# Patient Record
Sex: MALE | Race: BLACK OR AFRICAN AMERICAN | NOT HISPANIC OR LATINO | Employment: UNEMPLOYED | URBAN - METROPOLITAN AREA
[De-identification: names, ages, dates, MRNs, and addresses within clinical notes are randomized per-mention and may not be internally consistent; named-entity substitution may affect disease eponyms.]

---

## 2017-10-19 ENCOUNTER — HOSPITAL ENCOUNTER (EMERGENCY)
Facility: OTHER | Age: 57
Discharge: HOME | End: 2017-10-19
Attending: EMERGENCY MEDICINE
Payer: COMMERCIAL

## 2017-10-19 VITALS
BODY MASS INDEX: 34.1 KG/M2 | OXYGEN SATURATION: 94 % | RESPIRATION RATE: 17 BRPM | TEMPERATURE: 97 F | HEART RATE: 76 BPM | SYSTOLIC BLOOD PRESSURE: 143 MMHG | HEIGHT: 68 IN | WEIGHT: 225 LBS | DIASTOLIC BLOOD PRESSURE: 104 MMHG

## 2017-10-19 DIAGNOSIS — R53.83 FATIGUE: ICD-10-CM

## 2017-10-19 LAB
ANION GAP SERPL CALC-SCNC: 8 MMOL/L
BASOPHILS # BLD AUTO: 0.06 K/UL
BASOPHILS NFR BLD: 0.5 %
BUN SERPL-MCNC: 12 MG/DL
CALCIUM SERPL-MCNC: 9.3 MG/DL
CHLORIDE SERPL-SCNC: 105 MMOL/L
CO2 SERPL-SCNC: 24 MMOL/L
CREAT SERPL-MCNC: 0.9 MG/DL
DIFFERENTIAL METHOD: ABNORMAL
EOSINOPHIL # BLD AUTO: 0.1 K/UL
EOSINOPHIL NFR BLD: 1.2 %
ERYTHROCYTE [DISTWIDTH] IN BLOOD BY AUTOMATED COUNT: 14.2 %
EST. GFR  (AFRICAN AMERICAN): >60 ML/MIN/1.73 M^2
EST. GFR  (NON AFRICAN AMERICAN): >60 ML/MIN/1.73 M^2
GLUCOSE SERPL-MCNC: 142 MG/DL
HCT VFR BLD AUTO: 44.6 %
HGB BLD-MCNC: 15 G/DL
LYMPHOCYTES # BLD AUTO: 2.1 K/UL
LYMPHOCYTES NFR BLD: 18.5 %
MCH RBC QN AUTO: 27.7 PG
MCHC RBC AUTO-ENTMCNC: 33.6 G/DL
MCV RBC AUTO: 82 FL
MONOCYTES # BLD AUTO: 0.5 K/UL
MONOCYTES NFR BLD: 4.2 %
NEUTROPHILS # BLD AUTO: 8.5 K/UL
NEUTROPHILS NFR BLD: 75.3 %
PLATELET # BLD AUTO: 329 K/UL
PMV BLD AUTO: 10 FL
POCT GLUCOSE: 148 MG/DL (ref 70–110)
POTASSIUM SERPL-SCNC: 4 MMOL/L
RBC # BLD AUTO: 5.41 M/UL
SODIUM SERPL-SCNC: 137 MMOL/L
TROPONIN I SERPL DL<=0.01 NG/ML-MCNC: 0.03 NG/ML
WBC # BLD AUTO: 11.24 K/UL

## 2017-10-19 PROCEDURE — 82962 GLUCOSE BLOOD TEST: CPT

## 2017-10-19 PROCEDURE — 93010 ELECTROCARDIOGRAM REPORT: CPT | Mod: ,,, | Performed by: INTERNAL MEDICINE

## 2017-10-19 PROCEDURE — 99284 EMERGENCY DEPT VISIT MOD MDM: CPT | Mod: 25

## 2017-10-19 PROCEDURE — 85025 COMPLETE CBC W/AUTO DIFF WBC: CPT

## 2017-10-19 PROCEDURE — 80048 BASIC METABOLIC PNL TOTAL CA: CPT

## 2017-10-19 PROCEDURE — 84484 ASSAY OF TROPONIN QUANT: CPT

## 2017-10-19 PROCEDURE — 93005 ELECTROCARDIOGRAM TRACING: CPT

## 2017-10-19 RX ORDER — SODIUM CHLORIDE 9 MG/ML
1000 INJECTION, SOLUTION INTRAVENOUS
Status: DISCONTINUED | OUTPATIENT
Start: 2017-10-19 | End: 2017-10-19

## 2017-10-19 NOTE — ED NOTES
"Pt refused orthostatics. Pt wants to leave facility. Pt states, "I was brought here against my own will." Pt encouraged to stay for blood test results. Verbalizes will stay. Daughter and son at bedside.   "

## 2017-10-19 NOTE — ED NOTES
"Pt reports dizziness that started earlier today. "He had a lot of sugar yesterday." Daughter at bedside. Dizziness on standing, sweating "a lot," nausea. Denies chest pain.   "

## 2017-10-19 NOTE — ED NOTES
Pt reports taking 8 medications, but giles snot know the names of them. Has not taken BP meds in a couple days.

## 2017-10-19 NOTE — ED PROVIDER NOTES
Encounter Date: 10/19/2017       History     Chief Complaint   Patient presents with    Fatigue     C/o generalized weakness onset yesterday evening after eating fast food. Pt is diabetic, last metformin this AM. Currently clammy.      Patient is a 58 y/o male with CAD (2 stents), DM and HTN who presents to the ED with generalized fatigue. He reports eating a large amount of sugar yesterday and began feeling dizzy and tired. He receives his care at [facility] but he states his wife is here in the hospital with a knee replacement and his daughter wanted him to get evaluated while here. He reports compliance with Metformin and glipizide. He states he feels flushed and dehydrated. He states his dizziness is worse with lying down and standing up . He denies syncopal episodes. He states this has happened before and he increased his hydration with relief. He states he ran out of his HTN medicine one month ago. He denies CP or SOB. He denies nausea, vomiting, polydipsia, or recent illness.        The history is provided by the patient.     Review of patient's allergies indicates:  No Known Allergies  Past Medical History:   Diagnosis Date    Coronary artery disease     Diabetes mellitus     Hypertension      Past Surgical History:   Procedure Laterality Date    CORONARY ANGIOPLASTY WITH STENT PLACEMENT       History reviewed. No pertinent family history.  Social History   Substance Use Topics    Smoking status: Never Smoker    Smokeless tobacco: Never Used    Alcohol use No     Review of Systems   Constitutional: Positive for diaphoresis and fatigue. Negative for chills and fever.   HENT: Negative for congestion and sore throat.    Eyes: Negative for visual disturbance.   Respiratory: Negative for shortness of breath.    Cardiovascular: Negative for chest pain.   Gastrointestinal: Negative for abdominal pain, diarrhea, nausea and vomiting.   Endocrine: Negative for polydipsia and polyphagia.   Genitourinary:  Negative for decreased urine volume and dysuria.   Musculoskeletal: Negative for back pain.   Skin: Negative for rash.   Neurological: Positive for dizziness. Negative for speech difficulty, numbness and headaches.       Physical Exam     Initial Vitals [10/19/17 1103]   BP Pulse Resp Temp SpO2   (!) 163/95 74 16 97.4 °F (36.3 °C) 100 %      MAP       117.67         Physical Exam    Constitutional: Vital signs are normal. He is cooperative.    male in no acute distress. He is slow to respond which may be secondary to his hearing. He is non-toxic appearing    HENT:   Head: Normocephalic and atraumatic.   Hearing aid to left ear   Eyes: Conjunctivae and EOM are normal. Pupils are equal, round, and reactive to light.   Neck: Normal range of motion. Neck supple.   Cardiovascular: Normal rate and intact distal pulses.   No murmur heard.  Irregular rhythm with PVCs noted on monitor    Pulmonary/Chest: Breath sounds normal. He has no wheezes. He has no rhonchi. He has no rales.   Abdominal: Soft. Bowel sounds are normal. There is no tenderness. There is no rebound and no guarding.   Musculoskeletal: Normal range of motion. He exhibits no edema.   Neurological: GCS eye subscore is 4. GCS verbal subscore is 5. GCS motor subscore is 6.   AAOx4. CN II-XII were intact. Good finger-to-nose task ability. Teddy intact. Strength 5/5 in all extremities. Normal gait   Skin: Skin is warm. Capillary refill takes less than 2 seconds. No rash noted.   Face is mildly clammy    Psychiatric: He has a normal mood and affect. His behavior is normal.         ED Course   Procedures  Labs Reviewed   TROPONIN I - Abnormal; Notable for the following:        Result Value    Troponin I 0.028 (*)     All other components within normal limits    Narrative:     Recoll. [id number] by ODESSA at 10/19/2017 12:14, reason: 3+ hemolysis   notified kyree trinh er @1214   CBC W/ AUTO DIFFERENTIAL - Abnormal; Notable for the following:     Gran # 8.5  (*)     Gran% 75.3 (*)     All other components within normal limits   BASIC METABOLIC PANEL - Abnormal; Notable for the following:     Glucose 142 (*)     All other components within normal limits    Narrative:     Recoll. [id number] by ODESSA at 10/19/2017 12:14, reason: 3+ hemolysis   notified kyree cabrera @1214   POCT GLUCOSE - Abnormal; Notable for the following:     POCT Glucose 148 (*)     All other components within normal limits     EKG Readings: (Independently Interpreted)   Normal sinus rhythm at rate of 78. Normal intervals. Narrow QRS. Inverted T waves in leads III, avF, V5 and V6. Slight ST elevation in lead V2. No previous EKG to compare to          Medical Decision Making:   Initial Assessment:   Urgent evaluation of a 57 y.o. male with a medical history of CAD, HTN and DM presenting to the emergency department complaining of fatigue. Patient is afebrile, nontoxic appearing and hemodynamically stable.  ED Management:  Patient with fatigue and mild diaphoresis. He does not appear ill. EKG reveals possible ischemic changes. We do not have previous EKG to compare to. Will request records from [facility]. Given history, will get basic labs and troponin. POCT glucose is 148. I do not suspect DKA. Blood work does not reveal anemia.   Upon reevaluation, patient states he feels much better. He is adamant on leaving. I have explained to him we are waiting on his remaining lab work and we do not have prior records to compare his EKG to. He wants to leave [place]. I do not suspect ACS. He is stable for discharge and was told we would call with results. BMP reveals hyperglycemia at 142 and troponin is slightly bumped at 0.028.   I have reviewed his old EKGs from [facility] and there are no acute changes. I have called the patient and informed him of the results.                    ED Course      Clinical Impression:     1. Uncontrolled type 2 diabetes mellitus without complication, without long-term current use of insulin     2. Fatigue                             Barney Seals PA-C  10/19/17 1937

## 2019-01-23 DIAGNOSIS — I69.30 LATE EFFECT OF STROKE: Primary | ICD-10-CM

## 2019-01-28 ENCOUNTER — TELEPHONE (OUTPATIENT)
Dept: REHABILITATION | Facility: HOSPITAL | Age: 59
End: 2019-01-28

## 2019-01-28 ENCOUNTER — CLINICAL SUPPORT (OUTPATIENT)
Dept: REHABILITATION | Facility: HOSPITAL | Age: 59
End: 2019-01-28
Payer: MEDICARE

## 2019-01-28 ENCOUNTER — CLINICAL SUPPORT (OUTPATIENT)
Dept: REHABILITATION | Facility: HOSPITAL | Age: 59
End: 2019-01-28
Attending: PSYCHIATRY & NEUROLOGY
Payer: MEDICARE

## 2019-01-28 DIAGNOSIS — R29.898 WEAKNESS OF RIGHT UPPER EXTREMITY: ICD-10-CM

## 2019-01-28 DIAGNOSIS — Z74.09 IMPAIRED MOBILITY AND ACTIVITIES OF DAILY LIVING: ICD-10-CM

## 2019-01-28 DIAGNOSIS — I69.30 LATE EFFECT OF STROKE: ICD-10-CM

## 2019-01-28 DIAGNOSIS — R47.01 GLOBAL APHASIA: ICD-10-CM

## 2019-01-28 DIAGNOSIS — Z78.9 IMPAIRED MOBILITY AND ACTIVITIES OF DAILY LIVING: ICD-10-CM

## 2019-01-28 DIAGNOSIS — Z74.09 IMPAIRED FUNCTIONAL MOBILITY, BALANCE, GAIT, AND ENDURANCE: ICD-10-CM

## 2019-01-28 DIAGNOSIS — R29.898 WEAKNESS OF RIGHT LEG: ICD-10-CM

## 2019-01-28 PROCEDURE — G8987 SELF CARE CURRENT STATUS: HCPCS | Mod: CL,PO | Performed by: OPTOMETRIST

## 2019-01-28 PROCEDURE — G8979 MOBILITY GOAL STATUS: HCPCS | Mod: CL,PO

## 2019-01-28 PROCEDURE — G8988 SELF CARE GOAL STATUS: HCPCS | Mod: CK,PO | Performed by: OPTOMETRIST

## 2019-01-28 PROCEDURE — G8978 MOBILITY CURRENT STATUS: HCPCS | Mod: CM,PO

## 2019-01-28 PROCEDURE — 92523 SPEECH SOUND LANG COMPREHEN: CPT | Mod: PO

## 2019-01-28 PROCEDURE — 97166 OT EVAL MOD COMPLEX 45 MIN: CPT | Mod: PO | Performed by: OPTOMETRIST

## 2019-01-28 PROCEDURE — 97162 PT EVAL MOD COMPLEX 30 MIN: CPT | Mod: PO

## 2019-01-28 PROCEDURE — 97530 THERAPEUTIC ACTIVITIES: CPT | Mod: PO | Performed by: OPTOMETRIST

## 2019-01-28 NOTE — PLAN OF CARE
"Date: 1/28/2019   Start Time:  1350  Stop Time:  1435     Name: Torito Harris   MRN: [id number]             Therapy Diagnosis: No diagnosis found. Physician: Ian Feliciano MD  Physician Orders: Amb Referral to speech therapy   Medical Diagnosis from Referral: I69.30 (ICD-10-CM) - Late effect of stroke     Visit #/Visits authorized: 1/ 10  Date of Evaluation:  1/28/2019   Insurance Authorization Period: 12/31/19   Plan of Care Expiration:   1/28/2019 to 3/25/19     Time In: 1350  Time Out: 1435  Total Billable Time: 45 minutes  Procedure Min.   Speech- Language Evaluation    45   Total Billable Time: 45     Precautions:Standard and Fall  Subjective   Date of Onset: 12/26/17  History of Current Condition: Pt's wife present and acts as a historian for the patient. She reports that patient suffered a stroke in December of 2017. He spent several months at [facility] before discharge home where he had been receiving speech therapy via home health. Pt's wife reports that patient "does not speak well" and has difficulty with his memory.   Past Medical History: Torito Harris  has a past medical history of Coronary artery disease, Diabetes mellitus, and Hypertension.  Torito Harris  has a past surgical history that includes Coronary angioplasty with stent.  Medical Hx and Allergies:  Torito currently has no medications in their medication list. Review of patient's allergies indicates:  No Known Allergies  Imaging: No Imaging    Prior Therapy:  Inpatient rehabilitation and home health that ended "recently"  Social History:  Pt lives with their family (his wife and daughter). Pt has one daughter (age 16) and one son (age 24).  Occupation: Prior to CVA, pt was a .   Prior Level of Function: Independent    Current Level of Function: Requires 24/7 assist at home from family   Pain:   0/10  Pain Location / Description: N/A; no pain reported  Nutrition:  Regular/Thin Liquid   Patient's Therapy Goals:  " ""speak"  Objective   Western Aphasia Battery - Revised (WAB-R) was administered to evaluate the patient's receptive and expressive language function.The purpose stated in the manual for the WAB-R is to determine the presence, severity, and type of aphasia; measure the patient's level of performance to provide a baseline for detecting change over time; provide a comprehensive assessment of the patients language assests and deficits in order to guide treatment and management; infer the location and etiology of the lesion causing the aphasia.  The following results were revealed:      Spontaneous Speech Score:           6 / 20  Auditory Comprehension Score:    6.1 / 10  Repetition Score:                              4.4 /10  Naming and Word Finding Score: 4.1/ 10  Aphasia Quotient (AQ):                    41.2 / 100  Aphasia Classification: Global      CM NOMS (National Outcome Measure System):   Spoken Language Expression  LEVEL 3 The communication partner must assume responsibility for structuring thecommunication exchange, and with consistent and moderate cueing, the individual canproduce words and phrases that are appropriate and meaningful in context.     LEVEL 4: The individual consistently responds accurately to simple yes/no questions andoccasionally follows simple directions without cues. Moderate contextual support isusually needed to understand complex sentences/messages. The individual is able tounderstand limited conversations about routine daily activities with familiarcommunication partners.      .  Treatment   Education: POC, role of SLP and scheduling/ cancellation policy  was discussed with pt. Patient and family members expressed understanding.      Home Program: Not established at this time.  Assessment   Torito is a 58 y.o. male referred to outpatient SpeechTherapy with a medical diagnosis of CVA. Pt presents with global aphasia c/b reduced auditory verbal comprehension, reduced fluency, reduced " repetition, reduced naming & word finding.  Demonstrates impairments including limitations as described in the problem list. Positive prognostic factors include motivation and family support. Negative prognostic factors include time since onset.Barriers to progress include possible transportation from family. Patient will benefit from skilled, outpatient neurological rehabilitation speech therapy.     Rehab Potential: good  Pt's spiritual, cultural and educational needs considered and pt agreeable to plan of care and goals.     Short Term Goals (4 weeks):   1. Pt will complete R/L body part discrimination tasks with 70% Carmen.   2. Pt will follow 3 unit body commands with 80% acc ind.  3. Pt will follow 3 unit commands with visual stimuli with 70% Carmen.  4. Pt will name common objects with 90% Carmen.  5. Pt will generate a response to basic sentence completions with 80% Carmen .  6. Pt will name 5 concrete category items with Carmen.   7. Pt will repeat  3-5 word phrases with 80% Carmen.   8. Assess reading and writing skills.     Long Term Goals (8 weeks):   1. Pt will comprehend communication related to basic medical and social needs and utilize compensatory strategies to maintain safety and participate socially in functional living environment.     2. The patient will develop functional, cognitive-linguistic based skills and utilize compensatory strategies to communicate wants and needs effectively to different conversation partners, maintain safety and participate socially in functional living environment   3. Pt will develop functional reading and writing skills and utilize compensatory strategies to maintain safety during ADL's and read and understand everyday adult material independently.         Plan   Plan of Care Certification: 1/28/2019  to 3/25/19  Recommended Treatment Plan:  Patient will participate in the [facility] neurological rehabilitation program for speech therapy 2 times per week to address his   Communication, Cognition and Motor Speech deficits, to educate patient and their family, and to participate in a home exercise program.     Other Recommendations: None at this time     Therapist's Name:     Farrah Atkinson M.S. CCC-SLP  Speech Language Pathologist     Date: 1/28/2019

## 2019-01-28 NOTE — PROGRESS NOTES
"Outpatient Neurological Rehabilitation  Speech and Language Therapy Evaluation    Date: 1/28/2019   Start Time:  1350  Stop Time:  1435    Name: Torito Harris   MRN: [id number]    Therapy Diagnosis: No diagnosis found. Physician: Ian Feliciano MD  Physician Orders: Amb Referral to speech therapy   Medical Diagnosis from Referral: I69.30 (ICD-10-CM) - Late effect of stroke    Visit #/Visits authorized: ***/ ***  Date of Evaluation:  1/28/2019   Insurance Authorization Period: ***   Plan of Care Expiration:   1/28/2019 to 2/25/19    Time In: 1350  Time Out: 1435  Total Billable Time: 45 minutes  Procedure Min.   Speech- Language Evaluation    45   Total Billable Time: 45    Precautions:Standard and Fall  Subjective   Date of Onset: 12/26/17  History of Current Condition:   Pt's wife present and acts as a historian for the patient. She reports that patient suffered a stroke in December of 2017. He spent several months at [facility] before discharge home where he had been receiving speech therapy via home health. Pt's wife reports that patient "does not speak well".  Past Medical History: Torito Harris  has a past medical history of Coronary artery disease, Diabetes mellitus, and Hypertension.  Torito Harris  has a past surgical history that includes Coronary angioplasty with stent.  Medical Hx and Allergies:  Torito currently has no medications in their medication list. Review of patient's allergies indicates:  No Known Allergies  Imaging: No Imaging ***   Prior Therapy:  Inpatient rehabilitation and home health that ended "recently"  Social History:  Pt lives with their family (his wife and daughter). Pt has one daughter (age 16) and one son (age 24).  Occupation: Prior to CVA, pt was a .   Prior Level of Function: Independent    Current Level of Function: ***  Pain:   0/10  Pain Location / Description: ***  Nutrition:  ***  Patient's Therapy Goals:  ***  Objective   Western Aphasia Battery - Revised " "(WAB-R) was administered to evaluate the patient's receptive and expressive language function.The purpose stated in the manual for the WAB-R is to determine the presence, severity, and type of aphasia; measure the patient's level of performance to provide a baseline for detecting change over time; provide a comprehensive assessment of the patients language assests and deficits in order to guide treatment and management; infer the location and etiology of the lesion causing the aphasia.  The following results were revealed:     Spontaneous Speech Score: 6 / 20  Auditory Comprehension Score: 6.1 / 10  Repetition Score:   4.4 /10  Naming and Word Finding Score: 4.1/ 10  Aphasia Quotient (AQ):   41.2 / 100  Aphasia Classification: Global     CM NOMS (National Outcome Measure System):   Severity Modifier for Medicare G-Code:  ***  Treatment   Treatment Time In: {Blank single:19197::"n/a"}  Treatment Time Out: {Blank single:19197::"n/a"}  Total Treatment Time: {Blank single:19197::"*** minutes","n/a"}  {Blank single:19197::"n/a"}    Education: POC, role of SLP and scheduling/ cancellation policy  was discussed with pt. Patient and family members expressed understanding.     Home Program: Not established at this time.  Assessment   Torito is a 58 y.o. male referred to outpatient SpeechTherapy with a medical diagnosis of ***. Pt presents with ***  Demonstrates impairments including limitations as described in the problem list. Positive prognostic factors include ***. Negative prognostic factors include ***.Barriers to progress include ***. Patient will benefit from skilled, outpatient neurological rehabilitation speech therapy.    Rehab Potential: good  Pt's spiritual, cultural and educational needs considered and pt agreeable to plan of care and goals.    Short Term Goals (4 weeks):   ***  Long Term Goals (8 weeks):   ***    Plan   Plan of Care Certification: 1/28/2019  to ***  Recommended Treatment Plan:  Patient will " participate in the [facility] neurological rehabilitation program for speech therapy {NUMBER 1-5:67066} times per week to address his  {SLP TREATMENT AREAS:[id number]} deficits, to educate patient and their family, and to participate in a home exercise program.    Other Recommendations: ***    Therapist's Name:   Farrah Atkinson CCC-SLP     Date: 1/28/2019

## 2019-01-28 NOTE — PLAN OF CARE
[facility]  Physical Therapy Neurological Rehabilitation Initial Evaluation    Name: Torito Harris  Clinic Number: [id number]    Therapy Diagnosis:   Encounter Diagnoses   Name Primary?    Weakness of right leg     Impaired functional mobility, balance, gait, and endurance      Physician: Ian Feliciano MD    Physician Orders: PT Eval and Treat   Medical Diagnosis from Referral: Late effect of stroke  Evaluation Date: 1/28/2019  Authorization Period Expiration: 01/26/19-12/31/19  Plan of Care Expiration: 01/28/19 - 03/25/19  Visit # / Visits authorized: 01/ 10 (Vist # 1 of 2019)    Time In: 16:00  Time Out: 16:45  Total Billable Time: 45 minutes    Precautions: Standard, Diabetes, Fall and right hemiplegia, hearing aid, safety awareness, cognitive deficits, speech deficits    Subjective   Date of onset: ~1 year ago (December 2017)  History of current condition - Torito's wife present to assist with history. Patient and his wife report that he suffered a CVA ~ 1 year ago. He has been participating in  PT, OT, and SLP since that time, but was recently discharged. He currently requires (A) with toileting, bathing, dressing; (S) for stair negotiation; S to Mod I for functional transfers; S to Mod I for ambulation with abigail walker; S for driving short distances. Pt was active and completely (I) prior to his CVVA. He would like to continue to address remaining mobility deficits to continue to improve his (I) and safety with functional mobility.      Medical History:   Past Medical History:   Diagnosis Date    Coronary artery disease     Diabetes mellitus     Hypertension    No PMH of cancer    Surgical History:   Torito Harris  has a past surgical history that includes Coronary angioplasty with stent.    Medications:   Torito currently has no medications in their medication list.    Allergies:   Review of patient's allergies indicates:  No Known Allergies     Imaging: none  "present on file in [facility]    Prior Therapy:  PT/OT/SLP, just completed  Social History: lives with his wife and 15 y/o daughter; SSH, 7 steps total to enter home (steps outside, RHR; 2 steps to step down once inside the home); pt able to perform steps with abigail walker and SBA  Falls: 0  DME:  hospital bed, abigail walker, w/c, TTB  Home Environment: see above  Exercise Routine / History: none currently   Family Present at time of Eval: wife present and able to assist with history  Occupation: was working as a  prior to CVA; not currently working since CVA  Prior Level of Function: (I) with all functional mobility/ADL, working, driving, running errands  Current Level of Function: (A) ADL,S to Mod I for functional mobility, Mod I to S for ambulation with abigail walker, driving short distances with supervision; mother performs cooking and cleaning and running errands    Pain:  Current 0/10, worst 0/10, best 0/10     Pts goals: "To move better and walk better."    Objective     Mental status: alert, oriented to person, place, and time  Appearance: Casually dressed  Behavior:  cooperative  Attention Span and Concentration:  Grossly intact    Dominant hand:  right     Posture Alignment in sitting:   Head: forward head   Scapulae: shoulders rest in abduction, R scapula with increased winging compared to L side  Trunk: trunk deviated right   Pelvis: posterior pelvic tilt   Legs: right LE rests in ER but able to rest in neutral upon cues to fix posture    Posture Alignment in standing:   Head: forward head   Scapulae: shoulders rest in abduction, R scapula with increased winging compared to L side   Trunk: trunk deviated right, anterior forward lean  Pelvis: R pelvic drop  Legs: decreased weight bearing through RLE,  Increased R knee flexion in stance, limited R hip extension in stance     Sensation: Light Touch: Impaired: throughout RUE and RLE         Tone: gross BLE tone WFL    Visual/Auditory: reports some " blurriness since stroke  Tracking:Impaired: slight impairment with tracking in diagonal planes  Saccades: NT  Acuity:Intact  R/L discrimination: Intact  Visual field: slight L visual field neglect    Coordination:   - fine motor: see OT eval  - UE coordination: see OT eval    - LE coordination:  Not tested this date    ROM:   UPPER EXTREMITY--AROM/PROM  (R) UE: limited as follows: AROM limited with shoulder flexion, shoulder AB, elbow flexion, elbow extension, and wrist extension; PROM limited with shoulder AB and elbow extension; see OT eval for further detail  (L) UE: WNLs           RANGE OF MOTION--LOWER EXTREMITIES  (R) LE Hip: full   Knee: full   Ankle: DF limited to neutral     (L) LE: Hip: normal   Knee: normal   Ankle: normal    Strength: manual muscle test grades below   Upper Extremity Strength - see OT evaluation for more detail    Lower Extremity Strength  Right LE  Left LE    Hip Flexion: 4/5 Hip Flexion: 5/5   Hip Extension:  4-/5 Hip Extension: 5/5   Hip Abduction: 3/5 Hip Abduction: 5/5   Hip Adduction: 3/5 Hip Adduction 5/5   Knee Extension: 4/5 Knee Extension: 5/5   Knee Flexion: 4/5 Knee Flexion: 5/5   Ankle Dorsiflexion: 0/5 Ankle Dorsiflexion: 5/5   Ankle Plantarflexion: 2-/5 Ankle Plantarflexion: 5/5     Abdominal Strength: At least  3/5    Flexibility: Limited R gastroc/soleus; however, R GS tightness allows for functional R ankle clearance during gait     Evaluation   30 second Chair Rise  (adults > 59 y/o) 11 completed with SWETHA abigail walker, CGA     Gait Assessment:   - AD used: abigail walker on L side  - Assistance: SBA  - Distance: at least 200 ft, able to turn 90 deg through doorways and wound obstacles    GAIT DEVIATIONS:  Torito displays the following deviations with ambulation: decreased step length, decreased marcelo, decreased time in stance in RLE,  decreased step length of LLE, R anterior and lateral trunk flexion, increased R knee flexion during weight acceptance, R foot flat at IC,  decreased R hip extension during weight acceptance and during late stance, no reciprocal arm swing    Impairments contributing to deviations: impaired motor control, decreased strength, balance deficits, and muscular endurance deficits.     Endurance Deficit: moderate     Evaluation   Timed Up and Go 34 sec c/ abigail walker c/ SBA   Self Selected Walking Speed 0.33 m/sec (6m/18s)  C/ abigail walker c/ SBA   Fast Walking Speed NT       Functional Mobility (Bed mobility, transfers)  Bed mobility: Mod I  Supine to sit: Mod I  Sit to supine: Mod I  Transfers to bed: S to Mod I  Transfers to toilet: pt uses diapers  Sit to stand: S to Mod I  Stand pivot: S to Mod I  Car transfers: S to Mod I  Wheelchair mobility: dependent    ADL's: see OT evaluation for more detail  Feeding: Mod I  Grooming: (A) required  Hygiene: (A) required  UB Dressing: (A) required  LB Dressing: (A) required  Toileting: (A) required, pt uses diapers  Bathing: (A) required, bed bath    Functional Limitations Reports - G Codes  Category: Mobility   Tool: SSWS  Score: 0.33 m/sec   Current: CM at least 80% < 100% impaired, limited or restricted  Goal: CL at least 60% < 80% impaired, limited or restricted      CMS Impairment/Limitation/Restriction for FOTO for Cerebrovascular Disorders Survey    Therapist reviewed FOTO scores for Torito Harris on 1/28/2019.   FOTO documents entered into [employer] - see Media section.    Limitation Score: 72%         TREATMENT   No treatment provided this date.     Home Exercises and Patient Education Provided    Education provided: POC review, scheduling    Written Home Exercises Provided: to be provided at first follow up session.    Assessment   Torito is a 58 y.o. male referred to outpatient Physical Therapy with a medical diagnosis of late effect of CVA.  Pt presents with R sided hemiparesis, impaired gait, impaired balance, impaired endurance, and impaired functional mobility. RLE strength is notably weaker compared to  LLE strength, with most significant deficits present at R ankle. Pt demonstrates good endurance on chair rise test, but he required 1 UE support and CGA to perform test safely. Pt's current TUG score places him at a high fall risk for household ambulation. Additionally, his SSWS score places him at an increased fall risk for community ambulation. During ambulation in closed hallway, pt required VCs to avoid obstacles on L side due to mild L sided neglect. At this time, pt's mobility appears most limited by his R sided strength deficits, decreased weight acceptance through LLE in stance/ambulation, and  limited muscular endurance. Pt's mobility limitations are compounded by his decreased safety awareness, his visual neglect, and impaired sensation. Pt to benefit from continued PT intervention to address mobility deficits listed above, to improve safety and independence with functional mobility, and to decrease risk of fall.     Pt prognosis is Good.   Pt will benefit from skilled outpatient Physical Therapy to address the deficits stated above and in the chart below, provide pt/family education, and to maximize pt's level of independence.     Plan of care discussed with patient: Yes  Pt's spiritual, cultural and educational needs considered and patient is agreeable to the plan of care and goals as stated below:     Anticipated Barriers for therapy: hearing deficit, speech deficits, cognitive deficits, decreased safety awareness, fall risk, visual neglect, transportation assistance required    Medical Necessity is demonstrated by the following  History  Co-morbidities and personal factors that may impact the plan of care Co-morbidities:   diabetes, transportation assistance required, young age, coronary artery disease, HTN, s/p coronary angioplasty c/ stenting    Personal Factors:   education level     high   Examination  Body Structures and Functions, activity limitations and participation restrictions that may  impact the plan of care Body Regions:   lower extremities  upper extremities  trunk    Body Systems:    gross symmetry  ROM  strength  gross coordinated movement  balance  gait  transfers  transitions  motor control  motor learning    Participation Restrictions:   Limited household and community mobility  No current HEP  Poor safety awareness  Hearing deficits  Visual deficits  Fall risk    Activity limitations:   Learning and applying knowledge  listening    General Tasks and Commands  undertaking multiple tasks    Communication  communicating with/receiving spoken language    Mobility  lifting and carrying objects  fine hand use (grasping/picking up)  walking  driving (bike, car, motorcycle)    Self care  washing oneself (bathing, drying, washing hands)  caring for body parts (brushing teeth, shaving, grooming)  toileting  dressing  looking after one's health    Domestic Life  shopping  cooking  doing house work (cleaning house, washing dishes, laundry)  assisting others    Interactions/Relationships  no deficits    Life Areas  employment    Community and Social Life  community life  recreation and leisure         high   Clinical Presentation evolving clinical presentation with changing clinical characteristics moderate   Decision Making/ Complexity Score: moderate     Goals:  Short Term Goals: 4 weeks   1. Pt to be (I) with established HEP  2. Pt to improve R hip flexion strength MMT score to at least 4+/5 for improved gait mechanics  3. Pt to improve R hip extension strength MMT score to at least 4/5 for improved gait mechanics  4. Pt to improve R hip AB and R hip AD strength MMT scores to at least 3+/5 for improved gait mechanics  5.  Pt to improve R knee flexion and extension strength MMT scores to at least 4+/5 for improved gait mechanics  6. Pt to improve R ankle PF strength MMT score to at least 2/5 for improved gait mechanics  7. Pt to improve chair rise score to at least 5 times with no more than 1 UE  support and S for improved endurance and safety with functional transfers  8. Pt to improve TUG score to at least 30 seconds for improved safety with household mobility  9. Pt to improve SSWS score to at least 0.40 m/sec for improved safety with community mobility.     Long Term Goals: 8 weeks   1. Pt to be (I) with advanced HEP  2. Pt to improve R hip extension strength MMT score to at least 4+/5 for improved gait mechanics  3. Pt to improve R hip AB and R hip AD strength MMT scores to at least 4-/5 for improved gait mechanics  4. Pt to improve R ankle PF strength MMT score to at least 2+/5 for improved gait mechanics  5. Pt to improve chair rise score to at least 5 times with no UE support and S for improved endurance and safety with functional transfers  6. Pt to improve TUG score to at least 26 seconds for improved safety with household mobility  7. Pt to improve SSWS score to at least 0.50 m/sec for improved safety with community mobility.       Plan   Plan of care Certification: 1/28/2019 to 03/25/19.    Outpatient Physical Therapy 2 times weekly for 4 weeks to include the following interventions: Gait Training, Manual Therapy, Neuromuscular Re-ed, Orthotic Management and Training, Patient Education, Self Care, Therapeutic Activites and Therapeutic Exercise.     Hanh Brooke, PT

## 2019-01-28 NOTE — TELEPHONE ENCOUNTER
SLP called pt to reschedule appt 2/2 originally scheduled time was no longer available.   Pt's wife accepted new time.   1/28/19 at 1345 at [address] [place] [address]

## 2019-01-29 NOTE — PLAN OF CARE
[facility] Occupational Therapy  Initial Neurological Evaluation     Date: 1/28/2019  Patient: Torito Harris  Chart Number: [id number]    Therapy Diagnosis:   Encounter Diagnoses   Name Primary?    Impaired mobility and activities of daily living     Weakness of right upper extremity      Physician: Ian Feliciano MD    Physician Orders: Evaluate and treat  Medical Diagnosis: L CVA  Evaluation Date: 1/28/2019  Plan of Care Expiration Period: 3/11/2019  Insurance Authorization period Expiration: 12/31/2019  Date of Return to MD: Unknown at this time  Visit # / Visits Authorized: 1 / 10    Time In:1430  Time Out: 1515  Total Billable (one on one) Time: 45 minutes    Precautions: Standard    Subjective     History of Current Condition: Pt suffered a L CVA 12/6/2017 and was treated at [facility] for Acute care and InPt Rehab. Since discharge home in February of 2018 he has had only home health services.     Involved Side: Right  Dominant Side: Right  Date of Onset: 12/6/2017  Surgical Procedure: n/a  Imaging: unknown  Previous Therapy: PT, OT, Speech for InPt and HH.     Patient's Goals for Therapy: Pt states that he would like to improve use of his R UE for tasks.     Pain:  Pain Related Behaviors Observed: no   Functional Pain Scale Rating 0-10:   0/10 on average  n/a/10 at best  4/10 at worst  Location: R shoulder during PROM  Description: Aching    Occupation:  Pt worked as a body and fender repairman   Working presently: on disability As Pt has not worked since 12/17.       Functional Limitations/Social History:    Prior Level of Function: Prior to CVA in 12/17 he Independent with all ADLs and IADLs and working full time  Current Level of Function: Able to ambulate with Supervision but needs assist with dressing and bathing. Performs grooming and feeding tasks with non-dominant hand     Home/Living environment : lives with their spouse  Home Access: One story home with 5 ROBERT with  railing of L   DME: transfer tub bench, wheelchair and hospital bed and abigail walker     Leisure: Fishing    Driving: Pt currently not driving but has attempted driving short distances using his L foot to operate pedals. He was told not to attempt to drive again until he is tested for driving.     Past Medical History/Physical Systems Review:     Past Medical History:  Torito Harris  has a past medical history of Coronary artery disease, Diabetes mellitus, and Hypertension.    Past Surgical History:  Torito Harris  has a past surgical history that includes Coronary angioplasty with stent.    Current Medications:  Torito currently has no medications in their medication list.    Allergies:  Review of patient's allergies indicates:  No Known Allergies     Objective     Cognitive Exam:  Oriented: Person and Place as Pt was confused with month and year  Behaviors: friendly and cooperative  Follows Commands/attention: Follows one-step commands  Communication: expressive aphasia and has a component of receptive aphasia  Memory: Impaired STM as determined by 3 word recall after 1 minute and 3 minutes  Safety awareness/insight to disability: aware of diagnosis, treatment, and prognosis  Coping skills/emotional control: Appropriate to situation    Visual/Perceptual:  Tracking: intact  Saccades: intact  Acuity: intact  R/L discrimination: intact  Visual field: intact  Motor Planning Praxis: intact with L UE     Physical Exam:  Postural examination/scapula alignment: Rounded shoulder, Affected scapula depressed and Affected scapula downwardly rotated  Joint integrity: Subluxation of 1 finger on R  Skin integrity: Intact  Edema: Mild in R hand    Joint Evaluation  AROM  1/28/2019 AROM  1/28/2019 PROM   1/28/2019    Left Right Right   Shoulder flex 0-180 WNL  throughout 40 140   Shoulder Abd 0-180  25 120   Shoulder ER 0-90  50 90   Shoulder IR 0-90  30 80   Shoulder Extension 0-80  50 80   Elbow flex/ext 0-150  10-90 0-140      Fist: Pt able to make loose fist with increased time      Strength 1/28/2019 1/28/2019   **within available ROM** Left Right   Shoulder flex 4+/5 2-/5   Shoulder abd 4+/5 2-/5   Shoulder ER 4/5 2/5   Shoulder IR 4/5 2/5   Shoulder Extension 4+/5 2+/5   Elbow flex 4+/5 2+/5   Elbow ext 4+/5 2+/5   Wrist flex 4+/5 2+/5   Wrist ext 4+/5 2+/5      Strength: (CLAUDE Dynamometer in lbs.) Average 3 trials, Position II:     1/28/2019 1/28/2019    Left Right   Rung II 86# 15#       Fine Motor Coordination: 9 Hole Peg Test  Left 1/28/2019 Right 1/28/2019      Unable to perform     Gross motor coordination:    - MORRIS (Rapid Alternating Movements): Normal on L. Delayed on R at wrist for supination/pronation   - Finger to Nose (5 times): Unable on R   - Finger Flicks (coordination moving from digit flexion to digit extension): slow movement on R, unable to flick digits     Tone:  Modified Elmer Scale:   0 - No increase in muscle tone    Comments: Pt actually has low tone in R shoulder and upper arm     Sensation:  Torito  reports sensation is intact throughout    Balance:   Static Sit - FAIR+: Able to take MINIMAL challenges from all directions  Dynamic sit- FAIR+: Able to take MINIMAL challenges from all directions  Static Stand - FAIR: Maintains without assist, but unable to take any challenges   Dynamic stand - FAIR-: Maintains without assist but inconsistent     Endurance Deficit: mild                    Functional Status      Functional Mobility:  Bed mobility: Mod I  Roll to left: Mod I  Roll to right: Mod I  Supine to sit: Mod I  Sit to supine: Mod I  Transfers to bed: Mod I  Transfers to toilet: Mod I  Car transfers: Min A  Wheelchair mobility: Min A    ADL's:  Feeding: Mod I as pt uses non dominant L UE for task  Grooming: Mod I (as above)   Hygiene: Mod I ( as above)  UB Dressing: Mod A  LB Dressing: Max A  Toileting: Mod A  Bathing: Min A    IADL's:  Homecare: D  Cooking: D  Laundry: D  Yard work: D  Use of  telephone: Min A  Money management: D  Medication management: D, Pt's wife sets up for him  Handwriting:Mod I (minimal writing with L hand)   Technology Use: Min A using L hand only     Comments: Pt has expressive aphasia which also limits use of telephone      Therapist reviewed FOTO scores for Torito Harris on 1/28/2019.   FOTO documents entered into [facility] - see Media section.    CMS Impairment/Limitation/Restriction for FOTO Cerebrovascular Disorders Survey  Status Limitation G-Code CMS Severity Modifier  Intake 28% 72% Current Status CL - At least 60 percent but less than 80 percent  Predicted 41% 59% Goal Status+ CK - At least 40 percent but less than 60 percent  D/C Status CL **only report if this is a one time visit  +Based on FOTO predicted change score         Treatment     Treatment Time In: 1505  Treatment Time Out: 1515  Total Treatment time separate from Evaluation time:10 min    Torito participated in dynamic functional therapeutic activities to improve functional performance for 10 minutes, including:  - AAROM and PROM to L UE with emphasis on long stretch at end ranges. Pt was encouraged to practice more opening of his R hand and perform less gripping and flexion as to not interfere with his ability to open his hand.     Home Exercises and Patient Education Provided    Education provided:   -role of OT, goals for OT, scheduling/cancellations, insurance limitations with patient.  -Additional Education provided: Instructions to not use his R hand for gripping tasks and to focus more of opening of hand and functional movements of R wrist and elbow.     Assessment     Torito Harris is a 58 y.o. male referred to outpatient occupational therapy and presents with a medical diagnosis of L CVA, resulting in decreased flexibility, decreased range of motion, decreased muscle strength, impaired function and decreased work ability and demonstrates limitations as described in the chart below. Following  medical record review it is determined that pt will benefit from occupational therapy services in order to maximize pain free and/or functional use of right UE.    Pt prognosis is Good due to Pt motivated to participate and good family support from his spouse.   Pt will benefit from skilled outpatient Occupational Therapy to address the deficits stated above and in the chart below, provide pt/family education, and to maximize pt's level of independence.     Plan of care discussed with patient: Yes  Pt's spiritual, cultural and educational needs considered and patient is agreeable to the plan of care and goals as stated below:     Anticipated Barriers for therapy: None noted at this time     Medical Necessity is demonstrated by the following  Profile and History Assessment of Occupational Performance Level of Clinical Decision Making Complexity Score   Occupational Profile:   Torito Harris is a 58 y.o. male who lives with their spouse and is currently disability . Torito Harris has difficulty with  bathing and dressing  driving/transportation management and housework/household chores  affecting his/her daily functional abilities. His/her main goal for therapy is to improve functional use of his R hand and arm.     Comorbidities:   diabetes and history of CVA    Medical and Therapy History Review:   Expanded               Performance Deficits    Physical:  Joint Stability  Muscle Power/Strength  Muscle Endurance  Control of Voluntary Movement   Strength  Gross Motor Coordination  Fine Motor Coordination    Cognitive:  Orientation  Memory    Psychosocial:    Social Interaction     Clinical Decision Making:  moderate    Assessment Process:  Problem-Focused Assessments    Modification/Need for Assistance:  Minimal-Moderate Modifications/Assistance    Intervention Selection:  Several Treatment Options       moderate  Based on PMHX, co morbidities , data from assessments and functional level of assistance  required with task and clinical presentation directly impacting function.       The following goals were discussed with the patient and patient is in agreement with them as to be addressed in the treatment plan.     Goals:  Short Term Goals: 3 weeks   ROM/Strength to perform the ADL's and functional activities listed below,   Pt will improve functional  strength needed for tasks to 20# in R hand.  Pt will improve AROM for shoulder flexion needed for functional tasks to 60* in R UE.   UE dressing will improve to Min A   LB dressing will improve to Mod A   Toileting will improve to Min A incorporating R hand for task   Pt will be Independent with HEP to improve ROM and FM skills.      Long Term Goals: 6 weeks   ROM/Strength to perform the ADL's and functional activities listed below  Pt will improve functional  strength needed for tasks to 30# in R hand.  Pt will improve AROM for shoulder flexion needed for functional tasks to 80* in R UE.   UE dressing will improve to Supervision   LB dressing will improve to Min A   Toileting will improve to Supervision incorporating R hand for task   Pt will complete clinical testing for skills needed for driving  G code self care CK    Plan   Certification Period/Plan of care expiration: 1/28/2019 to 3/11/2019.    Outpatient Occupational Therapy 2 times weekly for 6 weeks to include the following interventions: Patient Education, Self Care, Therapeutic Activites and Therapeutic Exercise.    TIAGO Carlisle       I certify the need for these services furnished under this plan of treatment and while under my care.  ____________________________________ Physician/Referring Practitioner   Date of Signature

## 2019-01-30 PROBLEM — I69.30 LATE EFFECT OF STROKE: Status: ACTIVE | Noted: 2019-01-30

## 2019-01-30 PROBLEM — R47.01 GLOBAL APHASIA: Status: ACTIVE | Noted: 2019-01-30

## 2019-01-30 PROBLEM — I69.30 LATE EFFECT OF STROKE: Status: RESOLVED | Noted: 2019-01-30 | Resolved: 2019-01-30

## 2019-02-04 ENCOUNTER — DOCUMENTATION ONLY (OUTPATIENT)
Dept: REHABILITATION | Facility: HOSPITAL | Age: 59
End: 2019-02-04

## 2019-02-05 ENCOUNTER — CLINICAL SUPPORT (OUTPATIENT)
Dept: REHABILITATION | Facility: HOSPITAL | Age: 59
End: 2019-02-05
Attending: PSYCHIATRY & NEUROLOGY
Payer: MEDICARE

## 2019-02-05 DIAGNOSIS — Z74.09 IMPAIRED MOBILITY AND ACTIVITIES OF DAILY LIVING: ICD-10-CM

## 2019-02-05 DIAGNOSIS — Z74.09 IMPAIRED FUNCTIONAL MOBILITY, BALANCE, GAIT, AND ENDURANCE: ICD-10-CM

## 2019-02-05 DIAGNOSIS — R29.898 WEAKNESS OF RIGHT LEG: ICD-10-CM

## 2019-02-05 DIAGNOSIS — R29.898 WEAKNESS OF RIGHT UPPER EXTREMITY: ICD-10-CM

## 2019-02-05 DIAGNOSIS — R47.01 GLOBAL APHASIA: ICD-10-CM

## 2019-02-05 DIAGNOSIS — Z78.9 IMPAIRED MOBILITY AND ACTIVITIES OF DAILY LIVING: ICD-10-CM

## 2019-02-05 PROCEDURE — 97112 NEUROMUSCULAR REEDUCATION: CPT | Mod: PO,59

## 2019-02-05 PROCEDURE — 97110 THERAPEUTIC EXERCISES: CPT | Mod: PO

## 2019-02-05 PROCEDURE — 92507 TX SP LANG VOICE COMM INDIV: CPT | Mod: PO

## 2019-02-05 NOTE — PROGRESS NOTES
"  Physical Therapy Daily Treatment Note     [id number]    Therapy Diagnosis:   Encounter Diagnoses   Name Primary?    Weakness of right leg     Impaired functional mobility, balance, gait, and endurance      Physician: Ian Feliciano MD    Visit Date: 2/5/2019    Physician Orders: PT Eval and Treat   Medical Diagnosis from Referral: Late effect of stroke  Evaluation Date: 1/28/2019  Authorization Period Expiration: 01/26/19-12/31/19  Plan of Care Expiration: 01/28/19 - 03/25/19  Visit # / Visits authorized: 01/ 10 (Vist # 1 of 2019)     Time In: 15:20  Time Out: 16:00  Total Billable Time: 40 minutes    Precautions: Standard, Diabetes, Fall and right hemiplegia, hearing aid, safety awareness, cognitive deficits, speech deficits    Subjective     Pt reports: Feeling good   HEP Compliance: N/A secondary to first follow up  Response to previous treatment: N/A secondary to first follow up  Functional change: N/A secondary to first follow up      Pain: 0/10, none reported  Location: N/A      Objective   Pt 5 mins late from Speech Therapy, PT unable to accommodate.     Torito received therapeutic exercises to develop strength, endurance, ROM, flexibility and core stabilization for 40 minutes including:     SLR Test of RLE: approximately 10 degrees of quad lag    Supine: RLE   3 x 10 ADD ball squeezes in hooklying   3 x 10 ADD against gravity in hooklying with eccentrically controlled lowering   3 x 10 bridging, PT anchoring feet on mat   3 x 10 PF and DF AAROM with Yellow T-Band   1 X 5 DF, pt had difficulty with this but trace movements were felt     3 x 10 Side lying clamshells (RLE), tactile cues on hip to keep from rolling back    Seated on EOM: RLE   3 x 30" Seated gatrsoc stretch with cord, occasional Carmen for proper alignment   3 x 30" Seated HS stretch   3 x 10 hip flexion   3 x 10 LAQ, Pt unable to acquire full knee extension    Home Exercises Provided and Patient " Education Provided     Education provided: Bridging, side-lying clamshells, hip ADduction squeezes, seated marching, long arc quad, supine dorsiflexion, supine plantarflexion    Written Home Exercises Provided: yes.  Exercises were reviewed and Torito was able to demonstrate them prior to the end of the session.  Torito demonstrated good  understanding of the education provided. Torito's wife present for  HEP instruction as well.     See EMR under Media for exercises provided 2/5/2019.    Assessment   Mr. Ugarte tolerated his first follow-up appointment well this afternoon. Pt performed there-ex well and had trace movements in his RLE DFs. Pt required occasional Carmen and VCs to prevent rolling onto his back while performing sidelying clams. Pt's straight leg raise test revealed approximately a 10 degree quad lag. Hamstring length not formally assessed at this time, but hamstrings appear to be tight or shortened or will be addressed with hamstring stretches. Pt would continue to benefit from skilled PT interventions to address the deficits and problems listed on initial evaluation. Continue current POC.    Torito is progressing well towards his goals.   Pt prognosis is Good.     Pt will continue to benefit from skilled outpatient physical therapy to address the deficits listed in the problem list box on initial evaluation, provide pt/family education and to maximize pt's level of independence in the home and community environment.     Pt's spiritual, cultural and educational needs considered and pt agreeable to plan of care and goals.     Anticipated Barriers for therapy: hearing deficit, speech deficits, cognitive deficits, decreased safety awareness, fall risk, visual neglect, transportation assistance required    Goals:  Short Term Goals: 4 weeks   1. Pt to be (I) with established HEP Ongoing  2. Pt to improve R hip flexion strength MMT score to at least 4+/5 for improved gait mechanics Ongoing  3. Pt to improve R  hip extension strength MMT score to at least 4/5 for improved gait mechanics Ongoing  4. Pt to improve R hip AB and R hip AD strength MMT scores to at least 3+/5 for improved gait mechanics Ongoing  5.  Pt to improve R knee flexion and extension strength MMT scores to at least 4+/5 for improved gait mechanics Ongoing  6. Pt to improve R ankle PF strength MMT score to at least 2/5 for improved gait mechanics Ongoing  7. Pt to improve chair rise score to at least 5 times with no more than 1 UE support and S for improved endurance and safety with functional transfers Ongoing  8. Pt to improve TUG score to at least 30 seconds for improved safety with household mobility Ongoing  9. Pt to improve SSWS score to at least 0.40 m/sec for improved safety with community mobility. Ongoing     Long Term Goals: 8 weeks   1. Pt to be (I) with advanced HEP Ongoing  2. Pt to improve R hip extension strength MMT score to at least 4+/5 for improved gait mechanics Ongoing  3. Pt to improve R hip AB and R hip AD strength MMT scores to at least 4-/5 for improved gait mechanics Ongoing  4. Pt to improve R ankle PF strength MMT score to at least 2+/5 for improved gait mechanics Ongoing  5. Pt to improve chair rise score to at least 5 times with no UE support and S for improved endurance and safety with functional transfers Ongoing  6. Pt to improve TUG score to at least 26 seconds for improved safety with household mobility Ongoing  7. Pt to improve SSWS score to at least 0.50 m/sec for improved safety with community mobility. Ongoing     Plan   Plan for Next Session:   X 10 minutes of SciFit stepper, L1   Manual Hamstring stretch maintaining straight knee    Gastroc stretch with Carmen for maintaining proper alignment    Initiate standing weight shifting and weight bearing through RLE    Continue outpatient physical therapy 2x weekly under current established Plan of Care,1/28/2019 to 03/25/19., with treatment to include: pt education, HEP,  therapeutic exercises, neuromuscular re-education/balance exercises, therapeutic activities, joint mobilizations, and modalities PRN, to work towards established goals. Pt may be seen by PTA to carry out plan of care.     Carolin Ramirez, SPT    I certify that I was present in the room directing the student in service delivery and guiding them using my skilled judgment. As the co-signing therapist I have reviewed the students documentation and am responsible for the treatment, assessment, and plan.       Hanh Brooke, PT

## 2019-02-05 NOTE — PROGRESS NOTES
Occupational Therapy Daily Treatment Note     Name: Torito Harris  [facility] Number: [id number]  Physician: Ian Feliciano MD  Medical Diagnosis: L CVA      Therapy Diagnosis:   Encounter Diagnoses   Name Primary?    Impaired mobility and activities of daily living     Weakness of right upper extremity      Visit Date: 2/5/2019  Physician Orders: Eval and Treat   Surgical Procedure and Date: NA  Evaluation Date: 1/28/19   Insurance Authorization Period Expiration: 12/31/19   Plan of Care Certification Period: 3/11/19   Date of Return to MD: unknown at this time     Visit # / Visits authorized: 2 / 10  Time In:1345  Time Out: 1430   Total Billable Time: 45 minutes    Precautions: Standard, fall risk, hearing impaired     Subjective     Pt reports: denies questions regarding HEP verbally instructed at al   he was compliant with home exercise program given last session - occasionally per report   Response to previous treatment:positive, initiated HEP   Functional change: NA     Pain: expressed during passive ER and abd - not rated   Location: RUE      Objective   Patient seen by OT this date.  Pt ambulated to treatment session with abigail walker and SBA.  Patient's caregiver was present for treatment session.  Patient received individual therapy with activities as follows:       Patient participated in neuromuscular reeducation activities for 15 minutes to maximize ROM, motor strength and neuromuscular and proprioceptive input to the RUE.  The following activities were included:   Sitting EOM:   - restoration of R hand arches and metacarpal stretches   - general R wrist stretches: 1. P-A glide of scaphoid on radius, 2. Radial deviation, 3. Increasing mobility of metacarpals, 4. Carpal roll, 5. Movement of forearm on hand towards wrist extension, 6. Movement of forearm on the hand toward supination/ pronation, 7. wrist ext with distraction   - right upper extremity weight bearing on a hard static surface with body  on arm weight shifts in the frontal plane to promote R wrist extension with CGA to maintain R open hand position on hard surface    - application of GG splint to R hand/wrist       Torito participated in therapeutic exercises to improve ROM and strength for 30 minutes:   Sitting EOM:   - AAROM R scap elevation   - AAROM R scap adduction     Supine: (sit > supine with Min A to lift R heel over mat)   - AAROM R elbow flexion and extension   - AAROM shoulder flexion   - PROM shoulder abd   - PROM shoulder ER     Supine > sit with SBA     Patient ambulated with abigail walker to following ST session.  GG splint remained on R hand.  Pt was instructed to remove following ST session or with onset of pain.       Home Exercises and Education Provided     Education provided: passive RUE stretches  - instruction to pt and caregiver   - Progress towards goals   - POC     Written Home Exercises Provided: Patient instructed to cont prior HEP and continue with supine R shoulder exercises (verbally instructed)   Exercises were reviewed and Torito was able to demonstrate them prior to the end of the session.  Torito demonstrated good  understanding of the HEP provided.       Pt has no cultural, educational or language barriers to learning provided.    Assessment   Patient tolerated treatment session well.  Initiated GG splint this date to promote ability to actively open R hand.  He tolerated splint well and was able to maintain wear through following ST session.  He complained of increased pain with shoulder abd (~100*) and ER (~40*) this date.  Patient and his caregiver were instructed on supine stretches for maintenance of RUE tissue elasticity and joint mobility and verbalized understanding of same.      Pt would continue to benefit from skilled OT. Torito is progressing well towards his goals and there are no updates to goals at this time. Pt prognosis is Good due to Pt motivated to participate and good family support from his  spouse.  Pt will continue to benefit from skilled outpatient occupational therapy to address the deficits listed in the problem list on initial evaluation provide pt/family education and to maximize pt's level of independence in the home and community environment.     Anticipated barriers to occupational therapy: none     Pt's spiritual, cultural and educational needs considered and pt agreeable to plan of care and goals.    Goals:  Short Term Goals: 3 weeks   ROM/Strength to perform the ADL's and functional activities listed below,   Pt will improve functional  strength needed for tasks to 20# in R hand.  Pt will improve AROM for shoulder flexion needed for functional tasks to 60* in R UE.   UE dressing will improve to Min A   LB dressing will improve to Mod A   Toileting will improve to Min A incorporating R hand for task   Pt will be Independent with HEP to improve ROM and FM skills.      Long Term Goals: 6 weeks   ROM/Strength to perform the ADL's and functional activities listed below  Pt will improve functional  strength needed for tasks to 30# in R hand.  Pt will improve AROM for shoulder flexion needed for functional tasks to 80* in R UE.   UE dressing will improve to Supervision   LB dressing will improve to Min A   Toileting will improve to Supervision incorporating R hand for task   Pt will complete clinical testing for skills needed for driving  G code self care CK    Plan   Continue POC in pursuit of OT goals.   Discussed Plan of Care with patient: Yes  Updates/Grading for next session: progress as tolerated

## 2019-02-05 NOTE — PROGRESS NOTES
"Outpatient Neurological Rehabilitation   Speech and Language Therapy Daily Note  Date:  2/5/2019     Name: Torito Harris   MRN: [id number]   Therapy Diagnosis:   Encounter Diagnosis   Name Primary?    Global aphasia    Physician: Ian Feliciano MD  Physician Orders: ST evaluate and treat  Medical Diagnosis: I69.30 (ICD-10-CM) - Late effect of stroke    Visit #/Visits authorized: 2/ 10  Date of Evaluation:  1/28/19  Insurance Authorization Period: 1/26/19 to 12/31/19  Plan of Care Expiration Date:   1/28/2019 to 3/25/19  Extended POC: n/a     Time In:  1437  Time Out:  1520  Total Billable Time: 43     Precautions: Standard and Fall    Subjective:   Pt reports: "waffles" "Gone" (referring to his hearing in the right ear)   He  was not compliant to home exercise program 2/2 HEP not established yet.   Response to previous treatment: n/a   Pain Scale:  0/10 on VAS currently.   Pain Location: n/a  Objective:       UNTIMED  Procedure Min.   Speech- Language- Voice Therapy    43           Total Timed Units: 0  Total Untimed Units: 1  Charges Billed/# of units: 1    Short Term Goals: (4 weeks) Current Progress:   1. Pt will complete R/L body part discrimination tasks with 70% Carmen. Not formally addressed      2. Pt will follow 3 unit body commands with 80% acc ind 40% acc ind'ly, 100% acc given min cues   3. Pt will follow 3 unit commands with visual stimuli with 70% Carmen. Not formally addressed      4. Pt will name common objects with 90% Carmen. 20% acc ind'ly, 100% acc given max phonemic and semantic cues. He was able to name the pictures virgen   5. Pt will generate a response to basic sentence completions with 80% Carmen  Not formally addressed      6. Pt will name 5 concrete category items with Carmen. 0% acc ind'ly, 100% acc given max phonemic cues   7. Pt will repeat  3-5 word phrases with 80% Carmen. Not formally addressed      8. Assess reading and writing skills. Not formally addressed. Pt's dominant hand (right) was " affected and is nonfunctional at this time.        Patient Education/Response:   Pt and wife were educated on progress. Demonstrated and discussed labeling and naming things around the house to increase verbal expression.  Recommended better sleep patterns since he is up all day and sleeps during the day. They verbalized understanding.     Written Home Exercises Provided: no.  Exercises were reviewed and Torito was able to demonstrate them prior to the end of the session.  Torito demonstrated good  understanding of the education provided.     See EMR under n/a for exercises provided n/a.  Assessment:   Torito is progressing well towards his goals. Speech is telegraphic and mostly 1 to 2 word utterances. Paraphasias and perseverations were present.Pt is aware of his errors and was able to correct them inconsistently.  Hearing is reduced especially in the right ear 2/2 to not having a working hearing aid. He wore a hearing aid in the left ear.  Current goals remain appropriate. Goals to be updated as necessary.     Pt prognosis is Good. Pt will continue to benefit from skilled outpatient speech and language therapy to address the deficits listed in the problem list on initial evaluation, provide pt/family education and to maximize pt's level of independence in the home and community environment.     Barriers to Therapy: sleeping pattern, possible transportation  Pt's spiritual, cultural and educational needs considered and pt agreeable to plan of care and goals.    Plan:   Continue POC with focus on auditory comprehension and verbal expression.     Recommendation: 1. Hearing test and hearing aid evaluation. His right hearing aid does not work. Pt was seen previously by an external physician.     CATIE Tran., CCC-SLP, CBIS  Speech-Language Pathologist  2/5/2019

## 2019-02-07 ENCOUNTER — CLINICAL SUPPORT (OUTPATIENT)
Dept: REHABILITATION | Facility: HOSPITAL | Age: 59
End: 2019-02-07
Attending: PSYCHIATRY & NEUROLOGY
Payer: MEDICARE

## 2019-02-07 ENCOUNTER — DOCUMENTATION ONLY (OUTPATIENT)
Dept: REHABILITATION | Facility: HOSPITAL | Age: 59
End: 2019-02-07

## 2019-02-07 DIAGNOSIS — Z74.09 IMPAIRED FUNCTIONAL MOBILITY, BALANCE, GAIT, AND ENDURANCE: ICD-10-CM

## 2019-02-07 DIAGNOSIS — Z74.09 IMPAIRED MOBILITY AND ACTIVITIES OF DAILY LIVING: ICD-10-CM

## 2019-02-07 DIAGNOSIS — R29.898 WEAKNESS OF RIGHT LEG: ICD-10-CM

## 2019-02-07 DIAGNOSIS — Z78.9 IMPAIRED MOBILITY AND ACTIVITIES OF DAILY LIVING: ICD-10-CM

## 2019-02-07 DIAGNOSIS — R47.01 GLOBAL APHASIA: ICD-10-CM

## 2019-02-07 DIAGNOSIS — R47.01 BROCA'S APHASIA: ICD-10-CM

## 2019-02-07 DIAGNOSIS — R29.898 WEAKNESS OF RIGHT UPPER EXTREMITY: ICD-10-CM

## 2019-02-07 PROCEDURE — 97116 GAIT TRAINING THERAPY: CPT | Mod: PO

## 2019-02-07 PROCEDURE — 97112 NEUROMUSCULAR REEDUCATION: CPT | Mod: PO

## 2019-02-07 PROCEDURE — 92507 TX SP LANG VOICE COMM INDIV: CPT | Mod: PO

## 2019-02-07 PROCEDURE — 97110 THERAPEUTIC EXERCISES: CPT | Mod: PO

## 2019-02-07 NOTE — PROGRESS NOTES
Occupational Therapy Daily Treatment Note     Name: Torito Harris  [facility] Number: [id number]  Physician: Ian Feliciano MD  Medical Diagnosis: L CVA      Therapy Diagnosis:   Encounter Diagnoses   Name Primary?    Impaired mobility and activities of daily living     Weakness of right upper extremity      Visit Date: 2/7/2019  Physician Orders: Eval and Treat   Surgical Procedure and Date: NA  Evaluation Date: 1/28/19   Insurance Authorization Period Expiration: 12/31/19   Plan of Care Certification Period: 3/11/19   Date of Return to MD: unknown at this time     Visit # / Visits authorized: 3 / 10  Time In:1430   Time Out: 1515    Total Billable Time: 45 minutes    Precautions: Standard, fall risk, hearing impaired     Subjective     Pt reports: denies questions regarding HEP.    he was compliant with home exercise program given last session - performing daily per pt's spouse report   Response to previous treatment:positive  Functional change: NA     Pain: expressed during passive ER and abd - not rated   Location: RUE      Objective   Patient seen by OT this date.  Pt ambulated to treatment session with abigail walker and SBA.  Patient's caregiver was present for treatment session.  Patient received individual therapy with activities as follows:     Patient participated in neuromuscular reeducation activities for 30 minutes to maximize ROM, motor strength and neuromuscular and proprioceptive input to the RUE.  The following activities were included:   Sitting EOM:   - restoration of R hand arches and metacarpal stretches   - general R wrist stretches: 1. P-A glide of scaphoid on radius, 2. Radial deviation, 3. Increasing mobility of metacarpals, 4. Carpal roll, 5. Movement of forearm on hand towards wrist extension, 6. Movement of forearm on the hand toward supination/ pronation, 7. wrist ext with distraction   - right upper extremity weight bearing on a hard static surface with body on arm weight shifts in  the frontal plane to promote R wrist extension with CGA to maintain R open hand position on hard surface    - application of GG splint to R hand/wrist   - Shoulder I-frame body on arm and arm on body movements with Min A for full R elbow extension   - towel slides body on arm movements to promote R shoulder abduction       Torito participated in therapeutic exercises to improve ROM and strength for 15 minutes:   Sitting EOM:   - AAROM R scap elevation   - AAROM R scap adduction     Supine: (sit > supine with SBA)   - AAROM R elbow flexion and extension   - AAROM shoulder flexion   - PROM shoulder abd   - PROM shoulder ER     Supine > sit with SBA     Patient remainded sitting EOM for directly following PT session.  GG splint removed.        Home Exercises and Education Provided     Education provided re: HEP, resting hand in open flat position.   - Progress towards goals   - POC     Written Home Exercises Provided: Patient instructed to cont prior HEP and continue with supine R shoulder exercises (verbally instructed)   Exercises were reviewed and Torito was able to demonstrate them prior to the end of the session.  Torito demonstrated good  understanding of the HEP provided.       Pt has no cultural, educational or language barriers to learning provided.    Assessment   Patient tolerated treatment session well.  He continues to complain of pain with passive R shoulder abd and ER.  Passive stretches were terminated due to subjective pain and towel slides with body on arm movements were performed to promote shoulder abd within tolerable range of motion.  OT continued with GG splint to promote active extension and to prevent soft tissue shortening.  OT will continue to progress as tolerated.      Pt would continue to benefit from skilled OT. Torito is progressing well towards his goals and there are no updates to goals at this time. Pt prognosis is Good due to Pt motivated to participate and good family support from  his spouse.  Pt will continue to benefit from skilled outpatient occupational therapy to address the deficits listed in the problem list on initial evaluation provide pt/family education and to maximize pt's level of independence in the home and community environment.     Anticipated barriers to occupational therapy: none     Pt's spiritual, cultural and educational needs considered and pt agreeable to plan of care and goals.    Goals:  Short Term Goals: 3 weeks   ROM/Strength to perform the ADL's and functional activities listed below,   Pt will improve functional  strength needed for tasks to 20# in R hand.  Pt will improve AROM for shoulder flexion needed for functional tasks to 60* in R UE.   UE dressing will improve to Min A   LB dressing will improve to Mod A   Toileting will improve to Min A incorporating R hand for task   Pt will be Independent with HEP to improve ROM and FM skills.      Long Term Goals: 6 weeks   ROM/Strength to perform the ADL's and functional activities listed below  Pt will improve functional  strength needed for tasks to 30# in R hand.  Pt will improve AROM for shoulder flexion needed for functional tasks to 80* in R UE.   UE dressing will improve to Supervision   LB dressing will improve to Min A   Toileting will improve to Supervision incorporating R hand for task   Pt will complete clinical testing for skills needed for driving  G code self care CK    Plan   Continue POC in pursuit of OT goals.   Discussed Plan of Care with patient: Yes  Updates/Grading for next session: progress as tolerated

## 2019-02-07 NOTE — PROGRESS NOTES
"  Physical Therapy Daily Treatment Note     Name: Torito Harris  [facility] Number: [id number]    Therapy Diagnosis:   Encounter Diagnoses   Name Primary?    Weakness of right leg     Impaired functional mobility, balance, gait, and endurance      Physician: Ian Feliciano MD    Visit Date: 2/7/2019    Physician Orders: PT Eval and Treat   Medical Diagnosis from Referral: Late effect of stroke  Evaluation Date: 1/28/2019  Authorization Period Expiration: 01/26/19-12/31/19  Plan of Care Expiration: 01/28/19 - 03/25/19  Visit # / Visits authorized: 3/ 10 (Vist # 1 of 2019)     Time In: 15:15  Time Out: 16:00  Total Billable Time: 45 minutes    Precautions: Standard, Diabetes, Fall and right hemiplegia, hearing aid, safety awareness, cognitive deficits, speech deficits    Subjective     Pt reports: "Feeling good "  HEP Compliance: Reports compliance with HEP  Response to previous treatment: no soreness or issues  Functional change: progressing       Pain: 0/10, none reported  Location: N/A      Objective     Torito received therapeutic exercises to develop strength, endurance, ROM, flexibility and core stabilization for 35 minutes including:     X 10 Nu-Step recumbent stepper.  B LE, B UE ( R hand ace wrapped to handle)   Seated EOM:   2 x 30 sec of HS stretches with stool    Sit to supine with Mod I  Supine therex ( head elevated)   X 30 reps of IR/ER AROm with flag on foot   X 30 reps of bridges with hip adduction squeeze, 3 sec hold.   3 x 10 reps of R LE SLR.  VC's and TC's for quad set to be performed and slow and controlled movements.    supine to sit with Mod I    Gait: x 10 mins  Ambulated ~ 20ft x 2 to and from stepper with HW and CGA.  CGA to support R UE  Ambulated from the gym to speech therapy with HW and CGA.  CGA to support R UE.        Home Exercises Provided and Patient Education Provided     Education provided: Bridging, side-lying clamshells, hip ADduction squeezes, seated marching, long arc quad, " supine dorsiflexion, supine plantarflexion    Written Home Exercises Provided: yes.  Exercises were reviewed and Torito was able to demonstrate them prior to the end of the session.  Torito demonstrated good  understanding of the education provided. Torito's wife present for  HEP instruction as well.     See EMR under Media for exercises provided 2/5/2019.    Assessment   Mr. Ugarte tx session well and did not have any problems.  Pt began cardiovascular endurance on the stepper and was able to use B UE and B LE with R arm ace wrapped to the handle.  Pt cont with supine therex and was given a visual cue to perform R LE  IR/ER correctly.  Will began seated HS curls with creeper next tx session and try quad cane.   Pt would continue to benefit from skilled PT interventions to address the deficits and problems listed on initial evaluation. Continue current POC.    Torito is progressing well towards his goals.   Pt prognosis is Good.     Pt will continue to benefit from skilled outpatient physical therapy to address the deficits listed in the problem list box on initial evaluation, provide pt/family education and to maximize pt's level of independence in the home and community environment.     Pt's spiritual, cultural and educational needs considered and pt agreeable to plan of care and goals.     Anticipated Barriers for therapy: hearing deficit, speech deficits, cognitive deficits, decreased safety awareness, fall risk, visual neglect, transportation assistance required    Goals:  Short Term Goals: 4 weeks   1. Pt to be (I) with established HEP Ongoing  2. Pt to improve R hip flexion strength MMT score to at least 4+/5 for improved gait mechanics Ongoing  3. Pt to improve R hip extension strength MMT score to at least 4/5 for improved gait mechanics Ongoing  4. Pt to improve R hip AB and R hip AD strength MMT scores to at least 3+/5 for improved gait mechanics Ongoing  5.  Pt to improve R knee flexion and extension  strength MMT scores to at least 4+/5 for improved gait mechanics Ongoing  6. Pt to improve R ankle PF strength MMT score to at least 2/5 for improved gait mechanics Ongoing  7. Pt to improve chair rise score to at least 5 times with no more than 1 UE support and S for improved endurance and safety with functional transfers Ongoing  8. Pt to improve TUG score to at least 30 seconds for improved safety with household mobility Ongoing  9. Pt to improve SSWS score to at least 0.40 m/sec for improved safety with community mobility. Ongoing     Long Term Goals: 8 weeks   1. Pt to be (I) with advanced HEP Ongoing  2. Pt to improve R hip extension strength MMT score to at least 4+/5 for improved gait mechanics Ongoing  3. Pt to improve R hip AB and R hip AD strength MMT scores to at least 4-/5 for improved gait mechanics Ongoing  4. Pt to improve R ankle PF strength MMT score to at least 2+/5 for improved gait mechanics Ongoing  5. Pt to improve chair rise score to at least 5 times with no UE support and S for improved endurance and safety with functional transfers Ongoing  6. Pt to improve TUG score to at least 26 seconds for improved safety with household mobility Ongoing  7. Pt to improve SSWS score to at least 0.50 m/sec for improved safety with community mobility. Ongoing     Plan   Continue outpatient physical therapy 2x weekly under current established Plan of Care,1/28/2019 to 03/25/19., with treatment to include: pt education, HEP, therapeutic exercises, neuromuscular re-education/balance exercises, therapeutic activities, joint mobilizations, and modalities PRN, to work towards established goals. Pt may be seen by PTA to carry out plan of care.           Lucy Vanegas, SHERICE

## 2019-02-07 NOTE — PROGRESS NOTES
"Outpatient Neurological Rehabilitation   Speech and Language Therapy Daily Note  Date:  2/7/2019     Name: Torito Harris   MRN: [id number]   Therapy Diagnosis:   Encounter Diagnosis   Name Primary?    Global aphasia    Physician: Ian Feliciano MD  Physician Orders: ST evaluate and treat  Medical Diagnosis: I69.30 (ICD-10-CM) - Late effect of stroke    Visit #/Visits authorized: 3/ 10  Date of Evaluation:  1/28/19  Insurance Authorization Period: 1/26/19 to 12/31/19  Plan of Care Expiration Date:   1/28/2019 to 3/25/19  Extended POC: n/a     Time In:  1600   Time Out:  1645   Total Billable Time: 45 minutes     Precautions: Standard and Fall    Subjective:   Pt reports: "hi" pt's wife Nereyda was present entire therapy session.   Response to previous treatment: pt gestured "thumbs up"  Pain Scale:  0/10 on VAS currently.   Pain Location: n/a  Objective:   UNTIMED  Procedure Min.   Speech- Language- Voice Therapy    43   Total Untimed Units: 1  Charges Billed/# of units: 1    Short Term Goals: (4 weeks) Current Progress:   1. Pt will complete R/L body part discrimination tasks with 70% Carmen.  Goal Met 2/7/19/ Discontinue  100% acc indly  MET at Baseline   2. Pt will follow 3 unit body commands with 80% acc sergio  Progressing/ Not Met 2/7/2019   20% acc indly, 40% acc given a repetition and a verbal cue, 90% acc given a model   3. Pt will follow 3 unit commands with visual stimuli with 70% Carmen.  Progressing/ Not Met 2/7/2019   Not formally addressed      4. Pt will name common objects with 90% Carmen.  Progressing/ Not Met 2/7/2019   Not formally addressed     5. Pt will generate a response to basic sentence completions with 80% Carmen   Progressing/ Not Met 2/7/2019   Pt completed sentences with 50% acc indly, 80% acc given moderate verbal cues and a repetition.    6. Pt will name 5 concrete category items with Carmen.  Progressing/ Not Met 2/7/2019   Numbers: 13 given a verbal cue and automatic counting  Weather: 3 " indly, 6 given verbal cues  Food: 3 indly, 7 given verbal cues.    7. Pt will repeat  3-5 word phrases with 80% Carmen.  Progressing/ Not Met 2/7/2019   Not formally addressed      8. Assess reading and writing skills.  Progressing/ Not Met 2/7/2019   Not formally addressed         Patient Education/Response:   Reviewed word finding strategies and facilitating communication with people with aphasia (I.e. Establishing a topic of communication, using open ended sentences to elicit a desired word). Chunking strategy (I.e. Breaking sentences into short, manageable phrases) explained. Pt and his wife verbalized understanding.     Assessment:   Torito is progressing well towards his goals. Pt with increased accuracy with sentence completion today with good response to sentence completion as a cue conversationally. STG1 MET at baseline. Due to improved auditory comprehension, pt's aphasia type is considered Broca's Aphasia rather than Global Aphasia as originally diagnosed. Current goals remain appropriate. Goals to be updated as necessary.     Pt prognosis is Good. Pt will continue to benefit from skilled outpatient speech and language therapy to address the deficits listed in the problem list on initial evaluation, provide pt/family education and to maximize pt's level of independence in the home and community environment.     Barriers to Therapy: sleeping pattern, possible transportation  Pt's spiritual, cultural and educational needs considered and pt agreeable to plan of care and goals.    Plan:   Continue POC with focus on auditory comprehension and assessment of reading comprehension     Recommendation: 1. Hearing test and hearing aid evaluation. His right hearing aid does not work. Pt was seen previously by an external physician.     SERINA Mott, CCC-SLP  Speech Language Pathologist   2/7/2019

## 2019-02-07 NOTE — PROGRESS NOTES
Face to face meeting completed with Hanh Brooke PT regarding current status and progress of   Torito Harris .  Alvarado Escobedo, PTA

## 2019-02-11 ENCOUNTER — DOCUMENTATION ONLY (OUTPATIENT)
Dept: REHABILITATION | Facility: HOSPITAL | Age: 59
End: 2019-02-11

## 2019-02-11 NOTE — PROGRESS NOTES
PT/PTA met face to face to discuss pt's treatment plan and progress towards established goals. Continue with current PT POC. Pt will be seen by physical therapist at least every 6th treatment day or every 30 days, whichever occurs first.      Jeff Solis, PTA  02/11/2019

## 2019-02-12 ENCOUNTER — CLINICAL SUPPORT (OUTPATIENT)
Dept: REHABILITATION | Facility: HOSPITAL | Age: 59
End: 2019-02-12
Attending: PSYCHIATRY & NEUROLOGY
Payer: MEDICARE

## 2019-02-12 DIAGNOSIS — R47.01 BROCA'S APHASIA: ICD-10-CM

## 2019-02-12 DIAGNOSIS — Z78.9 IMPAIRED MOBILITY AND ACTIVITIES OF DAILY LIVING: ICD-10-CM

## 2019-02-12 DIAGNOSIS — R29.898 WEAKNESS OF RIGHT LEG: ICD-10-CM

## 2019-02-12 DIAGNOSIS — Z74.09 IMPAIRED FUNCTIONAL MOBILITY, BALANCE, GAIT, AND ENDURANCE: ICD-10-CM

## 2019-02-12 DIAGNOSIS — Z74.09 IMPAIRED MOBILITY AND ACTIVITIES OF DAILY LIVING: ICD-10-CM

## 2019-02-12 DIAGNOSIS — R29.898 WEAKNESS OF RIGHT UPPER EXTREMITY: ICD-10-CM

## 2019-02-12 PROCEDURE — 97530 THERAPEUTIC ACTIVITIES: CPT | Mod: PO

## 2019-02-12 PROCEDURE — 97110 THERAPEUTIC EXERCISES: CPT | Mod: PO,59

## 2019-02-12 PROCEDURE — 97116 GAIT TRAINING THERAPY: CPT | Mod: PO

## 2019-02-12 PROCEDURE — 97110 THERAPEUTIC EXERCISES: CPT | Mod: PO

## 2019-02-12 PROCEDURE — 92507 TX SP LANG VOICE COMM INDIV: CPT | Mod: PO

## 2019-02-12 PROCEDURE — 97112 NEUROMUSCULAR REEDUCATION: CPT | Mod: PO,59

## 2019-02-12 NOTE — PROGRESS NOTES
"Outpatient Neurological Rehabilitation   Speech and Language Therapy Daily Note  Date:  2/12/2019     Name: Torito Harris   MRN: [id number]   Therapy Diagnosis:   Encounter Diagnosis   Name Primary?    Broca's aphasia    Physician: Ian Feliciano MD  Physician Orders: ST evaluate and treat  Medical Diagnosis: I69.30 (ICD-10-CM) - Late effect of stroke    Visit #/Visits authorized: 4/ 10  Date of Evaluation:  1/28/19  Insurance Authorization Period: 1/26/19 to 12/31/19  Plan of Care Expiration Date:   1/28/2019 to 3/25/19  Extended POC: n/a     Time In:   1515  Time Out:   1600  Total Billable Time: 45 minutes    Precautions: Standard and Fall    Subjective:   Pt reports: When asked patient what he did before therapy, pt responded "I don't know".  Response to previous treatment: "alright"  Pain Scale:  0/10 on VAS currently.   Pain Location: n/a  Objective:   UNTIMED  Procedure Min.   Speech- Language- Voice Therapy    45   Total Untimed Units: 1  Charges Billed/# of units: 1    Short Term Goals: (4 weeks) Current Progress:   1. Pt will complete R/L body part discrimination tasks with 70% Carmen.   Goal Met 2/7/19/ Discontinue    2. Pt will follow 3 unit body commands with 80% acc sergio  Progressing/ Not Met 2/12/2019   22% acc indly, 26% acc given a repetition and a verbal cue, 90% acc given a model    3. Pt will follow 3 unit commands with visual stimuli with 70% Carmen.  Progressing/ Not Met 2/12/2019   22% indly, 44% given a repetition. Modeling did not improve accuracy.    4. Pt will name common objects with 90% Carmen.  Progressing/ Not Met 2/12/2019   27% acc indly, 63% acc given phonemic cues, 81% acc given semantic and phonemic cues.    5. Pt will generate a response to basic sentence completions with 80% Carmen   Progressing/ Not Met 2/12/2019   Pt completed sentences with 33% acc indly, 80% acc given moderate verbal and visual cues and a repetition.    6. Pt will name 5 concrete category items with " "Carmen.  Progressing/ Not Met 2/12/2019   Not formally addressed    7. Pt will repeat  3-5 word phrases with 80% Carmen.  Progressing/ Not Met 2/12/2019   85% indly,95 % with a repetition.     Met x's 1.    8. Assess reading and writing skills.  Progressing/ Not Met 2/12/2019   Not formally addressed         Patient Education/Response:   Clinician recommended "Healing the Broken Brain" book to help understand the stroke recovery process. Pt and his wife verbalized understanding.     Assessment:   Torito is progressing well towards his goals. Pt increased accuracy with repeating 3-5 word phrases. Pt benefited from phonemic and semantic cues to aid in naming common objects.  Current goals remain appropriate. Goals to be updated as necessary.     Pt prognosis is Good. Pt will continue to benefit from skilled outpatient speech and language therapy to address the deficits listed in the problem list on initial evaluation, provide pt/family education and to maximize pt's level of independence in the home and community environment.     Barriers to Therapy: sleeping pattern, possible transportation  Pt's spiritual, cultural and educational needs considered and pt agreeable to plan of care and goals.    Plan:   Continue POC with focus on auditory comprehension and assessment of reading comprehension     JANETH Joseph  Student Speech Language Pathologist       "

## 2019-02-12 NOTE — PROGRESS NOTES
"  Physical Therapy Daily Treatment Note     [id number]    Therapy Diagnosis:   Encounter Diagnoses   Name Primary?    Weakness of right leg     Impaired functional mobility, balance, gait, and endurance      Physician: Ian Feliciano MD    Visit Date: 2/12/2019    Physician Orders: PT Eval and Treat   Medical Diagnosis from Referral: Late effect of stroke  Evaluation Date: 1/28/2019  Authorization Period Expiration: 01/26/19-12/31/19  Plan of Care Expiration: 01/28/19 - 03/25/19  Visit # / Visits authorized: 4/ 10 (Vist #4 of 2019)   Time In:16:00  Time Out: 16:45  Total Billable Time: 45 minutes    Precautions: Standard, Diabetes, Fall and right hemiplegia, hearing aid, safety awareness, cognitive deficits, speech deficits    Subjective     Pt reports: Feeling good  HEP Compliance: Reports compliance with HEP  Response to previous treatment: no issues reported  Functional change: non reported at this time      Pain: 0/10, none reported  Location: N/A      Objective     Torito received therapeutic exercises to develop strength, endurance, ROM, flexibility and core stabilization for 35 minutes including:     X 10 SciFit recumbent stepper. L2.5;  B LE, B UE (R hand ace wrapped to handle)     Supine: RLE              x 20 ADduction against gravity in hooklying with eccentrically controlled lowering   1 x 10 Ankle DF                 Seated on EOM: RLE              3 x 30" Seated gatrsoc stretch with cord, occasional Carmen for proper alignment              3 x 30" Seated HS stretch   2 x 15 seated hip flexion     Patient participated in gait training activities to normalize gait pattern for 10 minutes. The following activities were included:   Gait belt used for safety.     Ballet Bar: LUE holding onto ballet bar; pt in standing, Min A   1 x 10 weight shifting onto RLE with PT at R knee to prevent buckling and hyper extension approximately   1 x 10 weight shifting onto RLE and LLE " "stepping onto target with PT at R knee to prevent buckling and hyper extension approximately   1 x 10 weight shifting onto RLE and LLE stepping onto 4" block twith PT at R knee to prevent buckling and hyper extension approximately  150 ft Ambualting with STEPHANI platform walker; modA x 2 assists. Assist for upright posture and walker management. L trunk lean present throughout ambulation. 1 brief standing rest break at approximately 75 ft when RLE started dragging.    Home Exercises Provided and Patient Education Provided     Education provided: Bridging, side-lying clamshells, hip ADduction squeezes, seated marching, long arc quad, supine dorsiflexion, supine plantarflexion    Written Home Exercises Provided: yes.  Exercises were reviewed and Torito was able to demonstrate them prior to the end of the session.  Torito demonstrated good  understanding of the education provided. Torito's wife present for  HEP instruction as well.     See EMR under Media for exercises provided 2/5/2019.    Assessment   Mr. Ugarte tx session well and did not have any problems. Pt demonstrated improved AROM DF and knee extension. PT progressed therapy to include RLE weight acceptance, weight shifting, and gait training, which pt tolerated well. During standing activities, pt continues to favor LLE for majority of weight bearing; however, platform walker did allow patient to increase step length and marcelo. PT to continue to address weight acceptance onto RLE as well as improving gait pattern. Continue current POC.    Torito is progressing well towards his goals.   Pt prognosis is Good.     Pt will continue to benefit from skilled outpatient physical therapy to address the deficits listed in the problem list box on initial evaluation, provide pt/family education and to maximize pt's level of independence in the home and community environment.     Pt's spiritual, cultural and educational needs considered and pt agreeable to plan of care and " goals.     Anticipated Barriers for therapy: hearing deficit, speech deficits, cognitive deficits, decreased safety awareness, fall risk, visual neglect, transportation assistance required    Goals:  Short Term Goals: 4 weeks   1. Pt to be (I) with established HEP Ongoing  2. Pt to improve R hip flexion strength MMT score to at least 4+/5 for improved gait mechanics Ongoing  3. Pt to improve R hip extension strength MMT score to at least 4/5 for improved gait mechanics Ongoing  4. Pt to improve R hip AB and R hip AD strength MMT scores to at least 3+/5 for improved gait mechanics Ongoing  5.  Pt to improve R knee flexion and extension strength MMT scores to at least 4+/5 for improved gait mechanics Ongoing  6. Pt to improve R ankle PF strength MMT score to at least 2/5 for improved gait mechanics Ongoing  7. Pt to improve chair rise score to at least 5 times with no more than 1 UE support and S for improved endurance and safety with functional transfers Ongoing  8. Pt to improve TUG score to at least 30 seconds for improved safety with household mobility Ongoing  9. Pt to improve SSWS score to at least 0.40 m/sec for improved safety with community mobility. Ongoing     Long Term Goals: 8 weeks   1. Pt to be (I) with advanced HEP Ongoing  2. Pt to improve R hip extension strength MMT score to at least 4+/5 for improved gait mechanics Ongoing  3. Pt to improve R hip AB and R hip AD strength MMT scores to at least 4-/5 for improved gait mechanics Ongoing  4. Pt to improve R ankle PF strength MMT score to at least 2+/5 for improved gait mechanics Ongoing  5. Pt to improve chair rise score to at least 5 times with no UE support and S for improved endurance and safety with functional transfers Ongoing  6. Pt to improve TUG score to at least 26 seconds for improved safety with household mobility Ongoing  7. Pt to improve SSWS score to at least 0.50 m/sec for improved safety with community mobility. Ongoing     Plan   Plan  for next visit: continue standing there-act to address weight acceptance onto RLE, GS stretch making sure patient doesn't invert, AROM DF of R ankle, AROM EV of R ankle.    Continue outpatient physical therapy 2x weekly under current established Plan of Care,1/28/2019 to 03/25/19., with treatment to include: pt education, HEP, therapeutic exercises, neuromuscular re-education/balance exercises, therapeutic activities, joint mobilizations, and modalities PRN, to work towards established goals. Pt may be seen by PTA to carry out plan of care.           Hanh Brooke, PT

## 2019-02-12 NOTE — PROGRESS NOTES
Occupational Therapy Daily Treatment Note     Name: Torito Harris  [facility] Number: [id number]  Physician: Ian Feliciano MD  Medical Diagnosis: L CVA      Therapy Diagnosis:   Encounter Diagnoses   Name Primary?    Impaired mobility and activities of daily living     Weakness of right upper extremity      Visit Date: 2/12/2019  Physician Orders: Eval and Treat   Surgical Procedure and Date: NA  Evaluation Date: 1/28/19   Insurance Authorization Period Expiration: 12/31/19   Plan of Care Certification Period: 3/11/19   Date of Return to MD: unknown at this time     Visit # / Visits authorized: 4 / 10  Time In:1430   Time Out: 1516    Total Billable Time: 46 minutes    Precautions: Standard, fall risk, hearing impaired     Subjective     Pt reports:  He has been stretching his hand and wrist by himself daily.  He reports his wife has not been assisting him with supine shoulder exercises as instructed on first follow up treatment sessions.      Response to previous treatment:positive  Functional change: NA     Pain: none reported this date.   Location: NA     Objective   Patient seen by OT this date.  Pt ambulated to treatment session with abigail walker and SBA.  Patient's caregiver was present for treatment session.  Patient received individual therapy with activities as follows:     Patient participated in neuromuscular reeducation activities for 15 minutes to maximize ROM, motor strength and neuromuscular and proprioceptive input to the RUE.  The following activities were included:   Sitting EOM:   - restoration of R hand arches and metacarpal stretches   - general R wrist stretches: 1. P-A glide of scaphoid on radius, 2. Radial deviation, 3. Increasing mobility of metacarpals, 4. Carpal roll, 5. Movement of forearm on hand towards wrist extension, 6. Movement of forearm on the hand toward supination/ pronation, 7. wrist ext with distraction   - right upper extremity weight bearing on a hard static surface with  body on arm weight shifts in the frontal plane to promote R wrist extension with CGA to maintain R open hand position on hard surface    - application of GG splint to R hand/wrist       Patient participated in therapeutic activities to improve functional performance in the home environment for 16 minutes.  The following activities were included:   - OT provided education on adaptive equipment to improve independence in dressing.  Patient practiced with reacher, long-handled shoe horn, sock aid, long-handled sponge, and dressing stick.  Patient demonstrated good performance with AD and verbalized knowledge and understanding of purpose and use.        Torito participated in therapeutic exercises to improve ROM and strength for 15 minutes:   Supine: (sit > supine with SBA)   - self assisted R elbow flexion and extension x 15 reps   - self assisted overhead  R shoulder flexion/ext x 15 reps   - self assisted R shoulder abd and add x 15 reps       Supine > sit with SBA   Sit > stand with SBA    GG splint remained on R hand and patient ambulated with abigail walker and SBA to following ST session.  Instructed to remove splint post ST.        Home Exercises and Education Provided     Education provided: HEP- self assisted supine exercises; AD to maximize independence   - Progress towards goals   - POC     Written Home Exercises Provided: yes - patient verbally instructed on self AA/PROM exercises and continue HEP as instructed in previous tx sessions   Exercises were reviewed and Torito was able to demonstrate them prior to the end of the session.  Torito demonstrated good  understanding of the HEP provided.       Pt has no cultural, educational or language barriers to learning provided.    Assessment   Patient tolerated treatment session well.  Patient reported decreased independence in dressing and max difficulty donning shoes.  OT educated patient on AD to ease performance and promote increased participation.  He  demonstrated good use of all AD, especially use of long handled shoe horn to don shoes without OT or caregiver assistance.  Post intervention, he verbalized interest in purchasing AD for home use.  OT provided education for self assisted ROM as caregiver reported she is not able to assist him daily due busy schedule.  He demonstrated good performance of the above self assisted exercises without any problems or complaints of pain.      Pt would continue to benefit from skilled OT. Torito is progressing well towards his goals and there are no updates to goals at this time. Pt prognosis is Good due to Pt motivated to participate and good family support from his spouse.  Pt will continue to benefit from skilled outpatient occupational therapy to address the deficits listed in the problem list on initial evaluation provide pt/family education and to maximize pt's level of independence in the home and community environment.     Anticipated barriers to occupational therapy: none     Pt's spiritual, cultural and educational needs considered and pt agreeable to plan of care and goals.    Goals:  Short Term Goals: 3 weeks   ROM/Strength to perform the ADL's and functional activities listed below,   Pt will improve functional  strength needed for tasks to 20# in R hand.  Pt will improve AROM for shoulder flexion needed for functional tasks to 60* in R UE.   UE dressing will improve to Min A   LB dressing will improve to Mod A   Toileting will improve to Min A incorporating R hand for task   Pt will be Independent with HEP to improve ROM and FM skills.      Long Term Goals: 6 weeks   ROM/Strength to perform the ADL's and functional activities listed below  Pt will improve functional  strength needed for tasks to 30# in R hand.  Pt will improve AROM for shoulder flexion needed for functional tasks to 80* in R UE.   UE dressing will improve to Supervision   LB dressing will improve to Min A   Toileting will improve to  Supervision incorporating R hand for task   Pt will complete clinical testing for skills needed for driving  G code self care CK    Plan   Continue POC in pursuit of OT goals.     Discussed Plan of Care with patient: Yes  Updates/Grading for next session: progress as tolerated

## 2019-02-14 ENCOUNTER — CLINICAL SUPPORT (OUTPATIENT)
Dept: REHABILITATION | Facility: HOSPITAL | Age: 59
End: 2019-02-14
Attending: PSYCHIATRY & NEUROLOGY
Payer: MEDICARE

## 2019-02-14 DIAGNOSIS — R47.01 BROCA'S APHASIA: ICD-10-CM

## 2019-02-14 DIAGNOSIS — R29.898 WEAKNESS OF RIGHT LEG: ICD-10-CM

## 2019-02-14 DIAGNOSIS — R29.898 WEAKNESS OF RIGHT UPPER EXTREMITY: ICD-10-CM

## 2019-02-14 DIAGNOSIS — Z74.09 IMPAIRED MOBILITY AND ACTIVITIES OF DAILY LIVING: ICD-10-CM

## 2019-02-14 DIAGNOSIS — Z74.09 IMPAIRED FUNCTIONAL MOBILITY, BALANCE, GAIT, AND ENDURANCE: ICD-10-CM

## 2019-02-14 DIAGNOSIS — Z78.9 IMPAIRED MOBILITY AND ACTIVITIES OF DAILY LIVING: ICD-10-CM

## 2019-02-14 PROCEDURE — 92507 TX SP LANG VOICE COMM INDIV: CPT | Mod: PO

## 2019-02-14 PROCEDURE — 97112 NEUROMUSCULAR REEDUCATION: CPT | Mod: PO,59

## 2019-02-14 PROCEDURE — 97110 THERAPEUTIC EXERCISES: CPT | Mod: PO,59

## 2019-02-14 PROCEDURE — 97116 GAIT TRAINING THERAPY: CPT | Mod: PO

## 2019-02-14 NOTE — PROGRESS NOTES
"Outpatient Neurological Rehabilitation   Speech and Language Therapy Daily Note  Date:  2/14/2019     Name: Torito Harris   MRN: [id number]   Therapy Diagnosis:   Encounter Diagnosis   Name Primary?    Broca's aphasia    Physician: Ian Feliciano MD  Physician Orders: ST evaluate and treat  Medical Diagnosis: I69.30 (ICD-10-CM) - Late effect of stroke    Visit #/Visits authorized: 5/ 10  Date of Evaluation:  1/28/19  Insurance Authorization Period: 1/26/19 to 12/31/19  Plan of Care Expiration Date:   1/28/2019 to 3/25/19  Extended POC: n/a     Time In:   1515   Time Out:   1600   Total Billable Time: 45 minutes    Precautions: Standard and Fall    Subjective:   Pt reports: "ok" when asked how he was doing. Consistently looked to his wife to answer for him. Discussed that Mr. Ugarte needs to attempt communication before receiving aid from his wife to help encourage his language development.   Response to previous treatment: did not indicate  Pain Scale:  0/10 on VAS currently.   Pain Location: n/a  Objective:   UNTIMED  Procedure Min.   Speech- Language- Voice Therapy    45   Total Untimed Units: 1  Charges Billed/# of units: 1    Short Term Goals: (4 weeks) Current Progress:   1. Pt will complete R/L body part discrimination tasks with 70% Carmen.   Goal Met 2/7/19/ Discontinue    2. Pt will follow 3 unit body commands with 80% acc sergio Goal Not Met / Revise to 2 unit    3. Pt will follow 3 unit commands with visual stimuli with 70% Carmen. Goal Not Met / Revise to 2 unit    4. Pt will name common objects with 90% Carmen.  Progressing/ Not Met 2/14/2019   40% acc indly, 60% acc given a semantic cue, 80% acc given a gesture, 90% acc given a semantic cue, gesture, and phonemic cue.    5. Pt will generate a response to basic sentence completions with 80% Carmen   Progressing/ Not Met 2/14/2019   Pt completed sentences with an object as visual stimuli with 50% acc indly, 60% acc given a repetition, 70% acc given a phonemic " cue.     Pt completed automatic sentences with 66% acc indly, 73% acc given a repetition, 100% acc given a repetition and a phonemic cue.    6. Pt will name 5 concrete category items with Carmen.  Progressing/ Not Met 2/14/2019   Pt named 0 items in a concrete category indly.  Pt completed convergent naming task with 100% acc max A and added an item to each category with 100% acc max A.    7. Pt will repeat  3-5 word phrases with 80% Caremn.  Progressing/ Not Met 2/14/2019   Not formally addressed    Previously METx1    8. Assess reading and writing skills.  Progressing/ Not Met 2/14/2019   Not formally addressed     9. Revised Goal: pt will follow 2 unit body part commands with 90% acc indly to improve auditory comprehension.  Progressing/ Not Met 2/14/2019   60% acc indly, 80% acc given mod verbal cues, 100% acc given a model    10. Revised Goal: pt will follow 2 unit commands with visual stimuli with 90% acc indly to improve auditory comprehension.   Progressing/ Not Met 2/14/2019   50% acc indly, 70% acc given a repetition, 80% acc given a repetition and a visual cue   11. New Goal: pt and a conversational partner will use supported communication for persons with aphasia to facilitate a 2 conversational turns with a communication partner.  NOTE: will train pt's wife in supported communication       Patient Education/Response:   Pt's wife asked about book Healing the Broken Brain that is on SLP's bookshelf. Discussed how this book discussed CVA recovery from a variety of perspectives including, therapy, neurology, caregiver, and the patient themselves. Pt's wife verbalized that she would like any education available to help her  make progress in therapy. SLP verbalized that she will gather information re: caregiver facilitation of language.     Assessment:   Torito is progressing well towards his goals.revised comprehension goals to address 2 unit stimuli to reduce frustration and facilitate success to prevent  burn out. This level was still a good working level as pt scored 60% accuracy at most with these tasks.  Current goals remain appropriate. Goals to be updated as necessary.     Pt prognosis is Good. Pt will continue to benefit from skilled outpatient speech and language therapy to address the deficits listed in the problem list on initial evaluation, provide pt/family education and to maximize pt's level of independence in the home and community environment.     Barriers to Therapy: sleeping pattern, possible transportation  Pt's spiritual, cultural and educational needs considered and pt agreeable to plan of care and goals.    Plan:   Continue POC with introduction of supported communication for persons with aphasia.     SERINA Mott, CCC-SLP  Speech Language Pathologist   2/14/2019

## 2019-02-14 NOTE — PROGRESS NOTES
"  Occupational Therapy Daily Treatment Note     Name: Torito Harris  [facility] Number: [id number]  Physician: Ian Feliciano MD  Medical Diagnosis: L CVA      Therapy Diagnosis:   Encounter Diagnoses   Name Primary?    Impaired mobility and activities of daily living     Weakness of right upper extremity      Visit Date: 2/14/2019  Physician Orders: Eval and Treat   Surgical Procedure and Date: NA  Evaluation Date: 1/28/19   Insurance Authorization Period Expiration: 12/31/19   Plan of Care Certification Period: 3/11/19   Date of Return to MD: unknown at this time     Visit # / Visits authorized: 5 / 10  Time In: 1441 - pt arrived late  Time Out: 1516  Total Billable Time: 35 minutes    Precautions: Standard, fall risk, hearing impaired     Subjective     Pt reports: Noncompliance with HEP.  Pt's wife reports, "Sorry were running late today.  Traffic was terrible."     Response to previous treatment:positive, increased knowledge on AD   Functional change: NA     Pain: none reported this date.   Location: NA     Objective   Patient seen by OT this date.  Pt ambulated to treatment session with abigail walker and SBA.  Patient's wife was present for treatment session.  Patient received individual therapy with activities as follows:     Patient participated in neuromuscular reeducation activities for 25 minutes to maximize ROM, motor strength and neuromuscular and proprioceptive input to the RUE.  The following activities were included:   Sitting EOM:   - restoration of R hand arches and metacarpal stretches   - general R wrist stretches: 1. P-A glide of scaphoid on radius, 2. Radial deviation, 3. Increasing mobility of metacarpals, 4. Carpal roll, 5. Movement of forearm on hand towards wrist extension, 6. Movement of forearm on the hand toward supination/ pronation, 7. wrist ext with distraction   - right upper extremity weight bearing on a hard static surface with body on arm weight shifts in the frontal plane to " promote R wrist extension with CGA to maintain R open hand position on hard surface    - application of GG splint to R hand/wrist   - Shoulder I-Frame forward shoulder flexion and scap protraction/retraction       Torito participated in therapeutic exercises to improve ROM and strength for 10 minutes:   Supine:  - R scap stabilization - 15 sec hold shoulder flexion 90* x 10 reps with Mod A   - R triceps extension x 15 reps with Min A     Functional mobility:   - Supine <> sit with SBA   - Sit > stand with SBA  - gait with abigail-walker and close SBA       GG splint remained on R hand and patient ambulated with abigail walker and SBA to following ST session.  Instructed to remove splint post ST.        Home Exercises and Education Provided     Education provided re: HEP  - AD to maximize independence   - Progress towards goals   - POC     Written Home Exercises Provided: Patient instructed to cont prior HEP as verbally instructed in previous tx sessions.  Exercises were reviewed and Torito was able to demonstrate them prior to the end of the session.  Torito demonstrated good  understanding of the HEP provided.       Pt has no cultural, educational or language barriers to learning provided.    Assessment   Patient tolerated treatment session well and without any problems.  OT progressed to increased focus on active assisted TherEx. RUE initiation and movement through partial range of motion is improving.  Patient continues to report noncompliance with HEP and therefore OT reinforced importance of daily compliance with HEP to maximize progress outside of therapy.      Pt would continue to benefit from skilled OT. Torito is progressing well towards his goals and there are no updates to goals at this time. Pt prognosis is Good due to Pt motivated to participate and good family support from his spouse.  Pt will continue to benefit from skilled outpatient occupational therapy to address the deficits listed in the problem list  on initial evaluation provide pt/family education and to maximize pt's level of independence in the home and community environment.     Anticipated barriers to occupational therapy: none     Pt's spiritual, cultural and educational needs considered and pt agreeable to plan of care and goals.    Goals:  Short Term Goals: 3 weeks   ROM/Strength to perform the ADL's and functional activities listed below,   Pt will improve functional  strength needed for tasks to 20# in R hand.  Pt will improve AROM for shoulder flexion needed for functional tasks to 60* in R UE.   UE dressing will improve to Min A   LB dressing will improve to Mod A   Toileting will improve to Min A incorporating R hand for task   Pt will be Independent with HEP to improve ROM and FM skills.      Long Term Goals: 6 weeks   ROM/Strength to perform the ADL's and functional activities listed below  Pt will improve functional  strength needed for tasks to 30# in R hand.  Pt will improve AROM for shoulder flexion needed for functional tasks to 80* in R UE.   UE dressing will improve to Supervision   LB dressing will improve to Min A   Toileting will improve to Supervision incorporating R hand for task   Pt will complete clinical testing for skills needed for driving  G code self care CK    Plan   Continue POC in pursuit of OT goals.     Discussed Plan of Care with patient: Yes  Updates/Grading for next session: progress as tolerated

## 2019-02-14 NOTE — PROGRESS NOTES
"  Physical Therapy Daily Treatment Note     [id number]    Therapy Diagnosis:   Encounter Diagnoses   Name Primary?    Weakness of right leg     Impaired functional mobility, balance, gait, and endurance      Physician: Ian Feliciano MD    Visit Date: 2/14/2019    Physician Orders: PT Eval and Treat   Medical Diagnosis from Referral: Late effect of stroke  Evaluation Date: 1/28/2019  Authorization Period Expiration: 01/26/19-12/31/19  Plan of Care Expiration: 01/28/19 - 03/25/19  Visit # / Visits authorized: 5/ 10 (Vist #5 of 2019)   Time In:16:00  Time Out: 16:45  Total Billable Time: 45 minutes    Precautions: Standard, Diabetes, Fall and right hemiplegia, hearing aid, safety awareness, cognitive deficits, speech deficits    Subjective     Pt reports: " I'm doing good."  HEP Compliance: Reports some compliance with HEP  Response to previous treatment: no issues reported  Functional change: non reported at this time      Pain: 0/10, none reported  Location: N/A      Objective     Torito received therapeutic exercises to develop strength, endurance, ROM, flexibility and core stabilization for 25 minutes including:     X 10 SciFit recumbent stepper. L3.0;  B LE, B UE (R hand ace wrapped to handle)     Supine: RLE   2 x 10 reps of R LE IR/ER with rubber bands                   Seated on EOM: BLE              3 x 30" Seated HS stretch     Standing therex:   2 x 10 reps of B LE heel raises with 2 UE support   1 x 10 reps of B LE single leg step ups onto 4 inch step with 2 UE support   1 x 10 reps of B LE single leg target tap to work on weight shifting and single leg stance     Patient participated in gait training activities to normalize gait pattern for 20 minutes. The following activities were included:   Gait belt used for safety.   // bars:  X 4 laps of side stepping with 1 UE support and therapist holding R UE.  VC's to increase hip and knee flexion  X 2 laps of backward " ambulation.   1 UE support and therapist holding R UE.  Pt with R knee hyperextension and did not cont with activity.    170 ft Ambualting with BRITTNEY platform walker; Min A.  Assist for upright posture and walker management. L trunk lean present throughout ambulation.     Home Exercises Provided and Patient Education Provided     Education provided: Bridging, side-lying clamshells, hip ADduction squeezes, seated marching, long arc quad, supine dorsiflexion, supine plantarflexion    Written Home Exercises Provided: yes.  Exercises were reviewed and Torito was able to demonstrate them prior to the end of the session.  Torito demonstrated good  understanding of the education provided. Torito's wife present for  HEP instruction as well.     See EMR under Media for exercises provided 2/5/2019.    Assessment   Mr. Ugarte tx session well and did not have any problems. Pt cont with R LE strengthening and activities to promote R LE weight bearing and weight shifting.  Pt required less assistance for the Brittney walker today and was able to perform gait with good step length today.  No loss of balance noted.   Continue current POC.    Torito is progressing well towards his goals.   Pt prognosis is Good.     Pt will continue to benefit from skilled outpatient physical therapy to address the deficits listed in the problem list box on initial evaluation, provide pt/family education and to maximize pt's level of independence in the home and community environment.     Pt's spiritual, cultural and educational needs considered and pt agreeable to plan of care and goals.     Anticipated Barriers for therapy: hearing deficit, speech deficits, cognitive deficits, decreased safety awareness, fall risk, visual neglect, transportation assistance required    Goals:  Short Term Goals: 4 weeks   1. Pt to be (I) with established HEP Ongoing  2. Pt to improve R hip flexion strength MMT score to at least 4+/5 for improved gait mechanics Ongoing  3.  Pt to improve R hip extension strength MMT score to at least 4/5 for improved gait mechanics Ongoing  4. Pt to improve R hip AB and R hip AD strength MMT scores to at least 3+/5 for improved gait mechanics Ongoing  5.  Pt to improve R knee flexion and extension strength MMT scores to at least 4+/5 for improved gait mechanics Ongoing  6. Pt to improve R ankle PF strength MMT score to at least 2/5 for improved gait mechanics Ongoing  7. Pt to improve chair rise score to at least 5 times with no more than 1 UE support and S for improved endurance and safety with functional transfers Ongoing  8. Pt to improve TUG score to at least 30 seconds for improved safety with household mobility Ongoing  9. Pt to improve SSWS score to at least 0.40 m/sec for improved safety with community mobility. Ongoing     Long Term Goals: 8 weeks   1. Pt to be (I) with advanced HEP Ongoing  2. Pt to improve R hip extension strength MMT score to at least 4+/5 for improved gait mechanics Ongoing  3. Pt to improve R hip AB and R hip AD strength MMT scores to at least 4-/5 for improved gait mechanics Ongoing  4. Pt to improve R ankle PF strength MMT score to at least 2+/5 for improved gait mechanics Ongoing  5. Pt to improve chair rise score to at least 5 times with no UE support and S for improved endurance and safety with functional transfers Ongoing  6. Pt to improve TUG score to at least 26 seconds for improved safety with household mobility Ongoing  7. Pt to improve SSWS score to at least 0.50 m/sec for improved safety with community mobility. Ongoing     Plan   Plan for next visit: continue standing there-act to address weight acceptance onto RLE, GS stretch making sure patient doesn't invert, AROM DF of R ankle, AROM EV of R ankle.    Continue outpatient physical therapy 2x weekly under current established Plan of Care,1/28/2019 to 03/25/19., with treatment to include: pt education, HEP, therapeutic exercises, neuromuscular  re-education/balance exercises, therapeutic activities, joint mobilizations, and modalities PRN, to work towards established goals. Pt may be seen by PTA to carry out plan of care.           Lucy Vanegas, PTA

## 2019-02-19 ENCOUNTER — CLINICAL SUPPORT (OUTPATIENT)
Dept: REHABILITATION | Facility: HOSPITAL | Age: 59
End: 2019-02-19
Attending: PSYCHIATRY & NEUROLOGY
Payer: MEDICARE

## 2019-02-19 DIAGNOSIS — Z74.09 IMPAIRED FUNCTIONAL MOBILITY, BALANCE, GAIT, AND ENDURANCE: ICD-10-CM

## 2019-02-19 DIAGNOSIS — R29.898 WEAKNESS OF RIGHT LEG: ICD-10-CM

## 2019-02-19 DIAGNOSIS — Z78.9 IMPAIRED MOBILITY AND ACTIVITIES OF DAILY LIVING: ICD-10-CM

## 2019-02-19 DIAGNOSIS — R47.01 BROCA'S APHASIA: ICD-10-CM

## 2019-02-19 DIAGNOSIS — R29.898 WEAKNESS OF RIGHT UPPER EXTREMITY: ICD-10-CM

## 2019-02-19 DIAGNOSIS — Z74.09 IMPAIRED MOBILITY AND ACTIVITIES OF DAILY LIVING: ICD-10-CM

## 2019-02-19 PROCEDURE — 97110 THERAPEUTIC EXERCISES: CPT | Mod: PO,59

## 2019-02-19 PROCEDURE — 97116 GAIT TRAINING THERAPY: CPT | Mod: PO

## 2019-02-19 PROCEDURE — 97112 NEUROMUSCULAR REEDUCATION: CPT | Mod: PO

## 2019-02-19 PROCEDURE — 92507 TX SP LANG VOICE COMM INDIV: CPT | Mod: PO

## 2019-02-19 PROCEDURE — 97110 THERAPEUTIC EXERCISES: CPT | Mod: PO

## 2019-02-19 NOTE — PROGRESS NOTES
"Outpatient Neurological Rehabilitation   Speech and Language Therapy Daily Note  Date:  2/19/2019     Name: Torito Harris   MRN: [id number]   Therapy Diagnosis:   Encounter Diagnosis   Name Primary?    Broca's aphasia    Physician: Ian Feliciano MD  Physician Orders: ST evaluate and treat  Medical Diagnosis: I69.30 (ICD-10-CM) - Late effect of stroke    Visit #/Visits authorized: 6/ 10  Date of Evaluation:  1/28/19  Insurance Authorization Period: 1/26/19 to 12/31/19  Plan of Care Expiration Date:   1/28/2019 to 3/25/19  Extended POC: n/a     Time In: 1515    Time Out:  1600  Total Billable Time: 45 minutes    Precautions: Standard and Fall    Subjective:   Pt reports: Patient communicated with clinician about when he went to school in [place]. When asked where in [place], client responded "way up there".  Response to previous treatment: "Good"  Pain Scale:  0/10 on VAS currently.   Pain Location: n/a  Objective:   UNTIMED  Procedure Min.   Speech- Language- Voice Therapy    45 minutes   Total Untimed Units: 1  Charges Billed/# of units: 1    Short Term Goals: (4 weeks) Current Progress:   1. Pt will complete R/L body part discrimination tasks with 70% Carmen.   Goal Met 2/7/19/ Discontinue    2. Pt will follow 3 unit body commands with 80% acc sergio Goal Not Met / Revise to 2 unit    3. Pt will follow 3 unit commands with visual stimuli with 70% Carmen. Goal Not Met / Revise to 2 unit    4. Pt will name common objects with 90% Carmen.  Progressing/ Not Met 2/19/2019   33% acc indly, 41% acc given a semantic cue, 63% acc given a gesture, 100% acc given a semantic cue, gesture, and phonemic cue.    5. Pt will generate a response to basic sentence completions with 80% Carmen   Progressing/ Not Met 2/19/2019   Pt completed sentences with an object as visual stimuli with 50% acc indly, 60% acc given a repetition, 70% acc given a phonemic cue.     Pt completed automatic sentences with 67% acc indly, 77% acc given a " "repetition, 100% acc given a repetition and a phonemic cue.    6. Pt will name 5 concrete category items with Carmen.  Progressing/ Not Met 2/19/2019   Pt completed convergent naming task with 100% acc max A.    7. Pt will repeat  3-5 word phrases with 80% Carmen.  Progressing/ Not Met 2/19/2019   Not formally addressed    Previously METx1    8. Assess reading and writing skills.  Progressing/ Not Met 2/19/2019   Not formally addressed     9. Revised Goal: pt will follow 2 unit body part commands with 90% acc indly to improve auditory comprehension.  Progressing/ Not Met 2/19/2019   65% acc indly, 83% acc given mod verbal cues, 100% acc given a model    10. Revised Goal: pt will follow 2 unit commands with visual stimuli with 90% acc indly to improve auditory comprehension.   Progressing/ Not Met 2/19/2019    Not formally addressed    11. New Goal: pt and a conversational partner will use supported communication for persons with aphasia to facilitate a 2 conversational turns with a communication partner.  NOTE: will train pt's wife in supported communication       Patient Education/Response:   Pt stated frequently throughout the session "I can't hear." SLP educated patient that although hearing may be an issue, he is having difficulty comprehending auditory information. SLP slowed speaking rate and used gestures to aid in presenting information to client. Pt and wife verbalized understanding of all information presented.     Assessment:   Torito is progressing well towards his goals. Patient improves accuracy during object identification tasks when given semantic and phonemic cues. Patient benefits from repetition, gestures, and slow speech rate to comprehend auditory information. Current goals remain appropriate. Goals to be updated as necessary.     Pt prognosis is Good. Pt will continue to benefit from skilled outpatient speech and language therapy to address the deficits listed in the problem list on initial " evaluation, provide pt/family education and to maximize pt's level of independence in the home and community environment.     Barriers to Therapy: sleeping pattern, possible transportation  Pt's spiritual, cultural and educational needs considered and pt agreeable to plan of care and goals.    Plan:   Continue POC with introduction of supported communication for persons with aphasia.     Oxana Dozier BA  Student Speech Language Pathologist     2/19/2019

## 2019-02-19 NOTE — PROGRESS NOTES
"  Physical Therapy Daily Treatment Note     [id number]    Therapy Diagnosis:   Encounter Diagnoses   Name Primary?    Weakness of right leg     Impaired functional mobility, balance, gait, and endurance      Physician: Ian Feliciano MD    Visit Date: 2/19/2019    Physician Orders: PT Eval and Treat   Medical Diagnosis from Referral: Late effect of stroke  Evaluation Date: 1/28/2019  Authorization Period Expiration: 01/26/19-12/31/19  Plan of Care Expiration: 01/28/19 - 03/25/19  Visit # / Visits authorized: 6/ 10 (Vist #5 of 2019)   Time In:16:00  Time Out: 16:45  Total Billable Time: 45 minutes    Precautions: Standard, Diabetes, Fall and right hemiplegia, hearing aid, safety awareness, cognitive deficits, speech deficits    Subjective     Pt reports: " I'm doing good."  HEP Compliance: Reports some compliance with HEP  Response to previous treatment: no issues reported  Functional change: non reported at this time      Pain: 0/10, none reported  Location: N/A      Objective     Torito received therapeutic exercises to develop strength, endurance, ROM, flexibility and core stabilization for 30 minutes including:     X 10 SciFit recumbent stepper. L3.0;  B LE, B UE (R hand ace wrapped to handle)     Supine: RLE   3 x 10 reps of R LE IR/ER with rubber bands   2 x 10 reps of R LE SLR, VC's for quad set              2 x 10 reps of R LE hip adduction with YTB    Sidelying:   2 x 10 reps of R LE clamshell     Seated EOM:   X 30 reps of R LE DF with YTB     Patient participated in gait training activities to normalize gait pattern for 15 minutes. The following activities were included:   Gait belt used for safety.   Pt  Ambulated ~ 170ft and 100ft with LBQC and CGA.      Home Exercises Provided and Patient Education Provided     Education provided: Bridging, side-lying clamshells, hip ADduction squeezes, seated marching, long arc quad, supine dorsiflexion, supine " plantarflexion    Written Home Exercises Provided: yes.  Exercises were reviewed and Torito was able to demonstrate them prior to the end of the session.  Torito demonstrated good  understanding of the education provided. Torito's wife present for  HEP instruction as well.     See EMR under Media for exercises provided 2/5/2019.    Assessment   Mr. Ugarte tx session well and did not have any problems. Pt was able to progress from abigail walker to large base quad cane today with ease and did not have any loss of balance noted.  Pt was able to improve gait speed with out any balance problems using the large base quad cane.  Continue current POC.    Torito is progressing well towards his goals.   Pt prognosis is Good.     Pt will continue to benefit from skilled outpatient physical therapy to address the deficits listed in the problem list box on initial evaluation, provide pt/family education and to maximize pt's level of independence in the home and community environment.     Pt's spiritual, cultural and educational needs considered and pt agreeable to plan of care and goals.     Anticipated Barriers for therapy: hearing deficit, speech deficits, cognitive deficits, decreased safety awareness, fall risk, visual neglect, transportation assistance required    Goals:  Short Term Goals: 4 weeks   1. Pt to be (I) with established HEP Ongoing  2. Pt to improve R hip flexion strength MMT score to at least 4+/5 for improved gait mechanics Ongoing  3. Pt to improve R hip extension strength MMT score to at least 4/5 for improved gait mechanics Ongoing  4. Pt to improve R hip AB and R hip AD strength MMT scores to at least 3+/5 for improved gait mechanics Ongoing  5.  Pt to improve R knee flexion and extension strength MMT scores to at least 4+/5 for improved gait mechanics Ongoing  6. Pt to improve R ankle PF strength MMT score to at least 2/5 for improved gait mechanics Ongoing  7. Pt to improve chair rise score to at least 5  times with no more than 1 UE support and S for improved endurance and safety with functional transfers Ongoing  8. Pt to improve TUG score to at least 30 seconds for improved safety with household mobility Ongoing  9. Pt to improve SSWS score to at least 0.40 m/sec for improved safety with community mobility. Ongoing     Long Term Goals: 8 weeks   1. Pt to be (I) with advanced HEP Ongoing  2. Pt to improve R hip extension strength MMT score to at least 4+/5 for improved gait mechanics Ongoing  3. Pt to improve R hip AB and R hip AD strength MMT scores to at least 4-/5 for improved gait mechanics Ongoing  4. Pt to improve R ankle PF strength MMT score to at least 2+/5 for improved gait mechanics Ongoing  5. Pt to improve chair rise score to at least 5 times with no UE support and S for improved endurance and safety with functional transfers Ongoing  6. Pt to improve TUG score to at least 26 seconds for improved safety with household mobility Ongoing  7. Pt to improve SSWS score to at least 0.50 m/sec for improved safety with community mobility. Ongoing     Plan   Plan for next visit: continue standing there-act to address weight acceptance onto RLE, GS stretch making sure patient doesn't invert, AROM DF of R ankle, AROM EV of R ankle.    Continue outpatient physical therapy 2x weekly under current established Plan of Care,1/28/2019 to 03/25/19., with treatment to include: pt education, HEP, therapeutic exercises, neuromuscular re-education/balance exercises, therapeutic activities, joint mobilizations, and modalities PRN, to work towards established goals. Pt may be seen by PTA to carry out plan of care.           Lucy Vanegas, SHERICE

## 2019-02-19 NOTE — PROGRESS NOTES
Occupational Therapy Daily Treatment Note     Name: Torito Harris  [facility] Number: [id number]  Physician: Ian Feliciano MD  Medical Diagnosis: L CVA      Therapy Diagnosis:   Encounter Diagnoses   Name Primary?    Impaired mobility and activities of daily living     Weakness of right upper extremity      Visit Date: 2/19/2019  Physician Orders: Eval and Treat   Surgical Procedure and Date: NA  Evaluation Date: 1/28/19   Insurance Authorization Period Expiration: 12/31/19   Plan of Care Certification Period: 3/11/19   Date of Return to MD: unknown at this time     Visit # / Visits authorized: 6 / 10  Time In: 1430  Time Out: 1515  Total Billable Time: 45 minutes    Precautions: Standard, fall risk, hearing impaired     Subjective     Pt reports: he is interested in purchasing GG splint for home use.   Noncompliance with HEP.       Response to previous treatment:positive, increased knowledge on AD, interest in GG splint   Functional change: progressing     Pain: none reported this date.   Location: NA     Objective   Patient seen by OT this date.  Pt ambulated to treatment session with abigail walker and SBA.  Patient's wife was present for treatment session.  Patient received individual therapy with activities as follows:     Patient participated in neuromuscular reeducation activities for 20 minutes to maximize ROM, motor strength and neuromuscular and proprioceptive input to the RUE.  The following activities were included:   Sitting EOM:   - restoration of R hand arches and metacarpal stretches   - general R wrist stretches: 1. P-A glide of scaphoid on radius, 2. Radial deviation, 3. Increasing mobility of metacarpals, 4. Carpal roll, 5. Movement of forearm on hand towards wrist extension, 6. Movement of forearm on the hand toward supination/ pronation, 7. wrist ext with distraction   - right upper extremity weight bearing on a hard static surface with body on arm weight shifts in the frontal plane to promote  R wrist extension with CGA to maintain R open hand position on hard surface          Torito participated in therapeutic exercises to improve ROM and strength for 25 minutes:   Supine:   - BUE exercises with 2# dowel; R hand paddled to dowel; 1 set of 15 reps each:    - chest press; min A - CGA   - shoulder flex/ext; min A- CGA   - shoulder add/abd; mod A - max A    - reverse curls; CGA   - triceps extension; mod A        * application of GG splint to R hand/wrist at end of session and instructed to remain on hand through following ST session.     Functional mobility:   - Supine <> sit with SBA   - Sit <> stand with SBA  - gait with abigail-walker and close SBA       Patient ambulated with abigail walker and SBA to following ST session.          Home Exercises and Education Provided     Education provided: GG splint ordering process - pt and his wife verbalized understanding and expressed interest in purchasing; OT provided them with information and order forms.   - HEP  - AD to maximize independence   - Progress towards goals   - POC     Written Home Exercises Provided: Patient instructed to cont prior HEP as verbally instructed in previous tx sessions.  Exercises were reviewed and Torito was able to demonstrate them prior to the end of the session.  Torito demonstrated good  understanding of the HEP provided.       Pt has no cultural, educational or language barriers to learning provided.    Assessment   Patient tolerated treatment session well and without any problems.  OT continues with an increased focus on active assisted TherEx. RUE initiation and movement through partial range of motion is gradually improving.  Patient expressed interest in purchasing GG splint for home use to maximize progress outside of therapy.  Pt and his wife were provided with information on splint and verbalized knowledge and understanding of education provided.      Pt would continue to benefit from skilled OT. Torito is progressing well  towards his goals and there are no updates to goals at this time. Pt prognosis is Good due to Pt motivated to participate and good family support from his spouse.  Pt will continue to benefit from skilled outpatient occupational therapy to address the deficits listed in the problem list on initial evaluation provide pt/family education and to maximize pt's level of independence in the home and community environment.     Anticipated barriers to occupational therapy: none     Pt's spiritual, cultural and educational needs considered and pt agreeable to plan of care and goals.    Goals:  Short Term Goals: 3 weeks   ROM/Strength to perform the ADL's and functional activities listed below, - in progress  Pt will improve functional  strength needed for tasks to 20# in R hand.  Pt will improve AROM for shoulder flexion needed for functional tasks to 60* in R UE. - in progress   UE dressing will improve to Min A   LB dressing will improve to Mod A   Toileting will improve to Min A incorporating R hand for task   Pt will be Independent with HEP to improve ROM and FM skills. - in progress      Long Term Goals: 6 weeks   ROM/Strength to perform the ADL's and functional activities listed below  Pt will improve functional  strength needed for tasks to 30# in R hand.  Pt will improve AROM for shoulder flexion needed for functional tasks to 80* in R UE.   UE dressing will improve to Supervision   LB dressing will improve to Min A   Toileting will improve to Supervision incorporating R hand for task   Pt will complete clinical testing for skills needed for driving  G code self care CK    Plan   Continue POC in pursuit of OT goals.     Discussed Plan of Care with patient: Yes  Updates/Grading for next session: progress as tolerated

## 2019-02-21 ENCOUNTER — CLINICAL SUPPORT (OUTPATIENT)
Dept: REHABILITATION | Facility: HOSPITAL | Age: 59
End: 2019-02-21
Attending: PSYCHIATRY & NEUROLOGY
Payer: MEDICARE

## 2019-02-21 DIAGNOSIS — R29.898 WEAKNESS OF RIGHT UPPER EXTREMITY: ICD-10-CM

## 2019-02-21 DIAGNOSIS — R29.898 WEAKNESS OF RIGHT LEG: ICD-10-CM

## 2019-02-21 DIAGNOSIS — Z74.09 IMPAIRED FUNCTIONAL MOBILITY, BALANCE, GAIT, AND ENDURANCE: ICD-10-CM

## 2019-02-21 DIAGNOSIS — R47.01 BROCA'S APHASIA: ICD-10-CM

## 2019-02-21 DIAGNOSIS — Z74.09 IMPAIRED MOBILITY AND ACTIVITIES OF DAILY LIVING: ICD-10-CM

## 2019-02-21 DIAGNOSIS — Z78.9 IMPAIRED MOBILITY AND ACTIVITIES OF DAILY LIVING: ICD-10-CM

## 2019-02-21 PROCEDURE — 97110 THERAPEUTIC EXERCISES: CPT | Mod: PO,59

## 2019-02-21 PROCEDURE — 97110 THERAPEUTIC EXERCISES: CPT | Mod: PO

## 2019-02-21 PROCEDURE — 97530 THERAPEUTIC ACTIVITIES: CPT | Mod: PO,59

## 2019-02-21 PROCEDURE — 97112 NEUROMUSCULAR REEDUCATION: CPT | Mod: PO

## 2019-02-21 PROCEDURE — 92507 TX SP LANG VOICE COMM INDIV: CPT | Mod: PO

## 2019-02-21 PROCEDURE — 97116 GAIT TRAINING THERAPY: CPT | Mod: PO

## 2019-02-21 NOTE — PROGRESS NOTES
Occupational Therapy Daily Treatment Note     Name: Torito Harris  [facility] Number: [id number]  Physician: Ian Feliciano MD  Medical Diagnosis: L CVA      Therapy Diagnosis:   Encounter Diagnoses   Name Primary?    Impaired mobility and activities of daily living     Weakness of right upper extremity      Visit Date: 2/21/2019  Physician Orders: Eval and Treat   Surgical Procedure and Date: NA  Evaluation Date: 1/28/19   Insurance Authorization Period Expiration: 12/31/19   Plan of Care Certification Period: 3/11/19   Date of Return to MD: unknown at this time     Visit # / Visits authorized: 7 / 10  Time In: 1431  Time Out: 1515   Total Billable Time: 44 minutes      Precautions: Standard, fall risk, hearing impaired     Subjective     Pt reports: no new complaints.  Patient reports he is performing self ROM occasionally.      Response to previous treatment:positive   Functional change: progressing     Pain: none reported this date.   Location: NA     Objective   Patient seen by OT this date.  Pt ambulated to treatment session with quad cane and SBA.  Patient's wife was present for treatment session.  Patient received individual therapy with activities as follows:     Torito participated in therapeutic exercises to improve ROM and strength for 8 minutes:   - UBE level 1 x 4 min forwards x 4 min reverse     Patient participated in neuromuscular reeducation activities for 36 minutes to maximize ROM, motor strength and neuromuscular and proprioceptive input to the RUE.  The following activities were included:   Sitting EOM:   - restoration of R hand arches and metacarpal stretches   - general R wrist stretches: 1. P-A glide of scaphoid on radius, 2. Radial deviation, 3. Increasing mobility of metacarpals, 4. Carpal roll, 5. Movement of forearm on hand towards wrist extension, 6. Movement of forearm on the hand toward supination/ pronation, 7. wrist ext with distraction   - right upper extremity weight bearing  on a hard static surface with body on arm weight shifts   - RUE grasp and release tasks with soft objects; OT and self assistance to reach towards object; verbal cues to maximize extension of digits       * application of GG splint to R hand/wrist at end of session and instructed to remain on hand through following ST session.     Functional mobility:   - Supine <> sit with SBA   - Sit <> stand with SBA  - gait with quad cane and close SBA       Patient ambulated with quad cane and SBA to following ST session.        Home Exercises and Education Provided     Education provided: GG splint   - HEP  - AD to maximize independence   - Progress towards goals   - POC     Written Home Exercises Provided: Patient instructed to cont prior HEP as verbally instructed in previous tx sessions. Addtionally, pt was instructed to practice grasp and release of lightweight, soft objects at home.   Exercises were reviewed and Torito was able to demonstrate them prior to the end of the session.  Torito demonstrated good  understanding of the HEP provided.       Pt has no cultural, educational or language barriers to learning provided.    Assessment   Patient tolerated treatment session well and without any problems.  OT initiated UBE this date for RUE range of motion and strengthening.  Progressed to grasp and release tasks with the RUE this date.  He required assistance to reach towards objects 2/2 continued weakness and AROM deficits.  He was able to actively grasp and sustain hold on lightweight, soft objects with R hand.  R hand extension of digits for active release of objects was limited - he required VCs to maximize extension.      Pt would continue to benefit from skilled OT. Torito is progressing well towards his goals and there are no updates to goals at this time. Pt prognosis is Good due to Pt motivated to participate and good family support from his spouse.  Pt will continue to benefit from skilled outpatient occupational  therapy to address the deficits listed in the problem list on initial evaluation provide pt/family education and to maximize pt's level of independence in the home and community environment.     Anticipated barriers to occupational therapy: none     Pt's spiritual, cultural and educational needs considered and pt agreeable to plan of care and goals.    Goals:  Short Term Goals: 3 weeks   ROM/Strength to perform the ADL's and functional activities listed below, - in progress  Pt will improve functional  strength needed for tasks to 20# in R hand.  Pt will improve AROM for shoulder flexion needed for functional tasks to 60* in R UE. - in progress   UE dressing will improve to Min A   LB dressing will improve to Mod A   Toileting will improve to Min A incorporating R hand for task   Pt will be Independent with HEP to improve ROM and FM skills. - in progress      Long Term Goals: 6 weeks   ROM/Strength to perform the ADL's and functional activities listed below - in progress   Pt will improve functional  strength needed for tasks to 30# in R hand. - in progress   Pt will improve AROM for shoulder flexion needed for functional tasks to 80* in R UE. - in progress   UE dressing will improve to Supervision   LB dressing will improve to Min A   Toileting will improve to Supervision incorporating R hand for task   Pt will complete clinical testing for skills needed for driving  G code self care CK    Plan   Continue POC in pursuit of OT goals.     Discussed Plan of Care with patient: Yes  Updates/Grading for next session: progress as tolerated

## 2019-02-21 NOTE — PROGRESS NOTES
"Outpatient Neurological Rehabilitation   Speech and Language Therapy Daily Note  Date:  2/21/2019     Name: Torito Harris   MRN: [id number]   Therapy Diagnosis:   Encounter Diagnosis   Name Primary?    Broca's aphasia    Physician: Ian Feliciano MD  Physician Orders:  evaluate and treat  Medical Diagnosis: I69.30 (ICD-10-CM) - Late effect of stroke    Visit #/Visits authorized: 6/ 10  Date of Evaluation:  1/28/19  Insurance Authorization Period: 1/26/19 to 12/31/19  Plan of Care Expiration Date:   1/28/2019 to 3/25/19  Extended POC: n/a     Time In: 1515    Time Out:  1600  Total Billable Time: 45 minutes    Precautions: Standard and Fall    Subjective:   Pt reports: "oh you again" when SLP initiated ST session  Response to previous treatment: "Good"  Pain Scale:  0/10 on VAS currently.   Pain Location: n/a  Objective:   UNTIMED  Procedure Min.   Speech- Language- Voice Therapy    45 minutes   Total Untimed Units: 1  Charges Billed/# of units: 1    Short Term Goals: (4 weeks) Current Progress:   1. Pt will complete R/L body part discrimination tasks with 70% Carmen.   Goal Met 2/7/19/ Discontinue    2. Pt will follow 3 unit body commands with 80% acc sergio Goal Not Met / Revise to 2 unit    3. Pt will follow 3 unit commands with visual stimuli with 70% Carmen. Goal Not Met / Revise to 2 unit    4. Pt will name common objects with 90% Carmen.  Progressing/ Not Met 2/21/2019   40% acc indly, 60% acc given a semantic cue, 80% acc given a semantic and phonemic cue.     5. Pt will generate a response to basic sentence completions with 80% Carmen   Progressing/ Not Met 2/21/2019   Pt completed sentences with 50% acc indly, 90% acc given a phonemic cue, 100% accgiven a model.   6. Pt will name 5 concrete category items with Carmen.  Progressing/ Not Met 2/21/2019   Not formally addressed     7. Pt will repeat  3-5 word phrases with 80% Carmen.   Pt repeated 3-5 word sentences with 90% acc indly.   Goal Met 2/21/19 / Discontinue "   8. Assess reading and writing skills.  Progressing/ Not Met 2/21/2019   Not formally addressed     9. pt will follow 2 unit body part commands with 90% acc indly to improve auditory comprehension.  Progressing/ Not Met 2/21/2019   70% acc indly, 100% acc givne a model   10.  pt will follow 2 unit commands with visual stimuli with 90% acc indly to improve auditory comprehension.   Progressing/ Not Met 2/21/2019    Not formally addressed    11. pt and a conversational partner will use supported communication for persons with aphasia to facilitate a 2 conversational turns with a communication partner.   Progressing/ Not Met 2/21/2019   Initiated supported communication training. Pt and family were eagerly engaged.       Patient Education/Response:   Pt  Exhibited difficulty producing his daughters name and requested to practice this in therapy. SLP verbalized that we would target accurately labeling family members by picture in upcoming session. Pt and family verbalized agreement.    Assessment:   Torito is progressing well towards his goals. MET STG 7. Improved accuracy in sentence completion task and following commands task.. Current goals remain appropriate. Goals to be updated as necessary.     Pt prognosis is Good. Pt will continue to benefit from skilled outpatient speech and language therapy to address the deficits listed in the problem list on initial evaluation, provide pt/family education and to maximize pt's level of independence in the home and community environment.     Barriers to Therapy: sleeping pattern, possible transportation  Pt's spiritual, cultural and educational needs considered and pt agreeable to plan of care and goals.    Plan:   Continue POC with introduction of supported communication for persons with aphasia.     SERINA Mott, CCC-SLP  Speech Language Pathologist   2/21/2019

## 2019-02-25 NOTE — PROGRESS NOTES
"  Physical Therapy Daily Treatment Note     [id number]    Therapy Diagnosis:   Encounter Diagnoses   Name Primary?    Weakness of right leg     Impaired functional mobility, balance, gait, and endurance      Physician: Ian Feliciano MD    Visit Date: 2/26/2019    Physician Orders: PT Eval and Treat   Medical Diagnosis from Referral: Late effect of stroke  Evaluation Date: 1/28/2019  Authorization Period Expiration: 01/26/19-12/31/19  Plan of Care Expiration: 01/28/19 - 03/25/19  Visit # / Visits authorized: 8/10 (Vist #8 of 2019)   Time In:16:01   Time Out: 16:45  Total Billable Time: 44  minutes    Precautions: Standard, Diabetes, Fall and right hemiplegia, hearing aid, safety awareness, cognitive deficits, speech deficits    Subjective     Pt reports: He is doing alreight  HEP Compliance: pt reports he is doing all of his exercises at home.  Response to previous treatment: no complaints  Functional change: none reported    Pain: 0/10  Location: N/A      Objective     Torito received therapeutic exercises to develop strength, endurance, ROM, flexibility and core stabilization for 28 minutes including:     X 7 minutes on [employer] recumbent stepper. L3.0;  B LE, B UE.  Pt able to hold the handle w/o the ace wrap. Activity stopped at 7 minutes due to patient complaints of chest pain and pt grabbing his chest.Vitals taken BP: 133/94 mmHg, HR: 59 bpm    Seated on EOM:   3 x 30" HS stretch RLE   3 x 30" GS stretch RLE     Patient participated in gait training activities to normalize gait pattern for 16 minutes. The following activities were included:   Gait belt used for safety.   Pt ambulated from the [employer] <> gym with large base quad cane that he brought from home and SBA.   Ambulated around the gym with SBA    Ballet Bar: 2 people, CGA  x4 laps Sidestepping at ballet bar, BUE support, Carmen with RUE Carmen at R knee  x4 laps Sidestepping at ballet bar over four rows of mini " "cones, BUE support, Carmen with RUE, Carmen at R knee  1 x 10 step ups with RLE onto 4 in block, LUE support. Carmen at R knee to prevent buckling and knee hyperextension.  1 x 10 step ups with LLE onto 4 in block, LUE support. Carmen at R knee to prevent buckling and knee hyperextension.      Home Exercises Provided and Patient Education Provided     Education provided: Ascended/descended curb step, proper swing mechanics for RLE    Written Home Exercises Provided: yes.  Exercises were reviewed and Torito was able to demonstrate them prior to the end of the session.  Torito demonstrated good  understanding of the education provided. Torito's wife present for  HEP instruction as well.     See EMR under Media for exercises provided 2/5/2019.    Assessment   Mr. Ugarte tx session fairly well today. He did stop exercising on the [employer] stepper because he had some chest pain. SPT took vitals and vitals within therapeutic range. After a short rest break, pt was able to continue with therapy with complete resolution of pain. Pt did well with side stepping activity, but needs assistance to prevent knee hyperextension and occasional buckling. VCs provided for improved RLE swing mechanics during stepping activity onto 4" step. Pt to benefit from continued PT intervention to further address remaining deficits. Continue current POC.    Torito is progressing well towards his goals.   Pt prognosis is Good.     Pt will continue to benefit from skilled outpatient physical therapy to address the deficits listed in the problem list box on initial evaluation, provide pt/family education and to maximize pt's level of independence in the home and community environment.     Pt's spiritual, cultural and educational needs considered and pt agreeable to plan of care and goals.     Anticipated Barriers for therapy: hearing deficit, speech deficits, cognitive deficits, decreased safety awareness, fall risk, visual neglect, transportation assistance " required    Goals:  Short Term Goals: 4 weeks   1. Pt to be (I) with established HEP Ongoing  2. Pt to improve R hip flexion strength MMT score to at least 4+/5 for improved gait mechanics Ongoing  3. Pt to improve R hip extension strength MMT score to at least 4/5 for improved gait mechanics Ongoing  4. Pt to improve R hip AB and R hip AD strength MMT scores to at least 3+/5 for improved gait mechanics Ongoing  5.  Pt to improve R knee flexion and extension strength MMT scores to at least 4+/5 for improved gait mechanics Ongoing  6. Pt to improve R ankle PF strength MMT score to at least 2/5 for improved gait mechanics Ongoing  7. Pt to improve chair rise score to at least 5 times with no more than 1 UE support and S for improved endurance and safety with functional transfers Ongoing  8. Pt to improve TUG score to at least 30 seconds for improved safety with household mobility Ongoing  9. Pt to improve SSWS score to at least 0.40 m/sec for improved safety with community mobility. Ongoing     Long Term Goals: 8 weeks   1. Pt to be (I) with advanced HEP Ongoing  2. Pt to improve R hip extension strength MMT score to at least 4+/5 for improved gait mechanics Ongoing  3. Pt to improve R hip AB and R hip AD strength MMT scores to at least 4-/5 for improved gait mechanics Ongoing  4. Pt to improve R ankle PF strength MMT score to at least 2+/5 for improved gait mechanics Ongoing  5. Pt to improve chair rise score to at least 5 times with no UE support and S for improved endurance and safety with functional transfers Ongoing  6. Pt to improve TUG score to at least 26 seconds for improved safety with household mobility Ongoing  7. Pt to improve SSWS score to at least 0.50 m/sec for improved safety with community mobility. Ongoing     Plan   Plan for next visit: continue standing there-act to address weight acceptance onto RLE; stair negotiation on [employer] stair case    Continue outpatient physical therapy 2x weekly  under current established Plan of Care,1/28/2019 to 03/25/19., with treatment to include: pt education, HEP, therapeutic exercises, neuromuscular re-education/balance exercises, therapeutic activities, joint mobilizations, and modalities PRN, to work towards established goals. Pt may be seen by PTA to carry out plan of care.     Carolin Ramirez, SPT    I certify that I was present in the room directing the student in service delivery and guiding them using my skilled judgment. As the co-signing therapist I have reviewed the students documentation and am responsible for the treatment, assessment, and plan.     Hanh Brooke, PT

## 2019-02-26 ENCOUNTER — CLINICAL SUPPORT (OUTPATIENT)
Dept: REHABILITATION | Facility: HOSPITAL | Age: 59
End: 2019-02-26
Attending: PSYCHIATRY & NEUROLOGY
Payer: MEDICARE

## 2019-02-26 DIAGNOSIS — Z74.09 IMPAIRED MOBILITY AND ACTIVITIES OF DAILY LIVING: ICD-10-CM

## 2019-02-26 DIAGNOSIS — Z74.09 IMPAIRED FUNCTIONAL MOBILITY, BALANCE, GAIT, AND ENDURANCE: ICD-10-CM

## 2019-02-26 DIAGNOSIS — R29.898 WEAKNESS OF RIGHT LEG: ICD-10-CM

## 2019-02-26 DIAGNOSIS — R47.01 BROCA'S APHASIA: ICD-10-CM

## 2019-02-26 DIAGNOSIS — Z78.9 IMPAIRED MOBILITY AND ACTIVITIES OF DAILY LIVING: ICD-10-CM

## 2019-02-26 DIAGNOSIS — R29.898 WEAKNESS OF RIGHT UPPER EXTREMITY: ICD-10-CM

## 2019-02-26 PROCEDURE — 97116 GAIT TRAINING THERAPY: CPT | Mod: PO

## 2019-02-26 PROCEDURE — 92507 TX SP LANG VOICE COMM INDIV: CPT | Mod: PO

## 2019-02-26 PROCEDURE — 97110 THERAPEUTIC EXERCISES: CPT | Mod: PO

## 2019-02-26 PROCEDURE — 97110 THERAPEUTIC EXERCISES: CPT | Mod: PO,59

## 2019-02-26 PROCEDURE — 97112 NEUROMUSCULAR REEDUCATION: CPT | Mod: PO,59

## 2019-02-26 NOTE — PROGRESS NOTES
"Outpatient Neurological Rehabilitation   Speech and Language Therapy Daily Note  Date:  2/26/2019     Name: Torito Harris   MRN: [id number]   Therapy Diagnosis:   Encounter Diagnosis   Name Primary?    Broca's aphasia    Physician: Ian Feliciano MD  Physician Orders: ST evaluate and treat  Medical Diagnosis: I69.30 (ICD-10-CM) - Late effect of stroke    Visit #/Visits authorized: 6/ 10  Date of Evaluation:  1/28/19  Insurance Authorization Period: 1/26/19 to 12/31/19  Plan of Care Expiration Date:   1/28/2019 to 3/25/19  Extended POC: n/a     Time In: 1518  Time Out:  1600  Total Billable Time: 42 minutes     Precautions: Standard and Fall    Subjective:   Pt reports: "it's a new cane" in response to cane he brought in to therapy today.   Response to previous treatment: "Alright"  Pain Scale:  0/10 on VAS currently.   Pain Location: n/a  Objective:   UNTIMED  Procedure Min.   Speech- Language- Voice Therapy   42 minutes    Total Untimed Units: 1  Charges Billed/# of units: 1    Short Term Goals: (4 weeks) Current Progress:   1. Pt will complete R/L body part discrimination tasks with 70% Carmen.   Goal Met 2/7/19/ Discontinue    2. Pt will follow 3 unit body commands with 80% acc sergio Goal Not Met / Revise to 2 unit    3. Pt will follow 3 unit commands with visual stimuli with 70% Carmen. Goal Not Met / Revise to 2 unit    4. Pt will name common objects with 90% Carmen.  Progressing/ Not Met 2/26/2019   Patient completed sentences with common objects. Following this, patient was asked to independently name objects. Patient responded with  36% acc indly, 60% acc given a semantic cue, 100% acc given a semantic and phonemic cue.     5. Pt will generate a response to basic sentence completions with 80% Carmen   Progressing/ Not Met 2/26/2019   Pt completed sentences (With common objects. Ex: "You drink from a ___".) with 46% acc with a visual cue, 100% acc given phonemic cue and visual cue.    6. Pt will name 5 concrete " category items with Carmen.  Progressing/ Not Met 2019   Not formally addressed     7. Pt will repeat  3-5 word phrases with 80% Carmen.     Goal Met 19 / Discontinue   8. Assess reading and writing skills. Goal met 2019/ Discontinue.  Patient was administered the Reading Subtest of the Western Aphasia Battery-Revised.      9. pt will follow 2 unit body part commands with 90% acc indly to improve auditory comprehension.  Progressing/ Not Met 2019   Not formally addressed   10.  pt will follow 2 unit commands with visual stimuli with 90% acc indly to improve auditory comprehension.   Progressing/ Not Met 2019    Not formally addressed    11. pt and a conversational partner will use supported communication for persons with   aphasia to facilitate a 2 conversational turns with a communication partner.   Progressing/ Not Met 2019   Not formally addressed    12. New Goal: Patient will match phrases to pictures to increase reading comprehension skills with 90% accuracy given min A.      13. New Goal: Patient will write name and address to increase written expression skills with 90% accuracy given verbal presentation of letters.          Western Aphasia Battery - Revised (WAB-R) - Supplemental was administered to evaluate the patient's reading and writing function.The following results were revealed:   Readin            Writing: Deferred 2/2 time    Reading    Comprehension of Sentences: 0 /40   Reading Commands 0 /6    Written word - object choice matching 6 /6    Written word - picture choice matching 6/6    Picture -written word choice matching 6/6   Spoken word - written word choice matching 3 /4   Letter discrimination 6 /6   Spelled word recognition 0/6   Spelling 0 /6      Language Quotient (LQ)  Spontaneous Speech Score: 6 /20  Auditory Verbal Comprehension Score: .61 / 20  Repetition Score: 4.4/10  Naming and Word Finding Score: 4.1 / 10  Reading Score: 5.4  /20  Writing Score:  "Not administered   Language Quotient: Deferred 2/2 not administering Writing Portion.     During today's session, the reading  subtest of the Western Aphasia Battery-Revised was administered to assess Torito's functional reading and writing skills. During the reading subtest, Torito completed the following tasks with 100% accuracy independently: Written Word-Object Choice Matching, Written-Word Picture Choice Matching, Picture-Written Word Choice Matching, and Letter Discrimination. In addition, he accurately answered 3/4 items of the Spoken Word-Written Word Choice Matching task. These results demonstrate strengths in reading one word.   He stated "I can't" and gave no responses when clinician administered the following tasks: Comprehension of Sentences, Reading Commands, Spelled Word Recognition, and Spelling. These tasks demonstrated difficulty with reading phrases and sentences. The clinician also informally gauged Torito's writing abilities. Using his left hand, Torito wrote 2  letters of the alphabet independently. In addition, the clinician spelled Torito's name and he wrote 5/15 letters correctly. Given a model, he increased the accuracy of correctly written letters to 15/15.      Patient Education/Response:   Pt stated "I can't" when completing reading tasks during formal assessment and writing tasks during informal assessment. SLP educated patient regarding his strengths and weaknesses in reading and writing. SLP verbalized that writing his name and address will be targeted during the next session. Pt and family verbalized agreement.    Assessment:   Torito is progressing well towards his goals.  Current goals remain appropriate. Torito's strengths in reading comprehension were at the one- word level. He demonstrated difficulty comprehending phrases and sentences. Torito did not independently generate words; however, his accuracyincreased given verbal cues and a model.   Following today's assessment, " reading and writing goals will be added to increase Torito's functional communication. Pt prognosis is Good. Pt will continue to benefit from skilled outpatient speech and language therapy to address the deficits listed in the problem list on initial evaluation, provide pt/family education and to maximize pt's level of independence in the home and community environment.     Barriers to Therapy: sleeping pattern, possible transportation  Pt's spiritual, cultural and educational needs considered and pt agreeable to plan of care and goals.    Plan:   Continue POC with introduction of supported communication for persons with aphasia.     JANETH Joseph  Student Speech Language Pathologist

## 2019-02-26 NOTE — PROGRESS NOTES
Occupational Therapy Daily Treatment Note     Name: Torito Harris  [facility] Number: [id number]  Physician: Ian Feliciano MD  Medical Diagnosis: L CVA      Therapy Diagnosis:   Encounter Diagnoses   Name Primary?    Impaired mobility and activities of daily living     Weakness of right upper extremity      Visit Date: 2/26/2019  Physician Orders: Eval and Treat   Surgical Procedure and Date: NA  Evaluation Date: 1/28/19   Insurance Authorization Period Expiration: 12/31/19   Plan of Care Certification Period: 3/11/19   Date of Return to MD: unknown at this time     Visit # / Visits authorized: 8 / 10  Time In: 1430   Time Out: 1515   Total Billable Time: 45 minutes      Precautions: Standard, fall risk, hearing impaired     Subjective     Pt reports: no new complaints.      Response to previous treatment:positive   Functional change: progressing     Pain: none reported this date.   Location: NA     Objective   Patient seen by OT this date.  Pt ambulated to treatment session with quad cane and SBA.  Patient's wife was present for treatment session.  Patient received individual therapy with activities as follows:     Torito participated in therapeutic exercises to improve ROM and strength for 10 minutes:   - UBE level 2 x 4 min forwards x 4 min reverse   - Seated overhead pulleys for shoulder flexion x 2 min     Patient participated in neuromuscular reeducation activities for 35 minutes to maximize ROM, motor strength and neuromuscular and proprioceptive input to the RUE.  The following activities were included:   Sitting EOM:   - restoration of R hand arches and metacarpal stretches   - general R wrist stretches: 1. P-A glide of scaphoid on radius, 2. Radial deviation, 3. Increasing mobility of metacarpals, 4. Carpal roll, 5. Movement of forearm on hand towards wrist extension, 6. Movement of forearm on the hand toward supination/ pronation, 7. wrist ext with distraction   - right upper extremity weight bearing  on a hard static surface with body on arm weight shifts   - application of GG splint to R hand      Standing at tabletop:   - RUE towel slides with Lovelock assist for forward flexion, abduction, and horizontal adduction     Sitting EOM: (gg splint removed)   - RUE grasp and release tasks of cones; OT and self assistance to reach towards object; verbal cues to maximize extension of digits     - metacarpal and general wrist stretches performed again   - RUE weight bearing to promote wrist extension performed again   - re-application of GG splint to R hand/wrist and instructed to remain on hand through following ST session.     Functional mobility:   - Sit <> stand with SBA  - gait with quad cane and close SBA       Patient ambulated with quad cane and SBA to following ST session.        Home Exercises and Education Provided     Education provided: grasp and release   - HEP  - AD to maximize independence   - Progress towards goals   - POC     Written Home Exercises Provided: Patient instructed to cont prior HEP as verbally instructed in previous tx sessions. Addtionally, pt was instructed to practice grasp and release of lightweight, soft objects at home.   Exercises were reviewed and Torito was able to demonstrate them prior to the end of the session.  Torito demonstrated good  understanding of the HEP provided.       Pt has no cultural, educational or language barriers to learning provided.    Assessment   Patient tolerated treatment session well and without any problems.  UBE and overhead pulleys performed for RUE range of motion and strengthening.  He demonstrated min difficulty maintaining grasp on UBE handle and pulley handle, but was able to complete exercises without assistance to maintain grasp.  OT continued with grasp and release tasks to promote increased functional hand use.  He required assistance to reach towards objects 2/2 continued weakness and AROM deficits.  He was able to actively grasp and sustain  hold on lightweight cones with R hand.  More difficulty noted with digit extension prior to grasp and digit extension during release phase.        Pt would continue to benefit from skilled OT. Torito is progressing well towards his goals and there are no updates to goals at this time. Pt prognosis is Good due to Pt motivated to participate and good family support from his spouse.  Pt will continue to benefit from skilled outpatient occupational therapy to address the deficits listed in the problem list on initial evaluation provide pt/family education and to maximize pt's level of independence in the home and community environment.     Anticipated barriers to occupational therapy: none     Pt's spiritual, cultural and educational needs considered and pt agreeable to plan of care and goals.    Goals:  Short Term Goals: 3 weeks   ROM/Strength to perform the ADL's and functional activities listed below, - in progress  Pt will improve functional  strength needed for tasks to 20# in R hand.  Pt will improve AROM for shoulder flexion needed for functional tasks to 60* in R UE. - in progress   UE dressing will improve to Min A   LB dressing will improve to Mod A   Toileting will improve to Min A incorporating R hand for task   Pt will be Independent with HEP to improve ROM and FM skills. - in progress      Long Term Goals: 6 weeks   ROM/Strength to perform the ADL's and functional activities listed below - in progress   Pt will improve functional  strength needed for tasks to 30# in R hand. - in progress   Pt will improve AROM for shoulder flexion needed for functional tasks to 80* in R UE. - in progress   UE dressing will improve to Supervision   LB dressing will improve to Min A   Toileting will improve to Supervision incorporating R hand for task   Pt will complete clinical testing for skills needed for driving  G code self care CK    Plan   Continue POC in pursuit of OT goals.     Discussed Plan of Care with  patient: Yes  Updates/Grading for next session: progress as tolerated

## 2019-02-28 ENCOUNTER — CLINICAL SUPPORT (OUTPATIENT)
Dept: REHABILITATION | Facility: HOSPITAL | Age: 59
End: 2019-02-28
Attending: PSYCHIATRY & NEUROLOGY
Payer: MEDICARE

## 2019-02-28 DIAGNOSIS — R29.898 WEAKNESS OF RIGHT LEG: ICD-10-CM

## 2019-02-28 DIAGNOSIS — Z74.09 IMPAIRED MOBILITY AND ACTIVITIES OF DAILY LIVING: ICD-10-CM

## 2019-02-28 DIAGNOSIS — Z78.9 IMPAIRED MOBILITY AND ACTIVITIES OF DAILY LIVING: ICD-10-CM

## 2019-02-28 DIAGNOSIS — Z74.09 IMPAIRED FUNCTIONAL MOBILITY, BALANCE, GAIT, AND ENDURANCE: ICD-10-CM

## 2019-02-28 DIAGNOSIS — R29.898 WEAKNESS OF RIGHT UPPER EXTREMITY: ICD-10-CM

## 2019-02-28 DIAGNOSIS — R47.01 BROCA'S APHASIA: ICD-10-CM

## 2019-02-28 PROCEDURE — 92507 TX SP LANG VOICE COMM INDIV: CPT | Mod: PO

## 2019-02-28 PROCEDURE — 97112 NEUROMUSCULAR REEDUCATION: CPT | Mod: 59,PO

## 2019-02-28 PROCEDURE — 97116 GAIT TRAINING THERAPY: CPT | Mod: PO

## 2019-02-28 PROCEDURE — 97110 THERAPEUTIC EXERCISES: CPT | Mod: PO

## 2019-02-28 PROCEDURE — 97112 NEUROMUSCULAR REEDUCATION: CPT | Mod: PO,59

## 2019-02-28 NOTE — PROGRESS NOTES
"  Physical Therapy Daily Treatment Note     Name: Torito Harris  [facility] Number: [id number]    Therapy Diagnosis:   Encounter Diagnoses   Name Primary?    Weakness of right leg     Impaired functional mobility, balance, gait, and endurance      Physician: Ian Feliciano MD    Visit Date: 2/28/2019    Physician Orders: PT Eval and Treat   Medical Diagnosis from Referral: Late effect of stroke  Evaluation Date: 1/28/2019  Authorization Period Expiration: 01/26/19-12/31/19  Plan of Care Expiration: 01/28/19 - 03/25/19  Visit # / Visits authorized: 9/10 (Vist #9 of 2019)   Time In: 1518   Time Out: 1603  Total Billable Time: 45  minutes    Precautions: Standard, Diabetes, Fall and right hemiplegia, hearing aid, safety awareness, cognitive deficits, speech deficits    Subjective     Pt reports: " good," when asked how he is today.  HEP Compliance: pt reports he is doing all of his exercises at home.  Response to previous treatment: no complaints  Functional change: none reported    Pain: 0/10  Location: N/A      Objective   Pt found seated edge of mat after working with OT.  R Afton wrist extension orthosis donned.      Patient participated in neuromuscular re-education activities to improve: Balance, Coordination, Proprioception and Posture for 25 minutes. The following activities were included:     Transitions:  -Pt performs 10 sit<> stand trials from edge of mat with PT supporting R UE and pt not using L UE during transition, min A.  PT also guards R knee against buckling/hyperextension    -Pt also performs 2 x 10 sit<> stand trials from edge of mat with R UE in weight bearing on bedside table, min A as above.    Bed mobility:  -sit<> supine on mat with CGA    Supine:  -Pt performs 2 x 10 reps, AA  R LE hip and knee flex/ext with resistance during extension to simulate Neuro-Shawna LE progression # 1    -Pt performs 2 x 10 reps B bridging    Seated:  -Pt performs R ankle dorsiflex/plantarflex 2 x 10 reps, AA " to active  -Pt performs R wrist extension 2 x 10 reps, AA         Patient participated in gait training activities to normalize gait pattern for 20 minutes. The following activities were included:   Gait belt used for safety.  AD: bedside table with R wrist and hand in GG orthosis    -Pt ambulates from mat to therapy stairs( ~ 66 feet) with use of beside table for B UE weight bearing, mod A from PT to assist with movement of table and to prevent R wrist from flexing off table.  Pt demonstrates step-to to occasional reciprocal pattern, decreased gait speed, decreased weight shift to R LE and considerable R knee flexion in stance phase.    -Pt performs ascent and descent of 12 six inch steps, L rail for support.  PT instructs pt to ascend with L LE and descend with L LE.  Min A provided for ascent( for weight shift and prevention of R knee hyperextension) and mod A for descent( to control R knee buckle).  Pt demonstrates step-to pattern throughout.    -Pt ambulates back to therapy mat with above conditions x 58 feet.    -PT removes R GG wrist extension orthosis at end of therapy session and pt uses LBQC to ambulate to lobby with wife supervising.          Home Exercises Provided and Patient Education Provided     Education provided: Ascended/descended curb step, proper swing mechanics for RLE    Written Home Exercises Provided: yes.  Exercises were reviewed and Torito was able to demonstrate them prior to the end of the session.  Torito demonstrated good  understanding of the education provided. Torito's wife present for  HEP instruction as well.     See EMR under Media for exercises provided 2/5/2019.    Assessment   Mr. Ugarte  tolerated his therapy session very well today. Pt followed PT cues during performance of most activities with only mild difficulty( due to hearing impairment and language deficits). Pt is currently limited by tendency to minimize weight bearing through R LE, though he has good potential for  improvement here. Pt's R UE tonal impairments also limit attempts to walk with hands flat on bedside table, thought PT was able to exert fairly good control over pt's R wrist with manual pressure.  Pt did very well with stair climbing, and he should continue to improve with additional practice. PT feels pt may do well to perform some of his gait training with RW and use of Neuro-Shawna paddle with bracket attachment. This will allow for R UE weight bearing and greater potential for R LE weight acceptance.  Pt to benefit from continued PT intervention to further address remaining deficits. Continue current POC.    Torito is progressing well towards his goals.   Pt prognosis is Good.     Pt will continue to benefit from skilled outpatient physical therapy to address the deficits listed in the problem list box on initial evaluation, provide pt/family education and to maximize pt's level of independence in the home and community environment.     Pt's spiritual, cultural and educational needs considered and pt agreeable to plan of care and goals.     Anticipated Barriers for therapy: hearing deficit, speech deficits, cognitive deficits, decreased safety awareness, fall risk, visual neglect, transportation assistance required    Goals:  Short Term Goals: 4 weeks   1. Pt to be (I) with established HEP Ongoing  2. Pt to improve R hip flexion strength MMT score to at least 4+/5 for improved gait mechanics Ongoing  3. Pt to improve R hip extension strength MMT score to at least 4/5 for improved gait mechanics Ongoing  4. Pt to improve R hip AB and R hip AD strength MMT scores to at least 3+/5 for improved gait mechanics Ongoing  5.  Pt to improve R knee flexion and extension strength MMT scores to at least 4+/5 for improved gait mechanics Ongoing  6. Pt to improve R ankle PF strength MMT score to at least 2/5 for improved gait mechanics Ongoing  7. Pt to improve chair rise score to at least 5 times with no more than 1 UE  support and S for improved endurance and safety with functional transfers Ongoing  8. Pt to improve TUG score to at least 30 seconds for improved safety with household mobility Ongoing  9. Pt to improve SSWS score to at least 0.40 m/sec for improved safety with community mobility. Ongoing     Long Term Goals: 8 weeks   1. Pt to be (I) with advanced HEP Ongoing  2. Pt to improve R hip extension strength MMT score to at least 4+/5 for improved gait mechanics Ongoing  3. Pt to improve R hip AB and R hip AD strength MMT scores to at least 4-/5 for improved gait mechanics Ongoing  4. Pt to improve R ankle PF strength MMT score to at least 2+/5 for improved gait mechanics Ongoing  5. Pt to improve chair rise score to at least 5 times with no UE support and S for improved endurance and safety with functional transfers Ongoing  6. Pt to improve TUG score to at least 26 seconds for improved safety with household mobility Ongoing  7. Pt to improve SSWS score to at least 0.50 m/sec for improved safety with community mobility. Ongoing     Plan   Plan for next visit: continue standing there-act to address weight acceptance onto RLE; stair negotiation on [employer] gym stair case    Continue outpatient physical therapy 2x weekly under current established Plan of Care,1/28/2019 to 03/25/19., with treatment to include: pt education, HEP, therapeutic exercises, neuromuscular re-education/balance exercises, therapeutic activities, joint mobilizations, and modalities PRN, to work towards established goals. Pt may be seen by PTA to carry out plan of care.       Boris Razo, PT

## 2019-02-28 NOTE — PROGRESS NOTES
"Outpatient Neurological Rehabilitation   Speech and Language Therapy Daily Note  Date:  2/28/2019     Name: Torito Harris   MRN: [id number]   Therapy Diagnosis:   Encounter Diagnosis   Name Primary?    Broca's aphasia    Physician: Ian Feliciano MD  Physician Orders: ST evaluate and treat  Medical Diagnosis: I69.30 (ICD-10-CM) - Late effect of stroke    Visit #/Visits authorized: 7/ 10  Date of Evaluation:  1/28/19  Insurance Authorization Period: 1/26/19 to 12/31/19  Plan of Care Expiration Date:   1/28/2019 to 3/25/19  Extended POC: n/a     Time In: 1345  Time Out:  1430  Total Billable Time: 45 minutes     Precautions: Standard and Fall  Subjective:   Pt reports: that his son is single and asked student SLP if she wanted to date him. SLP redirected conversation to therapy.  Response to previous treatment: "Alright"  Pain Scale:  0/10 on VAS currently.   Pain Location: n/a  Objective:   UNTIMED  Procedure Min.   Speech- Language- Voice Therapy   42 minutes    Total Untimed Units: 1  Charges Billed/# of units: 1    Short Term Goals: (4 weeks) Current Progress:   1. Pt will complete R/L body part discrimination tasks with 70% Carmen. Goal Met 2/7/19/ Discontinue    2. Pt will follow 3 unit body commands with 80% acc sergio Goal Not Met / Revise to 2 unit    3. Pt will follow 3 unit commands with visual stimuli with 70% Carmen. Goal Not Met / Revise to 2 unit    4. Pt will name common objects with 90% Carmen.  Progressing/ Not Met 2/28/2019   Patient completed sentences with common objects. Following this, patient was asked to independently name objects. Patient responded with  75% acc given a semantic cue, 100% acc given a phonemic cue   5. Pt will generate a response to basic sentence completions with 80% Carmen   Progressing/ Not Met 2/28/2019   Not formally addressed     6. Pt will name 5 concrete category items with Carmen.  Progressing/ Not Met 2/28/2019   Not formally addressed     7. Pt will repeat  3-5 word " phrases with 80% Carmen.     Goal Met 2/21/19 / Discontinue   8. Assess reading and writing skills. Goal met 2/26/2019/ Discontinue.   9. pt will follow 2 unit body part commands with 90% acc indly to improve auditory comprehension.  Progressing/ Not Met 2/28/2019   Pt followed 2 unit body part commands with 100% acc indly   METx1   10.  pt will follow 2 unit commands with visual stimuli with 90% acc indly to improve auditory comprehension.   Progressing/ Not Met 2/28/2019   Pt followed 2 unit commands with visual stimuli with 50% acc indly, 70% acc given a repetition.   11. pt and a conversational partner will use supported communication for persons with   aphasia to facilitate a 2 conversational turns with a communication partner.   Progressing/ Not Met 2/28/2019  Not formally addressed    12. Patient will match phrases to pictures to increase reading comprehension skills with 90% accuracy given min A.  Progressing/ Not Met 2/28/2019   Pt matched phrases to pictures in a field of 2 with 100% acc indly.  Pt matched phrases to pictures in a field of 3 with 90% acc indly.  Progress to field of 4   13. Patient will write name and address to increase written expression skills with 90% accuracy given verbal presentation of letters  Progressing/ Not Met 2/28/2019  .  Pt copied name and address with 100% acc indly.   Pt write his name with 40% acc indly, 100% acc given a verbal cue and a model  Pt wrote his address with a verbal cue with 50% acc, 100% acc given a verbal cue and a model      Patient Education/Response:   Reviewed pt progress with pt and his wife. Both verbalized understanding.     Assessment:   Torito is progressing well towards his goals.  Current goals remain appropriate. Increased accuracy with decreased field in reading comprehension task. Increased accuracy with 2 unit commands with increased with body part commands today. Pt prognosis is Good. Pt will continue to benefit from skilled outpatient speech  "and language therapy to address the deficits listed in the problem list on initial evaluation, provide pt/family education and to maximize pt's level of independence in the home and community environment.     Barriers to Therapy: sleeping pattern, possible transportation  Pt's spiritual, cultural and educational needs considered and pt agreeable to plan of care and goals.    Plan:   Continue POC with introduction of supported communication for persons with aphasia.     JANETH Joseph  Student Speech Language Pathologist                    "I, SERINA Mott, CCC-SLP , certify that I was present in the room directing the student in service delivery and guiding them using my skilled judgement.  As the co-signing therapist, I have reviewed the student's documentation and am responsible for the treatment, assessment and plan."  SERINA Mott, CCC-SLP  Speech Language Pathologist   2/28/2019                                                                                                                                                                                                                                                                                                    "

## 2019-02-28 NOTE — PROGRESS NOTES
"  Occupational Therapy Daily Treatment Note     [facility]  [facility] Number: [id number]  Physician: Ian Feliciano MD  Medical Diagnosis: L CVA      Therapy Diagnosis:   Encounter Diagnoses   Name Primary?    Impaired mobility and activities of daily living     Weakness of right upper extremity      Visit Date: 2/28/2019  Physician Orders: Eval and Treat   Surgical Procedure and Date: NA  Evaluation Date: 1/28/19   Insurance Authorization Period Expiration: 12/31/19   Plan of Care Certification Period: 3/11/19   Date of Return to MD: unknown at this time     Visit # / Visits authorized: 9 / 10  Time In: 1430    Time Out: 1515   Total Billable Time: 45 minutes      Precautions: Standard, fall risk, hearing impaired     Subjective     Pt reports: "My splint comes tomorrow, I think."  (GG splint)     Response to previous treatment:positive   Functional change: progressing     Pain: expressed with R shoulder ER   Location: NA     Objective   Patient seen by OT this date.  Pt ambulated to treatment session with quad cane and SBA.  Patient's wife was present for treatment session.  Patient received individual therapy with activities as follows:     Torito participated in therapeutic exercises to improve ROM and strength for 10 minutes:   - UBE level 2 x 4 min forwards x 4 min reverse   - Seated overhead pulleys for shoulder flexion x 2 min     Patient participated in neuromuscular reeducation activities for 35 minutes to maximize ROM, motor strength and neuromuscular and proprioceptive input to the RUE.  The following activities were included:   Sitting EOM:   - restoration of R hand arches and metacarpal stretches   - general R wrist stretches: 1. P-A glide of scaphoid on radius, 2. Radial deviation, 3. Increasing mobility of metacarpals, 4. Carpal roll, 5. Movement of forearm on hand towards wrist extension, 6. Movement of forearm on the hand toward supination/ pronation, 7. wrist ext with distraction   - " right upper extremity weight bearing on a hard static surface with body on arm weight shifts   - application of GG splint to R hand      Supine:   - R scap stabilization holding shoulder flexion at 90* x 20 sec x 5; Min-Mod A   - R shoulder flexion/extension x 20 reps; Min-Mod A  - passive R shoulder ER   - passive R shoulder abduction   - chest / thoracic stretch over foam roll x 3 min       Patient sitting EOM at end of session with GG splint to R hand/wrist and instructed to remain on hand through following PT session.     Functional mobility:   - Sit <> stand with SBA  - gait with quad cane and close SBA   - Sit <> supine with Min A at RLE       Home Exercises and Education Provided     Education provided re:   - grasp and release   - HEP  - AD to maximize independence   - Progress towards goals   - POC     Written Home Exercises Provided: Patient instructed to cont prior HEP as verbally instructed in previous tx sessions. Exercises were reviewed and Torito was able to demonstrate them prior to the end of the session.  Torito demonstrated good  understanding of the HEP provided.       Pt has no cultural, educational or language barriers to learning provided.    Assessment   Patient tolerated treatment session well.  UBE and overhead pulleys performed for RUE range of motion and strengthening.  Again he demonstrated min difficulty maintaining grasp on UBE handle and pulley handle, but was able to complete exercises without assistance to maintain grasp.  Increased focus on R shoulder ROM with patient demonstrated progress in active motion through partial range of motion.  He continues to complain of pain with R shoulder ER likely due to joint mobility and soft tissue restrictions.  Small gains in ER ROM post chest/throacic stretch over foam roll.          Pt would continue to benefit from skilled OT. Torito is progressing well towards his goals and there are no updates to goals at this time. Pt prognosis is Good  due to Pt motivated to participate and good family support from his spouse.  Pt will continue to benefit from skilled outpatient occupational therapy to address the deficits listed in the problem list on initial evaluation provide pt/family education and to maximize pt's level of independence in the home and community environment.     Anticipated barriers to occupational therapy: none     Pt's spiritual, cultural and educational needs considered and pt agreeable to plan of care and goals.    Goals:  Short Term Goals: 3 weeks   ROM/Strength to perform the ADL's and functional activities listed below, - in progress  Pt will improve functional  strength needed for tasks to 20# in R hand.  Pt will improve AROM for shoulder flexion needed for functional tasks to 60* in R UE. - in progress   UE dressing will improve to Min A   LB dressing will improve to Mod A   Toileting will improve to Min A incorporating R hand for task   Pt will be Independent with HEP to improve ROM and FM skills. - in progress      Long Term Goals: 6 weeks   ROM/Strength to perform the ADL's and functional activities listed below - in progress   Pt will improve functional  strength needed for tasks to 30# in R hand. - in progress   Pt will improve AROM for shoulder flexion needed for functional tasks to 80* in R UE. - in progress   UE dressing will improve to Supervision   LB dressing will improve to Min A   Toileting will improve to Supervision incorporating R hand for task   Pt will complete clinical testing for skills needed for driving  G code self care CK    Plan   Continue POC in pursuit of OT goals.     Discussed Plan of Care with patient: Yes  Updates/Grading for next session: progress as tolerated

## 2019-02-28 NOTE — PROGRESS NOTES
"Outpatient Neurological Rehabilitation   Speech and Language Therapy Daily Note  Date:  2/28/2019     Name: Torito Harris   MRN: [id number]   Therapy Diagnosis:   Encounter Diagnosis   Name Primary?    Broca's aphasia    Physician: Ian Feliciano MD  Physician Orders: ST evaluate and treat  Medical Diagnosis: I69.30 (ICD-10-CM) - Late effect of stroke    Visit #/Visits authorized: 7/ 10  Date of Evaluation:  1/28/19  Insurance Authorization Period: 1/26/19 to 12/31/19  Plan of Care Expiration Date:   1/28/2019 to 3/25/19  Extended POC: n/a     Time In:   Time Out:    Total Billable Time:     Precautions: Standard and Fall    Subjective:   Pt reports: "it's a new cane" in response to cane he brought in to therapy today.   Response to previous treatment: "Alright"  Pain Scale:  0/10 on VAS currently.   Pain Location: n/a  Objective:   UNTIMED  Procedure Min.   Speech- Language- Voice Therapy   42 minutes    Total Untimed Units: 1  Charges Billed/# of units: 1    Short Term Goals: (4 weeks) Current Progress:   1. Pt will complete R/L body part discrimination tasks with 70% Carmen.   Goal Met 2/7/19/ Discontinue    2. Pt will follow 3 unit body commands with 80% acc sergio Goal Not Met / Revise to 2 unit    3. Pt will follow 3 unit commands with visual stimuli with 70% Carmen. Goal Not Met / Revise to 2 unit    4. Pt will name common objects with 90% Carmen.  Progressing/ Not Met 2/28/2019   Patient completed sentences with common objects. Following this, patient was asked to independently name objects. Patient responded with  36% acc indly, 60% acc given a semantic cue, 100% acc given a semantic and phonemic cue.     5. Pt will generate a response to basic sentence completions with 80% Carmen   Progressing/ Not Met 2/28/2019   Pt completed sentences (With common objects. Ex: "You drink from a ___".) with 46% acc with a visual cue, 100% acc given phonemic cue and visual cue.    6. Pt will name 5 concrete category items with " Carmen.  Progressing/ Not Met 2019   Not formally addressed     7. Pt will repeat  3-5 word phrases with 80% Carmen.     Goal Met 19 / Discontinue   8. Assess reading and writing skills. Goal met 2019/ Discontinue.  Patient was administered the Reading Subtest of the Western Aphasia Battery-Revised.      9. pt will follow 2 unit body part commands with 90% acc indly to improve auditory comprehension.  Progressing/ Not Met 2019   Not formally addressed   10.  pt will follow 2 unit commands with visual stimuli with 90% acc indly to improve auditory comprehension.   Progressing/ Not Met 2019    Not formally addressed    11. pt and a conversational partner will use supported communication for persons with   aphasia to facilitate a 2 conversational turns with a communication partner.   Progressing/ Not Met 2019   Not formally addressed    12. New Goal: Patient will match phrases to pictures to increase reading comprehension skills with 90% accuracy given min A.      13. New Goal: Patient will write name and address to increase written expression skills with 90% accuracy given verbal presentation of letters.          Western Aphasia Battery - Revised (WAB-R) - Supplemental was administered to evaluate the patient's reading and writing function.The following results were revealed:   Readin            Writing: Deferred 2/2 time    Reading    Comprehension of Sentences: 0 /40   Reading Commands 0 /6    Written word - object choice matching 6 /6    Written word - picture choice matching 6/6    Picture -written word choice matching 6/6   Spoken word - written word choice matching 3 /4   Letter discrimination 6 /6   Spelled word recognition 0/6   Spelling 0 /6      Language Quotient (LQ)  Spontaneous Speech Score: 6 /20  Auditory Verbal Comprehension Score: .61 / 20  Repetition Score: 4.4/10  Naming and Word Finding Score: 4.1 / 10  Reading Score: 5.4  /20  Writing Score: Not administered  "  Language Quotient: Deferred 2/2 not administering Writing Portion.     During today's session, the reading  subtest of the Western Aphasia Battery-Revised was administered to assess Torito's functional reading and writing skills. During the reading subtest, Torito completed the following tasks with 100% accuracy independently: Written Word-Object Choice Matching, Written-Word Picture Choice Matching, Picture-Written Word Choice Matching, and Letter Discrimination. In addition, he accurately answered 3/4 items of the Spoken Word-Written Word Choice Matching task. These results demonstrate strengths in reading one word.   He stated "I can't" and gave no responses when clinician administered the following tasks: Comprehension of Sentences, Reading Commands, Spelled Word Recognition, and Spelling. These tasks demonstrated difficulty with reading phrases and sentences. The clinician also informally gauged Torito's writing abilities. Using his left hand, Torito wrote 2  letters of the alphabet independently. In addition, the clinician spelled Torito's name and he wrote 5/15 letters correctly. Given a model, he increased the accuracy of correctly written letters to 15/15.      Patient Education/Response:   Pt stated "I can't" when completing reading tasks during formal assessment and writing tasks during informal assessment. SLP educated patient regarding his strengths and weaknesses in reading and writing. SLP verbalized that writing his name and address will be targeted during the next session. Pt and family verbalized agreement.    Assessment:   Torito is progressing well towards his goals.  Current goals remain appropriate. Torito's strengths in reading comprehension were at the one- word level. He demonstrated difficulty comprehending phrases and sentences. Torito did not independently generate words; however, his accuracyincreased given verbal cues and a model.   Following today's assessment, reading and writing " goals will be added to increase Torito's functional communication. Pt prognosis is Good. Pt will continue to benefit from skilled outpatient speech and language therapy to address the deficits listed in the problem list on initial evaluation, provide pt/family education and to maximize pt's level of independence in the home and community environment.     Barriers to Therapy: sleeping pattern, possible transportation  Pt's spiritual, cultural and educational needs considered and pt agreeable to plan of care and goals.    Plan:   Continue POC with introduction of supported communication for persons with aphasia.     JANETH Joseph  Student Speech Language Pathologist

## 2019-03-01 NOTE — PROGRESS NOTES
"  Physical Therapy Daily Treatment Note     Name: Torito Harris  Clinic Number: [id number]    Therapy Diagnosis:   Encounter Diagnoses   Name Primary?    Weakness of right leg     Impaired functional mobility, balance, gait, and endurance      Physician: Ian Feliciano MD    Visit Date: 3/4/2019    Physician Orders: PT Eval and Treat   Medical Diagnosis from Referral: Late effect of stroke  Evaluation Date: 1/28/2019  Authorization Period Expiration: 01/26/19-12/31/19  Plan of Care Expiration: 01/28/19 - 03/25/19   Visit # / Visits authorized: 10/10 (Vist #10 of 2019)   Time In: 11: 17  Time Out: 12:03  Total Billable Time: 46 minutes    Precautions: Standard, Diabetes, Fall and right hemiplegia, hearing aid, safety awareness, cognitive deficits, speech deficits    Subjective     Pt reports: "alright" when asked how he is feeling today  HEP Compliance: pt reports he is doing all of his exercises at home.  Response to previous treatment: no complaints  Functional change: none reported    Pain: 0/10  Location: N/A      Objective     Torito received therapeutic exercises to develop strength, endurance, ROM, flexibility and core stabilization for 46 minutes including:      X 10 minutes on [employer] recumbent stepper. L3.0;  B LE, B UE.  Pt able to hold the handle s/ the ace wrap.    Strength: manual muscle test grades below   Upper Extremity Strength - see OT evaluation for more detail     Lower Extremity Strength  Right LE  Evaluation 3/4/2019 Left LE  Evaluation 3/4/2019   Hip Flexion: 4/5 4-/5 Hip Flexion: 5/5 4+/5   Hip Extension:  4-/5 4-/5 Hip Extension: 5/5 5/5   Hip Abduction: 3/5 3+/5 Hip Abduction: 5/5 5/5   Hip Adduction: 3/5 3+/5 Hip Adduction 5/5 5/5   Knee Extension: 4/5 4/5 Knee Extension: 5/5 5/5   Knee Flexion: 4/5 3/5 Knee Flexion: 5/5 5/5   Ankle Dorsiflexion: 0/5 5/5 Ankle Dorsiflexion: 5/5 5/5   Ankle Plantarflexion: 2-/5 2/5 Ankle Plantarflexion: 5/5 5/5      Abdominal Strength: At least  " 3/5     Flexibility: Limited R gastroc/soleus with full knee extension (pt only able to get to achieve neutral with knee extended)       Evaluation 3/4/2019   30 second Chair Rise  (adults > 61 y/o) 11 completed with LUE, abigail walker, CGA 8 completed with LUE, no AD, SBA      Endurance Deficit: moderate       Evaluation 3/4/2019   Timed Up and Go 34 sec c/ abigail walker c/ SBA 32 sec c/ SBQC c/ SBA   Self Selected Walking Speed 0.33 m/sec (6m/18s)  C/ abigail walker c/ SBA 0.32 m/sec (6m/19s)  c/ SBQC c/ SBA   Fast Walking Speed NT N/T       Seated Exercises:   1 x 10 R hip flexion   2 x 10 R hip flexion with 1 lb ankle weight; challenging to the patient   1 x 10 R ankle DF AROM      Home Exercises Provided and Patient Education Provided     Education provided: Pt instructed on putting RUE into jacket followed by LUE. SPT also encouraged wife to let him do as much as he can.    Written Home Exercises Provided: yes.  Exercises were reviewed and Torito was able to demonstrate them prior to the end of the session.  Torito demonstrated good  understanding of the education provided. Torito's wife present for  HEP instruction as well.     See EMR under Media for exercises provided 2/5/2019.    Assessment   Mr. Ugarte tolerated his reassessment well today. He continue to make steady progress with therapy. He is demonstrating increased functional mobility, requiring less assistance from SPT and has progressed to using a quad cane for ambulation. Pt's RLE MMT and SSWS scores were slightly decreased compared to evaluation, however this could be due to pt's increased amount of walking and participation in other therapies before PT. Mr. Ugarte's 30 second chair rise was 3 less than on evaluation, however his functional mobility has improved and he is requiring less assistance from SPT and did not require an AD when performing this task. Pt to benefit from continued PT intervention to further address remaining mobility deficits.  Continue current POC.    Torito is progressing well towards his goals.   Pt prognosis is Good.     Pt will continue to benefit from skilled outpatient physical therapy to address the deficits listed in the problem list box on initial evaluation, provide pt/family education and to maximize pt's level of independence in the home and community environment.     Pt's spiritual, cultural and educational needs considered and pt agreeable to plan of care and goals.     Anticipated Barriers for therapy: hearing deficit, speech deficits, cognitive deficits, decreased safety awareness, fall risk, visual neglect, transportation assistance required    Goals:  Short Term Goals: 4 weeks   1. Pt to be (I) with established HEP Ongoing  2. Pt to improve R hip flexion strength MMT score to at least 4+/5 for improved gait mechanics Ongoing  3. Pt to improve R hip extension strength MMT score to at least 4/5 for improved gait mechanics Ongoing  4. Pt to improve R hip AB and R hip AD strength MMT scores to at least 3+/5 for improved gait mechanics MET 03/04/19  5.  Pt to improve R knee flexion and extension strength MMT scores to at least 4+/5 for improved gait mechanics Ongoing  6. Pt to improve R ankle PF strength MMT score to at least 2/5 for improved gait mechanics MET 03/04/19  7. Pt to improve chair rise score to at least 5 times with no more than 1 UE support and S for improved endurance and safety with functional transfers MET 03/04/19  8. Pt to improve TUG score to at least 30 seconds for improved safety with household mobility Ongoing  9. Pt to improve SSWS score to at least 0.40 m/sec for improved safety with community mobility. Ongoing     Long Term Goals: 8 weeks   1. Pt to be (I) with advanced HEP Ongoing  2. Pt to improve R hip extension strength MMT score to at least 4+/5 for improved gait mechanics Ongoing  3. Pt to improve R hip AB and R hip AD strength MMT scores to at least 4-/5 for improved gait mechanics Ongoing  4.  Pt to improve R ankle PF strength MMT score to at least 2+/5 for improved gait mechanics Ongoing  5. Pt to improve chair rise score to at least 5 times with no UE support and S for improved endurance and safety with functional transfers Ongoing  6. Pt to improve TUG score to at least 26 seconds for improved safety with household mobility Ongoing  7. Pt to improve SSWS score to at least 0.50 m/sec for improved safety with community mobility. Ongoing     Plan   Plan for next visit: Continue standing there-act to address weight acceptance onto RLE; stair negotiation on [employer] stair case. Ankle T-band exercises (or AROM as patient tolerates), LE strengthening exercises.    Continue outpatient physical therapy 2x weekly under current established Plan of Care,1/28/2019 to 03/25/19., with treatment to include: pt education, HEP, therapeutic exercises, neuromuscular re-education/balance exercises, therapeutic activities, joint mobilizations, and modalities PRN, to work towards established goals. Pt may be seen by PTA to carry out plan of care.     Carolni Ramirez, SPT    I certify that I was present in the room directing the student in service delivery and guiding them using my skilled judgment. As the co-signing therapist I have reviewed the students documentation and am responsible for the treatment, assessment, and plan.       Hanh Brooke, PT

## 2019-03-04 ENCOUNTER — CLINICAL SUPPORT (OUTPATIENT)
Dept: REHABILITATION | Facility: HOSPITAL | Age: 59
End: 2019-03-04
Attending: PSYCHIATRY & NEUROLOGY
Payer: COMMERCIAL

## 2019-03-04 ENCOUNTER — DOCUMENTATION ONLY (OUTPATIENT)
Dept: REHABILITATION | Facility: HOSPITAL | Age: 59
End: 2019-03-04

## 2019-03-04 DIAGNOSIS — R47.01 BROCA'S APHASIA: ICD-10-CM

## 2019-03-04 DIAGNOSIS — Z74.09 IMPAIRED MOBILITY AND ACTIVITIES OF DAILY LIVING: ICD-10-CM

## 2019-03-04 DIAGNOSIS — Z74.09 IMPAIRED FUNCTIONAL MOBILITY, BALANCE, GAIT, AND ENDURANCE: ICD-10-CM

## 2019-03-04 DIAGNOSIS — R29.898 WEAKNESS OF RIGHT UPPER EXTREMITY: ICD-10-CM

## 2019-03-04 DIAGNOSIS — Z78.9 IMPAIRED MOBILITY AND ACTIVITIES OF DAILY LIVING: ICD-10-CM

## 2019-03-04 DIAGNOSIS — R29.898 WEAKNESS OF RIGHT LEG: ICD-10-CM

## 2019-03-04 PROCEDURE — 92507 TX SP LANG VOICE COMM INDIV: CPT | Mod: PO

## 2019-03-04 PROCEDURE — 97112 NEUROMUSCULAR REEDUCATION: CPT | Mod: PO

## 2019-03-04 PROCEDURE — 97110 THERAPEUTIC EXERCISES: CPT | Mod: PO,59

## 2019-03-04 NOTE — PROGRESS NOTES
PT/PTA met face to face to discuss pt's treatment plan and progress towards established goals.  Continue with current PT POC with focus on gait with quad cane, weight acceptance, balance and endurance.  Patient will be seen by physical therapist at least every sixth treatment or 30 days, whichever occurs first.    Lucy Vanegas, PTA  03/04/2019

## 2019-03-04 NOTE — PROGRESS NOTES
"Outpatient Neurological Rehabilitation   Speech and Language Therapy Daily Note  Date:  3/4/2019     Name: Torito Harris   MRN: [id number]   Therapy Diagnosis:   Encounter Diagnosis   Name Primary?    Broca's aphasia    Physician: Ian Feliciano MD  Physician Orders: ST evaluate and treat  Medical Diagnosis: I69.30 (ICD-10-CM) - Late effect of stroke    Visit #/Visits authorized: 8/ 10  Date of Evaluation:  1/28/19  Insurance Authorization Period: 1/26/19 to 12/31/19  Plan of Care Expiration Date:   1/28/2019 to 3/25/19  Extended POC: n/a     Time In: 1030   Time Out:  1114   Total Billable Time: 44 minutes     Precautions: Standard and Fall  Subjective:   Pt reports: that he did "nothing" all weekend.   Response to previous treatment: "Alright"  Pain Scale:  0/10 on VAS currently.   Pain Location: n/a  Objective:   UNTIMED  Procedure Min.   Speech- Language- Voice Therapy   44 minutes    Total Untimed Units: 1  Charges Billed/# of units: 1    Short Term Goals: (4 weeks) Current Progress:   1. Pt will complete R/L body part discrimination tasks with 70% Carmen. Goal Met 2/7/19/ Discontinue    2. Pt will follow 3 unit body commands with 80% acc sergio Goal Not Met / Revise to 2 unit    3. Pt will follow 3 unit commands with visual stimuli with 70% Carmen. Goal Not Met / Revise to 2 unit    4. Pt will name common objects with 90% Carmen.  Progressing/ Not Met 3/4/2019   Pt named common objects with 60% acc indly, 80% acc given a semantic cue, 100% acc given a semantic cue and a phonemic cue.    5. Pt will generate a response to basic sentence completions with 80% Carmen   Progressing/ Not Met 3/4/2019   Pt completed sentences with 60% acc indly, 90% acc given a repetition of stimuli.    6. Pt will name 5 concrete category items with Carmen.  Progressing/ Not Met 3/4/2019   Not formally addressed     7. Pt will repeat  3-5 word phrases with 80% Carmen. Goal Met 2/21/19 / Discontinue   8. Assess reading and writing skills. Goal " met 2/26/2019/ Discontinue.   9. pt will follow 2 unit body part commands with 90% acc indly to improve auditory comprehension. Pt followed 2 unit body part commands with 90% acc indly   Goal Met 3/4/19 / Discontinue   10.  pt will follow 2 unit commands with visual stimuli with 90% acc indly to improve auditory comprehension.   Progressing/ Not Met 3/4/2019   Pt followed 2 unit commands with visual stimuli with 70% acc indly, 90% acc given a repetition.   11. pt and a conversational partner will use supported communication for persons with   aphasia to facilitate a 2 conversational turns with a communication partner.   Progressing/ Not Met 3/4/2019  Not formally addressed    12. Patient will match phrases to pictures to increase reading comprehension skills with 90% accuracy given min A. Pt matched phrases to pictures in field of 4 with 100% acc indly. METat Baseline.  Goal Met 3/4/19 / Discontinue   13. Patient will write name and address to increase written expression skills with 90% accuracy given verbal presentation of letters  Progressing/ Not Met 3/4/2019  .  Pt wrote name indly with 46% acc, 80% acc given a verbal cue (I.e. Letter name), and 100% acc given a model of the letter. Pt copied name x3 after accurate production obtained.    14. New Goal: pt will complete reading comprehension tasks at the sentence level with 90% acc indly.  Progressing/ Not Met 3/4/2019   Pt selected a sentence that described a picture with 50% acc indly, 100% acc given verbal cues.    15. New Goal: pt will follow 3 unit body part commands with 90% acc indly.  Not formally addressed         Probe: semantic feature analysis completed x2 nouns with max verbal cues to increase variety of words produced.     Patient Education/Response:   Progress since first session reviewed (I.e. Labeling objects and completing sentences) . Both verbalized understanding.     Assessment:   Torito is progressing well towards his goals.  Current goals  remain appropriate. Increased accuracy on confrontational naming task and following 2 unit body part commands. Introduced semantic feature analysis to increase variety of words produced.  Pt prognosis is Good. Pt will continue to benefit from skilled outpatient speech and language therapy to address the deficits listed in the problem list on initial evaluation, provide pt/family education and to maximize pt's level of independence in the home and community environment.     Barriers to Therapy: sleeping pattern, possible transportation  Pt's spiritual, cultural and educational needs considered and pt agreeable to plan of care and goals.    Plan:   Continue POC with introduction of supported communication for persons with aphasia.     SERINA Mott, CCC-SLP  Speech Language Pathologist   3/4/2019

## 2019-03-04 NOTE — PROGRESS NOTES
"  Occupational Therapy Progress Note     Name: Torito Harris  [facility] Number: [id number]  Physician: Ian Feliciano MD  Medical Diagnosis: L CVA      Therapy Diagnosis:   Encounter Diagnoses   Name Primary?    Impaired mobility and activities of daily living     Weakness of right upper extremity      Visit Date: 3/4/2019  Physician Orders: Eval and Treat   Surgical Procedure and Date: NA  Evaluation Date: 1/28/19   Insurance Authorization Period Expiration: 12/31/19   Plan of Care Certification Period: 4/19/19   Date of Return to MD: unknown at this time     Visit # / Visits authorized: 10 / 10  Time In: 0945  Time Out: 1030    Total Billable Time: 45 minutes      Precautions: Standard, fall risk, hearing impaired     Subjective     Pt reports: "I have my splint with me today."     Response to previous treatment:positive   Functional change: progressing     Pain: none reported, pain behaviors noted with PROM   Location: NA     Objective   Patient seen by OT this date.  Pt ambulated to treatment session with abigail walker and SBA.  Patient's wife was present for treatment session.  Patient received individual therapy with activities as follows:     PROGRESS AS FOLLOWS:     Physical Exam:  Postural examination/scapula alignment: Rounded shoulder, Affected scapula depressed and Affected scapula downwardly rotated  Joint integrity: Subluxation of 1 finger on R  Skin integrity: Intact  Edema: none noted     Range of Motion:      Joint Evaluation  AROM  1/28/2019 AROM  1/28/2019 PROM   1/28/2019 AROM  3/4/19 PROM  3/4/19     Left Right Right Right  Right    Shoulder flex 0-180 WNL  throughout 40 140 40 (NC) 165 (+25)   Shoulder Abd 0-180   25 120 35 (+10) 125 (+5)    Shoulder ER 0-90   50 90 50 (NC) 90 (NC)   Shoulder IR 0-90   30 80 40 (+10) 80 (NC)   Shoulder Extension 0-80   50 80 50 (NC)  70 (-10)    Elbow flex/ext 0-150   10-90 0-140  (-40 /+45)  0-140 (NC)    Wrist flexion   NT NT 50 90    Wrist extension   " NT NT 35 75   Supination   NT NT 90 NT   Pronation   NT NT 80 NT   UD  NT NT Trace 25   RD  NT NT Trace  20      Fist: Pt able to make loose fist with increased time    Strength:       Strength 1/28/2019 1/28/2019 3/4/19   **within available ROM** Left Right Right    Shoulder flex 4+/5 2-/5 2+/5   Shoulder abd 4+/5 2-/5 2+/5   Shoulder ER 4/5 2/5 2+/5   Shoulder IR 4/5 2/5 2+/5   Shoulder Extension 4+/5 2+/5 2+/5   Elbow flex 4+/5 2+/5 2+/5   Elbow ext 4+/5 2+/5 2+/5   Wrist flex 4+/5 2+/5 2+/5   Wrist ext 4+/5 2+/5 2+/5    Comments: patient is able to perform RUE movements through partial range of motion in seated (antigravity) position.        Strength: (CLAUDE Dynamometer in lbs.) Average 3 trials, Position II:      #1  1/28/19 1/28/19 3/4/19     Left Right Right    Rung II 86# 15# 10# (-5#)         Fine Motor Coordination: 9 Hole Peg Test  Left 1/28/2019 Right   1/28/2019 Right   3/4/19      Unable to perform unable to perform        Gross motor coordination:    · MORRIS (Rapid Alternating Movements): Normal on L. Delayed on R at wrist for supination/pronation   · Finger to Nose (5 times): Unable on R   · Finger Flicks (coordination moving from digit flexion to digit extension): slow movement on R, unable to flick digits        CMS Impairment/Limitation/Restriction for FOTO Cerebrovascular Disorders Survey  Status Limitation G-Code CMS Severity Modifier  Intake 28% 72%  Predicted 41% 59% Goal Status+ CK - At least 40 percent but less than 60 percent  3/4/2019 28% 72% Current Status CL - At least 60 percent but less than 80 percent                      Patient participated in neuromuscular reeducation activities for 15 minutes to maximize ROM, motor strength and neuromuscular and proprioceptive input to the RUE.  The following activities were included:   Sitting EOM:   - restoration of R hand arches and metacarpal stretches   - general R wrist stretches: 1. P-A glide of scaphoid on radius, 2. Radial deviation, 3.  Increasing mobility of metacarpals, 4. Carpal roll, 5. Movement of forearm on hand towards wrist extension, 6. Movement of forearm on the hand toward supination/ pronation, 7. wrist ext with distraction   - right upper extremity weight bearing on a hard static surface with body on arm weight shifts   - instruction and application of GG splint to R hand  - patient's wife was present for application instruction.  She was able to don pt's GG splint properly in treatment session following education.        Patient ambulated with abigail walker and close SBA to following ST session.  GG splint remained on R hand/wrist and instructed to remain on hand through following ST and PT session.     Functional mobility:   - Sit <> stand with SBA  - gait with abigail-walker and close SBA       Home Exercises and Education Provided     Education provided re: don and doff GG splint via teach-back method.  Patient was instructed to wear splint for up to 3 hours and then remove and perform grasp and release tasks.   - Progress towards goals   - POC     Written Home Exercises Provided: Patient instructed to cont prior HEP as verbally instructed in previous tx sessions. Exercises were reviewed and Torito was able to demonstrate them prior to the end of the session.  Torito demonstrated good  understanding of the HEP provided.       Pt has no cultural, educational or language barriers to learning provided.    Assessment   Patient was reassessed in today's treatment session and demonstrated improvement or maintenance in RUE active range of motion against gravity.  Additionally, passive range of motion of the RUE improved or maintained in all planes other than a 10 degree decline in shoulder extension.  He is able to actively form a loose fist for grasp and hold of objects, but objective  strength declined this date by 5 pounds since his initial evaluation.  He demonstrates fair ability to actively open (extend) digits for active release of  objects, and OT has continued to use GG splint to improve extension.  He demonstrates fair ability to perform gross motor movements of the right hand, but he continues with max fine motor coordination limitations and is unable to participate in 9 [facility] assessment 2/2 continued deficits.          Pt would continue to benefit from skilled OT. Torito is progressing well towards his goals and there are no updates to goals at this time. Pt prognosis is Good due to patient remains motivated to participate and has good family support from his spouse.  He has recently purchased a GG splint for use in the home environment.  He was instructed to wear splint for up to 3 hours and then remove for functional grasp and release tasks.  Pt will continue to benefit from skilled outpatient occupational therapy to address the deficits listed in the problem list on initial evaluation provide pt/family education and to maximize pt's level of independence in the home and community environment.  OT recommends extending POC 2 times weekly for 6 weeks in pursuit of OT goals.      Anticipated barriers to occupational therapy: none     Pt's spiritual, cultural and educational needs considered and pt agreeable to plan of care and goals.    Goals:  Short Term Goals: 3 weeks   ROM/Strength to perform the ADL's and functional activities listed below, - in progress  Pt will improve functional  strength needed for tasks to 20# in R hand. - in progress   Pt will improve AROM for shoulder flexion needed for functional tasks to 60* in R UE. - in progress   UE dressing will improve to Min A - in progress   LB dressing will improve to Mod A  - in progress   Toileting will improve to Min A incorporating R hand for task  - in progress   Pt will be Independent with HEP to improve ROM and FM skills. - in progress      Long Term Goals: 6 weeks   ROM/Strength to perform the ADL's and functional activities listed below - in progress   Pt will improve  functional  strength needed for tasks to 30# in R hand. - in progress   Pt will improve AROM for shoulder flexion needed for functional tasks to 80* in R UE. - in progress   UE dressing will improve to Supervision   LB dressing will improve to Min A   Toileting will improve to Supervision incorporating R hand for task   Pt will complete clinical testing for skills needed for driving  G code self care CK    Plan   Continue POC in pursuit of OT goals - extend through 4/19/19.     Discussed Plan of Care with patient: Yes  Updates/Grading for next session: progress as tolerated

## 2019-03-04 NOTE — PLAN OF CARE
"  Occupational Therapy Progress Note     Name: Torito Harris  [facility] Number: [id number]  Physician: Ian Feliciano MD  Medical Diagnosis: L CVA      Therapy Diagnosis:   Encounter Diagnoses   Name Primary?    Impaired mobility and activities of daily living     Weakness of right upper extremity      Visit Date: 3/4/2019  Physician Orders: Eval and Treat   Surgical Procedure and Date: NA  Evaluation Date: 1/28/19   Insurance Authorization Period Expiration: 12/31/19   Plan of Care Certification Period: 4/19/19   Date of Return to MD: unknown at this time     Visit # / Visits authorized: 10 / 10  Time In: 0945  Time Out: 1030    Total Billable Time: 45 minutes      Precautions: Standard, fall risk, hearing impaired     Subjective     Pt reports: "I have my splint with me today."     Response to previous treatment:positive   Functional change: progressing     Pain: none reported, pain behaviors noted with PROM   Location: NA     Objective   Patient seen by OT this date.  Pt ambulated to treatment session with abigail walker and SBA.  Patient's wife was present for treatment session.  Patient received individual therapy with activities as follows:     PROGRESS AS FOLLOWS:     Physical Exam:  Postural examination/scapula alignment: Rounded shoulder, Affected scapula depressed and Affected scapula downwardly rotated  Joint integrity: Subluxation of 1 finger on R  Skin integrity: Intact  Edema: none noted     Range of Motion:      Joint Evaluation  AROM  1/28/2019 AROM  1/28/2019 PROM   1/28/2019 AROM  3/4/19 PROM  3/4/19     Left Right Right Right  Right    Shoulder flex 0-180 WNL  throughout 40 140 40 (NC) 165 (+25)   Shoulder Abd 0-180   25 120 35 (+10) 125 (+5)    Shoulder ER 0-90   50 90 50 (NC) 90 (NC)   Shoulder IR 0-90   30 80 40 (+10) 80 (NC)   Shoulder Extension 0-80   50 80 50 (NC)  70 (-10)    Elbow flex/ext 0-150   10-90 0-140  (-40 /+45)  0-140 (NC)    Wrist flexion   NT NT 50 90    Wrist extension   " NT NT 35 75   Supination   NT NT 90 NT   Pronation   NT NT 80 NT   UD  NT NT Trace 25   RD  NT NT Trace  20      Fist: Pt able to make loose fist with increased time    Strength:       Strength 1/28/2019 1/28/2019 3/4/19   **within available ROM** Left Right Right    Shoulder flex 4+/5 2-/5 2+/5   Shoulder abd 4+/5 2-/5 2+/5   Shoulder ER 4/5 2/5 2+/5   Shoulder IR 4/5 2/5 2+/5   Shoulder Extension 4+/5 2+/5 2+/5   Elbow flex 4+/5 2+/5 2+/5   Elbow ext 4+/5 2+/5 2+/5   Wrist flex 4+/5 2+/5 2+/5   Wrist ext 4+/5 2+/5 2+/5    Comments: patient is able to perform RUE movements through partial range of motion in seated (antigravity) position.        Strength: (CLAUDE Dynamometer in lbs.) Average 3 trials, Position II:      #1  1/28/19 1/28/19 3/4/19     Left Right Right    Rung II 86# 15# 10# (-5#)         Fine Motor Coordination: 9 Hole Peg Test  Left 1/28/2019 Right   1/28/2019 Right   3/4/19      Unable to perform unable to perform        Gross motor coordination:    · MORRIS (Rapid Alternating Movements): Normal on L. Delayed on R at wrist for supination/pronation   · Finger to Nose (5 times): Unable on R   · Finger Flicks (coordination moving from digit flexion to digit extension): slow movement on R, unable to flick digits        CMS Impairment/Limitation/Restriction for FOTO Cerebrovascular Disorders Survey  Status Limitation G-Code CMS Severity Modifier  Intake 28% 72%  Predicted 41% 59% Goal Status+ CK - At least 40 percent but less than 60 percent  3/4/2019 28% 72% Current Status CL - At least 60 percent but less than 80 percent                      Patient participated in neuromuscular reeducation activities for 15 minutes to maximize ROM, motor strength and neuromuscular and proprioceptive input to the RUE.  The following activities were included:   Sitting EOM:   - restoration of R hand arches and metacarpal stretches   - general R wrist stretches: 1. P-A glide of scaphoid on radius, 2. Radial deviation, 3.  Increasing mobility of metacarpals, 4. Carpal roll, 5. Movement of forearm on hand towards wrist extension, 6. Movement of forearm on the hand toward supination/ pronation, 7. wrist ext with distraction   - right upper extremity weight bearing on a hard static surface with body on arm weight shifts   - instruction and application of GG splint to R hand  - patient's wife was present for application instruction.  She was able to don pt's GG splint properly in treatment session following education.        Patient ambulated with abigail walker and close SBA to following ST session.  GG splint remained on R hand/wrist and instructed to remain on hand through following ST and PT session.     Functional mobility:   - Sit <> stand with SBA  - gait with abigail-walker and close SBA       Home Exercises and Education Provided     Education provided re: don and doff GG splint via teach-back method.  Patient was instructed to wear splint for up to 3 hours and then remove and perform grasp and release tasks.   - Progress towards goals   - POC     Written Home Exercises Provided: Patient instructed to cont prior HEP as verbally instructed in previous tx sessions. Exercises were reviewed and Torito was able to demonstrate them prior to the end of the session.  Torito demonstrated good  understanding of the HEP provided.       Pt has no cultural, educational or language barriers to learning provided.    Assessment   Patient was reassessed in today's treatment session and demonstrated improvement or maintenance in RUE active range of motion against gravity.  Additionally, passive range of motion of the RUE improved or maintained in all planes other than a 10 degree decline in shoulder extension.  He is able to actively form a loose fist for grasp and hold of objects, but objective  strength declined this date by 5 pounds since his initial evaluation.  He demonstrates fair ability to actively open (extend) digits for active release of  objects, and OT has continued to use GG splint to improve extension.  He demonstrates fair ability to perform gross motor movements of the right hand, but he continues with max fine motor coordination limitations and is unable to participate in 9 \[facility]\"" assessment 2/2 continued deficits.          Pt would continue to benefit from skilled OT. Torito is progressing well towards his goals and there are no updates to goals at this time. Pt prognosis is Good due to patient remains motivated to participate and has good family support from his spouse.  He has recently purchased a GG splint for use in the home environment.  He was instructed to wear splint for up to 3 hours and then remove for functional grasp and release tasks.  Pt will continue to benefit from skilled outpatient occupational therapy to address the deficits listed in the problem list on initial evaluation provide pt/family education and to maximize pt's level of independence in the home and community environment.  OT recommends extending POC 2 times weekly for 6 weeks in pursuit of OT goals.      Anticipated barriers to occupational therapy: none     Pt's spiritual, cultural and educational needs considered and pt agreeable to plan of care and goals.    Goals:  Short Term Goals: 3 weeks   ROM/Strength to perform the ADL's and functional activities listed below, - in progress  Pt will improve functional  strength needed for tasks to 20# in R hand. - in progress   Pt will improve AROM for shoulder flexion needed for functional tasks to 60* in R UE. - in progress   UE dressing will improve to Min A - in progress   LB dressing will improve to Mod A  - in progress   Toileting will improve to Min A incorporating R hand for task  - in progress   Pt will be Independent with HEP to improve ROM and FM skills. - in progress      Long Term Goals: 6 weeks   ROM/Strength to perform the ADL's and functional activities listed below - in progress   Pt will improve  functional  strength needed for tasks to 30# in R hand. - in progress   Pt will improve AROM for shoulder flexion needed for functional tasks to 80* in R UE. - in progress   UE dressing will improve to Supervision   LB dressing will improve to Min A   Toileting will improve to Supervision incorporating R hand for task   Pt will complete clinical testing for skills needed for driving  G code self care CK    Plan   Continue POC in pursuit of OT goals - extend through 4/19/19.     Discussed Plan of Care with patient: Yes  Updates/Grading for next session: progress as tolerated

## 2019-03-07 ENCOUNTER — CLINICAL SUPPORT (OUTPATIENT)
Dept: REHABILITATION | Facility: HOSPITAL | Age: 59
End: 2019-03-07
Attending: PSYCHIATRY & NEUROLOGY
Payer: COMMERCIAL

## 2019-03-07 ENCOUNTER — DOCUMENTATION ONLY (OUTPATIENT)
Dept: REHABILITATION | Facility: HOSPITAL | Age: 59
End: 2019-03-07

## 2019-03-07 DIAGNOSIS — R29.898 WEAKNESS OF RIGHT UPPER EXTREMITY: ICD-10-CM

## 2019-03-07 DIAGNOSIS — Z74.09 IMPAIRED FUNCTIONAL MOBILITY, BALANCE, GAIT, AND ENDURANCE: ICD-10-CM

## 2019-03-07 DIAGNOSIS — Z78.9 IMPAIRED MOBILITY AND ACTIVITIES OF DAILY LIVING: ICD-10-CM

## 2019-03-07 DIAGNOSIS — Z74.09 IMPAIRED MOBILITY AND ACTIVITIES OF DAILY LIVING: ICD-10-CM

## 2019-03-07 DIAGNOSIS — R47.01 BROCA'S APHASIA: ICD-10-CM

## 2019-03-07 DIAGNOSIS — R29.898 WEAKNESS OF RIGHT LEG: ICD-10-CM

## 2019-03-07 PROCEDURE — 97110 THERAPEUTIC EXERCISES: CPT | Mod: PO,59

## 2019-03-07 PROCEDURE — 97112 NEUROMUSCULAR REEDUCATION: CPT | Mod: PO,59

## 2019-03-07 PROCEDURE — 92507 TX SP LANG VOICE COMM INDIV: CPT | Mod: PO

## 2019-03-07 NOTE — PROGRESS NOTES
Occupational Therapy Progress Note     Name: Torito Harris  Clinic Number: [id number]  Physician: Ian Feliciano MD  Medical Diagnosis: L CVA      Therapy Diagnosis:   Encounter Diagnoses   Name Primary?    Impaired mobility and activities of daily living     Weakness of right upper extremity      Visit Date: 3/7/2019  Physician Orders: Eval and Treat   Surgical Procedure and Date: NA  Evaluation Date: 1/28/19   Insurance Authorization Period Expiration: 12/31/19   Plan of Care Certification Period: 4/19/19   Date of Return to MD: unknown at this time     Visit # / Visits authorized: 11 ( 1 / 10 new auth)   Time In: 1430   Time Out: 1515    Total Billable Time: 45 minutes      Precautions: Standard, fall risk, hearing impaired     Subjective     Pt reports: He has been wearing his GG splint regularly at home.        Response to previous treatment:positive   Functional change: progressing     Pain: none reported, pain behaviors noted with PROM   Location: NA     Objective   Patient seen by OT this date.  Pt ambulated to treatment session with quad cane and SBA.  Patient's wife was present for treatment session.  Patient received individual therapy with activities as follows:     Torito participated in therapeutic exercises to improve ROM and strength for 17 minutes:   - UBE level 3 x 5 min forwards x 5 min reverse   - Seated overhead pulleys for shoulder flexion x 2 min  - towel slides for shoulder AROM; OT assistance to maintain R hand on towel                         Patient participated in neuromuscular reeducation activities for 28 minutes to maximize ROM, motor strength and neuromuscular and proprioceptive input to the RUE.  The following activities were included:   Sitting EOM:   - restoration of R hand arches and metacarpal stretches   - general R wrist stretches: 1. P-A glide of scaphoid on radius, 2. Radial deviation, 3. Increasing mobility of metacarpals, 4. Carpal roll, 5. Movement of forearm on  hand towards wrist extension, 6. Movement of forearm on the hand toward supination/ pronation, 7. wrist ext with distraction   - right upper extremity weight bearing on a hard static surface with body on arm weight shifts in the frontal plane to promote R wrist and digit extension   - right upper extremity weight bearing on a hard static surface with patient reaching forwards, laterally, and across midline with LUE for weight shifting into RUE.    - grasp and release tasks with RUE - reaching forwards for cups and releasing them onto mat on R; self assist and Min A by OT   - passing ball from cup in R <> cup in L; Min A by OT   - OT donned pt's personal GG splint at end of session.       Patient ambulated with quad cane and close SBA to exit treatment gym.     Functional mobility:   - Sit <> stand with SBA  - gait with quad cane and close SBA       Home Exercises and Education Provided     Education provided re: Patient was instructed to wear splint for up to 3 hours and then remove and perform grasp and release tasks.   - Progress towards goals   - POC     Written Home Exercises Provided: Patient instructed to cont prior HEP as verbally instructed in previous tx sessions. Exercises were reviewed and Torito was able to demonstrate them prior to the end of the session.  Torito demonstrated good  understanding of the HEP provided.       Pt has no cultural, educational or language barriers to learning provided.    Assessment   Patient tolerated treatment session well.  OT continues with TherEx and neuromuscular reeducation techniques to increase RUE range of motion and strength, and maximize proprioceptive and motor input to the RUE.  He required Min A and self assist to reach for cups, grasp, and release.  He was not able to actively oppose thumb to grasp cups and required Total A for this movement.  Once cup was grasped, he was able to maintain hold through release phase.  He demonstrates fair ability to actively  open (extend) digits for active release of the cups and OT has continued to use GG splint to improve extension.  OT reinforced wearing GG splint for up to 3 hours and then remove for functional grasp and release tasks at home.         Pt would continue to benefit from skilled OT. Torito is progressing well towards his goals and there are no updates to goals at this time. Pt prognosis is Good due to patient remains motivated to participate and has good family support from his spouse.  He has recently purchased a GG splint for use in the home environment.  He was instructed toPt will continue to benefit from skilled outpatient occupational therapy to address the deficits listed in the problem list on initial evaluation provide pt/family education and to maximize pt's level of independence in the home and community environment.      Anticipated barriers to occupational therapy: none     Pt's spiritual, cultural and educational needs considered and pt agreeable to plan of care and goals.    Goals:  Short Term Goals: 3 weeks   ROM/Strength to perform the ADL's and functional activities listed below, - in progress  Pt will improve functional  strength needed for tasks to 20# in R hand. - in progress   Pt will improve AROM for shoulder flexion needed for functional tasks to 60* in R UE. - in progress   UE dressing will improve to Min A - in progress   LB dressing will improve to Mod A  - in progress   Toileting will improve to Min A incorporating R hand for task  - in progress   Pt will be Independent with HEP to improve ROM and FM skills. - in progress      Long Term Goals: 6 weeks   ROM/Strength to perform the ADL's and functional activities listed below - in progress   Pt will improve functional  strength needed for tasks to 30# in R hand. - in progress   Pt will improve AROM for shoulder flexion needed for functional tasks to 80* in R UE. - in progress   UE dressing will improve to Supervision   LB dressing  will improve to Min A   Toileting will improve to Supervision incorporating R hand for task   Pt will complete clinical testing for skills needed for driving  G code self care CK    Plan   Continue POC in pursuit of OT goals - extend through 4/19/19.     Discussed Plan of Care with patient: Yes  Updates/Grading for next session: progress as tolerated

## 2019-03-07 NOTE — PROGRESS NOTES
"  Physical Therapy Daily Treatment Note     [id number]    Therapy Diagnosis:   Encounter Diagnoses   Name Primary?    Weakness of right leg     Impaired functional mobility, balance, gait, and endurance      Physician: Ian Feliciano MD    Visit Date: 3/7/2019    Physician Orders: PT Eval and Treat   Medical Diagnosis from Referral: Late effect of stroke  Evaluation Date: 1/28/2019  Authorization Period Expiration: 01/26/19-12/31/19  Plan of Care Expiration: 01/28/19 - 03/25/19   Visit # / Visits authorized: 11/10 (Vist #10 of 2019)   Time In: 13:00  Time Out: 13:45  Total Billable Time: 45 minutes    Precautions: Standard, Diabetes, Fall and right hemiplegia, hearing aid, safety awareness, cognitive deficits, speech deficits    Subjective     Pt reports: "I'm doing pretty good."   Wife sat in chair in gym  HEP Compliance: pt reports he is doing all of his exercises at home.  Response to previous treatment: no complaints  Functional change: none reported    Pain: 0/10  Location: N/A      Objective     Torito received therapeutic exercises to develop strength, endurance, ROM, flexibility and core stabilization for 30 minutes including:      X 10 minutes on Nu Step recumbent stepper. L5.0;  B LE, B UE.  B LE wrapped together to prevent increased R hip abduction    Seated Exercises:   2 x 30 sec of manual R DF stretch   BAPS board, Level 3.0 working on DF/PF, CGA to keep foot on board in correct position   HS curls with creeper and yellow theraband x 30 reps   X 30 reps of R LE DF/PF with YTB    Patient participated in neuromuscular re-education activities to improve: Balance, Coordination, Kinesthetic, Sense, Proprioception and Posture for 15 minutes. The following activities were included:   2 x 10 reps of LLE single leg target tap to 4 inch step with 2 UE support.  Min A to block R knee from buckling  X 10 reps of LLE single leg target tap to 4 inch and 8 inch step with 2 UE " support.  Min A to block R knee from buckling  X 10 reps or R LE single leg target tap to 4 inch step focusing on increasing R hip flexion      Home Exercises Provided and Patient Education Provided     Education provided: Pt instructed on putting RUE into jacket followed by SWETHA. SPT also encouraged wife to let him do as much as he can.    Written Home Exercises Provided: yes.  Exercises were reviewed and Torito was able to demonstrate them prior to the end of the session.  Torito demonstrated good  understanding of the education provided. Torito's wife present for  HEP instruction as well.     See EMR under Media for exercises provided 2/5/2019.    Assessment   Mr. Ugarte tolerated tx session well today and did not have any problems noted.  Pt cont to work on R LE strengthening and weight acceptance on the R LE.  Pt required Min A for standing activities to prevent R knee buckling.  No loss of balance noted. Pt to benefit from continued PT intervention to further address remaining mobility deficits. Continue current POC.    Torito is progressing well towards his goals.   Pt prognosis is Good.     Pt will continue to benefit from skilled outpatient physical therapy to address the deficits listed in the problem list box on initial evaluation, provide pt/family education and to maximize pt's level of independence in the home and community environment.     Pt's spiritual, cultural and educational needs considered and pt agreeable to plan of care and goals.     Anticipated Barriers for therapy: hearing deficit, speech deficits, cognitive deficits, decreased safety awareness, fall risk, visual neglect, transportation assistance required    Goals:  Short Term Goals: 4 weeks   1. Pt to be (I) with established HEP Ongoing  2. Pt to improve R hip flexion strength MMT score to at least 4+/5 for improved gait mechanics Ongoing  3. Pt to improve R hip extension strength MMT score to at least 4/5 for improved gait mechanics  Ongoing  4. Pt to improve R hip AB and R hip AD strength MMT scores to at least 3+/5 for improved gait mechanics MET 03/04/19  5.  Pt to improve R knee flexion and extension strength MMT scores to at least 4+/5 for improved gait mechanics Ongoing  6. Pt to improve R ankle PF strength MMT score to at least 2/5 for improved gait mechanics MET 03/04/19  7. Pt to improve chair rise score to at least 5 times with no more than 1 UE support and S for improved endurance and safety with functional transfers MET 03/04/19  8. Pt to improve TUG score to at least 30 seconds for improved safety with household mobility Ongoing  9. Pt to improve SSWS score to at least 0.40 m/sec for improved safety with community mobility. Ongoing     Long Term Goals: 8 weeks   1. Pt to be (I) with advanced HEP Ongoing  2. Pt to improve R hip extension strength MMT score to at least 4+/5 for improved gait mechanics Ongoing  3. Pt to improve R hip AB and R hip AD strength MMT scores to at least 4-/5 for improved gait mechanics Ongoing  4. Pt to improve R ankle PF strength MMT score to at least 2+/5 for improved gait mechanics Ongoing  5. Pt to improve chair rise score to at least 5 times with no UE support and S for improved endurance and safety with functional transfers Ongoing  6. Pt to improve TUG score to at least 26 seconds for improved safety with household mobility Ongoing  7. Pt to improve SSWS score to at least 0.50 m/sec for improved safety with community mobility. Ongoing     Plan   Plan for next visit: Continue standing there-act to address weight acceptance onto RLE; stair negotiation on wooden gym stair case. Ankle T-band exercises (or AROM as patient tolerates), LE strengthening exercises.    Continue outpatient physical therapy 2x weekly under current established Plan of Care,1/28/2019 to 03/25/19., with treatment to include: pt education, HEP, therapeutic exercises, neuromuscular re-education/balance exercises, therapeutic  activities, joint mobilizations, and modalities PRN, to work towards established goals. Pt may be seen by PTA to carry out plan of care.         Lucy Vanegas, PTA

## 2019-03-07 NOTE — PROGRESS NOTES
Face to face meeting completed with Hanh Brooke PT regarding current status and progress of   Torito Harris. Standing TE  Alvarado Escobedo, PTA

## 2019-03-07 NOTE — PROGRESS NOTES
"Outpatient Neurological Rehabilitation   Speech and Language Therapy Daily Note  Date:  3/7/2019     Name: Torito Harris   MRN: [id number]   Therapy Diagnosis:   Encounter Diagnosis   Name Primary?    Broca's aphasia    Physician: Ian Feliciano MD  Physician Orders: ST evaluate and treat  Medical Diagnosis: I69.30 (ICD-10-CM) - Late effect of stroke    Visit #/Visits authorized: 9/ 10  Date of Evaluation:  19  Insurance Authorization Period: 19 to 19  Plan of Care Expiration Date:   2019 to 3/25/19  Extended POC: n/a     Time In: 1345   Time Out:  1430   Total Billable Time: 45 minutes     Precautions: Standard and Fall  Subjective:   Pt reports: that he did nothing for roberto gras day. His hearing aid  during session limiting auditory comprehension tasks. Completed primarily reading tasks while pt's wife bought him new hearing aid batteries.   Response to previous treatment: "Alright"  Pain Scale:  0/10 on VAS currently.   Pain Location: n/a  Objective:   UNTIMED  Procedure Min.   Speech- Language- Voice Therapy   45 minutes    Total Untimed Units: 1  Charges Billed/# of units: 1    Short Term Goals: (4 weeks) Current Progress:   1. Pt will complete R/L body part discrimination tasks with 70% Carmen. Goal Met 19/ Discontinue    2. Pt will follow 3 unit body commands with 80% acc sergio Goal Not Met / Revise to 2 unit    3. Pt will follow 3 unit commands with visual stimuli with 70% Carmen. Goal Not Met / Revise to 2 unit    4. Pt will name common objects with 90% Carmen.  Progressing/ Not Met 3/7/2019   Pt named common objects with 40% acc indly, 70% acc given a semantic cue, 100% acc given a semantic cue and a phonemic cue.    5. Pt will generate a response to basic sentence completions with 80% Carmen   Progressing/ Not Met 3/7/2019   Not formally addressed     6. Pt will name 5 concrete category items with Carmen.  Progressing/ Not Met 3/7/2019   Pt identified 5 items from a field of 10 in " a concrete category with 73%acc indly, 100% acc given visual cues    7. Pt will repeat  3-5 word phrases with 80% Carmen. Goal Met 2/21/19 / Discontinue   8. Assess reading and writing skills. Goal met 2/26/2019/ Discontinue.   9. pt will follow 2 unit body part commands with 90% acc indly to improve auditory comprehension. Goal Met 3/4/19 / Discontinue   10.  pt will follow 2 unit commands with visual stimuli with 90% acc indly to improve auditory comprehension.   Progressing/ Not Met 3/7/2019   Not formally addressed     11. pt and a conversational partner will use supported communication for persons with   aphasia to facilitate a 2 conversational turns with a communication partner.   Progressing/ Not Met 3/7/2019  Not formally addressed    12. Patient will match phrases to pictures to increase reading comprehension skills with 90% accuracy given min A. Goal Met 3/4/19 / Discontinue   13. Patient will write name and address to increase written expression skills with 90% accuracy given verbal presentation of letters  Progressing/ Not Met 3/7/2019  .  Not formally addressed     14.  pt will complete reading comprehension tasks at the sentence level with 90% acc indly.  Progressing/ Not Met 3/7/2019   Pt read simple y/n questions and pointed to the answer with 78% acc indly, 86% acc  Given min cues  Pt read and identified the answer to wh?s in a field of three with 85% acc indly, 92% acc given cues.    15. pt will follow 3 unit body part commands with 90% acc indly.   Progressing/ Not Met 3/7/2019   Not formally addressed         Patient Education/Response:   Relative strength of reading skills reviewed with pt via gesture and his wife verbally. Both indicated understanding.     Assessment:   Torito is progressing well towards his goals.  Current goals remain appropriate. Reading skills are considered a relative strength compared to auditory comprehension. Pt prognosis is Good. Pt will continue to benefit from skilled  outpatient speech and language therapy to address the deficits listed in the problem list on initial evaluation, provide pt/family education and to maximize pt's level of independence in the home and community environment.     Barriers to Therapy: sleeping pattern, possible transportation  Pt's spiritual, cultural and educational needs considered and pt agreeable to plan of care and goals.    Plan:   Continue POC with introduction of supported communication for persons with aphasia.     SERINA Mott, CCC-SLP  Speech Language Pathologist   3/7/2019

## 2019-03-10 ENCOUNTER — DOCUMENTATION ONLY (OUTPATIENT)
Dept: REHABILITATION | Facility: HOSPITAL | Age: 59
End: 2019-03-10

## 2019-03-11 NOTE — PROGRESS NOTES
PT/PTA met face to face to discuss pt's treatment plan and progress towards established goals. Continue with current PT POC. Pt will be seen by physical therapist at least every 6th treatment day or every 30 days, whichever occurs first.      Jeff Solis, PTA  03/10/2019

## 2019-03-12 ENCOUNTER — CLINICAL SUPPORT (OUTPATIENT)
Dept: REHABILITATION | Facility: HOSPITAL | Age: 59
End: 2019-03-12
Attending: PSYCHIATRY & NEUROLOGY
Payer: COMMERCIAL

## 2019-03-12 DIAGNOSIS — R47.01 BROCA'S APHASIA: ICD-10-CM

## 2019-03-12 DIAGNOSIS — Z78.9 IMPAIRED MOBILITY AND ACTIVITIES OF DAILY LIVING: ICD-10-CM

## 2019-03-12 DIAGNOSIS — R29.898 WEAKNESS OF RIGHT UPPER EXTREMITY: ICD-10-CM

## 2019-03-12 DIAGNOSIS — Z74.09 IMPAIRED MOBILITY AND ACTIVITIES OF DAILY LIVING: ICD-10-CM

## 2019-03-12 DIAGNOSIS — Z74.09 IMPAIRED FUNCTIONAL MOBILITY, BALANCE, GAIT, AND ENDURANCE: ICD-10-CM

## 2019-03-12 DIAGNOSIS — R29.898 WEAKNESS OF RIGHT LEG: ICD-10-CM

## 2019-03-12 PROCEDURE — 97112 NEUROMUSCULAR REEDUCATION: CPT | Mod: PO,59

## 2019-03-12 PROCEDURE — 97110 THERAPEUTIC EXERCISES: CPT | Mod: PO,59

## 2019-03-12 PROCEDURE — 92507 TX SP LANG VOICE COMM INDIV: CPT | Mod: PO

## 2019-03-12 NOTE — PROGRESS NOTES
Occupational Therapy Progress Note     Name: Torito Harris  [facility] Number: [id number]  Physician: Ian Feliciano MD  Medical Diagnosis: L CVA      Therapy Diagnosis:   Encounter Diagnoses   Name Primary?    Impaired mobility and activities of daily living     Weakness of right upper extremity      Visit Date: 3/12/2019  Physician Orders: Eval and Treat   Surgical Procedure and Date: NA  Evaluation Date: 1/28/19   Insurance Authorization Period Expiration: 12/31/19   Plan of Care Certification Period: 4/19/19   Date of Return to MD: unknown at this time     Visit # / Visits authorized: 12 ( 2 / 10 new auth)   Time In: 1430   Time Out: 1515    Total Billable Time: 45 minutes      Precautions: Standard, fall risk, hearing impaired     Subjective     Pt reports: He had an episode of L hand tingling during PT.  No other signs or symptoms of CVA noted.  Reports BP was monitored and symptoms resolved.  He reports he is feeling well at arrival.     Response to previous treatment:positive   Functional change: progressing     Pain: none reported   Location: NA     Objective   Patient seen by OT this date.  Pt ambulated to treatment session with quad cane and SBA.  Patient's wife was present for treatment session.  Patient received individual therapy with activities as follows:     Torito participated in therapeutic exercises to improve ROM and strength for 10 minutes:   - UBE level 3 x 5 min forwards x 5 min reverse                    Patient participated in neuromuscular reeducation activities for 35 minutes to maximize ROM, motor strength and neuromuscular and proprioceptive input to the RUE.  The following activities were included:   Sitting EOM:   - restoration of R hand arches and metacarpal stretches   - general R wrist stretches: 1. P-A glide of scaphoid on radius, 2. Radial deviation, 3. Increasing mobility of metacarpals, 4. Carpal roll, 5. Movement of forearm on hand towards wrist extension, 6. Movement of  forearm on the hand toward supination/ pronation, 7. wrist ext with distraction   - right upper extremity weight bearing on a hard static surface with body on arm weight shifts in the frontal plane to promote R wrist and digit extension   - body on arm and arm on body movements on Shoulder I-Frame on flat and inclined surfaces   - grasp and release tasks with RUE - reaching forwards for cups and releasing them onto mat on R; self assist and Min A by OT   - passing ball from cup in R <> cup in L; Min A by OT   - OT donned pt's personal GG splint at end of session.       Patient ambulated with quad cane and close SBA to exit treatment gym.     Functional mobility:   - Sit <> stand with SBA  - gait with quad cane and close SBA       Home Exercises and Education Provided     Education provided: signs and symptoms of CVA and when to seek medical attention   - Patient was instructed to wear splint for up to 3 hours and then remove and perform grasp and release tasks.   - Progress towards goals   - POC     Written Home Exercises Provided: Patient instructed to cont prior HEP as verbally instructed in previous tx sessions. Exercises were reviewed and Torito was able to demonstrate them prior to the end of the session.  Torito demonstrated good  understanding of the HEP provided.       Pt has no cultural, educational or language barriers to learning provided.    Assessment   Patient tolerated treatment session well.  No onset of signs and symptoms of CVA noted or reported during OT.  OT continued with TherEx and neuromuscular reeducation techniques to increase RUE range of motion and strength, and maximize proprioceptive and motor input to the RUE.  He required Min A and self assist to reach for cups, grasp, and release.  Occasional active R thumb movement noted this date to oppose around cup during grasping phase; Total A when movement did not occur.  He continues to demonstrate fair ability to actively open (extend) digits  and wrist during active release of the cups and OT has continued to use GG splint to improve extension.  OT reinforced wearing GG splint for up to 3 hours and then remove for functional grasp and release tasks at home.         Pt would continue to benefit from skilled OT. Torito is progressing well towards his goals and there are no updates to goals at this time. Pt prognosis is Good due to patient remains motivated to participate and has good family support from his spouse. Pt will continue to benefit from skilled outpatient occupational therapy to address the deficits listed in the problem list on initial evaluation provide pt/family education and to maximize pt's level of independence in the home and community environment.      Anticipated barriers to occupational therapy: none     Pt's spiritual, cultural and educational needs considered and pt agreeable to plan of care and goals.    Goals:  Short Term Goals: 3 weeks   ROM/Strength to perform the ADL's and functional activities listed below, - in progress  Pt will improve functional  strength needed for tasks to 20# in R hand. - in progress   Pt will improve AROM for shoulder flexion needed for functional tasks to 60* in R UE. - in progress   UE dressing will improve to Min A - in progress   LB dressing will improve to Mod A  - in progress   Toileting will improve to Min A incorporating R hand for task  - in progress   Pt will be Independent with HEP to improve ROM and FM skills. - in progress      Long Term Goals: 6 weeks   ROM/Strength to perform the ADL's and functional activities listed below - in progress   Pt will improve functional  strength needed for tasks to 30# in R hand. - in progress   Pt will improve AROM for shoulder flexion needed for functional tasks to 80* in R UE. - in progress   UE dressing will improve to Supervision   LB dressing will improve to Min A   Toileting will improve to Supervision incorporating R hand for task   Pt will  complete clinical testing for skills needed for driving  G code self care CK    Plan   Continue POC in pursuit of OT goals - extend through 4/19/19.     Discussed Plan of Care with patient: Yes  Updates/Grading for next session: progress as tolerated

## 2019-03-12 NOTE — PROGRESS NOTES
Outpatient Neurological Rehabilitation   Speech and Language Therapy Daily Note  Date:  3/12/2019     Name: Torito Harris   MRN: [id number]   Therapy Diagnosis:   Encounter Diagnosis   Name Primary?    Broca's aphasia    Physician: Ian Feliciano MD  Physician Orders: ST evaluate and treat  Medical Diagnosis: I69.30 (ICD-10-CM) - Late effect of stroke    Visit #/Visits authorized: 10/ 10  Date of Evaluation:  1/28/19  Insurance Authorization Period: 1/26/19 to 12/31/19  Plan of Care Expiration Date:   1/28/2019 to 3/25/19  Extended POC: n/a     Time In: 1346  Time Out:  1430  Total Billable Time: 44 minutes    Precautions: Standard and Fall  Subjective:   Pt reports: Pt reported tingling in left arm that started during PT therapy session 5 minutes prior. PTA reported that she took blood pressure and heart rate measurements during the session and that they were within normal limits. SLP took blood pressure at 1:53 p.m.  that was 116/79 . SLP took blood pressure again at 2:15 p.m.  that was 127/94. At 2:15 p.m., patient reported tingling sensation in L arm was going away. Other overt s/s of stroke were not present during today's session. No appreciable change in speech production or facial symmetry were observed. Patient was upset throughout today's session as his wife received a call that someone broke in to their home while their daughter was there.   Pain Scale:  0/10 on VAS currently.   Pain Location: n/a  Objective:   UNTIMED  Procedure Min.   Speech- Language- Voice Therapy   44 minutes    Total Untimed Units: 1  Charges Billed/# of units: 1    Short Term Goals: (4 weeks) Current Progress:   1. Pt will complete R/L body part discrimination tasks with 70% Carmen. Goal Met 2/7/19/ Discontinue    2. Pt will follow 3 unit body commands with 80% acc sergio Goal Not Met / Revise to 2 unit    3. Pt will follow 3 unit commands with visual stimuli with 70% Carmen. Goal Not Met / Revise to 2 unit    4. Pt will name common  objects with 90% Carmen.  Progressing/ Not Met 3/12/2019    Pt named common objects with 60% acc indly 90% acc with semantic cues, and 100% acc with semantic and phonemic cues.     5. Pt will generate a response to basic sentence completions with 80% Carmen   Progressing/ Not Met 3/12/2019   Given semantic sentence completion, patient named common objects with 90% acc indly and 100% acc given a phonemic cue.     6. Pt will name 5 concrete category items with Carmen.  Progressing/ Not Met 3/12/2019   Not formally addressed    7. Pt will repeat  3-5 word phrases with 80% Carmen. Goal Met 2/21/19 / Discontinue   8. Assess reading and writing skills. Goal met 2/26/2019/ Discontinue.   9. pt will follow 2 unit body part commands with 90% acc indly to improve auditory comprehension. Goal Met 3/4/19 / Discontinue   10.  pt will follow 2 unit commands with visual stimuli with 90% acc indly to improve auditory comprehension.   Progressing/ Not Met 3/12/2019    Patient followed 2 unit commands with visual stimuli with 95% acc indly and 100% acc with a model.     Goal met x's 1.   11. pt and a conversational partner will use supported communication for persons with   aphasia to facilitate 2 conversational turns with a communication partner.   Progressing/ Not Met 3/12/2019  Clinician used supported communication for persons with aphasia to facilitate conversational turns  6 x's to answer questions about current health status and information about his daughter. Patient conveyed to clinician that he was feeling okay, but his left arm was tingling during the session. Patient also conveyed to clinician that someone broke in to their home while their daughter was there.      12. Patient will match phrases to pictures to increase reading comprehension skills with 90% accuracy given min A. Goal Met 3/4/19 / Discontinue   13. Patient will write name and address to increase written expression skills with 90% accuracy given verbal presentation  of letters  Progressing/ Not Met 3/12/2019  .  Not formally addressed     14.  pt will complete reading comprehension tasks at the sentence level with 90% acc indly.  Progressing/ Not Met 3/12/2019   Pt read and determined whether 2 items were within the same category (Ipad written word category ID) with 100% acc ind'ly.    15. pt will follow 3 unit body part commands with 90% acc indly.   Progressing/ Not Met 3/12/2019   Patient followed 3 unit body commands with 33% acc ind'ly, 40% acc given a repetition, and 100% acc given a model and repetition.        Patient Education/Response:   Patient told that if symptoms worsened to go to ER. Patient and wife verbalized of all information presented.    Assessment:   Torito is progressing well towards his goals.  Current goals remain appropriate. Patient improved accuracy throughout all therapy tasks given a repetition and slower presentation of stimuli. During conversation, patient increased auditory comprehension when conversational partner broke down information,spoke slower, used gestures, and wrote down key words. Patient improved expressive communication when conversational partner used yes/ no questions and cued  for expansion of phrases/sentences. Pt prognosis is Good. Pt will continue to benefit from skilled outpatient speech and language therapy to address the deficits listed in the problem list on initial evaluation, provide pt/family education and to maximize pt's level of independence in the home and community environment.     Barriers to Therapy: sleeping pattern, possible transportation  Pt's spiritual, cultural and educational needs considered and pt agreeable to plan of care and goals.    Plan:   Continue POC with introduction of supported communication for persons with aphasia.     JANETH Jospeh  Student Speech Language Pathologist

## 2019-03-12 NOTE — PROGRESS NOTES
"  Physical Therapy Daily Treatment Note     [facility] [id number]    Therapy Diagnosis:   Encounter Diagnoses   Name Primary?    Weakness of right leg     Impaired functional mobility, balance, gait, and endurance      Physician: Ian Feliciano MD    Visit Date: 3/12/2019    Physician Orders: PT Eval and Treat   Medical Diagnosis from Referral: Late effect of stroke  Evaluation Date: 1/28/2019  Authorization Period Expiration: 01/26/19-12/31/19  Plan of Care Expiration: 01/28/19 - 03/25/19   Visit # / Visits authorized: 12/10 (Vist #10 of 2019)   Time In: 13:00  Time Out: 13:45  Total Billable Time: 45 minutes    Precautions: Standard, Diabetes, Fall and right hemiplegia, hearing aid, safety awareness, cognitive deficits, speech deficits    Subjective     Pt reports: "I'm doing pretty good."   Wife :" We ordered his plata gate brace, but it is missing a strap."  HEP Compliance: pt reports he is doing all of his exercises at home.  Response to previous treatment: no complaints  Functional change: none reported    Pain: 0/10  Location: N/A      Objective     Torito received therapeutic exercises to develop strength, endurance, ROM, flexibility and core stabilization for 20 minutes including:      X 10 minutes on Nu Step recumbent stepper. L5.0;  B LE, B UE.  B LE wrapped together to prevent increased R hip abduction    Seated Exercises:   HS curls with creeper and yellow theraband x 30 reps   X 30 reps of R LE DF/PF with YTB    Patient participated in neuromuscular re-education activities to improve: Balance, Coordination, Kinesthetic, Sense, Proprioception and Posture for 25 minutes. The following activities were included:   Restoration of arches of R hand  6 basic wrist stretches: 1. P-A glide of scaphoid on radius, 2. Radial deviation,            3. Increasing mobility of metacarpals, 4. Carpal roll, 5. Movement of forearm on    hand towards wrist extension, 6. Movement of forearm on " the hand toward     supination/ pronation  Own plata gate splint applied    Pt reported feeling a little dizzy, after HS curls, BP in sitting was 109/75 with a HR of 60 bpm    // bars:  Airex foam pad:   2 x 30 sec of static standing with arms by side and CGA   X 60 sec of medial/lateral weight shifting with arms by side and CGA    X 1 laps of forward tandem ambulation, pt had a hard time coordinating activity    At end of tx session pt kept opening and closing his L hand.  Pt reported some tingling noted in his left hand.  Pt was able to speak, he was able to stick out his tongue, had no cognition changes and no other tingling or pain noted on his left side.  Speech was notified of blood pressure and left sided tingling.       Home Exercises Provided and Patient Education Provided     Education provided: Pt instructed on putting RUE into jacket followed by LUE. SPT also encouraged wife to let him do as much as he can.    Written Home Exercises Provided: yes.  Exercises were reviewed and Torito was able to demonstrate them prior to the end of the session.  Torito demonstrated good  understanding of the education provided. Torito's wife present for  HEP instruction as well.     See EMR under Media for exercises provided 2/5/2019.    Assessment   Mr. Ugarte tolerated tx session fairly well today.  Pt was able to cont with cardiovascular endurance and right lower extremity strengthening.  Pt did report mild dizziness after sitting exercises and required a rest break.  When pt was in the // bars he began complaining about left tingling in his hand. Pts blood pressure was with in functional limits and he did not have any complaints of headaches, problems speaking, or another other symptoms.  Pt was educated on monitoring for any other problems and to go to the ER if problems persist.  Speech was informed of his left hand tingling.  Continue current POC.    Torito is progressing well towards his goals.   Pt prognosis is  Good.     Pt will continue to benefit from skilled outpatient physical therapy to address the deficits listed in the problem list box on initial evaluation, provide pt/family education and to maximize pt's level of independence in the home and community environment.     Pt's spiritual, cultural and educational needs considered and pt agreeable to plan of care and goals.     Anticipated Barriers for therapy: hearing deficit, speech deficits, cognitive deficits, decreased safety awareness, fall risk, visual neglect, transportation assistance required    Goals:  Short Term Goals: 4 weeks   1. Pt to be (I) with established HEP Ongoing  2. Pt to improve R hip flexion strength MMT score to at least 4+/5 for improved gait mechanics Ongoing  3. Pt to improve R hip extension strength MMT score to at least 4/5 for improved gait mechanics Ongoing  4. Pt to improve R hip AB and R hip AD strength MMT scores to at least 3+/5 for improved gait mechanics MET 03/04/19  5.  Pt to improve R knee flexion and extension strength MMT scores to at least 4+/5 for improved gait mechanics Ongoing  6. Pt to improve R ankle PF strength MMT score to at least 2/5 for improved gait mechanics MET 03/04/19  7. Pt to improve chair rise score to at least 5 times with no more than 1 UE support and S for improved endurance and safety with functional transfers MET 03/04/19  8. Pt to improve TUG score to at least 30 seconds for improved safety with household mobility Ongoing  9. Pt to improve SSWS score to at least 0.40 m/sec for improved safety with community mobility. Ongoing     Long Term Goals: 8 weeks   1. Pt to be (I) with advanced HEP Ongoing  2. Pt to improve R hip extension strength MMT score to at least 4+/5 for improved gait mechanics Ongoing  3. Pt to improve R hip AB and R hip AD strength MMT scores to at least 4-/5 for improved gait mechanics Ongoing  4. Pt to improve R ankle PF strength MMT score to at least 2+/5 for improved gait mechanics  Ongoing  5. Pt to improve chair rise score to at least 5 times with no UE support and S for improved endurance and safety with functional transfers Ongoing  6. Pt to improve TUG score to at least 26 seconds for improved safety with household mobility Ongoing  7. Pt to improve SSWS score to at least 0.50 m/sec for improved safety with community mobility. Ongoing     Plan   Plan for next visit: Continue standing there-act to address weight acceptance onto RLE; stair negotiation on wooden gym stair case. Ankle T-band exercises (or AROM as patient tolerates), LE strengthening exercises.    Continue outpatient physical therapy 2x weekly under current established Plan of Care,1/28/2019 to 03/25/19., with treatment to include: pt education, HEP, therapeutic exercises, neuromuscular re-education/balance exercises, therapeutic activities, joint mobilizations, and modalities PRN, to work towards established goals. Pt may be seen by PTA to carry out plan of care.         Lucy Vanegas, PTA

## 2019-03-14 ENCOUNTER — CLINICAL SUPPORT (OUTPATIENT)
Dept: REHABILITATION | Facility: HOSPITAL | Age: 59
End: 2019-03-14
Attending: PSYCHIATRY & NEUROLOGY
Payer: COMMERCIAL

## 2019-03-14 DIAGNOSIS — Z74.09 IMPAIRED MOBILITY AND ACTIVITIES OF DAILY LIVING: ICD-10-CM

## 2019-03-14 DIAGNOSIS — Z74.09 IMPAIRED FUNCTIONAL MOBILITY, BALANCE, GAIT, AND ENDURANCE: ICD-10-CM

## 2019-03-14 DIAGNOSIS — R47.01 BROCA'S APHASIA: ICD-10-CM

## 2019-03-14 DIAGNOSIS — R29.898 WEAKNESS OF RIGHT UPPER EXTREMITY: ICD-10-CM

## 2019-03-14 DIAGNOSIS — R29.898 WEAKNESS OF RIGHT LEG: ICD-10-CM

## 2019-03-14 DIAGNOSIS — Z78.9 IMPAIRED MOBILITY AND ACTIVITIES OF DAILY LIVING: ICD-10-CM

## 2019-03-14 PROCEDURE — 97110 THERAPEUTIC EXERCISES: CPT | Mod: PO,59

## 2019-03-14 PROCEDURE — 97112 NEUROMUSCULAR REEDUCATION: CPT | Mod: PO,59

## 2019-03-14 PROCEDURE — 97116 GAIT TRAINING THERAPY: CPT | Mod: PO

## 2019-03-14 PROCEDURE — 92507 TX SP LANG VOICE COMM INDIV: CPT | Mod: PO

## 2019-03-14 NOTE — PROGRESS NOTES
"  Physical Therapy Daily Treatment Note     [id number]    Therapy Diagnosis:   Encounter Diagnoses   Name Primary?    Weakness of right leg     Impaired functional mobility, balance, gait, and endurance      Physician: Ian Feliciano MD    Visit Date: 3/14/2019    Physician Orders: PT Eval and Treat   Medical Diagnosis from Referral: Late effect of stroke  Evaluation Date: 1/28/2019  Authorization Period Expiration: 02/12/19-12/31/19  Plan of Care Expiration: 01/28/19 - 03/25/19   Visit # / Visits authorized: 3/10 (Vist #13 of 2019)   Time In: 13:00  Time Out: 13:45  Total Billable Time: 45 minutes    Precautions: Standard, Diabetes, Fall and right hemiplegia, hearing aid, safety awareness, cognitive deficits, speech deficits    Subjective     Pt reports: "I'm doing pretty good."   Wife :" We ordered his plata gate brace, but it is missing a strap."  HEP Compliance: pt reports he is doing all of his exercises at home.  Response to previous treatment: no complaints  Functional change: none reported    Pain: 0/10  Location: N/A      Objective     Torito received therapeutic exercises to develop strength, endurance, ROM, flexibility and core stabilization for 15 minutes including:      3 x 30" RLE seated manual HSS  3 x 10 RLE seated LAQs c/ 1.5# cuff weight    X 10 reps, sit <> stand from mat of standard seat height; 4" step placed under LLE to promote increased weight bearing through RLE; CGA at R knee to prevent buckling, LUE support; 1 seated rest break after 6 reps    Patient participated in gait training activities to normalize gait pattern for 30 minutes. The following activities were included:   Gait belt used for safety. Assistive device: // bars, stair rail, little platform rolling walker    X 4 laps R<>L side stepping in // bars, increased focused on RLE weight acceptance, Min A for RUE management  X 4 laps backward stepping in // bars, increased focused on RLE weight " acceptance and increased step length, Min A for RUE management    X 1 trial, ascend <> descend 4 steps in NR pattern, leading with RLE for ascent and descent, CGA to occ Min a at R knee to prevent buckling, LUE support  X 2 trials, ascend <> descend 4 steps in NR pattern, leading with RLE for ascent and LLE descent, CGA to occ Min a at R knee to prevent buckling, LUE support  X 2 trials, ascend <> descend 4 steps in reciprocal pattern, CGA to occ Min a at R knee to prevent buckling, LUE support    X 300 feet forward ambulation with little platform rolling walker. Emphasis on improved step length, improved weight acceptance onto RLE, and improved RLE clearance in swing; patient able to perform step through gait pattern   Deficits noted: decreased RLE weight acceptance compared to LLE, decreased RLE clearance in swing, increased RLE foot flat contact, decreased RLE hip extension in stance, increased R knee flexion in stance        Home Exercises Provided and Patient Education Provided     Education provided: Pt instructed on putting RUE into jacket followed by LUE. SPT also encouraged wife to let him do as much as he can.    Written Home Exercises Provided: yes.  Exercises were reviewed and Torito was able to demonstrate them prior to the end of the session.  Torito demonstrated good  understanding of the education provided. Torito's wife present for  HEP instruction as well.     See EMR under Media for exercises provided 2/5/2019.    Assessment   Mr. Ugarte tolerated tx session fairly well today.  Therapy session focused on RLE quad strengthening for decreased R knee flexion in stance. Therapy session also focused on improved weight acceptance through RLE during sit <> stand transfers, lateral and backward steps, and forward ambulation. Pt demonstrates improved weight acceptance with lateral weight shifting and backward walking compared to prior trials, but he does require UE support to maintain balance. During forward  ambulation with little platform walker, patient able to perform controlled step through pattern, but increased R knee flexion during stance still noted. Able to progress stair negotiation from NR pattern to reciprocal pattern, but patient does require occ Min A at R knee to prevent buckling. Continue current POC to further address remaining mobility deficits.    Torito is progressing well towards his goals.   Pt prognosis is Good.     Pt will continue to benefit from skilled outpatient physical therapy to address the deficits listed in the problem list box on initial evaluation, provide pt/family education and to maximize pt's level of independence in the home and community environment.     Pt's spiritual, cultural and educational needs considered and pt agreeable to plan of care and goals.     Anticipated Barriers for therapy: hearing deficit, speech deficits, cognitive deficits, decreased safety awareness, fall risk, visual neglect, transportation assistance required    Goals:  Short Term Goals: 4 weeks   1. Pt to be (I) with established HEP Ongoing  2. Pt to improve R hip flexion strength MMT score to at least 4+/5 for improved gait mechanics Ongoing  3. Pt to improve R hip extension strength MMT score to at least 4/5 for improved gait mechanics Ongoing  4. Pt to improve R hip AB and R hip AD strength MMT scores to at least 3+/5 for improved gait mechanics MET 03/04/19  5.  Pt to improve R knee flexion and extension strength MMT scores to at least 4+/5 for improved gait mechanics Ongoing  6. Pt to improve R ankle PF strength MMT score to at least 2/5 for improved gait mechanics MET 03/04/19  7. Pt to improve chair rise score to at least 5 times with no more than 1 UE support and S for improved endurance and safety with functional transfers MET 03/04/19  8. Pt to improve TUG score to at least 30 seconds for improved safety with household mobility Ongoing  9. Pt to improve SSWS score to at least 0.40 m/sec for  improved safety with community mobility. Ongoing     Long Term Goals: 8 weeks   1. Pt to be (I) with advanced HEP Ongoing  2. Pt to improve R hip extension strength MMT score to at least 4+/5 for improved gait mechanics Ongoing  3. Pt to improve R hip AB and R hip AD strength MMT scores to at least 4-/5 for improved gait mechanics Ongoing  4. Pt to improve R ankle PF strength MMT score to at least 2+/5 for improved gait mechanics Ongoing  5. Pt to improve chair rise score to at least 5 times with no UE support and S for improved endurance and safety with functional transfers Ongoing  6. Pt to improve TUG score to at least 26 seconds for improved safety with household mobility Ongoing  7. Pt to improve SSWS score to at least 0.50 m/sec for improved safety with community mobility. Ongoing     Plan   Plan for next visit: Continue standing there-act to address weight acceptance onto RLE; stair negotiation on wooden gym stair case. Ankle T-band exercises (or AROM as patient tolerates), LE strengthening exercises.    Continue outpatient physical therapy 2x weekly under current established Plan of Care,1/28/2019 to 03/25/19., with treatment to include: pt education, HEP, therapeutic exercises, neuromuscular re-education/balance exercises, therapeutic activities, joint mobilizations, and modalities PRN, to work towards established goals. Pt may be seen by PTA to carry out plan of care.         Hanh Brooke, PT

## 2019-03-14 NOTE — PROGRESS NOTES
Occupational Therapy Daily Treatment Note     [id number]  Physician: Ian Feliciano MD  Medical Diagnosis: L CVA      Therapy Diagnosis:   Encounter Diagnoses   Name Primary?    Impaired mobility and activities of daily living     Weakness of right upper extremity      Visit Date: 3/14/2019  Physician Orders: Eval and Treat   Surgical Procedure and Date: NA  Evaluation Date: 1/28/19   Insurance Authorization Period Expiration: 12/31/19   Plan of Care Certification Period: 4/19/19   Date of Return to MD: unknown at this time     Visit # / Visits authorized: 13 ( 3 / 10 new auth)   Time In: 1433    Time Out: 1518    Total Billable Time: 45 minutes      Precautions: Standard, fall risk, hearing impaired     Subjective     Pt reports: No new complaints.  No new episodes of RUE numbness/heaviness.      Response to previous treatment:positive   Functional change: progressing     Pain: none reported   Location: NA     Objective   Patient seen by OT this date.  Pt ambulated to treatment session with quad cane and SBA.  Patient's wife was present for treatment session.  Patient received individual therapy with activities as follows:     Torito participated in therapeutic exercises to improve ROM and strength for 11 minutes:   - UBE level 3 x 4 min forwards x 4 min reverse   - overhead pulleys for shoulder flexion x 3 min; R hand coban taped to handle                    Patient participated in neuromuscular reeducation activities for 36 minutes to maximize ROM, motor strength and neuromuscular and proprioceptive input to the RUE.  The following activities were included:   Sitting EOM:   - restoration of R hand arches and metacarpal stretches   - general R wrist stretches: 1. P-A glide of scaphoid on radius, 2. Radial deviation, 3. Increasing mobility of metacarpals, 4. Carpal roll, 5. Movement of forearm on hand towards wrist extension, 6. Movement of forearm on the hand toward  supination/ pronation, 7. wrist ext with distraction   - right upper extremity weight bearing on a hard static surface with body on arm weight shifts in the frontal plane to promote R wrist and digit extension   - patient reaching for cups across midline, behind body, and across body with LUE for heavy weight bearing and weight shifting into RUE on hard static surface  - active R supination holding cup with self assist to maximize end range   - active R wrist extension holding cup with self assist to maximize ROM   - grasp and release tasks with RUE - reaching forwards for cups and releasing them onto mat on R; self assist and Min A by OT   - OT donned pt's personal GG splint at end of session.       Patient ambulated with quad cane and close SBA to exit treatment gym.     Functional mobility:   - Sit <> stand with SBA  - gait with quad cane and close SBA       Home Exercises and Education Provided     Education provided re: HEP   - Patient was instructed to wear splint for up to 3 hours and then remove and perform grasp and release tasks.   - Progress towards goals   - POC     Written Home Exercises Provided: Patient instructed to cont prior HEP as verbally instructed in previous tx sessions. Exercises were reviewed and Torito was able to demonstrate them prior to the end of the session.  Torito demonstrated good  understanding of the HEP provided.       Pt has no cultural, educational or language barriers to learning provided.    Assessment   Patient tolerated treatment session well.  OT continued with TherEx and neuromuscular reeducation techniques to increase RUE range of motion, strength, and neuromuscular input to the RUE.  He required Min A and self assist to reach for cups, grasp, and release.  R forearm supination is steadily improving and is nearly WNL at this time.  R wrist extension is steadily improving, but fatigues easily.  R thumb active movement is improving for opposition around cup during grasping  phase; Total A by OT or self assist when movement did not occur.  He continues to demonstrate fair ability to actively open (extend) digits and wrist during active release of the cups and OT has continued to use GG splint to improve extension.  OT reinforced wearing GG splint for up to 3 hours and then remove for functional grasp and release tasks at home.         Pt would continue to benefit from skilled OT. Torito is progressing well towards his goals and there are no updates to goals at this time. Pt prognosis is Good due to patient remains motivated to participate and has good family support from his spouse. Pt will continue to benefit from skilled outpatient occupational therapy to address the deficits listed in the problem list on initial evaluation provide pt/family education and to maximize pt's level of independence in the home and community environment.      Anticipated barriers to occupational therapy: none     Pt's spiritual, cultural and educational needs considered and pt agreeable to plan of care and goals.    Goals:  Short Term Goals: 3 weeks   ROM/Strength to perform the ADL's and functional activities listed below, - in progress  Pt will improve functional  strength needed for tasks to 20# in R hand. - in progress   Pt will improve AROM for shoulder flexion needed for functional tasks to 60* in R UE. - in progress   UE dressing will improve to Min A - in progress   LB dressing will improve to Mod A  - in progress   Toileting will improve to Min A incorporating R hand for task  - in progress   Pt will be Independent with HEP to improve ROM and FM skills. - in progress      Long Term Goals: 6 weeks   ROM/Strength to perform the ADL's and functional activities listed below - in progress   Pt will improve functional  strength needed for tasks to 30# in R hand. - in progress   Pt will improve AROM for shoulder flexion needed for functional tasks to 80* in R UE. - in progress   UE dressing will  improve to Supervision   LB dressing will improve to Min A   Toileting will improve to Supervision incorporating R hand for task   Pt will complete clinical testing for skills needed for driving  G code self care CK    Plan   Continue POC in pursuit of OT goals - extend through 4/19/19.     Discussed Plan of Care with patient: Yes  Updates/Grading for next session: progress as tolerated

## 2019-03-14 NOTE — PROGRESS NOTES
Outpatient Neurological Rehabilitation   Speech and Language Therapy Daily Note  Date:  3/14/2019     Name: Torito Harris   MRN: [id number]   Therapy Diagnosis:   No diagnosis found.Physician: Ian Feliciano MD  Physician Orders:  evaluate and treat  Medical Diagnosis: I69.30 (ICD-10-CM) - Late effect of stroke    Visit #/Visits authorized: 11/ 10  Date of Evaluation:  1/28/19  Insurance Authorization Period: 1/26/19 to 12/31/19  Plan of Care Expiration Date:   1/28/2019 to 3/25/19  Extended POC: n/a     Time In: 1345  Time Out:  1431  Total Billable Time: 46 minutes    Precautions: Standard and Fall  Subjective:   Pt reports: Pt reported he continues to have difficulty getting out the right words. Wife present throughout session. Door remained closed throughout session.   Pain Scale:  0/10 on VAS currently.   Pain Location: n/a  Objective:   UNTIMED  Procedure Min.   Speech- Language- Voice Therapy   46 minutes    Total Untimed Units: 1  Charges Billed/# of units: 1    Short Term Goals: (4 weeks) Current Progress:   1. Pt will complete R/L body part discrimination tasks with 70% Carmen. Goal Met 2/7/19/ Discontinue    2. Pt will follow 3 unit body commands with 80% acc sergio Goal Not Met / Revise to 2 unit   Not formally targeted   3. Pt will follow 3 unit commands with visual stimuli with 70% Carmen. Goal Not Met / Revise to 2 unit   Not formally targeted   4. Pt will name common objects with 90% Carmen.  Progressing/ Not Met 3/14/2019    Pt named common objects with 40% acc indly 90% given phonemic and/or visual cues (placement of articulators).     5. Pt will generate a response to basic sentence completions with 80% Caremn   Progressing/ Not Met 3/14/2019   Pt completed single word sentence completion task with idiomatic expressions with 100% acc independently.   Pt completed single word sentence completion task generating nouns with 100% acc independently.  Pt completed single word sentence completion task  "generating verbs (ie. spoons are for eating, stoves are for___) with 16% acc independently/67% acc given phonemic and visual cues for placement of articulators.     6. Pt will name 5 concrete category items with Carmen.  Progressing/ Not Met 3/14/2019   - Animals: named 4 items independently/5 items given semantic cue  Fish: named 1 item independently/4 given visual, phonemic and semantic cues.   7. Pt will repeat  3-5 word phrases with 80% Carmen. Goal Met 2/21/19 / Discontinue   8. Assess reading and writing skills. Goal met 2/26/2019/ Discontinue.   9. pt will follow 2 unit body part commands with 90% acc indly to improve auditory comprehension. Goal Met 3/4/19 / Discontinue   10.  pt will follow 2 unit commands with visual stimuli with 90% acc indly to improve auditory comprehension.   Progressing/ Not Met 3/14/2019    Not formally targeted     Goal met x's 1.   11. pt and a conversational partner will use supported communication for persons with   aphasia to facilitate 2 conversational turns with a communication partner.   Progressing/ Not Met 3/14/2019  Clinician used supported communication for persons with aphasia to facilitate conversational turns 3xs to discuss hobbies and progress in therapy. 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 .      12. Patient will match phrases to pictures to increase reading comprehension skills with 90% accuracy given min A. Goal Met 3/4/19 / Discontinue   13. Patient will write name and address to increase written expression skills with 90% accuracy given verbal presentation of letters  Progressing/ Not Met 3/14/2019  .  Pt wrote first and last name x2 given visual presentation of letters in first name only.  Pt initiated writing to dictation the word "dog" but wrote "don"  Pt copied the word 'cat' with 100% acc.    14.  pt will complete reading comprehension tasks at the sentence level with 90% acc indly.  Progressing/ Not Met " 3/14/2019   Not formally targeted   15. pt will follow 3 unit body part commands with 90% acc indly.   Progressing/ Not Met 3/14/2019   Not formally targeted.        Patient Education/Response:   Provided handout for patient to practice copying the alphabet. Discussed with patient and patient's wife the differences between language disorder and cognitive disorders.  Patient and wife verbalized of all information presented.    Assessment:   Torito is progressing well towards his goals.  Current goals remain appropriate. Patient with decrease in independently naming objects but continues to benefit from visual and phonemic cues.During writing tasks, pt able to copy with improvement noted when writing upper case letters vs lower care. During conversation, patient increased auditory comprehension when conversational partner broke down information,spoke slower and used gestures. Patient improved expressive communication when conversational partner used yes/ no questions and cued  for expansion of phrases/sentences.   Pt prognosis is Good. Pt will continue to benefit from skilled outpatient speech and language therapy to address the deficits listed in the problem list on initial evaluation, provide pt/family education and to maximize pt's level of independence in the home and community environment.     Barriers to Therapy: sleeping pattern, possible transportation  Pt's spiritual, cultural and educational needs considered and pt agreeable to plan of care and goals.    Plan:   Continue POC with introduction of supported communication for persons with aphasia. .     SERINA Dickey, CCC-SLP.     3/14/2019

## 2019-03-19 ENCOUNTER — CLINICAL SUPPORT (OUTPATIENT)
Dept: REHABILITATION | Facility: HOSPITAL | Age: 59
End: 2019-03-19
Attending: PSYCHIATRY & NEUROLOGY
Payer: COMMERCIAL

## 2019-03-19 DIAGNOSIS — R47.01 BROCA'S APHASIA: ICD-10-CM

## 2019-03-19 DIAGNOSIS — R29.898 WEAKNESS OF RIGHT UPPER EXTREMITY: ICD-10-CM

## 2019-03-19 DIAGNOSIS — Z74.09 IMPAIRED FUNCTIONAL MOBILITY, BALANCE, GAIT, AND ENDURANCE: ICD-10-CM

## 2019-03-19 DIAGNOSIS — Z78.9 IMPAIRED MOBILITY AND ACTIVITIES OF DAILY LIVING: ICD-10-CM

## 2019-03-19 DIAGNOSIS — Z74.09 IMPAIRED MOBILITY AND ACTIVITIES OF DAILY LIVING: ICD-10-CM

## 2019-03-19 DIAGNOSIS — R29.898 WEAKNESS OF RIGHT LEG: ICD-10-CM

## 2019-03-19 PROCEDURE — 97116 GAIT TRAINING THERAPY: CPT | Mod: PO

## 2019-03-19 PROCEDURE — 97110 THERAPEUTIC EXERCISES: CPT | Mod: PO,59

## 2019-03-19 PROCEDURE — 92507 TX SP LANG VOICE COMM INDIV: CPT | Mod: PO

## 2019-03-19 PROCEDURE — 97112 NEUROMUSCULAR REEDUCATION: CPT | Mod: PO,59

## 2019-03-19 NOTE — PROGRESS NOTES
"  Physical Therapy Daily Treatment Note     Name: Torito Harris  Clinic Number: [id number]    Therapy Diagnosis:   Encounter Diagnoses   Name Primary?    Weakness of right leg     Impaired functional mobility, balance, gait, and endurance      Physician: Ian Feliciano MD    Visit Date: 3/19/2019    Physician Orders: PT Eval and Treat   Medical Diagnosis from Referral: Late effect of stroke  Evaluation Date: 1/28/2019  Authorization Period Expiration: 02/12/19-12/31/19  Plan of Care Expiration: 03/19/19 to 05/14/19  Visit # / Visits authorized: 4/10 (Vist #14 of 2019)     Time In: 13:06  Time Out: 13:45  Total Billable Time: 39 minutes    Precautions: Standard, Diabetes, Fall and right hemiplegia, hearing aid, safety awareness, cognitive deficits, speech deficits    Subjective     Pt reports: "I'm doing pretty good."   HEP Compliance: pt reports he is doing all of his exercises at home.  Response to previous treatment: no complaints  Functional change: none reported    Pain: 0/10  Location: N/A      Objective     Torito received therapeutic exercises to develop strength, endurance, ROM, flexibility and core stabilization for 23 minutes including:      Lower Extremity Strength  Right LE  Evaluation 03/19/19 Left LE  Evaluation 03/19/19   Hip Flexion: 4/5 4-/5 Hip Flexion: 5/5 5/5   Hip Extension:  4-/5 4-/5 Hip Extension: 5/5 5/5   Hip Abduction: 3/5 4/5 Hip Abduction: 5/5 5/5   Hip Adduction: 3/5 4/5 Hip Adduction 5/5 5/5   Knee Extension: 4/5 4/5 Knee Extension: 5/5 5/5   Knee Flexion: 4/5 4-/5 Knee Flexion: 5/5 5/5   Ankle Dorsiflexion: 0/5 3+/5 Ankle Dorsiflexion: 5/5 5/5   Ankle Plantarflexion: 2-/5 3/5 Ankle Plantarflexion: 5/5 5/5       Evaluation 3/4/2019 03/19/19   30 second Chair Rise  (adults > 59 y/o) 11 completed with LUE, abigail walker, CGA 8 completed with LUE, no AD, SBA 9 completed with LUE, no AD, CBA at R knee, improved RLE use      Endurance Deficit: moderate    3 x 10 seated RLE LAQs, " "emphasis on amplitude   3 x 30" seated RLE HSS manual stretch    Patient participated in gait training activities to normalize gait pattern for 16 minutes. The following activities were included:   Gait belt used for safety. Assistive device: SBQC, little platform rolling walker       Evaluation 03/19/19   Timed Up and Go 34 sec c/ abigail walker c/ SBA 31 sec c/ SBQC c/ CGA to SBA   Self Selected Walking Speed 0.33 m/sec (6m/18s)  C/ abigail walker c/ SBA 0.35 m/sec (6m/17s)  c/ SBQC c/ SBA       X 10 reps, sit <> stand from bench of standard seat height; 4" step placed under LLE to promote increased weight bearing through RLE; CGA at R knee to prevent buckling, LUE support  X 3 reps, sit <> stand from from bench of standard seat height; 4" step placed under LLE to promote increased weight bearing through RLE; Min A at hips; no UE support    X 300 feet forward ambulation with little platform rolling walker. Emphasis on improved step length, improved weight acceptance onto RLE, and improved RLE clearance in swing; patient able to perform step through gait pattern              Deficits noted: decreased RLE weight acceptance compared to LLE, decreased RLE clearance in swing, increased RLE foot flat contact, decreased RLE hip extension in stance, increased R knee flexion in stance    Home Exercises Provided and Patient Education Provided     Education provided: Progress with therapy.     Written Home Exercises Provided: Continue current HEP.     See EMR under Media for exercises provided 2/5/2019.    Assessment   Mr. Ugarte tolerated therapy session well this afternoon. Pt reassessed for POC update. Pt has made steady progress with therapy and remains strongly motivated to improve. Most noted RLE strength gains seen at R hip AB and AD and R ankle DF and PF. During chair rise test, pt able to distribute more weight onto RLE and only required use of LUE compared to performance on initial evaluation. He demonstrates 3 second " improvement on TUG compared to evaluation, although his current TUG score does place him at an elevated fall risk for household and community ambulation. Pt also demonstrates 1 second improvement on SSWS, although this score also still places him at an elevated fall risk for community ambulation. Over course of therapy, pt has demonstrated improved RLE weight acceptance in standing and ambulation and improved RLE involvement during sit <> stand transfers and ambulation; however, he still does tend to favor LLE during WB activities. He also demonstrates improved technique with lateral stepping and backward stepping compared to evaluation. Pt is now able to ambulate to and from therapy wait room with [facility] c/ supervision vs w/c at evaluation. At this time, mobility remains limited by remaining right sided weakness, impaired right sided tone, and decreased RLE muscular endurance. Pt to benefit from continued PT intervention to further address remaining mobility deficits, to further improve independence and symmetry with functional mobility, and to reduce risk of fall. PT to extend POC x 8 more weeks.     Torito is progressing well towards his goals.   Pt prognosis is Good.     Pt will continue to benefit from skilled outpatient physical therapy to address the deficits listed in the problem list box on initial evaluation, provide pt/family education and to maximize pt's level of independence in the home and community environment.     Pt's spiritual, cultural and educational needs considered and pt agreeable to plan of care and goals.     Anticipated Barriers for therapy: hearing deficit, speech deficits, cognitive deficits, decreased safety awareness, fall risk, visual neglect, transportation assistance required    Goals:  Short Term Goals: 4 weeks   1. Pt to be (I) with established HEP MET 03/19/19  2. Pt to improve R hip flexion strength MMT score to at least 4+/5 for improved gait mechanics Ongoing  3. Pt to improve R  hip extension strength MMT score to at least 4/5 for improved gait mechanics Ongoing  4. Pt to improve R hip AB and R hip AD strength MMT scores to at least 3+/5 for improved gait mechanics MET 03/04/19  5.  Pt to improve R knee flexion and extension strength MMT scores to at least 4+/5 for improved gait mechanics Ongoing  6. Pt to improve R ankle PF strength MMT score to at least 2/5 for improved gait mechanics MET 03/04/19  7. Pt to improve chair rise score to at least 5 times with no more than 1 UE support and S for improved endurance and safety with functional transfers MET 03/04/19  8. Pt to improve TUG score to at least 30 seconds for improved safety with household mobility Ongoing  9. Pt to improve SSWS score to at least 0.40 m/sec for improved safety with community mobility. Ongoing     Long Term Goals: 8 weeks   1. Pt to be (I) with advanced HEP Ongoing  2. Pt to improve R hip extension strength MMT score to at least 4+/5 for improved gait mechanics Ongoing  3. Pt to improve R hip AB and R hip AD strength MMT scores to at least 4-/5 for improved gait mechanics Ongoing  4. Pt to improve R ankle PF strength MMT score to at least 2+/5 for improved gait mechanics MET 03/19/19  5. Pt to improve chair rise score to at least 5 times with no UE support and S for improved endurance and safety with functional transfers Ongoing  6. Pt to improve TUG score to at least 26 seconds for improved safety with household mobility Ongoing  7. Pt to improve SSWS score to at least 0.50 m/sec for improved safety with community mobility. Ongoing     Plan   PT to extend POC x 8 more weeks.     Plan for next visit: Continue standing there-act to address weight acceptance onto RLE; stair negotiation on [employer] stair case. Ankle T-band exercises (or AROM as patient tolerates), LE strengthening exercises.    Continue outpatient physical therapy 2x weekly under current established Plan of Care,03/19/19 to 05/14/19, with treatment to  include: pt education, HEP, therapeutic exercises, neuromuscular re-education/balance exercises, therapeutic activities, joint mobilizations, and modalities PRN, to work towards established goals. Pt may be seen by PTA to carry out plan of care.         Hanh Brooke, PT

## 2019-03-19 NOTE — PLAN OF CARE
"  Physical Therapy Daily Treatment Note     Name: Torito Harris  Clinic Number: [id number]    Therapy Diagnosis:   Encounter Diagnoses   Name Primary?    Weakness of right leg     Impaired functional mobility, balance, gait, and endurance      Physician: Ian Feliciano MD    Visit Date: 3/19/2019    Physician Orders: PT Eval and Treat   Medical Diagnosis from Referral: Late effect of stroke  Evaluation Date: 1/28/2019  Authorization Period Expiration: 02/12/19-12/31/19  Plan of Care Expiration: 03/19/19 to 05/14/19  Visit # / Visits authorized: 4/10 (Vist #14 of 2019)     Time In: 13:06  Time Out: 13:45  Total Billable Time: 39 minutes    Precautions: Standard, Diabetes, Fall and right hemiplegia, hearing aid, safety awareness, cognitive deficits, speech deficits    Subjective     Pt reports: "I'm doing pretty good."   HEP Compliance: pt reports he is doing all of his exercises at home.  Response to previous treatment: no complaints  Functional change: none reported    Pain: 0/10  Location: N/A      Objective     Torito received therapeutic exercises to develop strength, endurance, ROM, flexibility and core stabilization for 23 minutes including:      Lower Extremity Strength  Right LE  Evaluation 03/19/19 Left LE  Evaluation 03/19/19   Hip Flexion: 4/5 4-/5 Hip Flexion: 5/5 5/5   Hip Extension:  4-/5 4-/5 Hip Extension: 5/5 5/5   Hip Abduction: 3/5 4/5 Hip Abduction: 5/5 5/5   Hip Adduction: 3/5 4/5 Hip Adduction 5/5 5/5   Knee Extension: 4/5 4/5 Knee Extension: 5/5 5/5   Knee Flexion: 4/5 4-/5 Knee Flexion: 5/5 5/5   Ankle Dorsiflexion: 0/5 3+/5 Ankle Dorsiflexion: 5/5 5/5   Ankle Plantarflexion: 2-/5 3/5 Ankle Plantarflexion: 5/5 5/5       Evaluation 3/4/2019 03/19/19   30 second Chair Rise  (adults > 59 y/o) 11 completed with LUE, abigail walker, CGA 8 completed with LUE, no AD, SBA 9 completed with LUE, no AD, CBA at R knee, improved RLE use      Endurance Deficit: moderate    3 x 10 seated RLE LAQs, " "emphasis on amplitude   3 x 30" seated RLE HSS manual stretch    Patient participated in gait training activities to normalize gait pattern for 16 minutes. The following activities were included:   Gait belt used for safety. Assistive device: SBQC, little platform rolling walker       Evaluation 03/19/19   Timed Up and Go 34 sec c/ abigail walker c/ SBA 31 sec c/ SBQC c/ CGA to SBA   Self Selected Walking Speed 0.33 m/sec (6m/18s)  C/ abigail walker c/ SBA 0.35 m/sec (6m/17s)  c/ SBQC c/ SBA       X 10 reps, sit <> stand from bench of standard seat height; 4" step placed under LLE to promote increased weight bearing through RLE; CGA at R knee to prevent buckling, LUE support  X 3 reps, sit <> stand from from bench of standard seat height; 4" step placed under LLE to promote increased weight bearing through RLE; Min A at hips; no UE support    X 300 feet forward ambulation with little platform rolling walker. Emphasis on improved step length, improved weight acceptance onto RLE, and improved RLE clearance in swing; patient able to perform step through gait pattern              Deficits noted: decreased RLE weight acceptance compared to LLE, decreased RLE clearance in swing, increased RLE foot flat contact, decreased RLE hip extension in stance, increased R knee flexion in stance    Home Exercises Provided and Patient Education Provided     Education provided: Progress with therapy.     Written Home Exercises Provided: Continue current HEP.     See EMR under Media for exercises provided 2/5/2019.    Assessment   Mr. Ugarte tolerated therapy session well this afternoon. Pt reassessed for POC update. Pt has made steady progress with therapy and remains strongly motivated to improve. Most noted RLE strength gains seen at R hip AB and AD and R ankle DF and PF. During chair rise test, pt able to distribute more weight onto RLE and only required use of LUE compared to performance on initial evaluation. He demonstrates 3 second " improvement on TUG compared to evaluation, although his current TUG score does place him at an elevated fall risk for household and community ambulation. Pt also demonstrates 1 second improvement on SSWS, although this score also still places him at an elevated fall risk for community ambulation. Over course of therapy, pt has demonstrated improved RLE weight acceptance in standing and ambulation and improved RLE involvement during sit <> stand transfers and ambulation; however, he still does tend to favor LLE during WB activities. He also demonstrates improved technique with lateral stepping and backward stepping compared to evaluation. Pt is now able to ambulate to and from therapy wait room with [facility] c/ supervision vs w/c at evaluation. At this time, mobility remains limited by remaining right sided weakness, impaired right sided tone, and decreased RLE muscular endurance. Pt to benefit from continued PT intervention to further address remaining mobility deficits, to further improve independence and symmetry with functional mobility, and to reduce risk of fall. PT to extend POC x 8 more weeks.     Torito is progressing well towards his goals.   Pt prognosis is Good.     Pt will continue to benefit from skilled outpatient physical therapy to address the deficits listed in the problem list box on initial evaluation, provide pt/family education and to maximize pt's level of independence in the home and community environment.     Pt's spiritual, cultural and educational needs considered and pt agreeable to plan of care and goals.     Anticipated Barriers for therapy: hearing deficit, speech deficits, cognitive deficits, decreased safety awareness, fall risk, visual neglect, transportation assistance required    Goals:  Short Term Goals: 4 weeks   1. Pt to be (I) with established HEP MET 03/19/19  2. Pt to improve R hip flexion strength MMT score to at least 4+/5 for improved gait mechanics Ongoing  3. Pt to improve R  hip extension strength MMT score to at least 4/5 for improved gait mechanics Ongoing  4. Pt to improve R hip AB and R hip AD strength MMT scores to at least 3+/5 for improved gait mechanics MET 03/04/19  5.  Pt to improve R knee flexion and extension strength MMT scores to at least 4+/5 for improved gait mechanics Ongoing  6. Pt to improve R ankle PF strength MMT score to at least 2/5 for improved gait mechanics MET 03/04/19  7. Pt to improve chair rise score to at least 5 times with no more than 1 UE support and S for improved endurance and safety with functional transfers MET 03/04/19  8. Pt to improve TUG score to at least 30 seconds for improved safety with household mobility Ongoing  9. Pt to improve SSWS score to at least 0.40 m/sec for improved safety with community mobility. Ongoing     Long Term Goals: 8 weeks   1. Pt to be (I) with advanced HEP Ongoing  2. Pt to improve R hip extension strength MMT score to at least 4+/5 for improved gait mechanics Ongoing  3. Pt to improve R hip AB and R hip AD strength MMT scores to at least 4-/5 for improved gait mechanics Ongoing  4. Pt to improve R ankle PF strength MMT score to at least 2+/5 for improved gait mechanics MET 03/19/19  5. Pt to improve chair rise score to at least 5 times with no UE support and S for improved endurance and safety with functional transfers Ongoing  6. Pt to improve TUG score to at least 26 seconds for improved safety with household mobility Ongoing  7. Pt to improve SSWS score to at least 0.50 m/sec for improved safety with community mobility. Ongoing     Plan   PT to extend POC x 8 more weeks.     Plan for next visit: Continue standing there-act to address weight acceptance onto RLE; stair negotiation on [facility] stair case. Ankle T-band exercises (or AROM as patient tolerates), LE strengthening exercises.    Continue outpatient physical therapy 2x weekly under current established Plan of Care,03/19/19 to 05/14/19, with treatment to  include: pt education, HEP, therapeutic exercises, neuromuscular re-education/balance exercises, therapeutic activities, joint mobilizations, and modalities PRN, to work towards established goals. Pt may be seen by PTA to carry out plan of care.         Hanh Brooke, PT

## 2019-03-19 NOTE — PROGRESS NOTES
Occupational Therapy Daily Treatment Note     [id number]  Physician: Ina Feliciano MD  Medical Diagnosis: L CVA      Therapy Diagnosis:   Encounter Diagnoses   Name Primary?    Impaired mobility and activities of daily living     Weakness of right upper extremity      Visit Date: 3/19/2019  Physician Orders: Eval and Treat   Surgical Procedure and Date: NA  Evaluation Date: 1/28/19   Insurance Authorization Period Expiration: 12/31/19   Plan of Care Certification Period: 4/19/19   Date of Return to MD: unknown at this time     Visit # / Visits authorized: 14 ( 4 / 10 new auth)   Time In: 1436  Time Out: 1522    Total Billable Time: 46 minutes      Precautions: Standard, fall risk, hearing impaired     Subjective     Pt reports: No new complaints.  Patient reports compliance with GG splint and grasp and release tasks.       Response to previous treatment:positive   Functional change: progressing     Pain: none reported   Location: NA     Objective   Patient seen by OT this date.  Pt ambulated to treatment session with quad cane and SBA.  Patient's wife was present for treatment session.  Patient received individual therapy with activities as follows:       Torito participated in therapeutic exercises to improve ROM and strength for 36 minutes:   - UBE level 3 x 4 min forwards x 4 min reverse   - overhead pulleys for shoulder flexion x 3 min; R hand coban taped to handle   - supine chest press with 2# dowel nancy x 10 reps x 2; Min A   - supine shoulder flexion with 2# dowel nancy x 10 reps  2; Min A   - supine shoulder abduction and horizontal add with 2# dowel nancy x 10 reps x 2; Mod A                    Patient participated in neuromuscular reeducation activities for 10 minutes to maximize ROM, motor strength and neuromuscular and proprioceptive input to the RUE.  The following activities were included:   Sitting EOM:   - restoration of R hand arches and metacarpal stretches    - general R wrist stretches: 1. P-A glide of scaphoid on radius, 2. Radial deviation, 3. Increasing mobility of metacarpals, 4. Carpal roll, 5. Movement of forearm on hand towards wrist extension, 6. Movement of forearm on the hand toward supination/ pronation, 7. wrist ext with distraction   - right upper extremity weight bearing on a hard static surface with body on arm weight shifts in the frontal plane to promote R wrist and digit extension       - OT donned pt's personal GG splint at end of session.       Functional mobility:   - sit <> stand with SBA  - sit <> supine with SBA   - gait with quad cane and close SBA       Home Exercises and Education Provided     Education provided re: HEP   - Patient was instructed to wear splint for up to 3 hours and then remove and perform grasp and release tasks.   - Progress towards goals   - POC     Written Home Exercises Provided: Patient instructed to cont prior HEP as verbally instructed in previous tx sessions. Exercises were reviewed and Torito was able to demonstrate them prior to the end of the session.  Torito demonstrated good  understanding of the HEP provided.       Pt has no cultural, educational or language barriers to learning provided.    Assessment   Patient tolerated treatment session well.  OT continued with TherEx and neuromuscular reeducation techniques to increase RUE range of motion, strength, and neuromuscular input to the RUE.  He demonstrated improved ability to maintain R hand   on objects including UBE handle and dowel nancy during TherEx.  Steady improvements noted in R shoulder ROM during active assisted exercises.  He required only Min A for supine shoulder flexion and chest press with dowel, but required Mod A for supine shoulder abduction due to increased weakness in this plane.  OT will continue to advance intervention as tolerated.         Pt would continue to benefit from skilled OT. Torito is progressing well towards his goals and  there are no updates to goals at this time. Pt prognosis is Good due to patient remains motivated to participate and has good family support from his spouse. Pt will continue to benefit from skilled outpatient occupational therapy to address the deficits listed in the problem list on initial evaluation provide pt/family education and to maximize pt's level of independence in the home and community environment.      Anticipated barriers to occupational therapy: none     Pt's spiritual, cultural and educational needs considered and pt agreeable to plan of care and goals.    Goals:  Short Term Goals: 3 weeks   ROM/Strength to perform the ADL's and functional activities listed below, - in progress  Pt will improve functional  strength needed for tasks to 20# in R hand. - in progress   Pt will improve AROM for shoulder flexion needed for functional tasks to 60* in R UE. - in progress   UE dressing will improve to Min A - in progress   LB dressing will improve to Mod A  - in progress   Toileting will improve to Min A incorporating R hand for task  - in progress   Pt will be Independent with HEP to improve ROM and FM skills. - in progress      Long Term Goals: 6 weeks   ROM/Strength to perform the ADL's and functional activities listed below - in progress   Pt will improve functional  strength needed for tasks to 30# in R hand. - in progress   Pt will improve AROM for shoulder flexion needed for functional tasks to 80* in R UE. - in progress   UE dressing will improve to Supervision   LB dressing will improve to Min A   Toileting will improve to Supervision incorporating R hand for task   Pt will complete clinical testing for skills needed for driving  G code self care CK    Plan   Continue POC in pursuit of OT goals - extend through 4/19/19.     Discussed Plan of Care with patient: Yes  Updates/Grading for next session: continue active assisted exercises and grasp and release tasks

## 2019-03-19 NOTE — PROGRESS NOTES
Outpatient Neurological Rehabilitation   Speech and Language Therapy Daily Note  Date:  3/19/2019     Name: Torito Harris   MRN: [id number]   Therapy Diagnosis:   Encounter Diagnosis   Name Primary?    Broca's aphasia    Physician: Ian Feliciano MD  Physician Orders: ST evaluate and treat  Medical Diagnosis: I69.30 (ICD-10-CM) - Late effect of stroke    Visit #/Visits authorized: 4/ 10 (8 prior to this auth)  Date of Evaluation:  1/28/19  Insurance Authorization Period: 3/7/2019 to 12/31/19   Plan of Care Expiration Date:  1/28/2019 to 3/25/19  Extended POC: n/a     Time In: 1347  Time Out:  1430  Total Billable Time: 43 minutes    Precautions: Standard and Fall  Subjective:   Pt reports: Pt reported his daughter is doing well.  Pain Scale:  0/10 on VAS currently.   Pain Location: n/a  Objective:   UNTIMED  Procedure Min.   Speech- Language- Voice Therapy    43   Total Untimed Units: 1  Charges Billed/# of units: 1    Short Term Goals: (4 weeks) Current Progress:   1. Pt will complete R/L body part discrimination tasks with 70% Carmen. Goal Met 2/7/19/ Discontinue    2. Pt will follow 3 unit body commands with 80% acc sergio Goal Not Met / Revise to 2 unit   Not formally targeted   3. Pt will follow 3 unit commands with visual stimuli with 70% Carmen. Goal Not Met / Revise to 2 unit   Not formally targeted   4. Pt will name common objects with 90% Carmen.  Progressing/ Not Met 3/19/2019    Pt named common objects with 61% acc indly and 100% given phonemic cues.     5. Pt will generate a response to basic sentence completions with 80% Carmen   Progressing/ Not Met 3/19/2019   Pt completed single word sentence completion task with idiomatic expressions with 90% acc independently and 100% acc given phonemic cues.   6. Pt will name 5 concrete category items with Carmen.  Progressing/ Not Met 3/19/2019    Pt named concrete category given 3 items with 20% acc ind'ly and 90% acc given written choices (field of 4).    7. Pt will  repeat  3-5 word phrases with 80% Carmen. Goal Met 2/21/19 / Discontinue   8. Assess reading and writing skills. Goal met 2/26/2019/ Discontinue.   9. pt will follow 2 unit body part commands with 90% acc indly to improve auditory comprehension. Goal Met 3/4/19 / Discontinue   10.  pt will follow 2 unit commands with visual stimuli with 90% acc indly to improve auditory comprehension.   Progressing/ Not Met 3/19/2019    Pt followed 2 unit commands with visual stimuli with 78% acc ind'ly and 100% acc given a repetition.     11. pt and a conversational partner will use supported communication for persons with   aphasia to facilitate 2 conversational turns with a communication partner.   Progressing/ Not Met 3/19/2019  Not formally addressed.    12. Patient will match phrases to pictures to increase reading comprehension skills with 90% accuracy given min A. Goal Met 3/4/19 / Discontinue   13. Patient will write name and address to increase written expression skills with 90% accuracy given verbal presentation of letters  Progressing/ Not Met 3/19/2019  .  Pt copied first and last name with 80% acc. After copying name, clinician asked patient to write first and last name independently, which he did with 80% acc.    14.  pt will complete reading comprehension tasks at the sentence level with 90% acc indly.  Progressing/ Not Met 3/19/2019   Pt matched a phrase to a picture from a field of 3 with 100% acc ind'ly. Pt completed a phrase with a word from a field of 2 with 90% acc ind'ly.    15. pt will follow 3 unit body part commands with 90% acc indly.   Progressing/ Not Met 3/19/2019   Not formally targeted.        Patient Education/Response:   SLP discussed benefit of naming objects within pt's functional environment. In addition, SLP explained cuing strategies to increase accuracy with naming to wife and patient. Patient and wife verbalized understanding of all information presented.    Assessment:   Torito is progressing  well towards his goals.  Current goals remain appropriate. Pt improved accuracy of writing first and last name after copying information. Pt also with improved object naming given semantic and sentence completion cues. Pt prognosis is Good. Pt will continue to benefit from skilled outpatient speech and language therapy to address the deficits listed in the problem list on initial evaluation, provide pt/family education and to maximize pt's level of independence in the home and community environment.     Barriers to Therapy: sleeping pattern, possible transportation  Pt's spiritual, cultural and educational needs considered and pt agreeable to plan of care and goals.    Plan:   Continue POC with introduction of supported communication for persons with aphasia. .     ERICKA Joseph.     3/19/2019

## 2019-03-21 ENCOUNTER — CLINICAL SUPPORT (OUTPATIENT)
Dept: REHABILITATION | Facility: HOSPITAL | Age: 59
End: 2019-03-21
Attending: PSYCHIATRY & NEUROLOGY
Payer: COMMERCIAL

## 2019-03-21 DIAGNOSIS — Z78.9 IMPAIRED MOBILITY AND ACTIVITIES OF DAILY LIVING: ICD-10-CM

## 2019-03-21 DIAGNOSIS — Z74.09 IMPAIRED FUNCTIONAL MOBILITY, BALANCE, GAIT, AND ENDURANCE: ICD-10-CM

## 2019-03-21 DIAGNOSIS — R47.01 BROCA'S APHASIA: ICD-10-CM

## 2019-03-21 DIAGNOSIS — Z74.09 IMPAIRED MOBILITY AND ACTIVITIES OF DAILY LIVING: ICD-10-CM

## 2019-03-21 DIAGNOSIS — R29.898 WEAKNESS OF RIGHT UPPER EXTREMITY: ICD-10-CM

## 2019-03-21 DIAGNOSIS — R29.898 WEAKNESS OF RIGHT LEG: ICD-10-CM

## 2019-03-21 PROCEDURE — 92507 TX SP LANG VOICE COMM INDIV: CPT | Mod: PO

## 2019-03-21 PROCEDURE — 97110 THERAPEUTIC EXERCISES: CPT | Mod: PO,59

## 2019-03-21 PROCEDURE — 97110 THERAPEUTIC EXERCISES: CPT | Mod: PO

## 2019-03-21 PROCEDURE — 97112 NEUROMUSCULAR REEDUCATION: CPT | Mod: PO,59

## 2019-03-21 NOTE — PROGRESS NOTES
Outpatient Neurological Rehabilitation   Speech and Language Therapy Daily Note  Date:  3/21/2019     Name: Torito Harris   MRN: [id number]   Therapy Diagnosis:   Encounter Diagnosis   Name Primary?    Broca's aphasia    Physician: Ian Feliciano MD  Physician Orders: ST evaluate and treat  Medical Diagnosis: I69.30 (ICD-10-CM) - Late effect of stroke    Visit #/Visits authorized: 4/ 10 (8 prior to this auth)  Date of Evaluation:  1/28/19  Insurance Authorization Period: 3/7/2019 to 12/31/19   Plan of Care Expiration Date:  1/28/2019 to 3/25/19  Extended POC: n/a     Time In: 1346  Time Out:  1430  Total Billable Time: 44 minutes    Precautions: Standard and Fall  Subjective:   Pt reports: Pt's wife was present during session. Pt's wife reported that she has labeled common objects around the house for him to improve object naming skills.  Pain Scale:  0/10 on VAS currently.   Pain Location: n/a  Objective:   UNTIMED  Procedure Min.   Speech- Language- Voice Therapy    44   Total Untimed Units: 1  Charges Billed/# of units: 1    Short Term Goals: (4 weeks) Current Progress:   1. Pt will complete R/L body part discrimination tasks with 70% Carmen. Goal Met 2/7/19/ Discontinue    2. Pt will follow 3 unit body commands with 80% acc sergio Goal Not Met / Revise to 2 unit   Not formally targeted   3. Pt will follow 3 unit commands with visual stimuli with 70% Carmen. Goal Not Met / Revise to 2 unit   Not formally targeted   4. Pt will name common objects with 90% Carmen.  Progressing/ Not Met 3/21/2019    Pt named common objects with 69% acc indly and 100% given phonemic and semantic cues.     5. Pt will generate a response to basic sentence completions with 80% Carmen   Progressing/ Not Met 3/21/2019   Pt completed single word sentence completion task with idiomatic expressions with 65% acc independently and 90% acc given phonemic cues.   6. Pt will name 5 concrete category items with Carmen.  Progressing/ Not Met 3/21/2019     Pt named concrete category given 3 items with 55% acc ind'ly and 90% acc given written choices (field of 4).    7. Pt will repeat  3-5 word phrases with 80% Carmen. Goal Met 2/21/19 / Discontinue   8. Assess reading and writing skills. Goal met 2/26/2019/ Discontinue.   9. pt will follow 2 unit body part commands with 90% acc indly to improve auditory comprehension. Goal Met 3/4/19 / Discontinue   10.  pt will follow 2 unit commands with visual stimuli with 90% acc indly to improve auditory comprehension.   Progressing/ Not Met 3/21/2019    Pt followed 2 unit commands with visual stimuli with 90% acc ind'ly and 100% acc given a repetition.     11. pt and a conversational partner will use supported communication for persons with   aphasia to facilitate 2 conversational turns with a communication partner.   Progressing/ Not Met 3/21/2019  Not formally addressed.    12. Patient will match phrases to pictures to increase reading comprehension skills with 90% accuracy given min A. Goal Met 3/4/19 / Discontinue   13. Patient will write name and address to increase written expression skills with 90% accuracy given verbal presentation of letters  Progressing/ Not Met 3/21/2019  .  Pt copied first and last name with 100% acc. After copying name, clinician asked patient to write first and last name independently, which he did with 85% acc.    14.  pt will complete reading comprehension tasks at the sentence level with 90% acc indly.  Progressing/ Not Met 3/21/2019   Pt matched a phrase to a picture from a field of 3 with 80% acc ind'ly.      15. pt will follow 3 unit body part commands with 90% acc indly.   Progressing/ Not Met 3/21/2019   Not formally targeted.        Patient Education/Response:   SLP reiterated purpose of object naming progress and ways to generalize this skill to home environment. Patient and wife verbalized understanding of all information presented.    Assessment:   Torito is progressing well towards his  "goals.  Current goals remain appropriate. Pt with improved auditory comprehension skills with slower presentation of information. Patient also improved category naming with written cues. Pt prognosis is Good. Pt will continue to benefit from skilled outpatient speech and language therapy to address the deficits listed in the problem list on initial evaluation, provide pt/family education and to maximize pt's level of independence in the home and community environment.     Barriers to Therapy: sleeping pattern, possible transportation  Pt's spiritual, cultural and educational needs considered and pt agreeable to plan of care and goals.    Plan:   Continue POC with introduction of supported communication for persons with aphasia. .     Oxana Dozier ALEXANDREA    3/21/2019                                   "I, SERINA Mott, CCC-SLP , certify that I was present in the room directing the student in service delivery and guiding them using my skilled judgement.  As the co-signing therapist, I have reviewed the student's documentation and am responsible for the treatment, assessment and plan."  SERINA Mott, CCC-SLP  Speech Language Pathologist   3/21/2019                                                                                                                                                                                                                                                           "

## 2019-03-21 NOTE — PROGRESS NOTES
"  Occupational Therapy Daily Treatment Note     [facility]  [facility] Number[facility] [id number]  Physician: Ian Feliciano MD  Medical Diagnosis: L CVA      Therapy Diagnosis:   Encounter Diagnoses   Name Primary?    Impaired mobility and activities of daily living     Weakness of right upper extremity      Visit Date: 3/21/2019  Physician Orders: Eval and Treat   Surgical Procedure and Date: NA  Evaluation Date: 1/28/19   Insurance Authorization Period Expiration: 12/31/19   Plan of Care Certification Period: 4/19/19   Date of Return to MD: unknown at this time     Visit # / Visits authorized: 15 ( 5/10 new auth)   Time In: 1432   Time Out: 1515    Total Billable Time: 43 minutes      Precautions: Standard, fall risk, hearing impaired     Subjective     Pt reports: "Do you think I can drive?"   Patient reports compliance with GG splint and grasp and release tasks.       Response to previous treatment:positive   Functional change: progressing     Pain: none reported   Location: NA     Objective   Patient seen by OT this date.  Pt ambulated to treatment session with quad cane and SBA.  Patient's wife was present for treatment session.  Patient received individual therapy with activities as follows:       Torito participated in therapeutic exercises to improve ROM and strength for 10 minutes:   - UBE level 3 x 4 min forwards x 4 min reverse   - overhead pulleys for shoulder flexion x 2 min                    Patient participated in neuromuscular reeducation activities for 35 minutes to maximize ROM, motor strength and neuromuscular and proprioceptive input to the RUE.  The following activities were included:   Sitting EOM:   - restoration of R hand arches and metacarpal stretches   - general R wrist stretches: 1. P-A glide of scaphoid on radius, 2. Radial deviation, 3. Increasing mobility of metacarpals, 4. Carpal roll, 5. Movement of forearm on hand towards wrist extension, 6. Movement of forearm on the hand " toward supination/ pronation, 7. wrist ext with distraction   - right upper extremity weight bearing on a hard static surface with body on arm weight shifts in the frontal plane to promote R wrist and digit extension   - application of GG splint to R hand  - body on arm and arm on body movements on exercise ball; Stebbins self assist    Standing:   - arm on body movements on exercise ball with single arm balance support and Max A by OT for RUE movements     GG splint removed; patient sitting EOM:   - grasp and release tasks with RUE - reaching forwards for cups and releasing them onto mat on R; self assist and Min A by OT       Functional mobility:   - sit <> stand with SBA  - gait with quad cane and close SBA       Home Exercises and Education Provided     Education provided: educated patient on skills required for driving and why it is not safe at this time   - HEP   - Patient was instructed to wear splint for up to 3 hours and then remove and perform grasp and release tasks.   - Progress towards goals   - POC     Written Home Exercises Provided: Patient instructed to cont prior HEP as verbally instructed in previous tx sessions. Exercises were reviewed and Torito was able to demonstrate them prior to the end of the session.  Torito demonstrated good  understanding of the HEP provided.       Pt has no cultural, educational or language barriers to learning provided.    Assessment   Patient tolerated treatment session well.  OT continued with internvetion techniques to increase RUE range of motion, strength, and neuromuscular input to the RUE.  He demonstrated improved ability to maintain R hand  on objects including UBE handle and pulleys this date.  Steady improvements noted in R shoulder ROM during active assisted exercises.  He was able to perform body on arm movements with self Stebbins assist, but continues to requires significant assistance for arm on body movements due to weakness in antigravity positions.   Patient expressed interest in returning to driving this treatment session.  OT educated patient on skills needed for driving and it would not be safe for pt at this time.         Pt would continue to benefit from skilled OT. Torito is progressing well towards his goals and there are no updates to goals at this time. Pt prognosis is Good due to patient remains motivated to participate and has good family support from his spouse. Pt will continue to benefit from skilled outpatient occupational therapy to address the deficits listed in the problem list on initial evaluation provide pt/family education and to maximize pt's level of independence in the home and community environment.      Anticipated barriers to occupational therapy: none     Pt's spiritual, cultural and educational needs considered and pt agreeable to plan of care and goals.    Goals:  Short Term Goals: 3 weeks   ROM/Strength to perform the ADL's and functional activities listed below, - in progress  Pt will improve functional  strength needed for tasks to 20# in R hand. - in progress   Pt will improve AROM for shoulder flexion needed for functional tasks to 60* in R UE. - in progress   UE dressing will improve to Min A - in progress   LB dressing will improve to Mod A  - in progress   Toileting will improve to Min A incorporating R hand for task  - in progress   Pt will be Independent with HEP to improve ROM and FM skills. - in progress      Long Term Goals: 6 weeks   ROM/Strength to perform the ADL's and functional activities listed below - in progress   Pt will improve functional  strength needed for tasks to 30# in R hand. - in progress   Pt will improve AROM for shoulder flexion needed for functional tasks to 80* in R UE. - in progress   UE dressing will improve to Supervision   LB dressing will improve to Min A   Toileting will improve to Supervision incorporating R hand for task   Pt will complete clinical testing for skills needed for  driving  G code self care CK    Plan   Continue POC in pursuit of OT goals - extend through 4/19/19.     Discussed Plan of Care with patient: Yes  Updates/Grading for next session: continue active assisted exercises and grasp and release tasks

## 2019-03-21 NOTE — PROGRESS NOTES
"  Physical Therapy Daily Treatment Note     [id number]    Therapy Diagnosis:   Encounter Diagnoses   Name Primary?    Weakness of right leg     Impaired functional mobility, balance, gait, and endurance      Physician: Ian Feliciano MD    Visit Date: 3/21/2019    Physician Orders: PT Eval and Treat   Medical Diagnosis from Referral: Late effect of stroke  Evaluation Date: 1/28/2019  Authorization Period Expiration: 02/12/19-12/31/19  Plan of Care Expiration: 03/19/19 to 05/14/19  Visit # / Visits authorized: 5/10 (Vist #15 of 2019)     Time In: 13:00  Time Out: 13:45  Total Billable Time: 45 minutes    Precautions: Standard, Diabetes, Fall and right hemiplegia, hearing aid, safety awareness, cognitive deficits, speech deficits    Subjective     Pt reports: "I'm going to drive tomorrow."   HEP Compliance: pt reports he is doing all of his exercises at home.  Response to previous treatment: no complaints  Functional change: none reported    Pain: 0/10  Location: N/A      Objective     Torito received therapeutic exercises to develop strength, endurance, ROM, flexibility and core stabilization for 30 minutes including:      X 10 min on Sci Fit recumbent stepper.  B UE/ B LE, on level 4.0  3 x 10 seated RLE LAQs, emphasis on amplitude   3 x 30" seated RLE HSS manual stretch  2 x 30 sec of R LE manual HC stretch  3 x 10 reps of R LE DF/PF with OTB  2 x 10 reps of B LE squats on leg press wit # 80 lbs applied  1 x 15 reps of R LE single leg squat on leg press with #40 lbs applied.  VC's to slow down, take his time and to use his R leg only.    Patient participated in neuromuscular re-education activities to improve: Balance, Coordination, Sense, Proprioception and Posture for 8 minutes. The following activities were included:   BAPS board   R LE, DF/:PF on level 3.0    Patient participated in gait training activities to normalize gait pattern for 5 minutes. The following " activities were included:   Gait belt used for safety. Assistive device: SBQC  Ambulated in/out of clinic with SBQC and Supervision          Home Exercises Provided and Patient Education Provided     Education provided: Progress with therapy.     Written Home Exercises Provided: Continue current HEP.     See EMR under Media for exercises provided 2/5/2019.    Assessment   Mr. Ugarte tolerated therapy session well and did not have any problems.  Pt was able to progress to [place] theraband for ankle resistance exercises and was participating more with the BAPS range of motion today.  Cont with squats on the leg press but required moderate VC's to slow down and use his right leg only.  Cont with BE.    Torito is progressing well towards his goals.   Pt prognosis is Good.     Pt will continue to benefit from skilled outpatient physical therapy to address the deficits listed in the problem list box on initial evaluation, provide pt/family education and to maximize pt's level of independence in the home and community environment.     Pt's spiritual, cultural and educational needs considered and pt agreeable to plan of care and goals.     Anticipated Barriers for therapy: hearing deficit, speech deficits, cognitive deficits, decreased safety awareness, fall risk, visual neglect, transportation assistance required    Goals:  Short Term Goals: 4 weeks   1. Pt to be (I) with established HEP MET 03/19/19  2. Pt to improve R hip flexion strength MMT score to at least 4+/5 for improved gait mechanics Ongoing  3. Pt to improve R hip extension strength MMT score to at least 4/5 for improved gait mechanics Ongoing  4. Pt to improve R hip AB and R hip AD strength MMT scores to at least 3+/5 for improved gait mechanics MET 03/04/19  5.  Pt to improve R knee flexion and extension strength MMT scores to at least 4+/5 for improved gait mechanics Ongoing  6. Pt to improve R ankle PF strength MMT score to at least 2/5 for improved gait  mechanics MET 03/04/19  7. Pt to improve chair rise score to at least 5 times with no more than 1 UE support and S for improved endurance and safety with functional transfers MET 03/04/19  8. Pt to improve TUG score to at least 30 seconds for improved safety with household mobility Ongoing  9. Pt to improve SSWS score to at least 0.40 m/sec for improved safety with community mobility. Ongoing     Long Term Goals: 8 weeks   1. Pt to be (I) with advanced HEP Ongoing  2. Pt to improve R hip extension strength MMT score to at least 4+/5 for improved gait mechanics Ongoing  3. Pt to improve R hip AB and R hip AD strength MMT scores to at least 4-/5 for improved gait mechanics Ongoing  4. Pt to improve R ankle PF strength MMT score to at least 2+/5 for improved gait mechanics MET 03/19/19  5. Pt to improve chair rise score to at least 5 times with no UE support and S for improved endurance and safety with functional transfers Ongoing  6. Pt to improve TUG score to at least 26 seconds for improved safety with household mobility Ongoing  7. Pt to improve SSWS score to at least 0.50 m/sec for improved safety with community mobility. Ongoing     Plan   PT to extend POC x 8 more weeks.     Plan for next visit: Continue standing there-act to address weight acceptance onto RLE; stair negotiation on wooden gym stair case. Ankle T-band exercises (or AROM as patient tolerates), LE strengthening exercises.    Continue outpatient physical therapy 2x weekly under current established Plan of Care,03/19/19 to 05/14/19, with treatment to include: pt education, HEP, therapeutic exercises, neuromuscular re-education/balance exercises, therapeutic activities, joint mobilizations, and modalities PRN, to work towards established goals. Pt may be seen by PTA to carry out plan of care.         Lucy Vanegas, SHERICE

## 2019-03-26 ENCOUNTER — CLINICAL SUPPORT (OUTPATIENT)
Dept: REHABILITATION | Facility: HOSPITAL | Age: 59
End: 2019-03-26
Attending: PSYCHIATRY & NEUROLOGY
Payer: COMMERCIAL

## 2019-03-26 DIAGNOSIS — R47.01 BROCA'S APHASIA: ICD-10-CM

## 2019-03-26 DIAGNOSIS — Z74.09 IMPAIRED MOBILITY AND ACTIVITIES OF DAILY LIVING: ICD-10-CM

## 2019-03-26 DIAGNOSIS — Z74.09 IMPAIRED FUNCTIONAL MOBILITY, BALANCE, GAIT, AND ENDURANCE: ICD-10-CM

## 2019-03-26 DIAGNOSIS — Z78.9 IMPAIRED MOBILITY AND ACTIVITIES OF DAILY LIVING: ICD-10-CM

## 2019-03-26 DIAGNOSIS — R29.898 WEAKNESS OF RIGHT LEG: ICD-10-CM

## 2019-03-26 DIAGNOSIS — R29.898 WEAKNESS OF RIGHT UPPER EXTREMITY: ICD-10-CM

## 2019-03-26 PROCEDURE — 97116 GAIT TRAINING THERAPY: CPT | Mod: PO

## 2019-03-26 PROCEDURE — 97112 NEUROMUSCULAR REEDUCATION: CPT | Mod: PO,59

## 2019-03-26 PROCEDURE — 92507 TX SP LANG VOICE COMM INDIV: CPT | Mod: PO

## 2019-03-26 PROCEDURE — 97110 THERAPEUTIC EXERCISES: CPT | Mod: PO,59

## 2019-03-26 NOTE — PROGRESS NOTES
Occupational Therapy Daily Treatment Note     [id number]  Physician: Ian Feliciano MD  Medical Diagnosis: L CVA      Therapy Diagnosis:   Encounter Diagnoses   Name Primary?    Impaired mobility and activities of daily living     Weakness of right upper extremity      Visit Date: 3/26/2019  Physician Orders: Eval and Treat   Surgical Procedure and Date: NA  Evaluation Date: 1/28/19   Insurance Authorization Period Expiration: 12/31/19   Plan of Care Certification Period: 4/19/19   Date of Return to MD: unknown at this time     Visit # / Visits authorized: 16 ( 6/10 new auth)   Time In: 1430   Time Out: 1515  Total Billable Time: 45 minutes      Precautions: Standard, fall risk, hearing impaired     Subjective     Pt reports: no new complaints.   Patient reports compliance with GG splint and grasp and release tasks.       Response to previous treatment:positive   Functional change: progressing     Pain: none reported   Location: NA     Objective   Patient seen by OT this date.  Pt ambulated to treatment session with quad cane and SBA.  Patient's wife was present for treatment session.  Patient received individual therapy with activities as follows:     Torito participated in therapeutic exercises to improve ROM and strength for 20 minutes:   - UBE level 3 x 5 min forwards x 5 min reverse   - overhead pulleys for shoulder flexion x 2 min   - supine chest press with 2# dowel nancy x 10 reps x 2; Min A with R hand paddled to dowel   - supine shoulder flexion with 2# dowel nancy x 10 reps  2; Min A with R hand paddled to dowel       Patient participated in neuromuscular reeducation activities for 25 minutes to maximize ROM, motor strength and neuromuscular and proprioceptive input to the RUE.  The following activities were included:   Sitting EOM:   - restoration of R hand arches and metacarpal stretches   - general R wrist stretches: 1. P-A glide of scaphoid on radius, 2. Radial  deviation, 3. Increasing mobility of metacarpals, 4. Carpal roll, 5. Movement of forearm on hand towards wrist extension, 6. Movement of forearm on the hand toward supination/ pronation, 7. wrist ext with distraction   - right upper extremity weight bearing on a hard static surface with body on arm weight shifts in the frontal plane to promote R wrist and digit extension   - placement of R hand in paddle for supine dowel TherEx to continue to promote digit extension       GG splint removed; patient sitting EOM:   - grasp and release tasks with RUE - reaching forwards for cups and releasing them onto mat on R; self assist and intermittent Min A by OT       Functional mobility:   - sit <> stand with SBA  - sit <> supine with SBA   - gait with quad cane and close SBA       Home Exercises and Education Provided     Education provided re:  - HEP   - Patient was instructed to wear splint for up to 3 hours and then remove and perform grasp and release tasks.   - Progress towards goals   - POC     Written Home Exercises Provided: Patient instructed to cont prior HEP as verbally instructed in previous tx sessions. Exercises were reviewed and Torito was able to demonstrate them prior to the end of the session.  Torito demonstrated good  understanding of the HEP provided.       Pt has no cultural, educational or language barriers to learning provided.    Assessment   Patient tolerated treatment session well.  OT continued with internvetion techniques to increase RUE range of motion, strength, and neuromuscular input to the RUE.  Paddle used on R hand during dowel exercises to promote active digit extension.  He continues to demonstrate steady improvements in R shoulder ROM during active assisted exercises.  He continues to require self assist and intermittent Min A to reach in space to grasp and release cups.  R thumb active movement is improving for opposition around cup during grasping phase.  Additionally, extension of  digits is improving for active release of cups.     Pt would continue to benefit from skilled OT. Torito is progressing well towards his goals and there are no updates to goals at this time. Pt prognosis is Good due to patient remains motivated to participate and has good family support from his spouse. Pt will continue to benefit from skilled outpatient occupational therapy to address the deficits listed in the problem list on initial evaluation provide pt/family education and to maximize pt's level of independence in the home and community environment.      Anticipated barriers to occupational therapy: none     Pt's spiritual, cultural and educational needs considered and pt agreeable to plan of care and goals.    Goals:  Short Term Goals: 3 weeks   ROM/Strength to perform the ADL's and functional activities listed below, - in progress  Pt will improve functional  strength needed for tasks to 20# in R hand. - in progress   Pt will improve AROM for shoulder flexion needed for functional tasks to 60* in R UE. - in progress   UE dressing will improve to Min A - in progress   LB dressing will improve to Mod A  - in progress   Toileting will improve to Min A incorporating R hand for task  - in progress   Pt will be Independent with HEP to improve ROM and FM skills. - in progress      Long Term Goals: 6 weeks   ROM/Strength to perform the ADL's and functional activities listed below - in progress   Pt will improve functional  strength needed for tasks to 30# in R hand. - in progress   Pt will improve AROM for shoulder flexion needed for functional tasks to 80* in R UE. - in progress   UE dressing will improve to Supervision   LB dressing will improve to Min A   Toileting will improve to Supervision incorporating R hand for task   Pt will complete clinical testing for skills needed for driving  G code self care CK    Plan   Continue POC in pursuit of OT goals - extend through 4/19/19.     Discussed Plan of Care  with patient: Yes  Updates/Grading for next session: continue active assisted exercises and grasp and release tasks

## 2019-03-26 NOTE — PROGRESS NOTES
"  Physical Therapy Daily Treatment Note     [id number]    Therapy Diagnosis:   Encounter Diagnoses   Name Primary?    Weakness of right leg     Impaired functional mobility, balance, gait, and endurance      Physician: Ian Feliciano MD    Visit Date: 3/26/2019    Physician Orders: PT Eval and Treat   Medical Diagnosis from Referral: Late effect of stroke  Evaluation Date: 1/28/2019  Authorization Period Expiration: 02/12/19-12/31/19  Plan of Care Expiration: 03/19/19 to 05/14/19  Visit # / Visits authorized: 6/10 (Vist #15 of 2019)     Time In: 13:00  Time Out: 13:45  Total Billable Time: 45 minutes    Precautions: Standard, Diabetes, Fall and right hemiplegia, hearing aid, safety awareness, cognitive deficits, speech deficits    Subjective     Pt reports: "Can we walk with that thing?"  Pt speaking of the Brittney platform walker  HEP Compliance: pt reports he is doing all of his exercises at home.  Response to previous treatment: no complaints  Functional change: none reported    Pain: 0/10  Location: N/A      Objective     Torito received therapeutic exercises to develop strength, endurance, ROM, flexibility and core stabilization for 10 minutes including:      X 10 min on Sci Fit recumbent stepper.  B UE/ B LE, on level 4.0    Patient participated in neuromuscular re-education activities to improve: Balance, Coordination, Sense, Proprioception and Posture for 20 minutes. The following activities were included:   Ascended/Descended 4 steps x 5 trials with 2 HR and CGA.  VCs to place entire foot on step.  2 x 10 reps of B LE single leg step over 1/2 foam pad.  2 UE support and CGA for safety    Airex foam pad:   2 x 30 sec of static standing with arms by side and CGA   X 60 sec of medial/lateral weight shifting with arms by side.  VCs and TCs for upright posture     Patient participated in gait training activities to normalize gait pattern for 15 minutes. The following " activities were included:   Gait belt used for safety. Assistive device: SBQC  Ambulated into clinic with SBQC and Supervision  Ambulated over 300ft with Little Walker and CGA.  VC's for heel/toe gait        Home Exercises Provided and Patient Education Provided     Education provided: Progress with therapy.     Written Home Exercises Provided: Continue current HEP.     See EMR under Media for exercises provided 2/5/2019.    Assessment   Mr. Ugarte tolerated tx session well and did not have any complaints.  Pt practiced gait with the little walker and was able to increase gait speed, slightly but required VC's for heel strike during gait.  Pt was able to put more weight on his R LE during single leg step overs with less R knee buckling noted.  Cont with DEMAR Ugarte is progressing well towards his goals.   Pt prognosis is Good.     Pt will continue to benefit from skilled outpatient physical therapy to address the deficits listed in the problem list box on initial evaluation, provide pt/family education and to maximize pt's level of independence in the home and community environment.     Pt's spiritual, cultural and educational needs considered and pt agreeable to plan of care and goals.     Anticipated Barriers for therapy: hearing deficit, speech deficits, cognitive deficits, decreased safety awareness, fall risk, visual neglect, transportation assistance required    Goals:  Short Term Goals: 4 weeks   1. Pt to be (I) with established HEP MET 03/19/19  2. Pt to improve R hip flexion strength MMT score to at least 4+/5 for improved gait mechanics Ongoing  3. Pt to improve R hip extension strength MMT score to at least 4/5 for improved gait mechanics Ongoing  4. Pt to improve R hip AB and R hip AD strength MMT scores to at least 3+/5 for improved gait mechanics MET 03/04/19  5.  Pt to improve R knee flexion and extension strength MMT scores to at least 4+/5 for improved gait mechanics Ongoing  6. Pt to improve R ankle  PF strength MMT score to at least 2/5 for improved gait mechanics MET 03/04/19  7. Pt to improve chair rise score to at least 5 times with no more than 1 UE support and S for improved endurance and safety with functional transfers MET 03/04/19  8. Pt to improve TUG score to at least 30 seconds for improved safety with household mobility Ongoing  9. Pt to improve SSWS score to at least 0.40 m/sec for improved safety with community mobility. Ongoing     Long Term Goals: 8 weeks   1. Pt to be (I) with advanced HEP Ongoing  2. Pt to improve R hip extension strength MMT score to at least 4+/5 for improved gait mechanics Ongoing  3. Pt to improve R hip AB and R hip AD strength MMT scores to at least 4-/5 for improved gait mechanics Ongoing  4. Pt to improve R ankle PF strength MMT score to at least 2+/5 for improved gait mechanics MET 03/19/19  5. Pt to improve chair rise score to at least 5 times with no UE support and S for improved endurance and safety with functional transfers Ongoing  6. Pt to improve TUG score to at least 26 seconds for improved safety with household mobility Ongoing  7. Pt to improve SSWS score to at least 0.50 m/sec for improved safety with community mobility. Ongoing     Plan     Plan for next visit: Continue standing there-act to address weight acceptance onto RLE; stair negotiation on wooden gym stair case. Ankle T-band exercises (or AROM as patient tolerates), LE strengthening exercises.    Continue outpatient physical therapy 2x weekly under current established Plan of Care,03/19/19 to 05/14/19, with treatment to include: pt education, HEP, therapeutic exercises, neuromuscular re-education/balance exercises, therapeutic activities, joint mobilizations, and modalities PRN, to work towards established goals. Pt may be seen by PTA to carry out plan of care.         Lucy Vanegas, SHERICE

## 2019-03-26 NOTE — PROGRESS NOTES
"Outpatient Neurological Rehabilitation   Speech and Language Therapy Daily Note  Date:  3/26/2019     Name: Torito Harris   MRN: [id number]   Therapy Diagnosis:   Encounter Diagnosis   Name Primary?    Broca's aphasia    Physician: Ian Feliciano MD  Physician Orders: ST evaluate and treat  Medical Diagnosis: I69.30 (ICD-10-CM) - Late effect of stroke    Visit #/Visits authorized: 5/ 10 (8 prior to this auth)  Date of Evaluation:  1/28/19  Insurance Authorization Period: 3/7/2019 to 12/31/19   Plan of Care Expiration Date:  1/28/2019 to 3/25/19  Extended POC: n/a     Time In: 1346  Time Out:  1431  Total Billable Time: 45 minutes     Precautions: Standard and Fall  Subjective:   Pt reports: That his dr cleared him to drive and that he drove to today's session.  Pt response to previous therapy session: "Alright"  Pain Scale:  0/10 on VAS currently.   Pain Location: n/a  Objective:   UNTIMED  Procedure Min.   Speech- Language- Voice Therapy    45   Total Untimed Units: 1  Charges Billed/# of units: 1    Short Term Goals: (4 weeks) Current Progress:   1. Pt will complete R/L body part discrimination tasks with 70% Carmen. Goal Met 2/7/19/ Discontinue    2. Pt will follow 3 unit body commands with 80% acc sergio Goal Not Met / Revise to 2 unit   Not formally targeted   3. Pt will follow 3 unit commands with visual stimuli with 70% Carmen. Goal Not Met / Revise to 2 unit   Not formally targeted   4. Pt will name common objects with 90% Carmen.  Progressing/ Not Met 3/26/2019    Pt named common objects with 72% acc indly and 100% given phonemic and semantic cues.     5. Pt will generate a response to basic sentence completions with 80% Carmen   Progressing/ Not Met 3/26/2019   Pt completed single word sentence completion task with automatic expressions with 80% acc independently and 100% acc given phonemic cues.   6. Pt will name 5 concrete category items with Carmen.  Progressing/ Not Met 3/26/2019    Not formally addressed.  "   7. Pt will repeat  3-5 word phrases with 80% Carmen. Goal Met 2/21/19 / Discontinue   8. Assess reading and writing skills. Goal met 2/26/2019/ Discontinue.   9. pt will follow 2 unit body part commands with 90% acc indly to improve auditory comprehension. Goal Met 3/4/19 / Discontinue   10.  pt will follow 2 unit commands with visual stimuli with 90% acc indly to improve auditory comprehension.   Progressing/ Not Met 3/26/2019    Pt followed 2 unit commands with visual stimuli with 55% acc ind'ly and 100% acc given a repetition.     11. pt and a conversational partner will use supported communication for persons with   aphasia to facilitate 2 conversational turns with a communication partner.   Progressing/ Not Met 3/26/2019  Not formally addressed.    12. Patient will match phrases to pictures to increase reading comprehension skills with 90% accuracy given min A. Goal Met 3/4/19 / Discontinue   13. Patient will write name and address to increase written expression skills with 90% accuracy given verbal presentation of letters  Progressing/ Not Met 3/26/2019  .  Pt copied first and last name with 100% acc. After copying name, clinician asked patient to write first and last name independently, which he did with 88% acc.    14.  pt will complete reading comprehension tasks at the sentence level with 90% acc indly.  Progressing/ Not Met 3/26/2019   Pt matched a phrase to a picture from a field of 3 with 71% acc ind'ly.      15. pt will follow 3 unit body part commands with 90% acc indly.   Progressing/ Not Met 3/26/2019    Pt followed 3 unit body part commands with 20% acc indly, 50% acc with a repetition, and 100% acc given a repetition and a model.    16. New Goal 3/26/2019: Patient will write grapheme to corresponding phoneme with 90% acc indly.  Progressing/ Not Met 3/26/2019             Patient Education/Response:   Patient stated that he drove to today's therapy session. SLP instructed patient to make sure that  "his doctor cleared him to drive. In addition, patient educated regarding the purpose of implementing Phonological Treatment Protocol to promote phoneme-grapheme correspondence skills. Patient and wife verbalized understanding of all information presented.    Assessment:   Torito is progressing well towards his goals.  Current goals remain appropriate. Patient misses details within comprehension tasks;however, his accuracy improves given slower presentation of the information and a repetition. Phonological Treatment Protocol was introduced during today's session and patient's accuracy improved when asked to point to the letter (out of a field of 3) that corresponds with a certain sound. Pt prognosis is Good. Pt will continue to benefit from skilled outpatient speech and language therapy to address the deficits listed in the problem list on initial evaluation, provide pt/family education and to maximize pt's level of independence in the home and community environment.     Barriers to Therapy: sleeping pattern, possible transportation  Pt's spiritual, cultural and educational needs considered and pt agreeable to plan of care and goals.    Plan:   Continue POC with introduction of supported communication for persons with aphasia. .     Oxana Dozier [facility]    3/26/2019                                   "I, SERINA Mott, CCC-SLP , certify that I was present in the room directing the student in service delivery and guiding them using my skilled judgement.  As the co-signing therapist, I have reviewed the student's documentation and am responsible for the treatment, assessment and plan."  SERINA Mott, CCC-SLP  Speech Language Pathologist   3/26/2019                                                                                                                                                                                                                                                           "

## 2019-03-28 ENCOUNTER — CLINICAL SUPPORT (OUTPATIENT)
Dept: REHABILITATION | Facility: HOSPITAL | Age: 59
End: 2019-03-28
Attending: PSYCHIATRY & NEUROLOGY
Payer: COMMERCIAL

## 2019-03-28 DIAGNOSIS — R29.898 WEAKNESS OF RIGHT UPPER EXTREMITY: ICD-10-CM

## 2019-03-28 DIAGNOSIS — Z78.9 IMPAIRED MOBILITY AND ACTIVITIES OF DAILY LIVING: ICD-10-CM

## 2019-03-28 DIAGNOSIS — R47.01 BROCA'S APHASIA: ICD-10-CM

## 2019-03-28 DIAGNOSIS — R29.898 WEAKNESS OF RIGHT LEG: ICD-10-CM

## 2019-03-28 DIAGNOSIS — Z74.09 IMPAIRED MOBILITY AND ACTIVITIES OF DAILY LIVING: ICD-10-CM

## 2019-03-28 DIAGNOSIS — Z74.09 IMPAIRED FUNCTIONAL MOBILITY, BALANCE, GAIT, AND ENDURANCE: ICD-10-CM

## 2019-03-28 PROCEDURE — 92507 TX SP LANG VOICE COMM INDIV: CPT | Mod: KX,PO

## 2019-03-28 PROCEDURE — 97110 THERAPEUTIC EXERCISES: CPT | Mod: PO

## 2019-03-28 PROCEDURE — 97116 GAIT TRAINING THERAPY: CPT | Mod: PO

## 2019-03-28 PROCEDURE — 97110 THERAPEUTIC EXERCISES: CPT | Mod: KX,PO,59

## 2019-03-28 NOTE — PROGRESS NOTES
Occupational Therapy Daily Treatment Note     Name: Torito Harris  Clinic Number: [id number]  Physician: Ian Feliciano MD  Medical Diagnosis: L CVA      Therapy Diagnosis:   Encounter Diagnoses   Name Primary?    Impaired mobility and activities of daily living     Weakness of right upper extremity      Visit Date: 3/28/2019  Physician Orders: Eval and Treat   Surgical Procedure and Date: NA  Evaluation Date: 1/28/19   Insurance Authorization Period Expiration: 12/31/19   Plan of Care Certification Period: 4/19/19   Date of Return to MD: unknown at this time     Visit # / Visits authorized: 17 ( 7/10 new auth)   Time In: 1430   Time Out: 1515  Total Billable Time: 45 minutes      Precautions: Standard, fall risk, hearing impaired     Subjective     Pt reports: no new complaints.   Patient reports compliance with GG splint and grasp and release tasks.       Response to previous treatment:positive   Functional change: progressing     Pain: none reported   Location: NA     Objective   Patient seen by OT this date.  Pt ambulated to treatment session with quad cane and SBA.  Patient's wife was present for treatment session.  Patient received individual therapy with activities as follows:     Torito participated in therapeutic exercises to improve ROM and strength for 39 minutes:   - UBE level 3.5 x 5 min forwards x 5 min reverse   - overhead pulleys for shoulder flexion x 2 min   - supine chest press with half ball x 15 reps x 2; R hand coban taped to flat half of ball   - supine shoulder flexion with half ball x 15 reps x 2; R hand coban taped to flat half of ball   - supine shoulder add/abd with half ball x 15 reps x 2; R hand coban taped to flat half of ball   - seated AROM wrist flexion x 15 reps x 2; Min-Mod A   - seated AROM wrist extension x 15 reps x 2; Min-Mod A   - seated AROM sup/pro x 15 reps x 2; self assist PRN  - seated AROM wrist RD/UD x 15 reps x 2; Max-Total A       Patient participated in  neuromuscular reeducation activities for 6 minutes to maximize ROM, motor strength and neuromuscular and proprioceptive input to the RUE.  The following activities were included:   Sitting EOM:   - restoration of R hand arches and metacarpal stretches   - general R wrist stretches: 1. P-A glide of scaphoid on radius, 2. Radial deviation, 3. Increasing mobility of metacarpals, 4. Carpal roll, 5. Movement of forearm on hand towards wrist extension, 6. Movement of forearm on the hand toward supination/ pronation, 7. wrist ext with distraction         Functional mobility:   - sit <> stand with SBA  - sit <> supine with SBA   - gait with quad cane and close SBA       Home Exercises and Education Provided     Education provided:  - HEP   - Patient was instructed to wear splint for up to 3 hours and then remove and perform grasp and release tasks.   - Progress towards goals   - POC     Written Home Exercises Provided: instructed to continue wrist AROM as performed this date as well as prior HEP.    Patient instructed to cont prior HEP as verbally instructed in previous tx sessions. Exercises were reviewed and Torito was able to demonstrate them prior to the end of the session.  Torito demonstrated good  understanding of the HEP provided.       Pt has no cultural, educational or language barriers to learning provided.    Assessment   Patient tolerated treatment session well.  OT continued with internvetion techniques to increase RUE range of motion, strength, and neuromuscular input to the RUE.  Half ball used during shoulder TherEx to promote active digit extension / open hand position of the R hand.  He continues to demonstrate steady improvements in R shoulder ROM during active assisted exercises. Increased focus on active R wrist movements this date.  Motion is improving, but he continues to require assistance for all wrist movements through full ROM; most assistance required for RD and UD movement.      Pt would  continue to benefit from skilled OT. Torito is progressing well towards his goals and there are no updates to goals at this time. Pt prognosis is Good due to patient remains motivated to participate and has good family support from his spouse. Pt will continue to benefit from skilled outpatient occupational therapy to address the deficits listed in the problem list on initial evaluation provide pt/family education and to maximize pt's level of independence in the home and community environment.      Anticipated barriers to occupational therapy: none     Pt's spiritual, cultural and educational needs considered and pt agreeable to plan of care and goals.    Goals:  Short Term Goals: 3 weeks   ROM/Strength to perform the ADL's and functional activities listed below, - in progress  Pt will improve functional  strength needed for tasks to 20# in R hand. - in progress   Pt will improve AROM for shoulder flexion needed for functional tasks to 60* in R UE. - in progress   UE dressing will improve to Min A - in progress   LB dressing will improve to Mod A  - in progress   Toileting will improve to Min A incorporating R hand for task  - in progress   Pt will be Independent with HEP to improve ROM and FM skills. - in progress      Long Term Goals: 6 weeks   ROM/Strength to perform the ADL's and functional activities listed below - in progress   Pt will improve functional  strength needed for tasks to 30# in R hand. - in progress   Pt will improve AROM for shoulder flexion needed for functional tasks to 80* in R UE. - in progress   UE dressing will improve to Supervision   LB dressing will improve to Min A   Toileting will improve to Supervision incorporating R hand for task   Pt will complete clinical testing for skills needed for driving  G code self care CK    Plan   Continue POC in pursuit of OT goals - extend through 4/19/19.     Discussed Plan of Care with patient: Yes  Updates/Grading for next session: continue  active assisted exercises and grasp and release tasks

## 2019-03-28 NOTE — PROGRESS NOTES
"Outpatient Neurological Rehabilitation   Speech and Language Therapy Daily Note  Date:  3/28/2019     Name: Torito Harris   MRN: [id number]   Therapy Diagnosis:   Encounter Diagnosis   Name Primary?    Broca's aphasia    Physician: Ian Feliciano MD  Physician Orders: ST evaluate and treat  Medical Diagnosis: I69.30 (ICD-10-CM) - Late effect of stroke    Visit #/Visits authorized: 6/ 10 (8 prior to this auth) KX   Date of Evaluation:  1/28/19  Insurance Authorization Period: 3/7/2019 to 12/31/19   Plan of Care Expiration Date:  1/28/2019 to 3/25/19  Extended POC: n/a     Time In: 1345   Time Out:  1425   Total Billable Time: 40 minutes     Precautions: Standard and Fall  Subjective:   Pt reports: that he is interested in something for a home program. Discussed different apps available for home practice. Ultimately recommended [employer] Language 2 in 1 2/2 feedback and cueing build into application.  Pt response to previous therapy session: "Alright"  Pain Scale:  0/10 on VAS currently.   Pain Location: n/a  Objective:   UNTIMED  Procedure Min.   Speech- Language- Voice Therapy    45   Total Untimed Units: 1  Charges Billed/# of units: 1    Short Term Goals: (4 weeks) Current Progress:   1. Pt will complete R/L body part discrimination tasks with 70% Carmen. Goal Met 2/7/19/ Discontinue    2. Pt will follow 3 unit body commands with 80% acc sergio Goal Not Met / Revise to 2 unit   Not formally targeted   3. Pt will follow 3 unit commands with visual stimuli with 70% Carmen. Goal Not Met / Revise to 2 unit   Not formally targeted   4. Pt will name common objects with 90% Carmen.  Progressing/ Not Met 3/28/2019   Pt named objects with 50% acc nidly, 75% acc given a semantic cue, 92% acc given a phonemic cue.   Pt named items in pictures with 40% acc indly, 80% acc given phrase completion and phonemic cue.    5. Pt will generate a response to basic sentence completions with 80% Carmen   Progressing/ Not Met 3/28/2019   " Pt completed sentences with 70% acc indly, 90% acc given min verbal cues.   6. Pt will name 5 concrete category items with Carmen.  Progressing/ Not Met 3/28/2019    Not formally addressed.    7. Pt will repeat  3-5 word phrases with 80% Carmen. Goal Met 2/21/19 / Discontinue   8. Assess reading and writing skills. Goal met 2/26/2019/ Discontinue.   9. pt will follow 2 unit body part commands with 90% acc indly to improve auditory comprehension. Goal Met 3/4/19 / Discontinue   10.  pt will follow 2 unit commands with visual stimuli with 90% acc indly to improve auditory comprehension.   Progressing/ Not Met 3/28/2019   Not formally addressed     11. pt and a conversational partner will use supported communication for persons with   aphasia to facilitate 2 conversational turns with a communication partner.   Progressing/ Not Met 3/28/2019  Not formally addressed.    12. Patient will match phrases to pictures to increase reading comprehension skills with 90% accuracy given min A. Goal Met 3/4/19 / Discontinue   13. Patient will write name and address to increase written expression skills with 90% accuracy given verbal presentation of letters  Progressing/ Not Met 3/28/2019  .  Not formally addressed     14.  pt will complete reading comprehension tasks at the sentence level with 90% acc indly.  Progressing/ Not Met 3/28/2019   Pt read wh- questions and selected the answer from a field of three with 73% acc indly, 100% acc given a verbal cue.    15. pt will follow 3 unit body part commands with 90% acc indly.   Progressing/ Not Met 3/28/2019   Pt followed 3 unit body part commands with 20% acc indly, 50% acc with a repetition, and 100% acc given a repetition and a model.    16. Patient will write grapheme to corresponding phoneme with 90% acc indly.  Progressing/ Not Met 3/28/2019     Not formally addressed         Patient Education/Response:   [employer] [facility] Language 4 in 1 therapy as a home program reviewed with patient.  Purchase not required by SLP but suggested as a carryover activity.     Assessment:   Torito is progressing well towards his goals.  Current goals remain appropriate. Increased accuracy with reading comprehension today.  Pt prognosis is Good. Pt will continue to benefit from skilled outpatient speech and language therapy to address the deficits listed in the problem list on initial evaluation, provide pt/family education and to maximize pt's level of independence in the home and community environment.     Barriers to Therapy: sleeping pattern, possible transportation  Pt's spiritual, cultural and educational needs considered and pt agreeable to plan of care and goals.    Plan:   Continue POC with focus on written cues as support.     SERINA Paz, CCC-SLP   3/28/2019

## 2019-03-28 NOTE — PROGRESS NOTES
"  Physical Therapy Daily Treatment Note     Name: Torito Harris  Clinic Number: [id number]    Therapy Diagnosis:   Encounter Diagnoses   Name Primary?    Weakness of right leg     Impaired functional mobility, balance, gait, and endurance      Physician: Ian Feliciano MD    Visit Date: 3/28/2019    Physician Orders: PT Eval and Treat   Medical Diagnosis from Referral: Late effect of stroke  Evaluation Date: 1/28/2019  Authorization Period Expiration: 02/12/19-12/31/19  Plan of Care Expiration: 03/19/19 to 05/14/19  Visit # / Visits authorized: 7/10 (Vist #15 of 2019)     Time In: 13:00  Time Out: 13:45  Total Billable Time: 45 minutes    Precautions: Standard, Diabetes, Fall and right hemiplegia, hearing aid, safety awareness, cognitive deficits, speech deficits    Subjective     Pt reports: "I'm good."   HEP Compliance: pt reports he is doing all of his exercises at home.  Response to previous treatment: no complaints  Functional change: none reported    Pain: 0/10  Location: N/A      Objective     Torito received therapeutic exercises to develop strength, endurance, ROM, flexibility and core stabilization for 35 minutes including:      X 10 min on Nu Step recumbent stepper.  B UE/ B LE, on level 4.0  3 x 30 sec of R LE HS stretches, manually  X 30 reps of AROM IR/ER ( attempted YTB, but unable)  X 20 reps of R LE SLR, AAROM for full extension, rest break in between sets  X 20 reps of R SAQ over bolster, 3 sec hold, rest break in between sets  X 20 reps of R LE hooklying hip adduction with YTB      Patient participated in gait training activities to normalize gait pattern for 10 minutes. The following activities were included:   Gait belt used for safety. Assistive device: SBQC  Ambulated into clinic with SBQC and Supervision  Ambulated over 300ft with Brittney Walker and CGA.  VC's for heel/toe gait      Patient participated in neuromuscular re-education activities to improve: Balance, Coordination, Sense, " Proprioception and Posture for 0 minutes. The following activities were included:               Home Exercises Provided and Patient Education Provided     Education provided: Progress with therapy.     Written Home Exercises Provided: Continue current HEP.     See EMR under Media for exercises provided 2/5/2019.    Assessment   Mr. Ugarte tolerated tx session well and did not have any complaints.  Pt focused on R LE strengthening today and required AAROM for SLR, and SAQ to fully extend his R knee and for control.  Pt was able to perform hook lying hip adduction better today and had better control with exercises.    Cont with POC.    Torito is progressing well towards his goals.   Pt prognosis is Good.     Pt will continue to benefit from skilled outpatient physical therapy to address the deficits listed in the problem list box on initial evaluation, provide pt/family education and to maximize pt's level of independence in the home and community environment.     Pt's spiritual, cultural and educational needs considered and pt agreeable to plan of care and goals.     Anticipated Barriers for therapy: hearing deficit, speech deficits, cognitive deficits, decreased safety awareness, fall risk, visual neglect, transportation assistance required    Goals:  Short Term Goals: 4 weeks   1. Pt to be (I) with established HEP MET 03/19/19  2. Pt to improve R hip flexion strength MMT score to at least 4+/5 for improved gait mechanics Ongoing  3. Pt to improve R hip extension strength MMT score to at least 4/5 for improved gait mechanics Ongoing  4. Pt to improve R hip AB and R hip AD strength MMT scores to at least 3+/5 for improved gait mechanics MET 03/04/19  5.  Pt to improve R knee flexion and extension strength MMT scores to at least 4+/5 for improved gait mechanics Ongoing  6. Pt to improve R ankle PF strength MMT score to at least 2/5 for improved gait mechanics MET 03/04/19  7. Pt to improve chair rise score to at least  5 times with no more than 1 UE support and S for improved endurance and safety with functional transfers MET 03/04/19  8. Pt to improve TUG score to at least 30 seconds for improved safety with household mobility Ongoing  9. Pt to improve SSWS score to at least 0.40 m/sec for improved safety with community mobility. Ongoing     Long Term Goals: 8 weeks   1. Pt to be (I) with advanced HEP Ongoing  2. Pt to improve R hip extension strength MMT score to at least 4+/5 for improved gait mechanics Ongoing  3. Pt to improve R hip AB and R hip AD strength MMT scores to at least 4-/5 for improved gait mechanics Ongoing  4. Pt to improve R ankle PF strength MMT score to at least 2+/5 for improved gait mechanics MET 03/19/19  5. Pt to improve chair rise score to at least 5 times with no UE support and S for improved endurance and safety with functional transfers Ongoing  6. Pt to improve TUG score to at least 26 seconds for improved safety with household mobility Ongoing  7. Pt to improve SSWS score to at least 0.50 m/sec for improved safety with community mobility. Ongoing     Plan     Plan for next visit: Continue standing there-act to address weight acceptance onto RLE; stair negotiation on wooden gym stair case. Ankle T-band exercises (or AROM as patient tolerates), LE strengthening exercises.    Continue outpatient physical therapy 2x weekly under current established Plan of Care,03/19/19 to 05/14/19, with treatment to include: pt education, HEP, therapeutic exercises, neuromuscular re-education/balance exercises, therapeutic activities, joint mobilizations, and modalities PRN, to work towards established goals. Pt may be seen by PTA to carry out plan of care.         Lucy Vanegas, SHERICE

## 2019-04-01 ENCOUNTER — DOCUMENTATION ONLY (OUTPATIENT)
Dept: REHABILITATION | Facility: HOSPITAL | Age: 59
End: 2019-04-01

## 2019-04-01 NOTE — PROGRESS NOTES
PT/PTA met face to face to discuss pt's treatment plan and progress towards established goals.  Continue with current PT POC with focus on endurance, weight shifting, weight acceptance, and balance.  Patient will be seen by physical therapist at least every sixth treatment or 30 days, whichever occurs first.    Lucy Vanegas, PTA  04/01/2019

## 2019-04-01 NOTE — PROGRESS NOTES
Face to face meeting completed with Hanh Brooke PT regarding current status and progress of   Torito Harris .LE strength and weight shift SLS  Alvarado Escobedo, PTA

## 2019-04-02 ENCOUNTER — CLINICAL SUPPORT (OUTPATIENT)
Dept: REHABILITATION | Facility: HOSPITAL | Age: 59
End: 2019-04-02
Attending: PSYCHIATRY & NEUROLOGY
Payer: COMMERCIAL

## 2019-04-02 DIAGNOSIS — Z74.09 IMPAIRED FUNCTIONAL MOBILITY, BALANCE, GAIT, AND ENDURANCE: ICD-10-CM

## 2019-04-02 DIAGNOSIS — R29.898 WEAKNESS OF RIGHT LEG: ICD-10-CM

## 2019-04-02 DIAGNOSIS — R47.01 BROCA'S APHASIA: ICD-10-CM

## 2019-04-02 DIAGNOSIS — Z74.09 IMPAIRED MOBILITY AND ACTIVITIES OF DAILY LIVING: ICD-10-CM

## 2019-04-02 DIAGNOSIS — Z78.9 IMPAIRED MOBILITY AND ACTIVITIES OF DAILY LIVING: ICD-10-CM

## 2019-04-02 DIAGNOSIS — R29.898 WEAKNESS OF RIGHT UPPER EXTREMITY: ICD-10-CM

## 2019-04-02 PROCEDURE — 97116 GAIT TRAINING THERAPY: CPT | Mod: PO

## 2019-04-02 PROCEDURE — 97112 NEUROMUSCULAR REEDUCATION: CPT | Mod: KX,PO,59

## 2019-04-02 PROCEDURE — 97110 THERAPEUTIC EXERCISES: CPT | Mod: KX,PO,59

## 2019-04-02 PROCEDURE — 92507 TX SP LANG VOICE COMM INDIV: CPT | Mod: KX,PO

## 2019-04-02 PROCEDURE — 97110 THERAPEUTIC EXERCISES: CPT | Mod: PO,59

## 2019-04-02 NOTE — PROGRESS NOTES
Occupational Therapy Daily Treatment Note     Name: Torito Harris  [facility] Number: [id number]  Physician: Ian Feliciano MD  Medical Diagnosis: L CVA      Therapy Diagnosis:   Encounter Diagnoses   Name Primary?    Impaired mobility and activities of daily living     Weakness of right upper extremity      Visit Date: 4/2/2019  Physician Orders: Eval and Treat   Surgical Procedure and Date: NA  Evaluation Date: 1/28/19   Insurance Authorization Period Expiration: 12/31/19   Plan of Care Certification Period: 4/19/19   Date of Return to MD: unknown at this time     Visit # / Visits authorized: 18 ( 8/10 new auth)   Time In: 1348   Time Out: 1433   Total Billable Time: 45 minutes      Precautions: Standard, fall risk, hearing impaired     Subjective     Pt reports: no new complaints. Patient reports he is feeling well.   Patient reports compliance with GG splint and grasp and release tasks.       Response to previous treatment:positive   Functional change: progressing     Pain: none reported   Location: NA     Objective   Patient seen by OT this date.  Pt ambulated to treatment session with quad cane and SBA.  Patient's wife was present for treatment session.  Patient received individual therapy with activities as follows:     Torito participated in therapeutic exercises to improve ROM and strength for 20 minutes:   - UBE level 4 x 4 min forwards x 4 min reverse   - overhead pulleys for shoulder flexion x 2 min   - wrist extension with 1# dowel x 15 reps x 2; OT assistance to maximize end range   - AROM sup/pro x 15 reps x 2   - AROM wrist ext/flex x 15 reps x 2        Patient participated in neuromuscular reeducation activities for 25 minutes to maximize ROM, motor strength and neuromuscular and proprioceptive input to the RUE.  The following activities were included:   Sitting EOM:   - restoration of R hand arches and metacarpal stretches   - general R wrist stretches: 1. P-A glide of scaphoid on radius, 2.  Radial deviation, 3. Increasing mobility of metacarpals, 4. Carpal roll, 5. Movement of forearm on hand towards wrist extension, 6. Movement of forearm on the hand toward supination/ pronation, 7. wrist ext with distraction   - body on arm weight shifts in stand to promote wrist and digit extension   - in stand, pt performed grap and release of cups at counter top; self assist to reach forwards and laterally for cups  - passing ball from cup in R <> cup in L   - OT donned pt's personal GG splint at end of session for following PT session.       Functional mobility:   - sit <> stand with SBA  - gait with quad cane and close SBA       Home Exercises and Education Provided     Education provided:  - HEP   - Patient was instructed to wear splint for up to 3 hours and then remove and perform grasp and release tasks.   - Progress towards goals   - POC     Written Home Exercises Provided: no new, continue HEP    Patient instructed to cont prior HEP as verbally instructed in previous tx sessions. Exercises were reviewed and Torito was able to demonstrate them prior to the end of the session.  Torito demonstrated good  understanding of the HEP provided.       Pt has no cultural, educational or language barriers to learning provided.    Assessment   Patient tolerated treatment session well.  OT continued with internvetion techniques to increase RUE range of motion, strength, and motor control.  Increased focus on wrist and forearm movements to improve functional grasp, move, and manipulation of objects.  Motion is slowly but steadily improving, but he continues to require assistance for all wrist movements through full ROM.  Additionally, he continues with R hand limitations for grasp and release, but performance continues to improve. OT continues to reinforce compliance with HEP to maximize progress outside of therapy.     Pt would continue to benefit from skilled OT. Torito is progressing well towards his goals and there  are no updates to goals at this time. Pt prognosis is Good due to patient remains motivated to participate and has good family support from his spouse. Pt will continue to benefit from skilled outpatient occupational therapy to address the deficits listed in the problem list on initial evaluation provide pt/family education and to maximize pt's level of independence in the home and community environment.      Anticipated barriers to occupational therapy: none     Pt's spiritual, cultural and educational needs considered and pt agreeable to plan of care and goals.    Goals:  Short Term Goals: 3 weeks   ROM/Strength to perform the ADL's and functional activities listed below, - in progress  Pt will improve functional  strength needed for tasks to 20# in R hand. - in progress   Pt will improve AROM for shoulder flexion needed for functional tasks to 60* in R UE. - in progress   UE dressing will improve to Min A - in progress   LB dressing will improve to Mod A  - in progress   Toileting will improve to Min A incorporating R hand for task  - in progress   Pt will be Independent with HEP to improve ROM and FM skills. - in progress      Long Term Goals: 6 weeks   ROM/Strength to perform the ADL's and functional activities listed below - in progress   Pt will improve functional  strength needed for tasks to 30# in R hand. - in progress   Pt will improve AROM for shoulder flexion needed for functional tasks to 80* in R UE. - in progress   UE dressing will improve to Supervision   LB dressing will improve to Min A   Toileting will improve to Supervision incorporating R hand for task   Pt will complete clinical testing for skills needed for driving  G code self care CK    Plan   Continue POC in pursuit of OT goals - extend through 4/19/19.     Discussed Plan of Care with patient: Yes  Updates/Grading for next session: continue active assisted exercises and grasp and release tasks

## 2019-04-02 NOTE — PROGRESS NOTES
"  Physical Therapy Daily Treatment Note     Name: Torito Harris  [facility] Number: [id number]    Therapy Diagnosis:   Encounter Diagnoses   Name Primary?    Weakness of right leg     Impaired functional mobility, balance, gait, and endurance      Physician: Ian Feliciano MD    Visit Date: 4/2/2019    Physician Orders: PT Eval and Treat   Medical Diagnosis from Referral: Late effect of stroke  Evaluation Date: 1/28/2019  Authorization Period Expiration: 02/12/19-12/31/19  Plan of Care Expiration: 03/19/19 to 05/14/19  Visit # / Visits authorized: 8/10 (Vist #16 of 2019)     Time In: 14:34  Time Out: 15:15  Total Billable Time: 41 minutes    Precautions: Standard, Diabetes, Fall and right hemiplegia, hearing aid, safety awareness, cognitive deficits, speech deficits    Subjective     Pt reports: "I'm good."   HEP Compliance: pt reports he is doing all of his exercises at home.  Response to previous treatment: no complaints  Functional change: none reported    Pain: 0/10  Location: N/A      Objective     Torito received therapeutic exercises to develop strength, endurance, ROM, flexibility and core stabilization for 33 minutes including:      X 6 min on Sci Fit recumbent stepper, L1, BLE only for RLE strength and RLE muscular endurance  3 x 30 sec of R LE HS stretches, manually    X 10 reps sit <> stand from chair of standard height with 4" step placed under LLE to promote increased use of RLE; LUE support, CGA at R knee  X 10 reps sit <> stand from chair of standard height, both feet on ground, rubber ball placed under LUE to promote increased use of RLE to perform transfer, Min A to initiate hip clearance    3 x 10 RLE SAQs VCs to complete full range, TCs  3 x 10 RLE clamshells in L side lying, AAROM    Patient participated in gait training activities to normalize gait pattern for 8 minutes. The following activities were included:   Gait belt used for safety. Assistive device: SBQC  Ambulated into clinic with " SBQC and Supervision  Ambulated over 300ft with Brittney Walker and CGA. X 2 trials turning 180 degrees with walker focusing on proper weight shifting and proper stepping.     Home Exercises Provided and Patient Education Provided     Education provided: Progress with therapy.     Written Home Exercises Provided: Continue current HEP.     See EMR under Media for exercises provided 2/5/2019.    Assessment   Mr. Ugarte tolerated therapy session well this afternoon and remains motivated to improved. Therapy session continued to focus on improved weightbearing and utilization on RLE during sit <> stand transfers as patient continues to rely heavily on LLE and LUE to complete functional transfers. Remainder of session focused on RLE quad and hip strengthening for improved control during ambulation and sit <> stand transfers. Pt demonstrates poor RLE muscular endurance throughout SAQs and hip AB exercises.  Cont with POC to further address remaining mobility deficits.     Torito is progressing well towards his goals.   Pt prognosis is Good.     Pt will continue to benefit from skilled outpatient physical therapy to address the deficits listed in the problem list box on initial evaluation, provide pt/family education and to maximize pt's level of independence in the home and community environment.     Pt's spiritual, cultural and educational needs considered and pt agreeable to plan of care and goals.     Anticipated Barriers for therapy: hearing deficit, speech deficits, cognitive deficits, decreased safety awareness, fall risk, visual neglect, transportation assistance required    Goals:  Short Term Goals: 4 weeks   1. Pt to be (I) with established HEP MET 03/19/19  2. Pt to improve R hip flexion strength MMT score to at least 4+/5 for improved gait mechanics Ongoing  3. Pt to improve R hip extension strength MMT score to at least 4/5 for improved gait mechanics Ongoing  4. Pt to improve R hip AB and R hip AD strength MMT  scores to at least 3+/5 for improved gait mechanics MET 03/04/19  5.  Pt to improve R knee flexion and extension strength MMT scores to at least 4+/5 for improved gait mechanics Ongoing  6. Pt to improve R ankle PF strength MMT score to at least 2/5 for improved gait mechanics MET 03/04/19  7. Pt to improve chair rise score to at least 5 times with no more than 1 UE support and S for improved endurance and safety with functional transfers MET 03/04/19  8. Pt to improve TUG score to at least 30 seconds for improved safety with household mobility Ongoing  9. Pt to improve SSWS score to at least 0.40 m/sec for improved safety with community mobility. Ongoing     Long Term Goals: 8 weeks   1. Pt to be (I) with advanced HEP Ongoing  2. Pt to improve R hip extension strength MMT score to at least 4+/5 for improved gait mechanics Ongoing  3. Pt to improve R hip AB and R hip AD strength MMT scores to at least 4-/5 for improved gait mechanics Ongoing  4. Pt to improve R ankle PF strength MMT score to at least 2+/5 for improved gait mechanics MET 03/19/19  5. Pt to improve chair rise score to at least 5 times with no UE support and S for improved endurance and safety with functional transfers Ongoing  6. Pt to improve TUG score to at least 26 seconds for improved safety with household mobility Ongoing  7. Pt to improve SSWS score to at least 0.50 m/sec for improved safety with community mobility. Ongoing     Plan     Plan for next visit: Continue standing there-act to address weight acceptance onto RLE; focus on R quad strength and R gross hip strength for improved control and RLE utilization during ambulation and standing activities.     Continue outpatient physical therapy 2x weekly under current established Plan of Care,03/19/19 to 05/14/19, with treatment to include: pt education, HEP, therapeutic exercises, neuromuscular re-education/balance exercises, therapeutic activities, joint mobilizations, and modalities PRN, to  work towards established goals. Pt may be seen by PTA to carry out plan of care.     Hanh Brooke, PT

## 2019-04-02 NOTE — PROGRESS NOTES
"Outpatient Neurological Rehabilitation   Speech and Language Therapy Daily Note  Date:  4/2/2019     Name: Torito Harris   MRN: [id number]   Therapy Diagnosis:   Encounter Diagnosis   Name Primary?    Broca's aphasia    Physician: Ian Feliciano MD  Physician Orders: ST evaluate and treat  Medical Diagnosis: I69.30 (ICD-10-CM) - Late effect of stroke    Visit #/Visits authorized: 7/ 10 (8 prior to this auth) KX   Date of Evaluation:  1/28/19  Insurance Authorization Period: 3/7/2019 to 12/31/19   Plan of Care Expiration Date:  1/28/2019 to 3/25/19  Extended POC: n/a     Time In: 1300  Time Out:  1345  Total Billable Time: 45    Precautions: Standard and Fall  Subjective:   Pt reports: When asked how his weekend was, patient responded "good".  Pt response to previous therapy session: "Alright"  Pain Scale:  0/10 on VAS currently.   Pain Location: n/a  Objective:   UNTIMED  Procedure Min.   Speech- Language- Voice Therapy    45 minutes   Total Untimed Units: 1  Charges Billed/# of units: 1    Short Term Goals: (4 weeks) Current Progress:   1. Pt will name common objects with 90% Carmen.  Progressing/ Not Met 4/2/2019   Pt named objects with 50% acc nidly, 90% acc given a semantic cue, 100% acc given a phonemic cue.   Pt named items in pictures with 60% acc indly, 80% acc given phrase completion and phonemic cue.    2. Pt will generate a response to basic sentence completions with 80% Carmen   Progressing/ Not Met 4/2/2019   Pt completed automatic sentences with 90% acc indly, 100% acc given min verbal cues.    Goal met x's 1.   3. Pt will name 5 concrete category items with Carmen.  Progressing/ Not Met 4/2/2019    Not formally addressed.    4.  pt will follow 2 unit commands with visual stimuli with 90% acc indly to improve auditory comprehension.   Progressing/ Not Met 4/2/2019   Patient followed 2 unit commands with visual stimuli with 87% acc indly and 100% acc given a model.   5. pt and a conversational partner " will use supported communication for persons with   aphasia to facilitate 2 conversational turns with a communication partner.   Progressing/ Not Met 4/2/2019  Patient used gestures to convey that he wanted to talk about his tablet at home. Clinician and wife labeled different objects and pt responded with yes/no  with 100% acc indly until the correct item was identified.    6. Patient will write name and address to increase written expression skills with 90% accuracy given verbal presentation of letters  Progressing/ Not Met 4/2/2019  .  Not formally addressed     7.  pt will complete reading comprehension tasks at the sentence level with 90% acc indly.  Progressing/ Not Met 4/2/2019   Not formally addressed    8. pt will follow 3 unit body part commands with 90% acc indly.   Progressing/ Not Met 4/2/2019   Pt followed 3 unit body part commands with 40% acc indly, 60% acc with a repetition, and 100% acc given a repetition and a model.    9. Patient will write grapheme to corresponding phoneme with 90% acc indly.  Progressing/ Not Met 4/2/2019     Patient selected the correct letter out of a field of 3 with 100% acc indly. Clinician prompted pt to verbally state each grapheme after he identified the correct one. He repeated graphemes with 100% acc indly       Patient Education/Response:   Patient provided education re: purpose of phoneme-grapheme treatment and success with activity.     Assessment:   Torito is progressing well towards his goals.  Current goals remain appropriate. Increased verbal expression during conversation today. Pt initiated conversation 3 times during today's session, pointing and gesturing to objects in the room.  Pt prognosis is Good. Pt will continue to benefit from skilled outpatient speech and language therapy to address the deficits listed in the problem list on initial evaluation, provide pt/family education and to maximize pt's level of independence in the home and community  "environment.     Barriers to Therapy: sleeping pattern, possible transportation  Pt's spiritual, cultural and educational needs considered and pt agreeable to plan of care and goals.    Plan:   Continue POC with focus on written cues as support.     Oxana Dozier, ALEXANDREA   4/2/2019                                                                                                                                                                                                                                                 "I, SERINA Mott, CCC-SLP , certify that I was present in the room directing the student in service delivery and guiding them using my skilled judgement.  As the co-signing therapist, I have reviewed the student's documentation and am responsible for the treatment, assessment and plan."  SERINA Mott, CCC-SLP  Speech Language Pathologist   4/2/2019                "

## 2019-04-04 ENCOUNTER — CLINICAL SUPPORT (OUTPATIENT)
Dept: REHABILITATION | Facility: HOSPITAL | Age: 59
End: 2019-04-04
Attending: PSYCHIATRY & NEUROLOGY
Payer: COMMERCIAL

## 2019-04-04 DIAGNOSIS — R29.898 WEAKNESS OF RIGHT LEG: ICD-10-CM

## 2019-04-04 DIAGNOSIS — R47.01 BROCA'S APHASIA: ICD-10-CM

## 2019-04-04 DIAGNOSIS — Z74.09 IMPAIRED FUNCTIONAL MOBILITY, BALANCE, GAIT, AND ENDURANCE: ICD-10-CM

## 2019-04-04 DIAGNOSIS — R29.898 WEAKNESS OF RIGHT UPPER EXTREMITY: ICD-10-CM

## 2019-04-04 DIAGNOSIS — Z74.09 IMPAIRED MOBILITY AND ACTIVITIES OF DAILY LIVING: ICD-10-CM

## 2019-04-04 DIAGNOSIS — Z78.9 IMPAIRED MOBILITY AND ACTIVITIES OF DAILY LIVING: ICD-10-CM

## 2019-04-04 PROCEDURE — 97110 THERAPEUTIC EXERCISES: CPT | Mod: KX,PO,59

## 2019-04-04 PROCEDURE — 97112 NEUROMUSCULAR REEDUCATION: CPT | Mod: KX,PO,59

## 2019-04-04 PROCEDURE — 92507 TX SP LANG VOICE COMM INDIV: CPT | Mod: KX,PO

## 2019-04-04 PROCEDURE — 97116 GAIT TRAINING THERAPY: CPT | Mod: PO

## 2019-04-04 PROCEDURE — 97110 THERAPEUTIC EXERCISES: CPT | Mod: PO,59

## 2019-04-04 NOTE — PROGRESS NOTES
Occupational Therapy Daily Treatment Note     Name: Torito Harris  Clinic Number: [id number]  Physician: Ian Feliciano MD  Medical Diagnosis: L CVA      Therapy Diagnosis:   Encounter Diagnoses   Name Primary?    Broca's aphasia     Impaired mobility and activities of daily living     Weakness of right upper extremity      Visit Date: 4/4/2019  Physician Orders: Eval and Treat   Surgical Procedure and Date: NA  Evaluation Date: 1/28/19   Insurance Authorization Period Expiration: 12/31/19   Plan of Care Certification Period: 4/19/19   Date of Return to MD: unknown at this time     Visit # / Visits authorized: 19 ( 9/10 new auth)   Time In: 1345  Time Out: 1430   Total Billable Time: 45 minutes      Precautions: Standard, fall risk, hearing impaired     Subjective     Pt reports: nothing significant   Patient reports compliance with GG splint and grasp and release tasks.       Response to previous treatment:positive   Functional change: progressing     Pain: none reported   Location: NA     Objective   Patient seen by OT this date.  Pt ambulated to treatment session with quad cane and SBA.  Patient's wife was present for treatment session.  Patient received individual therapy with activities as follows:     Torito participated in therapeutic exercises to improve ROM and strength for 30 minutes:   - UBE level 4 x 4 min forwards x 4 min reverse   - overhead pulleys for shoulder flexion x 2 min   - supine chest press with 2# dowel x 10 reps x 2   - supine shoulder flexion with 2# dowel x 10 reps x 2  - seated bicep curls with 2# dowel x 10 reps x 2; Min A   - seated wrist extension with 2# dowel x 10 reps x 2; Mod A   - gross gripping yellow theraputty x 3 minutes        Patient participated in neuromuscular reeducation activities for 15 minutes to maximize ROM, motor strength and neuromuscular and proprioceptive input to the RUE.  The following activities were included:   Sitting EOM:   - restoration of R  hand arches and metacarpal stretches   - general R wrist stretches: 1. P-A glide of scaphoid on radius, 2. Radial deviation, 3. Increasing mobility of metacarpals, 4. Carpal roll, 5. Movement of forearm on hand towards wrist extension, 6. Movement of forearm on the hand toward supination/ pronation, 7. wrist ext with distraction   - body on arm weight shifts to promote wrist and digit extension   - on tabletop, extension rolls with large flexbar; Mod A       Functional mobility:   - sit <> stand with SBA  - sit <> supine with SBA   - gait with quad cane and close SBA       Home Exercises and Education Provided     Education provided re:  - HEP   - Patient was instructed to wear splint for up to 3 hours and then remove and perform grasp and release tasks.   - Progress towards goals   - POC     Written Home Exercises Provided: no new, continue HEP    Patient instructed to cont prior HEP as verbally instructed in previous tx sessions. Exercises were reviewed and Torito was able to demonstrate them prior to the end of the session.  Torito demonstrated good  understanding of the HEP provided.       Pt has no cultural, educational or language barriers to learning provided.    Assessment   Patient tolerated treatment session well.  OT continued with internvetion techniques to increase RUE range of motion, strength, and motor control.  Putty exercises initiated to maximize R hand  strength.  Additionally, OT continues with intervention techniques to improve digit extension for active release of objects.  He required Mod A for extension rolls to maintain extension of digits.  Continue POC to improve RUE functional use for self care skills and functional tasks in the home environment.      Pt would continue to benefit from skilled OT. Torito is progressing well towards his goals and there are no updates to goals at this time. Pt prognosis is Good due to patient remains motivated to participate and has good support from  his spouse. Pt will continue to benefit from skilled outpatient occupational therapy to address the deficits listed in the problem list on initial evaluation provide pt/family education and to maximize pt's level of independence in the home and community environment.      Anticipated barriers to occupational therapy: none     Pt's spiritual, cultural and educational needs considered and pt agreeable to plan of care and goals.    Goals:  Short Term Goals: 3 weeks   ROM/Strength to perform the ADL's and functional activities listed below, - in progress  Pt will improve functional  strength needed for tasks to 20# in R hand. - in progress   Pt will improve AROM for shoulder flexion needed for functional tasks to 60* in R UE. - in progress   UE dressing will improve to Min A - in progress   LB dressing will improve to Mod A  - in progress   Toileting will improve to Min A incorporating R hand for task  - in progress   Pt will be Independent with HEP to improve ROM and FM skills. - in progress      Long Term Goals: 6 weeks   ROM/Strength to perform the ADL's and functional activities listed below - in progress   Pt will improve functional  strength needed for tasks to 30# in R hand. - in progress   Pt will improve AROM for shoulder flexion needed for functional tasks to 80* in R UE. - in progress   UE dressing will improve to Supervision   LB dressing will improve to Min A   Toileting will improve to Supervision incorporating R hand for task   Pt will complete clinical testing for skills needed for driving  G code self care CK    Plan   Continue POC in pursuit of OT goals - extend through 4/19/19.     Discussed Plan of Care with patient: Yes  Updates/Grading for next session: continue active assisted exercises and grasp and release tasks

## 2019-04-04 NOTE — PROGRESS NOTES
"  Physical Therapy Daily Treatment Note     [id number]    Therapy Diagnosis:   Encounter Diagnoses   Name Primary?    Weakness of right leg     Impaired functional mobility, balance, gait, and endurance      Physician: Ian Feliciano MD    Visit Date: 4/4/2019    Physician Orders: PT Eval and Treat   Medical Diagnosis from Referral: Late effect of stroke  Evaluation Date: 1/28/2019  Authorization Period Expiration: 02/12/19-12/31/19  Plan of Care Expiration: 03/19/19 to 05/14/19  Visit # / Visits authorized: 9/10 (Vist #17 of 2019)     Time In: 13:00  Time Out: 13:45  Total Billable Time: 45 minutes    Precautions: Standard, Diabetes, Fall and right hemiplegia, hearing aid, safety awareness, cognitive deficits, speech deficits    Subjective     Pt reports: "Doing fine."  HEP Compliance: pt reports he is doing all of his exercises at home.  Response to previous treatment: no complaints  Functional change: none reported    Pain: 0/10  Location: N/A      Objective     Torito received therapeutic exercises to develop strength, endurance, ROM, flexibility and core stabilization for 37 minutes including:      3 x 30 sec of R LE HS stretches, manually    X 15 reps sit <> stand from chair of standard height with 4" step placed under LLE to promote increased use of RLE; LUE support, CGA at R knee  X 10 reps sit <> stand from chair of standard height, both feet on ground, rubber ball placed under LUE to promote increased use of RLE to perform transfer, Min A progressed to CGA to initiate hip clearance    3 x 10 RLE clamshells in L side lying, A/AROM  3 x 10 standing RLE hip flexion with 1.0# cuff weight in // bars, BUE support    3 x 10 RLE leg press with 60.0# weight; Min A from PT to maintain proper foot positioning  1 x 10 BLE leg press with 100.0# weight; Min A from PT to maintain proper RLE positioning    Patient participated in gait training activities to normalize gait pattern " for 8 minutes. The following activities were included:   Gait belt used for safety. Assistive device: SBQC  Ambulated into clinic with SBQC and Supervision  Ambulated over 300ft with Brittney Walker and CGA. X 2 trials turning 180 degrees with walker focusing on proper weight shifting and proper stepping.     Home Exercises Provided and Patient Education Provided     Education provided: Progress with therapy.     Written Home Exercises Provided: Continue current HEP.     See EMR under Media for exercises provided 2/5/2019.    Assessment   Mr. Ugarte tolerated therapy session well this afternoon. Therapy session continued to focus on improved weightbearing and utilization on RLE during sit <> stand transfers as patient continues to rely heavily on LLE and LUE to complete functional transfers. Pt demonstrates improved control during sit <> stand transfer training today compared to prior session. Initiated leg press for RLE strengthening; pt tolerated this activity well with no complaints. Plan to continue to address R hip and R knee strength deficits to improve safety and independence with functional transfers and ambulation.   Cont with POC to further address remaining mobility deficits.     Torito is progressing well towards his goals.   Pt prognosis is Good.     Pt will continue to benefit from skilled outpatient physical therapy to address the deficits listed in the problem list box on initial evaluation, provide pt/family education and to maximize pt's level of independence in the home and community environment.     Pt's spiritual, cultural and educational needs considered and pt agreeable to plan of care and goals.     Anticipated Barriers for therapy: hearing deficit, speech deficits, cognitive deficits, decreased safety awareness, fall risk, visual neglect, transportation assistance required    Goals:  Short Term Goals: 4 weeks   1. Pt to be (I) with established HEP MET 03/19/19  2. Pt to improve R hip flexion  strength MMT score to at least 4+/5 for improved gait mechanics Ongoing  3. Pt to improve R hip extension strength MMT score to at least 4/5 for improved gait mechanics Ongoing  4. Pt to improve R hip AB and R hip AD strength MMT scores to at least 3+/5 for improved gait mechanics MET 03/04/19  5.  Pt to improve R knee flexion and extension strength MMT scores to at least 4+/5 for improved gait mechanics Ongoing  6. Pt to improve R ankle PF strength MMT score to at least 2/5 for improved gait mechanics MET 03/04/19  7. Pt to improve chair rise score to at least 5 times with no more than 1 UE support and S for improved endurance and safety with functional transfers MET 03/04/19  8. Pt to improve TUG score to at least 30 seconds for improved safety with household mobility Ongoing  9. Pt to improve SSWS score to at least 0.40 m/sec for improved safety with community mobility. Ongoing     Long Term Goals: 8 weeks   1. Pt to be (I) with advanced HEP Ongoing  2. Pt to improve R hip extension strength MMT score to at least 4+/5 for improved gait mechanics Ongoing  3. Pt to improve R hip AB and R hip AD strength MMT scores to at least 4-/5 for improved gait mechanics Ongoing  4. Pt to improve R ankle PF strength MMT score to at least 2+/5 for improved gait mechanics MET 03/19/19  5. Pt to improve chair rise score to at least 5 times with no UE support and S for improved endurance and safety with functional transfers Ongoing  6. Pt to improve TUG score to at least 26 seconds for improved safety with household mobility Ongoing  7. Pt to improve SSWS score to at least 0.50 m/sec for improved safety with community mobility. Ongoing     Plan     Plan for next visit: Continue standing there-act to address weight acceptance onto RLE; focus on R quad strength and R gross hip strength for improved control and RLE utilization during ambulation and standing activities.     Continue outpatient physical therapy 2x weekly under current  established Plan of Care,03/19/19 to 05/14/19, with treatment to include: pt education, HEP, therapeutic exercises, neuromuscular re-education/balance exercises, therapeutic activities, joint mobilizations, and modalities PRN, to work towards established goals. Pt may be seen by PTA to carry out plan of care.     Hanh Brooke, PT

## 2019-04-04 NOTE — PROGRESS NOTES
Outpatient Neurological Rehabilitation   Speech and Language Therapy Daily Note  Date:  4/4/2019     Name: Torito Harris   MRN: [id number]   Therapy Diagnosis:   Encounter Diagnosis   Name Primary?    Broca's aphasia    Physician: Ian Feliciano MD  Physician Orders: ST evaluate and treat  Medical Diagnosis: I69.30 (ICD-10-CM) - Late effect of stroke    Visit #/Visits authorized: 8/ 10 (8 prior to this auth) KX   Date of Evaluation:  1/28/19  Insurance Authorization Period: 3/7/2019 to 12/31/19   Plan of Care Expiration Date:  1/28/2019 to 3/25/19; 3/28/19 to 5/24/19  Extended POC: n/a     Time In: 1430   Time Out:  1515   Total Billable Time: 45 minutes     Precautions: Standard and Fall  Subjective:   Pt reports: that he did not like the weather.    Pain Scale:  0/10 on VAS currently.   Pain Location: n/a  Objective:   UNTIMED  Procedure Min.   Speech- Language- Voice Therapy    45 minutes   Total Untimed Units: 1  Charges Billed/# of units: 1    Short Term Goals: (4 weeks) Current Progress:   1. Pt will name common objects with 90% Carmen.  Progressing/ Not Met 4/4/2019   58% acc indly, 100% acc given semantic cues   2. Pt will generate a response to basic sentence completions with 80% Carmen   Progressing/ Not Met 4/4/2019   Pt completed sentences with 20% acc indly, 100% acc given max verbal cues and repetitions.   3. Pt will name 5 concrete category items with Carmen.  Progressing/ Not Met 4/4/2019    Not formally addressed.    4.  pt will follow 2 unit commands with visual stimuli with 90% acc indly to improve auditory comprehension.   Progressing/ Not Met 4/4/2019   Not formally addressed     5. pt and a conversational partner will use supported communication for persons with   aphasia to facilitate 2 conversational turns with a communication partner.   Progressing/ Not Met 4/4/2019  Not formally addressed     6. Patient will write name and address to increase written expression skills with 90% accuracy given  verbal presentation of letters  Progressing/ Not Met 4/4/2019  .  Not formally addressed     7.  pt will complete reading comprehension tasks at the sentence level with 90% acc indly.  Progressing/ Not Met 4/4/2019   Not formally addressed    8. pt will follow 3 unit body part commands with 90% acc indly.   Progressing/ Not Met 4/4/2019  Not formally addressed     9. Patient will write grapheme to corresponding phoneme with 90% acc indly.  Progressing/ Not Met 4/4/2019     Not formally addressed       Pt identified the missing phrase from a sentences from a field of 3 with 70% acc indly, 100% acc given verbal cues (I.e. Constant Therapy Level 1 Passive Sentence completion)    Patient Education/Response:   Tactus therapy, constant therapy reviewed with pt as avenues for home practice. Pt and his wife indicated understanding.     Assessment:   Torito is progressing well towards his goals.  Current goals remain appropriate. Pt with improved use of gesture to aid in expressive communication today.  Pt prognosis is Good. Pt will continue to benefit from skilled outpatient speech and language therapy to address the deficits listed in the problem list on initial evaluation, provide pt/family education and to maximize pt's level of independence in the home and community environment.     Barriers to Therapy: sleeping pattern, possible transportation  Pt's spiritual, cultural and educational needs considered and pt agreeable to plan of care and goals.    Plan:   Continue POC with focus on written cues as support.     SERINA Paz, CCC-SLP   4/4/2019

## 2019-04-04 NOTE — PLAN OF CARE
Outpatient Neurological Rehabilitation Therapy  Updated POC     Date: 3/28/2019   Name: Torito Harris  Clinic Number: [id number]    Therapy Diagnosis:   Encounter Diagnosis   Name Primary?    Broca's aphasia      Physician: Ian Feliciano MD      Physician Orders: ST evaluate and treat  Medical Diagnosis: I69.30 (ICD-10-CM) - Late effect of stroke  Evaluation Date: 1/28/19    Total Visits Received: 7  Cancelled Visits: 0  No Show Visits: 0  Insurance Authorization Period: 1/26/19 to 12/31/19  Plan of Care Expiration:    1/28/19 to 3/25/19  New POC Certification Period:  3/28/2019 to 5/24/19    Precautions:Standard and Fall     Subjective     Update: pt with subjective progress and increased fluency.     Objective     Update: see follow up note dated 3/28/2019    Assessment     Update: Torito Harris presents to [facility] s/p medical diagnosis of late effect of stroke. Demonstrates impairments including limitations as described in the problem list. Positive prognostic factors include patient motivation. Negative prognostic factors include time post onset. He presents with Broca's Aphasia c/b decreased verbal fluency, decreased auditory comprehension, and anomia.  No barriers to therapy identified.. Patient will benefit from skilled, outpatient neurological rehabilitation speech therapy.    Rehab Potential: fair     Education: Plan of Care and role of SLP in care pt and family verbalized understanding.     Previous Short Term Goals Status: 4 weeks  Short Term Goals: (4 weeks) Current Progress:   1. Pt will complete R/L body part discrimination tasks with 70% Carmen. Goal Met 2/7/19/ Discontinue    2. Pt will follow 3 unit body commands with 80% acc sergio Goal Not Met / Revise to 2 unit   Not formally targeted   3. Pt will follow 3 unit commands with visual stimuli with 70% Carmen. Goal Not Met / Revise to 2 unit   Not formally targeted   4. Pt will name common objects with 90% Carmen.    Goal Not Met / Continue    5. Pt will generate a response to basic sentence completions with 80% Carmen  Goal Not Met / Continue    6. Pt will name 5 concrete category items with Carmen. Goal Not Met / Continue    7. Pt will repeat  3-5 word phrases with 80% Carmen. Goal Met 2/21/19 / Discontinue   8. Assess reading and writing skills. Goal met 2/26/2019/ Discontinue.   9. pt will follow 2 unit body part commands with 90% acc indly to improve auditory comprehension. Goal Met 3/4/19 / Discontinue   10.  pt will follow 2 unit commands with visual stimuli with 90% acc indly to improve auditory comprehension.  Goal Not Met / Continue    11. pt and a conversational partner will use supported communication for persons with   aphasia to facilitate 2 conversational turns with a communication partner.  Goal Not Met / Continue    12. Patient will match phrases to pictures to increase reading comprehension skills with 90% accuracy given min A. Goal Met 3/4/19 / Discontinue   13. Patient will write name and address to increase written expression skills with 90% accuracy given verbal presentation of letters   Goal Not Met / Continue    14.  pt will complete reading comprehension tasks at the sentence level with 90% acc indly. Goal Not Met / Continue    15. pt will follow 3 unit body part commands with 90% acc indly.  Goal Not Met / Continue    16. New Goal 3/26/2019: Patient will write grapheme to corresponding phoneme with 90% acc indly.    Goal Not Met / Continue         New Short Term Goals: 4 weeks  Short Term Goals: (4 weeks) Current Progress:   1. Pt will name common objects with 90% Carmen.   Goal Not Met / Continue    2. Pt will generate a response to basic sentence completions with 80% Carmen  Goal Not Met / Continue    3. Pt will name 5 concrete category items with Carmen. Goal Not Met / Continue    4.  pt will follow 2 unit commands with visual stimuli with 90% acc indly to improve auditory comprehension.  Goal Not Met / Continue     5. pt and a conversational partner will use supported communication for persons with   aphasia to facilitate 2 conversational turns with a communication partner.  Goal Not Met / Continue    6. Patient will write name and address to increase written expression skills with 90% accuracy given verbal presentation of letters   Goal Not Met / Continue    7.  pt will complete reading comprehension tasks at the sentence level with 90% acc indly. Goal Not Met / Continue    8. pt will follow 3 unit body part commands with 90% acc indly.  Goal Not Met / Continue    9. Patient will write grapheme to corresponding phoneme with 90% acc indly.  Goal Not Met / Continue      Long Term Goal Status:  8 weeks  1. Pt will comprehend communication related to basic medical and social needs and utilize compensatory strategies to maintain safety and participate socially in functional living environment.   Goal Not Met / Continue   2. The patient will develop functional, cognitive-linguistic based skills and utilize compensatory strategies to communicate wants and needs effectively to different conversation partners, maintain safety and participate socially in functional living environment Goal Not Met / Continue   3. Pt will develop functional reading and writing skills and utilize compensatory strategies to maintain safety during ADL's and read and understand everyday adult material independently. Goal Not Met / Continue       Plan     Updated Certification Period: 3/28/2019 to 5/24/19  Recommended Treatment Plan: Patient will participate in the [facility] neurological rehabilitation program for speech therapy 2 times per week to address his  Communication and Motor Speech deficits, to educate patient and their family, and to participate in a home exercise program.     Other recommendations: none at this time     Therapist's Name:  SERINA Paz, CCC-SLP   3/28/2019      I CERTIFY THE NEED FOR THESE SERVICES FURNISHED UNDER THIS PLAN  OF TREATMENT AND WHILE UNDER MY CARE    Physician's comments:     Physician's Name:

## 2019-04-06 ENCOUNTER — DOCUMENTATION ONLY (OUTPATIENT)
Dept: REHABILITATION | Facility: HOSPITAL | Age: 59
End: 2019-04-06

## 2019-04-07 NOTE — PROGRESS NOTES
PT/PTA met face to face to discuss pt's treatment plan and progress towards established goals. Continue with current PT POC. Pt will be seen by physical therapist at least every 6th treatment day or every 30 days, whichever occurs first.      Jeff Solis, PTA  04/06/2019

## 2019-04-09 ENCOUNTER — CLINICAL SUPPORT (OUTPATIENT)
Dept: REHABILITATION | Facility: HOSPITAL | Age: 59
End: 2019-04-09
Attending: PSYCHIATRY & NEUROLOGY
Payer: COMMERCIAL

## 2019-04-09 DIAGNOSIS — Z78.9 IMPAIRED MOBILITY AND ACTIVITIES OF DAILY LIVING: ICD-10-CM

## 2019-04-09 DIAGNOSIS — R47.01 BROCA'S APHASIA: ICD-10-CM

## 2019-04-09 DIAGNOSIS — R29.898 WEAKNESS OF RIGHT UPPER EXTREMITY: ICD-10-CM

## 2019-04-09 DIAGNOSIS — Z74.09 IMPAIRED MOBILITY AND ACTIVITIES OF DAILY LIVING: ICD-10-CM

## 2019-04-09 DIAGNOSIS — R29.898 WEAKNESS OF RIGHT LEG: ICD-10-CM

## 2019-04-09 DIAGNOSIS — Z74.09 IMPAIRED FUNCTIONAL MOBILITY, BALANCE, GAIT, AND ENDURANCE: ICD-10-CM

## 2019-04-09 PROCEDURE — 97110 THERAPEUTIC EXERCISES: CPT | Mod: PO,59

## 2019-04-09 PROCEDURE — 97112 NEUROMUSCULAR REEDUCATION: CPT | Mod: KX,PO,59

## 2019-04-09 PROCEDURE — 92507 TX SP LANG VOICE COMM INDIV: CPT | Mod: PO

## 2019-04-09 PROCEDURE — 97116 GAIT TRAINING THERAPY: CPT | Mod: PO

## 2019-04-09 PROCEDURE — 97110 THERAPEUTIC EXERCISES: CPT | Mod: KX,PO,59

## 2019-04-09 NOTE — PROGRESS NOTES
Outpatient Neurological Rehabilitation   Speech and Language Therapy Daily Note  Date:  4/9/2019     Name: Torito Harris   MRN: [id number]   Therapy Diagnosis:   No diagnosis found.Physician: Ian Feliciano MD  Physician Orders: ST evaluate and treat  Medical Diagnosis: I69.30 (ICD-10-CM) - Late effect of stroke    Visit #/Visits authorized: 9/ 10 (8 prior to this auth) KX   Date of Evaluation:  1/28/19  Insurance Authorization Period: 3/7/2019 to 12/31/19   Plan of Care Expiration Date:  1/28/2019 to 3/25/19; 3/28/19 to 5/24/19  Extended POC: n/a     Time In: 1345   Time Out:  1430   Total Billable Time: 45 minutes     Precautions: Standard and Fall  Subjective:   Pt reports: his tooth and his hand are bothering him. Per pt's wife, pt is having his tooth pulled by the dentist on Monday. He was distracted by his brace in the beginning of the session which negatively impacted progression though tasks today.   Pain Scale:  0/10 on VAS currently.   Pain Location: n/a  Objective:   UNTIMED  Procedure Min.   Speech- Language- Voice Therapy    45 minutes   Total Untimed Units: 1  Charges Billed/# of units: 1    Short Term Goals: (4 weeks) Current Progress:   1. Pt will name common objects with 90% Carmen.  Progressing/ Not Met 4/9/2019   50% acc indly, 100% acc given max semantic and phonemic cues   2. Pt will generate a response to basic sentence completions with 80% Carmen   Progressing/ Not Met 4/9/2019   Pt completed sentences with 90% acc indly, 100% acc given mod verbal cues and repetitions.   3. Pt will name 5 concrete category items with Carmen.  Progressing/ Not Met 4/9/2019   Animals - 1 indly, 5 with min A  Food - 2 indly, 7 with min A   4.  pt will follow 2 unit commands with visual stimuli with 90% acc indly to improve auditory comprehension.   Progressing/ Not Met 4/9/2019   Pt followed commands with 80% acc indly, 100% acc with min A   5. pt and a conversational partner will use supported communication for  persons with   aphasia to facilitate 2 conversational turns with a communication partner.   Progressing/ Not Met 4/9/2019  Not formally addressed     6. Patient will write name and address to increase written expression skills with 90% accuracy given verbal presentation of letters  Progressing/ Not Met 4/9/2019  .  Not formally addressed     7.  pt will complete reading comprehension tasks at the sentence level with 90% acc indly.  Progressing/ Not Met 4/9/2019   Not formally addressed    8. pt will follow 3 unit body part commands with 90% acc indly.   Progressing/ Not Met 4/9/2019  Not formally addressed     9. Patient will write grapheme to corresponding phoneme with 90% acc indly.  Progressing/ Not Met 4/9/2019     Not formally addressed       Pt identified the missing phrase from a sentences from a field of 3 with 70% acc indly, 100% acc given verbal cues (I.e. Constant Therapy Level 1 Passive Sentence completion)    Patient Education/Response:   Tactus therapy, constant therapy reviewed with pt as avenues for home practice. Pt and his wife indicated understanding.     Assessment:   Torito is progressing well towards his goals. Increased sentence completion.  Current goals remain appropriate.   Pt prognosis is Good. Pt will continue to benefit from skilled outpatient speech and language therapy to address the deficits listed in the problem list on initial evaluation, provide pt/family education and to maximize pt's level of independence in the home and community environment.     Barriers to Therapy: sleeping pattern, possible transportation  Pt's spiritual, cultural and educational needs considered and pt agreeable to plan of care and goals.    Plan:   Continue POC with focus on written cues as support.     SCOTT Castillo.  ADAM SLP  Clinician  4/9/2019     I certify that I was present in the room directing the student in service delivery and guiding them using my skilled judgment. As the  co-signing therapist I have reviewed the students documentation and am responsible for the treatment, assessment, and plan.     Farrah Atkinson M.S. CCC-SLP  Speech Language Pathologist   4/9/2019

## 2019-04-09 NOTE — PROGRESS NOTES
"  Physical Therapy Daily Treatment Note     [id number]    Therapy Diagnosis:   Encounter Diagnoses   Name Primary?    Weakness of right leg     Impaired functional mobility, balance, gait, and endurance      Physician: Ian Feliciano MD    Visit Date: 4/9/2019    Physician Orders: PT Eval and Treat   Medical Diagnosis from Referral: Late effect of stroke  Evaluation Date: 1/28/2019  Authorization Period Expiration: 02/12/19-12/31/19  Plan of Care Expiration: 03/19/19 to 05/14/19  Visit # / Visits authorized: 10/10 (Vist #18 of 2019)     Time In: 14:30  Time Out: 15:15  Total Billable Time: 45 minutes    Precautions: Standard, Diabetes, Fall and right hemiplegia, hearing aid, safety awareness, cognitive deficits, speech deficits    Subjective     Pt reports: that he is doing good today and is excited to walk with the platform walker and to do the leg press again.   HEP Compliance: pt reports he is doing all of his exercises at home.  Response to previous treatment: no complaints  Functional change: improved ability to side step and to step backward    Pain: 0/10  Location: N/A      Objective     Torito received therapeutic exercises to develop strength, endurance, ROM, flexibility and core stabilization for 35 minutes including:      3 x 30 sec of R LE hamstring stretches, manually    X 10 reps sit <> stand from chair of standard height with 4" step placed under LLE to promote increased use of RLE; LUE support, CGA at R knee  X 10 reps sit <> stand from chair of standard height, with 4" step placed under LLE and rubber ball placed under LUE to promote increased use of RLE to perform transfer, Min A progressed to CGA to initiate hip clearance    3 x 10 RLE clamshells in L side lying, A/AROM  3 x 10 RLE SAQs over medium bolster, AAROM, TCs to prevent hip flexion    3 x 10 RLE leg press with 60.0# weight; Min A from PT to maintain proper foot positioning  1 x 10 BLE leg press " with 100.0# weight; Min A from PT to maintain proper RLE positioning    Patient participated in gait training activities to normalize gait pattern for 10 minutes. The following activities were included:   Gait belt used for safety. Assistive device: SBQC  Ambulated into clinic with SBQC and Supervision  Ambulated over 300ft with Brittney Walker and CGA. X 2 trials turning 180 degrees with walker focusing on proper weight shifting and proper stepping.     X 4 laps backward ambulation in // bars, Min A for RUE management, BUE support, CGA    Home Exercises Provided and Patient Education Provided     Education provided: Progress with therapy.     Written Home Exercises Provided: Continue current HEP.   See EMR under Media for exercises provided 2/5/2019.    Assessment   Mr. Ugarte tolerated therapy session well this afternoon.  Able to progress sit <> stand activity today to include both step under LLE and rubber ball under LUE to promote increased utilization of RLE throughout transfer. Pt did require Min A initially to improve hip clearance, but his technique improved throughout activity. Remainder of session continued to address RLE hip and knee muscle weakness which continue to limit ambulation and functional mobility. Cont with POC to further address remaining mobility deficits.     Torito is progressing well towards his goals.   Pt prognosis is Good.     Pt will continue to benefit from skilled outpatient physical therapy to address the deficits listed in the problem list box on initial evaluation, provide pt/family education and to maximize pt's level of independence in the home and community environment.     Pt's spiritual, cultural and educational needs considered and pt agreeable to plan of care and goals.     Anticipated Barriers for therapy: hearing deficit, speech deficits, cognitive deficits, decreased safety awareness, fall risk, visual neglect, transportation assistance required    Goals:  Short Term Goals:  4 weeks   1. Pt to be (I) with established HEP MET 03/19/19  2. Pt to improve R hip flexion strength MMT score to at least 4+/5 for improved gait mechanics Ongoing  3. Pt to improve R hip extension strength MMT score to at least 4/5 for improved gait mechanics Ongoing  4. Pt to improve R hip AB and R hip AD strength MMT scores to at least 3+/5 for improved gait mechanics MET 03/04/19  5.  Pt to improve R knee flexion and extension strength MMT scores to at least 4+/5 for improved gait mechanics Ongoing  6. Pt to improve R ankle PF strength MMT score to at least 2/5 for improved gait mechanics MET 03/04/19  7. Pt to improve chair rise score to at least 5 times with no more than 1 UE support and S for improved endurance and safety with functional transfers MET 03/04/19  8. Pt to improve TUG score to at least 30 seconds for improved safety with household mobility Ongoing  9. Pt to improve SSWS score to at least 0.40 m/sec for improved safety with community mobility. Ongoing     Long Term Goals: 8 weeks   1. Pt to be (I) with advanced HEP Ongoing  2. Pt to improve R hip extension strength MMT score to at least 4+/5 for improved gait mechanics Ongoing  3. Pt to improve R hip AB and R hip AD strength MMT scores to at least 4-/5 for improved gait mechanics Ongoing  4. Pt to improve R ankle PF strength MMT score to at least 2+/5 for improved gait mechanics MET 03/19/19  5. Pt to improve chair rise score to at least 5 times with no UE support and S for improved endurance and safety with functional transfers Ongoing  6. Pt to improve TUG score to at least 26 seconds for improved safety with household mobility Ongoing  7. Pt to improve SSWS score to at least 0.50 m/sec for improved safety with community mobility. Ongoing     Plan     Plan for next visit: Continue standing activities to address weight acceptance onto RLE; focus on R quad strength and R gross hip strength for improved control and RLE utilization during  ambulation and standing activities.     Hanh Brooke, PT

## 2019-04-09 NOTE — PROGRESS NOTES
Occupational Therapy Daily Treatment Note     [id number]  Physician: Ian Feliciano MD  Medical Diagnosis: L CVA      Therapy Diagnosis:   Encounter Diagnoses   Name Primary?    Impaired mobility and activities of daily living     Weakness of right upper extremity      Visit Date: 4/9/2019  Physician Orders: Eval and Treat   Surgical Procedure and Date: NA  Evaluation Date: 1/28/19   Insurance Authorization Period Expiration: 12/31/19   Plan of Care Certification Period: 4/19/19   Date of Return to MD: unknown at this time     Visit # / Visits authorized: 20 ( 10/10 new auth)   Time In: 1300   Time Out: 1345  Total Billable Time: 45 minutes      Precautions: Standard, fall risk, hearing impaired     Subjective     Pt reports: nothing significant   Patient reports compliance with GG splint and grasp and release tasks.       Response to previous treatment:positive   Functional change: progressing     Pain: none reported   Location: NA     Objective   Patient seen by OT this date.  Pt ambulated to treatment session with quad cane and SBA.  Patient's wife was present for treatment session.  Patient received individual therapy with activities as follows:     Torito participated in therapeutic exercises to improve ROM and strength for 14 minutes:   - UBE level 4 x 5 min forwards x 5 min reverse   - overhead pulleys for shoulder flexion x 2 min   - active assisted scap retraction x 10 reps x 2       Patient participated in neuromuscular reeducation activities for 31 minutes to maximize ROM, motor strength and neuromuscular and proprioceptive input to the RUE.  The following activities were included:   Sitting EOM:   - restoration of R hand arches and metacarpal stretches   - general R wrist stretches: 1. P-A glide of scaphoid on radius, 2. Radial deviation, 3. Increasing mobility of metacarpals, 4. Carpal roll, 5. Movement of forearm on hand towards wrist extension, 6. Movement  of forearm on the hand toward supination/ pronation, 7. wrist ext with distraction   - body on arm weight shifts to promote wrist and digit extension   - arm on body movements on Shoulder I- Frame with 5# wt x 3 min   - in stand, body on arm movements at counter top   - in stand, pt performed grap and release of soft objects at counter top; self assist to reach forwards and laterally for objects       Functional mobility:   - sit <> stand with SBA  - gait with quad cane and close SBA       Home Exercises and Education Provided     Education provided re:  - HEP   - Patient was instructed to wear splint for up to 3 hours and then remove and perform grasp and release tasks.   - Progress towards goals   - POC     Written Home Exercises Provided: no new, continue HEP    Patient instructed to cont prior HEP as verbally instructed in previous tx sessions. Exercises were reviewed and Torito was able to demonstrate them prior to the end of the session.  Torito demonstrated good  understanding of the HEP provided.       Pt has no cultural, educational or language barriers to learning provided.    Assessment   Patient tolerated treatment session well.  OT continued with internvetion techniques to increase RUE range of motion, strength, and motor control.  He demonstrates steady progress, but functional reach, grasp, and release remains difficult due to residual strength deficits.  He continues to use LUE to self assist reach.  Rest beaks are taken to improve grasp and release as R hand fatigues with exercise.   Continue POC to improve RUE functional use for self care skills and functional tasks in the home environment.      Pt would continue to benefit from skilled OT. Torito is progressing well towards his goals and there are no updates to goals at this time. Pt prognosis is Good due to patient remains motivated to participate and has good support from his spouse. Pt will continue to benefit from skilled outpatient  occupational therapy to address the deficits listed in the problem list on initial evaluation provide pt/family education and to maximize pt's level of independence in the home and community environment.      Anticipated barriers to occupational therapy: none     Pt's spiritual, cultural and educational needs considered and pt agreeable to plan of care and goals.    Goals:  Short Term Goals: 3 weeks   ROM/Strength to perform the ADL's and functional activities listed below, - in progress  Pt will improve functional  strength needed for tasks to 20# in R hand. - in progress   Pt will improve AROM for shoulder flexion needed for functional tasks to 60* in R UE. - in progress   UE dressing will improve to Min A - in progress   LB dressing will improve to Mod A  - in progress   Toileting will improve to Min A incorporating R hand for task  - in progress   Pt will be Independent with HEP to improve ROM and FM skills. - in progress      Long Term Goals: 6 weeks   ROM/Strength to perform the ADL's and functional activities listed below - in progress   Pt will improve functional  strength needed for tasks to 30# in R hand. - in progress   Pt will improve AROM for shoulder flexion needed for functional tasks to 80* in R UE. - in progress   UE dressing will improve to Supervision   LB dressing will improve to Min A   Toileting will improve to Supervision incorporating R hand for task   Pt will complete clinical testing for skills needed for driving  G code self care CK    Plan   Continue POC in pursuit of OT goals - extend through 4/19/19.     Discussed Plan of Care with patient: Yes  Updates/Grading for next session: continue active assisted exercises and grasp and release tasks

## 2019-04-10 NOTE — PROGRESS NOTES
Outpatient Neurological Rehabilitation   Speech and Language Therapy Daily Note  Date:  4/11/2019     Name: Torito Harris   MRN: [id number]   Therapy Diagnosis:   Encounter Diagnosis   Name Primary?    Broca's aphasia    Physician: Ian Feliciano MD  Physician Orders: ST evaluate and treat  Medical Diagnosis: I69.30 (ICD-10-CM) - Late effect of stroke    Visit #/Visits authorized: 9/ 10 (8 prior to this auth) KX   Date of Evaluation:  1/28/19  Insurance Authorization Period: 3/7/2019 to 12/31/19   Plan of Care Expiration Date:  1/28/2019 to 3/25/19; 3/28/19 to 5/24/19  Extended POC: n/a     Time In: 1345   Time Out:  1430   Total Billable Time: 45 minutes     Precautions: Standard and Fall  Subjective:   Pt reports: his tooth and his hand are bothering him. Per pt's wife, pt is having his tooth pulled by the dentist on Monday. He was distracted by his brace in the beginning of the session which negatively impacted progression though tasks today.   Pain Scale:  0/10 on VAS currently.   Pain Location: n/a  Objective:   UNTIMED  Procedure Min.   Speech- Language- Voice Therapy    45 minutes   Total Untimed Units: 1  Charges Billed/# of units: 1    Short Term Goals: (4 weeks) Current Progress:   1. Pt will name common objects with 90% Carmen.  Progressing/ Not Met 4/11/2019   50% acc indly, 100% acc given max semantic and phonemic cues   2. Pt will generate a response to basic sentence completions with 80% Carmen   Progressing/ Not Met 4/11/2019   Pt completed sentences with 90% acc indly, 100% acc given mod verbal cues and repetitions.   3. Pt will name 5 concrete category items with Carmen.  Progressing/ Not Met 4/11/2019   Animals - 1 indly, 5 with min A  Food - 2 indly, 7 with min A   4.  pt will follow 2 unit commands with visual stimuli with 90% acc indly to improve auditory comprehension.   Progressing/ Not Met 4/11/2019   Pt followed commands with 80% acc indly, 100% acc with min A   5. pt and a conversational  partner will use supported communication for persons with   aphasia to facilitate 2 conversational turns with a communication partner.   Progressing/ Not Met 4/11/2019  Not formally addressed     6. Patient will write name and address to increase written expression skills with 90% accuracy given verbal presentation of letters  Progressing/ Not Met 4/11/2019  .  Not formally addressed     7.  pt will complete reading comprehension tasks at the sentence level with 90% acc indly.  Progressing/ Not Met 4/11/2019   Not formally addressed    8. pt will follow 3 unit body part commands with 90% acc indly.   Progressing/ Not Met 4/11/2019  Not formally addressed     9. Patient will write grapheme to corresponding phoneme with 90% acc indly.  Progressing/ Not Met 4/11/2019     Not formally addressed       Pt identified the missing phrase from a sentences from a field of 3 with 70% acc indly, 100% acc given verbal cues (I.e. Constant Therapy Level 1 Passive Sentence completion)    Patient Education/Response:   Tactus therapy, constant therapy reviewed with pt as avenues for home practice. Pt and his wife indicated understanding.     Assessment:   Torito is progressing well towards his goals. Increased sentence completion.  Current goals remain appropriate.   Pt prognosis is Good. Pt will continue to benefit from skilled outpatient speech and language therapy to address the deficits listed in the problem list on initial evaluation, provide pt/family education and to maximize pt's level of independence in the home and community environment.     Barriers to Therapy: sleeping pattern, possible transportation  Pt's spiritual, cultural and educational needs considered and pt agreeable to plan of care and goals.    Plan:   Continue POC with focus on written cues as support.     SCOTT Castillo.  ADAM SLP  Clinician  4/11/2019     I certify that I was present in the room directing the student in service delivery and  guiding them using my skilled judgment. As the co-signing therapist I have reviewed the students documentation and am responsible for the treatment, assessment, and plan.     Farrah Atkinson M.S. CCC-SLP  Speech Language Pathologist   4/11/2019

## 2019-04-11 ENCOUNTER — CLINICAL SUPPORT (OUTPATIENT)
Dept: REHABILITATION | Facility: HOSPITAL | Age: 59
End: 2019-04-11
Attending: PSYCHIATRY & NEUROLOGY
Payer: COMMERCIAL

## 2019-04-11 DIAGNOSIS — R47.01 BROCA'S APHASIA: ICD-10-CM

## 2019-04-11 DIAGNOSIS — R29.898 WEAKNESS OF RIGHT LEG: ICD-10-CM

## 2019-04-11 DIAGNOSIS — Z74.09 IMPAIRED FUNCTIONAL MOBILITY, BALANCE, GAIT, AND ENDURANCE: ICD-10-CM

## 2019-04-11 DIAGNOSIS — Z74.09 IMPAIRED MOBILITY AND ACTIVITIES OF DAILY LIVING: ICD-10-CM

## 2019-04-11 DIAGNOSIS — R29.898 WEAKNESS OF RIGHT UPPER EXTREMITY: ICD-10-CM

## 2019-04-11 DIAGNOSIS — Z78.9 IMPAIRED MOBILITY AND ACTIVITIES OF DAILY LIVING: ICD-10-CM

## 2019-04-11 PROCEDURE — 97110 THERAPEUTIC EXERCISES: CPT | Mod: KX,PO,59

## 2019-04-11 PROCEDURE — 97112 NEUROMUSCULAR REEDUCATION: CPT | Mod: KX,PO

## 2019-04-11 PROCEDURE — 97110 THERAPEUTIC EXERCISES: CPT | Mod: PO

## 2019-04-11 PROCEDURE — 92507 TX SP LANG VOICE COMM INDIV: CPT | Mod: KX,PO

## 2019-04-11 PROCEDURE — 97116 GAIT TRAINING THERAPY: CPT | Mod: PO

## 2019-04-11 NOTE — PROGRESS NOTES
Occupational Therapy Daily Treatment Note     Name: Torito Harris  [facility] Number: [id number]  Physician: Ian Feliciano MD  Medical Diagnosis: L CVA      Therapy Diagnosis:   Encounter Diagnoses   Name Primary?    Impaired mobility and activities of daily living     Weakness of right upper extremity      Visit Date: 4/11/2019  Physician Orders: Eval and Treat   Surgical Procedure and Date: NA  Evaluation Date: 1/28/19   Insurance Authorization Period Expiration: 12/31/19   Plan of Care Certification Period: 4/19/19   Date of Return to MD: unknown at this time     Visit # / Visits authorized: 21 ( 1/ 10 new auth)   Time In: 1430   Time Out: 1515   Total Billable Time: 45 minutes      Precautions: Standard, fall risk, hearing impaired     Subjective     Pt reports: nothing significant   Patient reports compliance with GG splint and grasp and release tasks.       Response to previous treatment:positive   Functional change: progressing     Pain: none reported   Location: NA     Objective   Patient seen by OT this date.  Pt ambulated to treatment session with quad cane and SBA.  Patient's wife was present for treatment session.  Patient received individual therapy with activities as follows:     Torito participated in therapeutic exercises to improve ROM and strength for 12 minutes:   - UBE level 4 x 5 min forwards x 5 min reverse   - overhead pulleys for shoulder flexion x 2 min       Patient participated in neuromuscular reeducation activities for 33 minutes to maximize ROM, motor strength and neuromuscular and proprioceptive input to the RUE.  The following activities were included:   Sitting EOM:   - restoration of R hand arches and metacarpal stretches   - general R wrist stretches: 1. P-A glide of scaphoid on radius, 2. Radial deviation, 3. Increasing mobility of metacarpals, 4. Carpal roll, 5. Movement of forearm on hand towards wrist extension, 6. Movement of forearm on the hand toward supination/  pronation, 7. wrist ext with distraction   - body on arm weight shifts for heavy weight bearing into RUE  - application of GG splint   - body on arm and arm on body movements on large and small exercise ball; [place] self assist    Functional mobility:   - sit <> stand with SBA  - gait with quad cane and close SBA       Home Exercises and Education Provided     Education provided re:  - HEP   - Patient was instructed to wear splint for up to 3 hours and then remove and perform grasp and release tasks.   - Progress towards goals   - POC     Written Home Exercises Provided: no new, continue HEP    Patient instructed to cont prior HEP as verbally instructed in previous tx sessions. Exercises were reviewed and Torito was able to demonstrate them prior to the end of the session.  Torito demonstrated good  understanding of the HEP provided.       Pt has no cultural, educational or language barriers to learning provided.    Assessment   Patient tolerated treatment session well.  OT continued with internvetion techniques to increase RUE range of motion, strength, and motor control.  He demonstrates good participation and remains motivated to improve residual functional and physical deficits.  Plan to reassess at following treatment session and update POC.      Continue POC to improve RUE functional use for self care skills and functional tasks in the home environment.      Pt would continue to benefit from skilled OT. Torito is progressing well towards his goals and there are no updates to goals at this time. Pt prognosis is Good due to patient remains motivated to participate and has good support from his spouse. Pt will continue to benefit from skilled outpatient occupational therapy to address the deficits listed in the problem list on initial evaluation provide pt/family education and to maximize pt's level of independence in the home and community environment.      Anticipated barriers to occupational therapy: none      Pt's spiritual, cultural and educational needs considered and pt agreeable to plan of care and goals.    Goals:  Short Term Goals: 3 weeks   ROM/Strength to perform the ADL's and functional activities listed below, - in progress  Pt will improve functional  strength needed for tasks to 20# in R hand. - in progress   Pt will improve AROM for shoulder flexion needed for functional tasks to 60* in R UE. - in progress   UE dressing will improve to Min A - in progress   LB dressing will improve to Mod A  - in progress   Toileting will improve to Min A incorporating R hand for task  - in progress   Pt will be Independent with HEP to improve ROM and FM skills. - in progress      Long Term Goals: 6 weeks   ROM/Strength to perform the ADL's and functional activities listed below - in progress   Pt will improve functional  strength needed for tasks to 30# in R hand. - in progress   Pt will improve AROM for shoulder flexion needed for functional tasks to 80* in R UE. - in progress   UE dressing will improve to Supervision   LB dressing will improve to Min A   Toileting will improve to Supervision incorporating R hand for task   Pt will complete clinical testing for skills needed for driving  G code self care CK    Plan   Continue POC in pursuit of OT goals - extend through 4/19/19.     Discussed Plan of Care with patient: Yes  Updates/Grading for next session: reassess

## 2019-04-11 NOTE — PROGRESS NOTES
Outpatient Neurological Rehabilitation   Speech and Language Therapy Daily Note  Date:  4/11/2019     Name: Torito Harris   MRN: [id number]   Therapy Diagnosis:   Encounter Diagnosis   Name Primary?    Broca's aphasia    Physician: Ian Feliciano MD  Physician Orders: ST evaluate and treat  Medical Diagnosis: I69.30 (ICD-10-CM) - Late effect of stroke    Visit #/Visits authorized: 10/ 10 (8 prior to this auth) KX   Date of Evaluation:  1/28/19  Insurance Authorization Period: 3/7/2019 to 12/31/19   Plan of Care Expiration Date:  1/28/2019 to 3/25/19; 3/28/19 to 5/24/19  Extended POC: n/a     Time In: 1345   Time Out:  1430   Total Billable Time: 45 minutes     Precautions: Standard and Fall  Subjective:   Pt reports: he was able to tell clinician about his trips to the beach.   Pain Scale:  0/10 on VAS currently.   Pain Location: n/a  Objective:   UNTIMED  Procedure Min.   Speech- Language- Voice Therapy    45 minutes   Total Untimed Units: 1  Charges Billed/# of units: 1    Short Term Goals: (4 weeks) Current Progress:   1. Pt will name common objects with 90% Carmen.  Progressing/ Not Met 4/11/2019   70% acc indly, 100% acc given min semantic and phonemic cues   2. Pt will generate a response to basic sentence completions with 80% Carmen   Progressing/ Not Met 4/11/2019   Pt completed sentences with 60% acc indly, 100% acc given mod verbal cues and repetitions.   3. Pt will name 5 concrete category items with Carmen.  Progressing/ Not Met 4/11/2019   Pt was able to determine a category given three items with 30% acc indly, 100% given max A   4.  pt will follow 2 unit commands with visual stimuli with 90% acc indly to improve auditory comprehension.   Progressing/ Not Met 4/11/2019   Not formally addressed      5. pt and a conversational partner will use supported communication for persons with   aphasia to facilitate 2 conversational turns with a communication partner.   Progressing/ Not Met 4/11/2019  Not formally  addressed     6. Patient will write name and address to increase written expression skills with 90% accuracy given verbal presentation of letters  Progressing/ Not Met 4/11/2019  .  Pt was able to write his name with 93% acc indly, 100% acc copying one letter    7.  pt will complete reading comprehension tasks at the sentence level with 90% acc indly.  Progressing/ Not Met 4/11/2019   Not formally addressed    8. pt will follow 3 unit body part commands with 90% acc indly.   Progressing/ Not Met 4/11/2019  40% acc indly, 100% acc mod A   9. Patient will write grapheme to corresponding phoneme with 90% acc indly.  Progressing/ Not Met 4/11/2019     Not formally addressed       Patient Education/Response:   Discussed schedule for continued sessions. Pt and his wife indicated understanding.     Assessment:   Torito is progressing well towards his goals. Increased sentence completion. Increased written expression. Increased category naming with verbal cues. Current goals remain appropriate.     Pt prognosis is Good. Pt will continue to benefit from skilled outpatient speech and language therapy to address the deficits listed in the problem list on initial evaluation, provide pt/family education and to maximize pt's level of independence in the home and community environment.     Barriers to Therapy: sleeping pattern, possible transportation  Pt's spiritual, cultural and educational needs considered and pt agreeable to plan of care and goals.    Plan:   Continue POC with focus on written cues as support.     SCOTT Castillo.  ADAM SLP  Clinician  4/11/2019

## 2019-04-11 NOTE — PROGRESS NOTES
"  Physical Therapy Daily Treatment Note     Name: Torito Harris  Clinic Number: [id number]    Therapy Diagnosis:   Encounter Diagnoses   Name Primary?    Weakness of right leg     Impaired functional mobility, balance, gait, and endurance      Physician: Ian Feliciano MD    Visit Date: 4/11/2019    Physician Orders: PT Eval and Treat   Medical Diagnosis from Referral: Late effect of stroke  Evaluation Date: 1/28/2019  Authorization Period Expiration: 02/12/19-12/31/19  Plan of Care Expiration: 03/19/19 to 05/14/19  Visit # / Visits authorized: 1/10 (Vist #19 of 2019)     Time In: 13:00  Time Out: 13:45  Total Billable Time: 45 minutes    Precautions: Standard, Diabetes, Fall and right hemiplegia, hearing aid, safety awareness, cognitive deficits, speech deficits    Subjective     Pt reports: " I'm good, can we do that leg thing?"  HEP Compliance: pt reports he is doing all of his exercises at home.  Response to previous treatment: no complaints  Functional change: improved ability to side step and to step backward    Pain: 0/10  Location: N/A      Objective     Torito received therapeutic exercises to develop strength, endurance, ROM, flexibility and core stabilization for 25 minutes including:      3 x 30 sec of R LE hamstring stretches, manually    3 x 10 RLE leg press with 60.0# weight; Min A from PT to maintain proper foot positioning  3 x 10 BLE leg press with 100.0# weight; Min A from PT to maintain proper RLE positioning    Patient participated in gait training activities to normalize gait pattern for 20 minutes. The following activities were included:   Gait belt used for safety. Assistive device: SBQC  Ambulated into clinic with SBQC and Supervision  Ambulated over 400ft with Brittney Walker and CGA.       Home Exercises Provided and Patient Education Provided     Education provided: Progress with therapy.     Written Home Exercises Provided: Continue current HEP.   See EMR under Media for exercises " provided 2/5/2019.    Assessment   Mr. Ugarte tolerated therapy session well this afternoon.  Pt cont with leg press and gait and will attempt to increase weights next tx session on the leg press.  Pt required VC's for step length and not to scissor with gait with the STEPHANI walker.   Cont with POC to further address remaining mobility deficits.     Torito is progressing well towards his goals.   Pt prognosis is Good.     Pt will continue to benefit from skilled outpatient physical therapy to address the deficits listed in the problem list box on initial evaluation, provide pt/family education and to maximize pt's level of independence in the home and community environment.     Pt's spiritual, cultural and educational needs considered and pt agreeable to plan of care and goals.     Anticipated Barriers for therapy: hearing deficit, speech deficits, cognitive deficits, decreased safety awareness, fall risk, visual neglect, transportation assistance required    Goals:  Short Term Goals: 4 weeks   1. Pt to be (I) with established HEP MET 03/19/19  2. Pt to improve R hip flexion strength MMT score to at least 4+/5 for improved gait mechanics Ongoing  3. Pt to improve R hip extension strength MMT score to at least 4/5 for improved gait mechanics Ongoing  4. Pt to improve R hip AB and R hip AD strength MMT scores to at least 3+/5 for improved gait mechanics MET 03/04/19  5.  Pt to improve R knee flexion and extension strength MMT scores to at least 4+/5 for improved gait mechanics Ongoing  6. Pt to improve R ankle PF strength MMT score to at least 2/5 for improved gait mechanics MET 03/04/19  7. Pt to improve chair rise score to at least 5 times with no more than 1 UE support and S for improved endurance and safety with functional transfers MET 03/04/19  8. Pt to improve TUG score to at least 30 seconds for improved safety with household mobility Ongoing  9. Pt to improve SSWS score to at least 0.40 m/sec for improved safety  with community mobility. Ongoing     Long Term Goals: 8 weeks   1. Pt to be (I) with advanced HEP Ongoing  2. Pt to improve R hip extension strength MMT score to at least 4+/5 for improved gait mechanics Ongoing  3. Pt to improve R hip AB and R hip AD strength MMT scores to at least 4-/5 for improved gait mechanics Ongoing  4. Pt to improve R ankle PF strength MMT score to at least 2+/5 for improved gait mechanics MET 03/19/19  5. Pt to improve chair rise score to at least 5 times with no UE support and S for improved endurance and safety with functional transfers Ongoing  6. Pt to improve TUG score to at least 26 seconds for improved safety with household mobility Ongoing  7. Pt to improve SSWS score to at least 0.50 m/sec for improved safety with community mobility. Ongoing     Plan     Plan for next visit: Increase weights on leg press, cont with R LE strengthening and weight shifting.     Lucy Vanegas, PTA

## 2019-04-23 ENCOUNTER — CLINICAL SUPPORT (OUTPATIENT)
Dept: REHABILITATION | Facility: HOSPITAL | Age: 59
End: 2019-04-23
Attending: PSYCHIATRY & NEUROLOGY
Payer: COMMERCIAL

## 2019-04-23 DIAGNOSIS — R29.898 WEAKNESS OF RIGHT LEG: ICD-10-CM

## 2019-04-23 DIAGNOSIS — R29.898 WEAKNESS OF RIGHT UPPER EXTREMITY: ICD-10-CM

## 2019-04-23 DIAGNOSIS — Z74.09 IMPAIRED MOBILITY AND ACTIVITIES OF DAILY LIVING: ICD-10-CM

## 2019-04-23 DIAGNOSIS — Z74.09 IMPAIRED FUNCTIONAL MOBILITY, BALANCE, GAIT, AND ENDURANCE: ICD-10-CM

## 2019-04-23 DIAGNOSIS — R47.01 BROCA'S APHASIA: ICD-10-CM

## 2019-04-23 DIAGNOSIS — Z78.9 IMPAIRED MOBILITY AND ACTIVITIES OF DAILY LIVING: ICD-10-CM

## 2019-04-23 PROCEDURE — 97112 NEUROMUSCULAR REEDUCATION: CPT | Mod: KX,PO,59

## 2019-04-23 PROCEDURE — 97110 THERAPEUTIC EXERCISES: CPT | Mod: PO,59

## 2019-04-23 PROCEDURE — 97110 THERAPEUTIC EXERCISES: CPT | Mod: KX,PO,59

## 2019-04-23 PROCEDURE — 92507 TX SP LANG VOICE COMM INDIV: CPT | Mod: KX,PO

## 2019-04-23 PROCEDURE — 97116 GAIT TRAINING THERAPY: CPT | Mod: PO

## 2019-04-23 NOTE — PROGRESS NOTES
"Outpatient Neurological Rehabilitation   Speech and Language Therapy Daily Note  Date:  4/23/2019     Name: Torito Harris   MRN: [id number]   Therapy Diagnosis:   Encounter Diagnosis   Name Primary?    Broca's aphasia    Physician: Ian Feliciano MD  Physician Orders: ST evaluate and treat  Medical Diagnosis: I69.30 (ICD-10-CM) - Late effect of stroke    Visit #/Visits authorized: 2/ 10 (20 prior to this auth) KX   Date of Evaluation:  1/28/19  Insurance Authorization Period: 3/7/2019 to 12/31/19   Plan of Care Expiration Date:  1/28/2019 to 3/25/19; 3/28/19 to 5/24/19  Extended POC: n/a     Time In:  1347  Time Out:  1430  Total Billable Time: 43 minutes    Precautions: Standard and Fall  Subjective:   Pt reports: When asked how he was feeling, pt responded "good."  Pain Scale:  0/10 on VAS currently.   Pain Location: n/a  Objective:   UNTIMED  Procedure Min.   Speech- Language- Voice Therapy    43 minutes   Total Untimed Units: 1  Charges Billed/# of units: 1    Short Term Goals: (4 weeks) Current Progress:   1. Pt will name common objects with 90% Carmen.  Progressing/ Not Met 4/23/2019   80 %indly, 100% acc given min semantic and phonemic cues   2. Pt will generate a response to basic sentence completions with 80% Carmen   Progressing/ Not Met 4/23/2019   Pt completed sentences (automatic-nouns) with 90% acc indly, 100% acc given mod verbal cues and repetitions.    Pt completed sentences (automatic verbs) with 40% acc indly and 80% acc given repetitions and semantic cues.    3. Pt will name 5 concrete category items with Carmen.  Progressing/ Not Met 4/23/2019   Pt was able to determine a category given three items with 20% acc indly, 100% given max A   4.  pt will follow 2 unit commands with visual stimuli with 90% acc indly to improve auditory comprehension.   Progressing/ Not Met 4/23/2019   Not formally addressed      5. pt and a conversational partner will use supported communication for persons with   " aphasia to facilitate 2 conversational turns with a communication partner.   Progressing/ Not Met 4/23/2019  Not formally addressed     6. Patient will write name and address to increase written expression skills with 90% accuracy given verbal presentation of letters  Progressing/ Not Met 4/23/2019  .  Pt was able to write his name with 95% acc indly, 100% acc copying letter.    Goal met x's 2   7.  pt will complete reading comprehension tasks at the sentence level with 90% acc indly.  Progressing/ Not Met 4/23/2019   Given a field of 3, patient pointed to word that completed a sentence with 70% acc and 100% acc given clinician assistance    8. pt will follow 3 unit body part commands with 90% acc indly.   Progressing/ Not Met 4/23/2019  Not formally addressed.   9. Patient will write grapheme to corresponding phoneme with 90% acc indly.  Progressing/ Not Met 4/23/2019     Not formally addressed       Patient Education/Response:    Pt given counseling and education Pt and his wife indicated understanding.     Assessment:   Torito is progressing well towards his goals. Increased sentence completion. Increased written expression. Increased category naming with verbal cues. Current goals remain appropriate. Pt improved reading comprehension accuracy when given a field of 3 options to choose from. Pt with improvements in word-finding skills when clinician cued pt to describe function of object. Pt prognosis is Good. Pt will continue to benefit from skilled outpatient speech and language therapy to address the deficits listed in the problem list on initial evaluation, provide pt/family education and to maximize pt's level of independence in the home and community environment.     Barriers to Therapy: sleeping pattern, possible transportation  Pt's spiritual, cultural and educational needs considered and pt agreeable to plan of care and goals.    Plan:   Continue POC with focus on written cues as support.     [place]  JANETH Dozier  Student Speech Language Pathologist

## 2019-04-23 NOTE — PLAN OF CARE
Occupational Therapy Progress Note     Name: Torito Harris  [facility] Number: [id number]  Physician: Ian Feliciano MD  Medical Diagnosis: L CVA      Therapy Diagnosis:   Encounter Diagnoses   Name Primary?    Impaired mobility and activities of daily living     Weakness of right upper extremity      Visit Date: 4/23/2019  Physician Orders: Eval and Treat   Surgical Procedure and Date: NA  Evaluation Date: 1/28/19   Insurance Authorization Period Expiration: 12/31/19   Plan of Care Certification Period: 5/24/19   Date of Return to MD: unknown at this time     Visit # / Visits authorized: 22 ( 2/ 10 new auth)   Time In: 1300   Time Out: 1345    Total Billable Time: 45 minutes      Precautions: Standard, fall risk, hearing impaired     Subjective     Pt reports: nothing significant   Patient reports compliance with GG splint and grasp and release tasks.       Response to previous treatment:positive   Functional change: progressing     Pain: none reported   Location: NA     Objective   Patient seen by OT this date.  Pt ambulated to treatment session with quad cane and SBA.  Patient's wife was present for treatment session.  Patient received individual therapy with activities as follows:     PROGRESS AS FOLLOWS:      Physical Exam:  Postural examination/scapula alignment: Rounded shoulder, Affected scapula depressed and Affected scapula downwardly rotated  Joint integrity: Subluxation of 1 finger on R  Skin integrity: Intact  Edema: none noted      Range of Motion:      Joint Evaluation  AROM  1/28/2019 AROM  1/28/2019 PROM   1/28/2019 AROM  3/4/19 PROM  3/4/19 AROM  4/23/19 PROM   4/28/19     Left Right Right Right  Right  Right  Right    Shoulder flex 0-180 WNL  throughout 40 140 40 (NC) 165 (+25) 50 (+10) 165 (NC)   Shoulder Abd 0-180   25 120 35 (+10) 125 (+5)  40 (+5) 130 (+5)   Shoulder ER 0-90   50 90 50 (NC) 90 (NC) 20 (-30) 90 (NC)   Shoulder IR 0-90   30 80 40 (+10) 80 (NC) 30 (-10) 85 (+5)   Shoulder  Extension 0-80   50 80 50 (NC)  70 (-10)  50 (NC) 75 (+5)   Elbow flex/ext 0-150   10-90 0-140  (-40 /+45)  0-140 (NC)   (+10 / -5) 0-140 (NC)   Wrist flexion    NT NT 50 90  70 (+20) 90 (NC)   Wrist extension    NT NT 35 75 30 (-5) 75 (NC)   Supination    NT NT 90 NT 85 (-5) NT   Pronation    NT NT 80 NT 85 (+5) NT   UD   NT NT Trace 25 25 (+25) 35 (+10)   RD   NT NT Trace  20 15 (+15) 20 (NC)      Fist: Pt able to make loose fist with increased time     Strength:       Strength 1/28/2019 1/28/2019 3/4/19 4/23/19   **within available ROM** Left Right Right  Right    Shoulder flex 4+/5 2-/5 2+/5 2+/5   Shoulder abd 4+/5 2-/5 2+/5 2+/5   Shoulder ER 4/5 2/5 2+/5 2+/5   Shoulder IR 4/5 2/5 2+/5 2+/5   Shoulder Extension 4+/5 2+/5 2+/5 2+/5   Elbow flex 4+/5 2+/5 2+/5 2+/5   Elbow ext 4+/5 2+/5 2+/5 2+/5   Wrist flex 4+/5 2+/5 2+/5 2+/5   Wrist ext 4+/5 2+/5 2+/5 2+/5    Comments: patient is able to perform RUE movements through partial range of motion in seated (antigravity) position.          Strength: (CLAUDE Dynamometer in lbs.) Average 3 trials, Position II:      #1  1/28/19 1/28/19 3/4/19 4/23/19      Left Right Right  Right    Rung II 86# 15# 10# (-5#) 10# (NC)         Fine Motor Coordination: 9 Hole Peg Test  Left 1/28/2019 Right   1/28/2019 Right   3/4/19      Unable to perform unable to perform        Gross motor coordination:    · MORRIS (Rapid Alternating Movements): Normal on L. Delayed on R at wrist for supination/pronation   · Finger to Nose (5 times): Unable on R   · Finger Flicks (coordination moving from digit flexion to digit extension): slow movement on R, unable to flick digits; slow composite flexion and extension; flexion is loose and extension is not full          CMS Impairment/Limitation/Restriction for FOTO Cerebrovascular Disorders Survey  Status Limitation G-Code CMS Severity Modifier  Intake 28% 72%  Predicted 41% 59% Goal Status+ CK - At least 40 percent but less than 60  percent  3/4/2019 28% 72%   4/23/2019 42% 58% Current Status CK - At least 40 percent but less than 60 percent       Torito participated in therapeutic exercises to improve ROM and strength for 6 minutes:   - UBE level 4 x 3 min forwards x 3 min reverse       Patient participated in neuromuscular reeducation activities for 15 minutes to maximize ROM, motor strength and neuromuscular and proprioceptive input to the RUE.  The following activities were included:   Sitting EOM:   - restoration of R hand arches and metacarpal stretches   - general R wrist stretches: 1. P-A glide of scaphoid on radius, 2. Radial deviation, 3. Increasing mobility of metacarpals, 4. Carpal roll, 5. Movement of forearm on hand towards wrist extension, 6. Movement of forearm on the hand toward supination/ pronation, 7. wrist ext with distraction   - body on arm weight shifts for heavy weight bearing into RUE  - application of GG splint       Functional mobility:   - sit <> stand with SBA  - gait with quad cane and close SBA       Home Exercises and Education Provided     Education provided re:  - HEP   - Patient was instructed to wear splint for up to 3 hours and then remove and perform grasp and release tasks.   - Progress towards goals   - POC     Written Home Exercises Provided: no new, continue HEP    Patient instructed to cont prior HEP as verbally instructed in previous tx sessions. Exercises were reviewed and Torito was able to demonstrate them prior to the end of the session.  Torito demonstrated good  understanding of the HEP provided.       Pt has no cultural, educational or language barriers to learning provided.    Assessment   Patient was reassessed in today's treatment session.  Minimal significant changes noted in RUE AROM measurements today (see above for objective min gains and declines noted).  PROM of the RUE maintained or slightly improved as noted above.  He is able to actively form a loose fist for light grasp and hold  of objects, but objective  strength remained at 10# since last reassessment.  He demonstrates fair ability to actively open (extend) digits for active release of objects, and OT has continued to use GG splint to improve extension.  He demonstrates fair ability to perform gross MORRIS and finger flicks, but he continues with severe fine motor coordination limitations and is unable to participate in 9 [facility] assessment 2/2 continued deficits.  Based on the results of today's reassessment, patient may be approaching his max potential from skilled occupational therapy.  OT recommends extending his POC 2 times weekly for 4 weeks to maximize progress and improve residual physical and functional deficits.  Torito is progressing fairly towards his goals and there are no updates to goals at this time. Pt prognosis is Good due to patient remains motivated to participate and has good support from his spouse. Pt will continue to benefit from skilled outpatient occupational therapy to address the deficits listed in the problem list on initial evaluation provide pt/family education and to maximize pt's level of independence in the home and community environment.      Anticipated barriers to occupational therapy: none     Pt's spiritual, cultural and educational needs considered and pt agreeable to plan of care and goals.    Goals:  Short Term Goals: 3 weeks   ROM/Strength to perform the ADL's and functional activities listed below, - in progress  Pt will improve functional  strength needed for tasks to 20# in R hand. - in progress   Pt will improve AROM for shoulder flexion needed for functional tasks to 60* in R UE. - in progress   UE dressing will improve to Min A - in progress   LB dressing will improve to Mod A  - in progress   Toileting will improve to Min A incorporating R hand for task  - in progress   Pt will be Independent with HEP to improve ROM and FM skills. - in progress      Long Term Goals: 6 weeks   ROM/Strength  to perform the ADL's and functional activities listed below - in progress   Pt will improve functional  strength needed for tasks to 30# in R hand. - in progress   Pt will improve AROM for shoulder flexion needed for functional tasks to 80* in R UE. - in progress   UE dressing will improve to Supervision   LB dressing will improve to Min A   Toileting will improve to Supervision incorporating R hand for task   Pt will complete clinical testing for skills needed for driving  G code self care CK    Plan   Continue POC in pursuit of OT goals - extend through 5/24/19.     Discussed Plan of Care with patient: Yes  Updates/Grading for next session: continue POC

## 2019-04-23 NOTE — PROGRESS NOTES
"  Physical Therapy Daily Treatment Note     [id number]    Therapy Diagnosis:   Encounter Diagnoses   Name Primary?    Weakness of right leg     Impaired functional mobility, balance, gait, and endurance      Physician: Ian Feliciano MD    Visit Date: 4/23/2019    Physician Orders: PT Eval and Treat   Medical Diagnosis from Referral: Late effect of stroke  Evaluation Date: 1/28/2019  Authorization Period Expiration: 02/12/19-12/31/19  Plan of Care Expiration: 03/19/19 to 05/14/19  Visit # / Visits authorized: 2/10 (Vist #20 of 2019) - KX    Time In: 14:30  Time Out: 15:15  Total Billable Time: 45 minutes    Precautions: Standard, Diabetes, Fall and right hemiplegia, hearing aid, safety awareness, cognitive deficits, speech deficits    Subjective     Pt reports: " I'm good."  HEP Compliance: pt reports he is doing all of his exercises at home.  Response to previous treatment: no complaints  Functional change: improved ability to side step and to step backward    Pain: 0/10  Location: N/A      Objective     Torito received therapeutic exercises to develop strength, endurance, ROM, flexibility and core stabilization for 37 minutes including:     X 6 min Sci Fit recumbent stepper L1, BLE only for BLE muscular endurance, BLE reciprocal use, and BLE strength     3 x 30 sec of R LE hamstring stretches, manually    4 x 10 RLE leg press with 60.0# weight; Min A from PT to maintain proper foot positioning  1 x 10 BLE leg press with 100.0# weight; Min A from PT to maintain proper RLE positioning    3 x 10 RLE SAQs; TCs to prevent hip flexion  2 x 10 supine RLE hip flexion + knee flexion <> resisted hip extension    X 5 reps standing <> split tall kneeling c/ use of BUE; RLE in front; Min A for RUE management and to come into full stand; tech providing CGA; increased focus on utilizing RUE and RLE throughout activity    Patient participated in gait training activities to normalize gait " pattern for 8 minutes. The following activities were included:   Ambulated into clinic with SBQC and Supervision  Ambulated over 400ft with Brittney Walker and CGA throughout, Min A for walker management with turning 180 deg. Focused on proper weight shifting with 180 deg turns and proper step length.     Pt continues to demonstrates decreased weight bearing through RLE, increased right knee flexion in stance, increased RLE adduction in stance and swing, decreased right hip extension in stance and increased right foot flat contact.       Home Exercises Provided and Patient Education Provided     Education provided: Progress with therapy.     Written Home Exercises Provided: Continue current HEP.   See EMR under Media for exercises provided 2/5/2019.    Assessment   Mr. Ugarte tolerated therapy session well this afternoon without complaint of pain or fatigue. Initiated split tall kneeling <> standing transfer to/from floor to increase patient's utilization of RUE and RLE during functional transfers. Pt appeared tentative at first with this activity, but confidence and technique improved after completing first trial with tech nearby for reassurance. Pt also able to perform SAQs today and achieve full knee extension without active assist from PT. However, he still required TCs to prevent hip flexion during this exercise. During ambulation and sit <> stand transfers, pt continues to favor LLE for majority of weightbearing. PT to continue to focus on increased weight acceptance and weight bearing throughout RLE in standing, ambulation, and functional mobility. Cont. DEMAR Ugarte is progressing well towards his goals.   Pt prognosis is Good.     Pt will continue to benefit from skilled outpatient physical therapy to address the deficits listed in the problem list box on initial evaluation, provide pt/family education and to maximize pt's level of independence in the home and community environment.     Pt's spiritual,  cultural and educational needs considered and pt agreeable to plan of care and goals.     Anticipated Barriers for therapy: hearing deficit, speech deficits, cognitive deficits, decreased safety awareness, fall risk, visual neglect, transportation assistance required    Goals:  Short Term Goals: 4 weeks   1. Pt to be (I) with established HEP MET 03/19/19  2. Pt to improve R hip flexion strength MMT score to at least 4+/5 for improved gait mechanics Ongoing  3. Pt to improve R hip extension strength MMT score to at least 4/5 for improved gait mechanics Ongoing  4. Pt to improve R hip AB and R hip AD strength MMT scores to at least 3+/5 for improved gait mechanics MET 03/04/19  5.  Pt to improve R knee flexion and extension strength MMT scores to at least 4+/5 for improved gait mechanics Ongoing  6. Pt to improve R ankle PF strength MMT score to at least 2/5 for improved gait mechanics MET 03/04/19  7. Pt to improve chair rise score to at least 5 times with no more than 1 UE support and S for improved endurance and safety with functional transfers MET 03/04/19  8. Pt to improve TUG score to at least 30 seconds for improved safety with household mobility Ongoing  9. Pt to improve SSWS score to at least 0.40 m/sec for improved safety with community mobility. Ongoing     Long Term Goals: 8 weeks   1. Pt to be (I) with advanced HEP Ongoing  2. Pt to improve R hip extension strength MMT score to at least 4+/5 for improved gait mechanics Ongoing  3. Pt to improve R hip AB and R hip AD strength MMT scores to at least 4-/5 for improved gait mechanics Ongoing  4. Pt to improve R ankle PF strength MMT score to at least 2+/5 for improved gait mechanics MET 03/19/19  5. Pt to improve chair rise score to at least 5 times with no UE support and S for improved endurance and safety with functional transfers Ongoing  6. Pt to improve TUG score to at least 26 seconds for improved safety with household mobility Ongoing  7. Pt to improve  SSWS score to at least 0.50 m/sec for improved safety with community mobility. Ongoing     Plan     Plan for next visit: Continue to promote use of RLE during functional transfers and ambulation. Continue to address right knee and right hip strength deficits to assist with improved mechanics during ambulation.     Hanh Brooke, PT

## 2019-04-23 NOTE — PROGRESS NOTES
Occupational Therapy Progress Note     Name: Torito Harris  [facility] Number: [id number]  Physician: Ian Feliciano MD  Medical Diagnosis: L CVA      Therapy Diagnosis:   Encounter Diagnoses   Name Primary?    Impaired mobility and activities of daily living     Weakness of right upper extremity      Visit Date: 4/23/2019  Physician Orders: Eval and Treat   Surgical Procedure and Date: NA  Evaluation Date: 1/28/19   Insurance Authorization Period Expiration: 12/31/19   Plan of Care Certification Period: 5/24/19   Date of Return to MD: unknown at this time     Visit # / Visits authorized: 22 ( 2/ 10 new auth)   Time In: 1300   Time Out: 1345    Total Billable Time: 45 minutes      Precautions: Standard, fall risk, hearing impaired     Subjective     Pt reports: nothing significant   Patient reports compliance with GG splint and grasp and release tasks.       Response to previous treatment:positive   Functional change: progressing     Pain: none reported   Location: NA     Objective   Patient seen by OT this date.  Pt ambulated to treatment session with quad cane and SBA.  Patient's wife was present for treatment session.  Patient received individual therapy with activities as follows:     PROGRESS AS FOLLOWS:      Physical Exam:  Postural examination/scapula alignment: Rounded shoulder, Affected scapula depressed and Affected scapula downwardly rotated  Joint integrity: Subluxation of 1 finger on R  Skin integrity: Intact  Edema: none noted      Range of Motion:      Joint Evaluation  AROM  1/28/2019 AROM  1/28/2019 PROM   1/28/2019 AROM  3/4/19 PROM  3/4/19 AROM  4/23/19 PROM   4/28/19     Left Right Right Right  Right  Right  Right    Shoulder flex 0-180 WNL  throughout 40 140 40 (NC) 165 (+25) 50 (+10) 165 (NC)   Shoulder Abd 0-180   25 120 35 (+10) 125 (+5)  40 (+5) 130 (+5)   Shoulder ER 0-90   50 90 50 (NC) 90 (NC) 20 (-30) 90 (NC)   Shoulder IR 0-90   30 80 40 (+10) 80 (NC) 30 (-10) 85 (+5)   Shoulder  Extension 0-80   50 80 50 (NC)  70 (-10)  50 (NC) 75 (+5)   Elbow flex/ext 0-150   10-90 0-140  (-40 /+45)  0-140 (NC)   (+10 / -5) 0-140 (NC)   Wrist flexion    NT NT 50 90  70 (+20) 90 (NC)   Wrist extension    NT NT 35 75 30 (-5) 75 (NC)   Supination    NT NT 90 NT 85 (-5) NT   Pronation    NT NT 80 NT 85 (+5) NT   UD   NT NT Trace 25 25 (+25) 35 (+10)   RD   NT NT Trace  20 15 (+15) 20 (NC)      Fist: Pt able to make loose fist with increased time     Strength:       Strength 1/28/2019 1/28/2019 3/4/19 4/23/19   **within available ROM** Left Right Right  Right    Shoulder flex 4+/5 2-/5 2+/5 2+/5   Shoulder abd 4+/5 2-/5 2+/5 2+/5   Shoulder ER 4/5 2/5 2+/5 2+/5   Shoulder IR 4/5 2/5 2+/5 2+/5   Shoulder Extension 4+/5 2+/5 2+/5 2+/5   Elbow flex 4+/5 2+/5 2+/5 2+/5   Elbow ext 4+/5 2+/5 2+/5 2+/5   Wrist flex 4+/5 2+/5 2+/5 2+/5   Wrist ext 4+/5 2+/5 2+/5 2+/5    Comments: patient is able to perform RUE movements through partial range of motion in seated (antigravity) position.          Strength: (CLAUDE Dynamometer in lbs.) Average 3 trials, Position II:      #1  1/28/19 1/28/19 3/4/19 4/23/19      Left Right Right  Right    Rung II 86# 15# 10# (-5#) 10# (NC)         Fine Motor Coordination: 9 Hole Peg Test  Left 1/28/2019 Right   1/28/2019 Right   3/4/19      Unable to perform unable to perform        Gross motor coordination:    · MORRIS (Rapid Alternating Movements): Normal on L. Delayed on R at wrist for supination/pronation   · Finger to Nose (5 times): Unable on R   · Finger Flicks (coordination moving from digit flexion to digit extension): slow movement on R, unable to flick digits; slow composite flexion and extension; flexion is loose and extension is not full          CMS Impairment/Limitation/Restriction for FOTO Cerebrovascular Disorders Survey  Status Limitation G-Code CMS Severity Modifier  Intake 28% 72%  Predicted 41% 59% Goal Status+ CK - At least 40 percent but less than 60  percent  3/4/2019 28% 72%   4/23/2019 42% 58% Current Status CK - At least 40 percent but less than 60 percent       Torito participated in therapeutic exercises to improve ROM and strength for 6 minutes:   - UBE level 4 x 3 min forwards x 3 min reverse       Patient participated in neuromuscular reeducation activities for 15 minutes to maximize ROM, motor strength and neuromuscular and proprioceptive input to the RUE.  The following activities were included:   Sitting EOM:   - restoration of R hand arches and metacarpal stretches   - general R wrist stretches: 1. P-A glide of scaphoid on radius, 2. Radial deviation, 3. Increasing mobility of metacarpals, 4. Carpal roll, 5. Movement of forearm on hand towards wrist extension, 6. Movement of forearm on the hand toward supination/ pronation, 7. wrist ext with distraction   - body on arm weight shifts for heavy weight bearing into RUE  - application of GG splint       Functional mobility:   - sit <> stand with SBA  - gait with quad cane and close SBA       Home Exercises and Education Provided     Education provided re:  - HEP   - Patient was instructed to wear splint for up to 3 hours and then remove and perform grasp and release tasks.   - Progress towards goals   - POC     Written Home Exercises Provided: no new, continue HEP    Patient instructed to cont prior HEP as verbally instructed in previous tx sessions. Exercises were reviewed and Torito was able to demonstrate them prior to the end of the session.  Torito demonstrated good  understanding of the HEP provided.       Pt has no cultural, educational or language barriers to learning provided.    Assessment   Patient was reassessed in today's treatment session.  Minimal significant changes noted in RUE AROM measurements today (see above for objective min gains and declines noted).  PROM of the RUE maintained or slightly improved as noted above.  He is able to actively form a loose fist for light grasp and hold  of objects, but objective  strength remained at 10# since last reassessment.  He demonstrates fair ability to actively open (extend) digits for active release of objects, and OT has continued to use GG splint to improve extension.  He demonstrates fair ability to perform gross MORRIS and finger flicks, but he continues with severe fine motor coordination limitations and is unable to participate in 9 [facility] assessment 2/2 continued deficits.  Based on the results of today's reassessment, patient may be approaching his max potential from skilled occupational therapy.  OT recommends extending his POC 2 times weekly for 4 weeks to maximize progress and improve residual physical and functional deficits.  Torito is progressing fairly towards his goals and there are no updates to goals at this time. Pt prognosis is Good due to patient remains motivated to participate and has good support from his spouse. Pt will continue to benefit from skilled outpatient occupational therapy to address the deficits listed in the problem list on initial evaluation provide pt/family education and to maximize pt's level of independence in the home and community environment.      Anticipated barriers to occupational therapy: none     Pt's spiritual, cultural and educational needs considered and pt agreeable to plan of care and goals.    Goals:  Short Term Goals: 3 weeks   ROM/Strength to perform the ADL's and functional activities listed below, - in progress  Pt will improve functional  strength needed for tasks to 20# in R hand. - in progress   Pt will improve AROM for shoulder flexion needed for functional tasks to 60* in R UE. - in progress   UE dressing will improve to Min A - in progress   LB dressing will improve to Mod A  - in progress   Toileting will improve to Min A incorporating R hand for task  - in progress   Pt will be Independent with HEP to improve ROM and FM skills. - in progress      Long Term Goals: 6 weeks   ROM/Strength  to perform the ADL's and functional activities listed below - in progress   Pt will improve functional  strength needed for tasks to 30# in R hand. - in progress   Pt will improve AROM for shoulder flexion needed for functional tasks to 80* in R UE. - in progress   UE dressing will improve to Supervision   LB dressing will improve to Min A   Toileting will improve to Supervision incorporating R hand for task   Pt will complete clinical testing for skills needed for driving  G code self care CK    Plan   Continue POC in pursuit of OT goals - extend through 5/24/19.     Discussed Plan of Care with patient: Yes  Updates/Grading for next session: continue POC

## 2019-04-25 ENCOUNTER — DOCUMENTATION ONLY (OUTPATIENT)
Dept: REHABILITATION | Facility: HOSPITAL | Age: 59
End: 2019-04-25

## 2019-04-25 NOTE — PROGRESS NOTES
PT/PTA met face to face to discuss pt's treatment plan and progress towards established goals. Continue with current PT POC. Pt will be seen by physical therapist at least every 6th treatment day or every 30 days, whichever occurs first.      Jeff Solis, PTA  04/25/2019

## 2019-04-30 ENCOUNTER — DOCUMENTATION ONLY (OUTPATIENT)
Dept: REHABILITATION | Facility: HOSPITAL | Age: 59
End: 2019-04-30

## 2019-04-30 DIAGNOSIS — R47.01 BROCA'S APHASIA: ICD-10-CM

## 2019-04-30 NOTE — PROGRESS NOTES
No Show Note/Documentation    Patient: Torito Harris  Date of Session: 4/30/2019  Diagnosis:   Encounter Diagnosis   Name Primary?    Broca's aphasia       MRN: [id number]    Torito Harris cancelled  his  scheduled therapy appointment today. Reportedly, he had a fall and was not feeling well. This is the 4th appointment that he has not attended. Next appointment is scheduled for 5/2/19 and will follow up with patient at that time. No charges have been posted today.     SERINA Paz, CCC-SLP   4/30/2019

## 2019-05-02 ENCOUNTER — TELEPHONE (OUTPATIENT)
Dept: REHABILITATION | Facility: HOSPITAL | Age: 59
End: 2019-05-02

## 2019-05-02 DIAGNOSIS — R47.01 BROCA'S APHASIA: ICD-10-CM

## 2019-05-02 DIAGNOSIS — Z74.09 IMPAIRED FUNCTIONAL MOBILITY, BALANCE, GAIT, AND ENDURANCE: ICD-10-CM

## 2019-05-02 DIAGNOSIS — R29.898 WEAKNESS OF RIGHT LEG: ICD-10-CM

## 2019-05-02 NOTE — TELEPHONE ENCOUNTER
"SLP called pt's wife because pt did not show for his appointment. Reports Mr. Ugarte took a fall "hurt his hand and knee real bad." His wife reports that they didn't wanna push him to go to therapy. He had gone to the house next door to use his old exercise equipment. After when he was walking back, he went to look at the  and fell and couldn't get up. His daughter was home. His wife was not home. Will return to therapy next week.  "

## 2019-05-06 ENCOUNTER — DOCUMENTATION ONLY (OUTPATIENT)
Dept: REHABILITATION | Facility: HOSPITAL | Age: 59
End: 2019-05-06

## 2019-05-06 NOTE — PROGRESS NOTES
PT/PTA met face to face to discuss pt's treatment plan and progress towards established goals.  Continue with current PT POC with focus on endurance, strengthening and weight acceptance.  Patient will be seen by physical therapist at least every sixth treatment or 30 days, whichever occurs first.    Lucy Vanegas, PTA  05/06/2019

## 2019-05-07 ENCOUNTER — CLINICAL SUPPORT (OUTPATIENT)
Dept: REHABILITATION | Facility: HOSPITAL | Age: 59
End: 2019-05-07
Attending: PSYCHIATRY & NEUROLOGY

## 2019-05-07 DIAGNOSIS — R29.898 WEAKNESS OF RIGHT LEG: ICD-10-CM

## 2019-05-07 DIAGNOSIS — Z74.09 IMPAIRED FUNCTIONAL MOBILITY, BALANCE, GAIT, AND ENDURANCE: ICD-10-CM

## 2019-05-07 PROCEDURE — 97110 THERAPEUTIC EXERCISES: CPT | Mod: PO

## 2019-05-07 NOTE — PROGRESS NOTES
"  Physical Therapy Daily Treatment Note     [id number]    Therapy Diagnosis:   Encounter Diagnoses   Name Primary?    Weakness of right leg     Impaired functional mobility, balance, gait, and endurance      Physician: Ian Feliciano MD    Visit Date: 5/7/2019    Physician Orders: PT Eval and Treat   Medical Diagnosis from Referral: Late effect of stroke  Evaluation Date: 1/28/2019  Authorization Period Expiration: 02/12/19-12/31/19  Plan of Care Expiration: 03/19/19 to 05/14/19  Visit # / Visits authorized: 3/10 (Vist #21 of 2019) - KX    Time In: 13:45  Time Out: 14:00  Total Billable Time: 15 minutes    Precautions: Standard, Diabetes, Fall and right hemiplegia, hearing aid, safety awareness, cognitive deficits, speech deficits    Subjective     Pt reports: Pt's wife reports that he fell ~1.5 weeks ago walking outside. His still has considerable pain in right knee and right wrist.   HEP Compliance: pt reports he is doing all of his exercises at home.  Response to previous treatment: no complaints  Functional change: improved ability to side step and to step backward    Pain: 0/10  Location: N/A      Objective     Torito received therapeutic exercises to develop strength, endurance, ROM, flexibility and core stabilization for 15 minutes including:     X 8 min Sci Fit recumbent stepper L1, BLE and LUE for BLE muscular endurance, BLE reciprocal use, and BLE strength    After completing stepper activity, pt ambulates to mat. Upon attempting to start RLE therex, pt reports to PT and his wife that he does not want to do therapy today. PT asked if patient was in pain. Pt shook his head "no" and became tearful. Pt's wife reports that patient appears to still be frustrated s/p fall and that she was hoping that he would feel better if he came and tried therapy. Pt reports that he does not want to do therapy today.        Home Exercises Provided and Patient Education Provided "     Education provided: Progress with therapy.     Written Home Exercises Provided: Continue current HEP.   See EMR under Media for exercises provided 2/5/2019.    Assessment   Mr. Ugarte tolerated therapy session poor this afternoon. Pt present with depressed affect s/p fall ~1.5 weeks ago. Pt agreeable to attempt therapy with his wife's encouragement. However, after completing recumbent stepper activity, patient became tearful and reported that he did not want to do therapy today. Patient denied pain as reason to stop therapy. Pt's wife reports that she was hoping that if he attended therapy today, he would feel better about the fall. Cont. POC.       Torito is progressing well towards his goals.   Pt prognosis is Good.     Pt will continue to benefit from skilled outpatient physical therapy to address the deficits listed in the problem list box on initial evaluation, provide pt/family education and to maximize pt's level of independence in the home and community environment.     Pt's spiritual, cultural and educational needs considered and pt agreeable to plan of care and goals.     Anticipated Barriers for therapy: hearing deficit, speech deficits, cognitive deficits, decreased safety awareness, fall risk, visual neglect, transportation assistance required    Goals:  Short Term Goals: 4 weeks   1. Pt to be (I) with established HEP MET 03/19/19  2. Pt to improve R hip flexion strength MMT score to at least 4+/5 for improved gait mechanics Ongoing  3. Pt to improve R hip extension strength MMT score to at least 4/5 for improved gait mechanics Ongoing  4. Pt to improve R hip AB and R hip AD strength MMT scores to at least 3+/5 for improved gait mechanics MET 03/04/19  5.  Pt to improve R knee flexion and extension strength MMT scores to at least 4+/5 for improved gait mechanics Ongoing  6. Pt to improve R ankle PF strength MMT score to at least 2/5 for improved gait mechanics MET 03/04/19  7. Pt to improve chair  rise score to at least 5 times with no more than 1 UE support and S for improved endurance and safety with functional transfers MET 03/04/19  8. Pt to improve TUG score to at least 30 seconds for improved safety with household mobility Ongoing  9. Pt to improve SSWS score to at least 0.40 m/sec for improved safety with community mobility. Ongoing     Long Term Goals: 8 weeks   1. Pt to be (I) with advanced HEP Ongoing  2. Pt to improve R hip extension strength MMT score to at least 4+/5 for improved gait mechanics Ongoing  3. Pt to improve R hip AB and R hip AD strength MMT scores to at least 4-/5 for improved gait mechanics Ongoing  4. Pt to improve R ankle PF strength MMT score to at least 2+/5 for improved gait mechanics MET 03/19/19  5. Pt to improve chair rise score to at least 5 times with no UE support and S for improved endurance and safety with functional transfers Ongoing  6. Pt to improve TUG score to at least 26 seconds for improved safety with household mobility Ongoing  7. Pt to improve SSWS score to at least 0.50 m/sec for improved safety with community mobility. Ongoing     Plan     Plan for next visit: Continue to promote use of RLE during functional transfers and ambulation. Continue to address right knee and right hip strength deficits to assist with improved mechanics during ambulation.     Hanh Brooke, PT

## 2019-05-08 ENCOUNTER — DOCUMENTATION ONLY (OUTPATIENT)
Dept: REHABILITATION | Facility: HOSPITAL | Age: 59
End: 2019-05-08

## 2019-05-08 NOTE — PROGRESS NOTES
Face to face meeting completed with Hanh Brooke PT regarding current status and progress of   Torito Harris . Weight acceptance, R hip knee strength  Alvarado Escobedo, PTA

## 2019-05-09 ENCOUNTER — DOCUMENTATION ONLY (OUTPATIENT)
Dept: REHABILITATION | Facility: HOSPITAL | Age: 59
End: 2019-05-09

## 2019-05-09 NOTE — PROGRESS NOTES
No Show Note/Documentation    Patient: Torito Harris  Date of Session: 5/9/2019  Diagnosis:   No diagnosis found.   MRN: [id number]    Torito Harris cancelled  his  scheduled therapy appointment today. This is the 5th appointment that he has not attended. Next appointment is scheduled for 5/14/19 and will follow up with patient at that time. No charges have been posted today.     SERINA Dickey, CCC-SLP   5/9/2019

## 2019-05-14 ENCOUNTER — CLINICAL SUPPORT (OUTPATIENT)
Dept: REHABILITATION | Facility: HOSPITAL | Age: 59
End: 2019-05-14
Attending: PSYCHIATRY & NEUROLOGY

## 2019-05-14 DIAGNOSIS — Z78.9 IMPAIRED MOBILITY AND ACTIVITIES OF DAILY LIVING: ICD-10-CM

## 2019-05-14 DIAGNOSIS — Z74.09 IMPAIRED MOBILITY AND ACTIVITIES OF DAILY LIVING: ICD-10-CM

## 2019-05-14 DIAGNOSIS — R29.898 WEAKNESS OF RIGHT UPPER EXTREMITY: ICD-10-CM

## 2019-05-14 DIAGNOSIS — R29.898 WEAKNESS OF RIGHT LEG: ICD-10-CM

## 2019-05-14 DIAGNOSIS — R47.01 BROCA'S APHASIA: ICD-10-CM

## 2019-05-14 DIAGNOSIS — Z74.09 IMPAIRED FUNCTIONAL MOBILITY, BALANCE, GAIT, AND ENDURANCE: ICD-10-CM

## 2019-05-14 PROCEDURE — 92507 TX SP LANG VOICE COMM INDIV: CPT | Mod: KX,PO

## 2019-05-14 PROCEDURE — 97110 THERAPEUTIC EXERCISES: CPT | Mod: KX,PO

## 2019-05-14 PROCEDURE — 97116 GAIT TRAINING THERAPY: CPT | Mod: KX,PO

## 2019-05-14 PROCEDURE — 97112 NEUROMUSCULAR REEDUCATION: CPT | Mod: KX,PO,59

## 2019-05-14 NOTE — PROGRESS NOTES
Occupational Therapy Progress Note     Name: Torito Harris  [facility] Number: [id number]  Physician: Ian Feliciano MD  Medical Diagnosis: L CVA      Therapy Diagnosis:   Encounter Diagnoses   Name Primary?    Impaired mobility and activities of daily living     Weakness of right upper extremity      Visit Date: 5/14/2019  Physician Orders: Eval and Treat   Surgical Procedure and Date: NA  Evaluation Date: 1/28/19   Insurance Authorization Period Expiration: 12/31/19   Plan of Care Certification Period: 6/14/19   Date of Return to MD: unknown at this time     Visit # / Visits authorized: 23 ( 3/ 10 new auth)   Time In: 1300   Time Out: 1345    Total Billable Time: 45 minutes      Precautions: Standard, fall risk, hearing impaired     Subjective     Pt reports: He is feeling better since his fall and is willing to participate in today's tx session.    Patient reports compliance with HEP while out of therapy.     Response to previous treatment:positive   Functional change: progressing     Pain: none reported   Location: NA     Objective   Patient seen by OT this date.  Pt ambulated to treatment session with quad cane and CGA.  Patient's wife was present for treatment session.  Patient received individual therapy with activities as follows:     Patient has been out of occupational therapy since 4/23/19 due to a fall.  Measurements were performed again today to evaluate progress/performance since being out of therapy for an extended period of time.      PROGRESS AS FOLLOWS:       Range of Motion:      Joint Evaluation  AROM  1/28/2019 AROM  1/28/2019 PROM   1/28/2019 AROM  3/4/19 PROM  3/4/19 AROM  4/23/19 PROM   4/28/19 AROM  5/14/19 PROM  5/14/19     Left Right Right Right  Right  Right  Right  Right  Right    Shoulder flex 0-180 WNL  throughout 40 140 40 (NC) 165 (+25) 50 (+10) 165 (NC) 90 (+40) 170 (+5)   Shoulder Abd 0-180   25 120 35 (+10) 125 (+5)  40 (+5) 130 (+5) 95 (+55) 130 (NC)   Shoulder ER 0-90   50  90 50 (NC) 90 (NC) 20 (-30) 90 (NC) 40 (+20) 90 (NC)   Shoulder IR 0-90   30 80 40 (+10) 80 (NC) 30 (-10) 85 (+5) 40 (+10)  80 (-5)   Shoulder Extension 0-80   50 80 50 (NC)  70 (-10)  50 (NC) 75 (+5) 60 (+10) 75 (NC)   Elbow flex/ext 0-150   10-90 0-140  (-40 /+45)  0-140 (NC)   (+10 / -5) 0-140 (NC)  (NC/+10) 0-150 (NC/+10)   Wrist flexion    NT NT 50 90  70 (+20) 90 (NC) 65 (-5) 90 (NC)   Wrist extension    NT NT 35 75 30 (-5) 75 (NC) 45 (+15) 80 (+5)   Supination    NT NT 90 NT 85 (-5) NT 85 (NC)  NT    Pronation    NT NT 80 NT 85 (+5) NT 90 (+5) NT   UD   NT NT Trace 25 25 (+25) 35 (+10) 20 (-5) 30 (-5)   RD   NT NT Trace  20 15 (+15) 20 (NC) 20 (+5)  30 (+10)       Fist: Pt able to make loose fist        Strength: (CLAUDE Dynamometer in lbs.) Average 3 trials, Position II:      #1  1/28/19 1/28/19 3/4/19 4/23/19  5/14/19     Left Right Right  Right  Right    Rung II 86# 15# 10# (-5#) 10# (NC) 17# (7#)         Fine Motor Coordination: 9 Hole Peg Test  Left 1/28/2019 Right   1/28/2019 Right   3/4/19  Right 4/23/19 Right 5/14/19     Unable to perform unable to perform   unable Unable       Gross motor coordination:    · MORRIS (Rapid Alternating Movements): Normal on L. Delayed on R at wrist for supination/pronation   · Finger to Nose (5 times): severly impaired motor control on R    · Finger Flicks (coordination moving from digit flexion to digit extension): slow movement on R, unable to flick digits; slow composite flexion and extension; flexion is loose and extension is not full          Torito participated in therapeutic exercises to improve ROM and strength for 15 minutes:   - UBE level 4 x 4 min forwards x 4 min reverse   - towel slides for R shoulder AAROM; performed sitting EOM       Patient participated in neuromuscular reeducation activities for 10 minutes to maximize ROM, motor strength and neuromuscular and proprioceptive input to the RUE.  The following activities were included:    Sitting EOM:   - restoration of R hand arches and metacarpal stretches   - general R wrist stretches: 1. P-A glide of scaphoid on radius, 2. Radial deviation, 3. Increasing mobility of metacarpals, 4. Carpal roll, 5. Movement of forearm on hand towards wrist extension, 6. Movement of forearm on the hand toward supination/ pronation, 7. wrist ext with distraction   - body on arm weight shifts for heavy weight bearing into RUE      Functional mobility:   - sit <> stand with SBA  - gait with quad cane and CGA       Home Exercises and Education Provided     Education provided re:  - HEP   - Patient was instructed to wear splint for up to 3 hours and then remove and perform grasp and release tasks.   - Progress towards goals   - POC     Written Home Exercises Provided: no new, continue HEP    Patient instructed to cont prior HEP as verbally instructed in previous tx sessions. Exercises were reviewed and Torito was able to demonstrate them prior to the end of the session.  Torito demonstrated good  understanding of the HEP provided.       Pt has no cultural, educational or language barriers to learning provided.    Assessment   Patient was reassessed again today as he has been out of therapy since prior reassessment on 4/23/19 due to a fall.  Despite decreased participation, he demonstrates significant progress in R shoulder AROM in all assessed planes as noted above and reports he has been performing his HEP consistently since being out of skilled occupational therapy.  Small gains in active R wrist and forearm movements were also noted other than a 5 degree decline in wrist flexion and UD.  He is able to actively form a loose fist for light grasp and hold of objects, and his  strength increased by 7# since last reassessment.  Again he demonstrates fair ability to actively open (extend) digits for active release of objects.  He continues with gross and fine motor deficits including decreased speed, amplitude, and  control of movement and is unable to peform in 9 HPT assessment.     Based on the results of today's reassessment, patient would benefit from continued skilled occupational therapy.  OT recommends extending  his POC 2 times weekly for 4 weeks (from today) to maximize progress and improve residual physical and functional deficits.  Torito is progressing fairly towards his goals and there are no updates to goals at this time. Pt prognosis is Good due to patient remains motivated to participate and has good support from his spouse. Pt will continue to benefit from skilled outpatient occupational therapy to address the deficits listed in the problem list on initial evaluation provide pt/family education and to maximize pt's level of independence in the home and community environment.      Anticipated barriers to occupational therapy: none     Pt's spiritual, cultural and educational needs considered and pt agreeable to plan of care and goals.    Goals:  Short Term Goals: 3 weeks   ROM/Strength to perform the ADL's and functional activities listed below, - in progress  Pt will improve functional  strength needed for tasks to 20# in R hand. - in progress   Pt will improve AROM for shoulder flexion needed for functional tasks to 60* in R UE. - MET 5/14/19  UE dressing will improve to Min A - in progress   LB dressing will improve to Mod A  - in progress   Toileting will improve to Min A incorporating R hand for task  - in progress   Pt will be Independent with HEP to improve ROM and FM skills. - ongoing      Long Term Goals: 6 weeks   ROM/Strength to perform the ADL's and functional activities listed below - in progress   Pt will improve functional  strength needed for tasks to 30# in R hand. - in progress   Pt will improve AROM for shoulder flexion needed for functional tasks to 80* in R UE. - MET 4/14/19  UE dressing will improve to Supervision   LB dressing will improve to Min A   Toileting will improve to  Supervision incorporating R hand for task   Pt will complete clinical testing for skills needed for driving  G code self care CK - in progress     Plan   Continue POC in pursuit of OT goals - extend through 6/14/19.     Discussed Plan of Care with patient: Yes  Updates/Grading for next session: continue POC; advance as tolerated

## 2019-05-14 NOTE — PLAN OF CARE
Physical Therapy Daily Treatment Note     Name: Torito Harris  [facility] Number: [id number]    Therapy Diagnosis:   Encounter Diagnoses   Name Primary?    Weakness of right leg     Impaired functional mobility, balance, gait, and endurance      Physician: Ian Feliciano MD    Visit Date: 5/14/2019    Physician Orders: PT Eval and Treat   Medical Diagnosis from Referral: Late effect of stroke  Evaluation Date: 1/28/2019  Authorization Period Expiration: 02/12/19-12/31/19  Plan of Care Expiration: 05/14/19 to 07/09/19  Visit # / Visits authorized: 4/10 (Vist #22 of 2019) - KX    Time In: 13:45  Time Out: 14:30  Total Billable Time: 45 minutes    Precautions: Standard, Diabetes, Fall and right hemiplegia, hearing aid, safety awareness, cognitive deficits, speech deficits    Subjective     Pt reports: that he is doing better today  HEP Compliance: pt reports he is doing all of his exercises at home.  Response to previous treatment: no complaints  Functional change: improved ability to side step and to step backward    Pain: 0/10  Location: N/A      Objective     Torito received therapeutic exercises to develop strength, endurance, ROM, flexibility and core stabilization for 25 minutes including:     X 6 min Sci Fit recumbent stepper L1, BLE and LUE for BLE muscular endurance, BLE reciprocal use, and BLE strength    Lower Extremity Strength  Right LE  Evaluation 03/19/19 05/14/19 Left LE  Evaluation 03/19/19 05/14/19   Hip Flexion: 4/5 4-/5 4-/5 Hip Flexion: 5/5 5/5 5/5   Hip Extension:  4-/5 4-/5 4-/5 Hip Extension: 5/5 5/5 5/5   Hip Abduction: 3/5 4/5 4/5 Hip Abduction: 5/5 5/5 5/5   Hip Adduction: 3/5 4/5 4+/5 Hip Adduction 5/5 5/5 5/5   Knee Extension: 4/5 4/5 4+/5 Knee Extension: 5/5 5/5 5/5   Knee Flexion: 4/5 4-/5 4-/5 Knee Flexion: 5/5 5/5 5/5   Ankle Dorsiflexion: 0/5 3+/5 3+/5 Ankle Dorsiflexion: 5/5 5/5 5/5   Ankle Plantarflexion: 2-/5 3/5 3+/5 Ankle Plantarflexion: 5/5 5/5 5/5        Evaluation 3/4/2019  03/19/19 05/14/19   30 second Chair Rise  (adults > 59 y/o) 11 completed with LUE, abigail walker, CGA 8 completed with LUE, no AD, SBA 9 completed with LUE, no AD, CBA at R knee, improved RLE use 10 completed with LUE, no AD, CGA at R knee, improved RLE use     3 x 10 RLE SAQs c/ 1.0# cuff weight  3 x 10 RLE hip abduction in hook lying with OTB    Patient participated in gait training activities to normalize gait pattern for 20 minutes. The following activities were included:   Gait belt used for safety. Assistive device: SBQC, little platform rolling walker        Evaluation 03/19/19 05/14/19   Timed Up and Go 34 sec c/ abigail walker c/ SBA 31 sec c/ SBQC c/ CGA to SBA 27 sec c/ SBQC and CGA   Self Selected Walking Speed 0.33 m/sec (6m/18s)  C/ abigail walker c/ SBA 0.35 m/sec (6m/17s)  c/ SBQC c/ SBA 0.40 m/sec (6m/15s)  c/ SBQC c/ CGA     X 300 feet ambulation in closed hallway with LITTLE platform RW, CGA with occ Min A with turning 180 degrees. Pt demonstrates more weight bearing on LLE compared to RLE. Increased R knee flexion in stance, increased RLE adduction in stance, increased RLE foot flat contact. Min A to navigate walker when stepping backward and side stepping.     Home Exercises Provided and Patient Education Provided     Education provided: Progress with therapy.   Pt and his wife reports to PT that they are interested in purchasing a platform rolling walker similar to the LITTLE platform rolling walker that pt uses to ambulate with PT during therapy sessions. PT reported that she would have to get the company's information for pateint and his wife as the company who supplies this particular piece of equipment does not take insurance. PT instructed both patient and his wife that if patient does purchase this rolling platform walker for home use, PT recommends that patient's wife be present AT ALL TIMES when patient is using the device as he does require occ Min A from PT to steer walker when turning and to maneuver  walker when stepping backward or laterally. Pt's wife verbalized good understanding of PT's instructions. Pt appeared reluctant in wanting wife to assist him, insisting that he could use the device by himself. PT once again instructed him that he is at high risk for another fall if he tries to use the platform rolling walker unsupervised since he requires assist from PT to use it in therapy sessions. Reinforcement needed.     Written Home Exercises Provided: Continue current HEP.   See EMR under Media for exercises provided 2/5/2019.    Assessment   Mr. Ugarte tolerated therapy session much better this afternoon compared to prior therapy session. Pt reassessed for POC update. Pt suffered fall at home ~2.5 weeks ago. He attempted to return to therapy last week, but became very emotional about his fall and did not want to finish his session. However, pt appears to have regained his motivation to participate in therapy. Despite fall at home, patient demonstrates good but slow progress with therapy.  During MMT strength testing of BLE, pt's RLE strength scores remain fairly consistent with prior assessment measurements. However, some increase in strength noted with right hip adduction, right knee extension, and right ankle plantarflexion. Pt also demonstrates improved chair rise score by 1 rep compared to prior trial. However, he continues to rely heavily on LUE and LLE to complete sit <> stand transfers. Pt demonstrates improved TUG score with 4 second improvement compared to prior trial. However, during performance of TUG, he demonstrates poor safety awareness with turning. Pt also demonstrates a 2 second improvement in SSWS compared to prior trial. His current score still places him at an elevated fall risk with community ambulation due to increased time needs. During gait trials with both SBQC and platform rolling walker, pt continues to rely predominantly on LLE for weight bearing. Additionally, he continues to  demonstrate poor gait mechanics most likely attributed to decreased RLE strength. Pt to benefit from continued PT intervention to continue to address remaining mobility deficits, to improve safety awareness with functional mobility, and to decrease risk of fall. Cont. POC.       Torito is progressing well towards his goals.   Pt prognosis is Good.     Pt will continue to benefit from skilled outpatient physical therapy to address the deficits listed in the problem list box on initial evaluation, provide pt/family education and to maximize pt's level of independence in the home and community environment.     Pt's spiritual, cultural and educational needs considered and pt agreeable to plan of care and goals.     Anticipated Barriers for therapy: hearing deficit, speech deficits, cognitive deficits, decreased safety awareness, fall risk, visual neglect, transportation assistance required    Goals:  Short Term Goals: 4 weeks   1. Pt to be (I) with established HEP MET 03/19/19  2. Pt to improve R hip flexion strength MMT score to at least 4+/5 for improved gait mechanics Ongoing  3. Pt to improve R hip extension strength MMT score to at least 4/5 for improved gait mechanics Ongoing  4. Pt to improve R hip AB and R hip AD strength MMT scores to at least 3+/5 for improved gait mechanics MET 03/04/19  5.  Pt to improve R knee flexion and extension strength MMT scores to at least 4+/5 for improved gait mechanics Ongoing  6. Pt to improve R ankle PF strength MMT score to at least 2/5 for improved gait mechanics MET 03/04/19  7. Pt to improve chair rise score to at least 5 times with no more than 1 UE support and S for improved endurance and safety with functional transfers MET 03/04/19  8. Pt to improve TUG score to at least 30 seconds for improved safety with household mobility Progressing  9. Pt to improve SSWS score to at least 0.40 m/sec for improved safety with community mobility. MET 05/14/19     Long Term Goals: 8  weeks   1. Pt to be (I) with advanced HEP Ongoing  2. Pt to improve R hip extension strength MMT score to at least 4+/5 for improved gait mechanics Ongoing  3. Pt to improve R hip AB and R hip AD strength MMT scores to at least 4-/5 for improved gait mechanics Ongoing  4. Pt to improve R ankle PF strength MMT score to at least 2+/5 for improved gait mechanics MET 03/19/19  5. Pt to improve chair rise score to at least 5 times with no UE support and S for improved endurance and safety with functional transfers Ongoing  6. Pt to improve TUG score to at least 26 seconds for improved safety with household mobility Ongoing  7. Pt to improve SSWS score to at least 0.50 m/sec for improved safety with community mobility. Ongoing     Plan     PT to extend POC x 8 weeks.     Continue outpatient physical therapy 2x weekly under current established Plan of Care,05/14/19 to 07/09/19, with treatment to include: pt education, HEP, therapeutic exercises, neuromuscular re-education/balance exercises, therapeutic activities, joint mobilizations, and modalities PRN, to work towards established goals. Pt may be seen by PTA to carry out plan of care.     Plan for next session:  RLE strengthening, mainly hip and knee  Safety with turning, side stepping, and backward stepping with gait  Weight bearing on RLE    Hanh Brooke, PT

## 2019-05-14 NOTE — PROGRESS NOTES
Physical Therapy Daily Treatment Note     Name: Torito Harris  [facility] Number: [id number]    Therapy Diagnosis:   Encounter Diagnoses   Name Primary?    Weakness of right leg     Impaired functional mobility, balance, gait, and endurance      Physician: Ian Feliciano MD    Visit Date: 5/14/2019    Physician Orders: PT Eval and Treat   Medical Diagnosis from Referral: Late effect of stroke  Evaluation Date: 1/28/2019  Authorization Period Expiration: 02/12/19-12/31/19  Plan of Care Expiration: 05/14/19 to 07/09/19  Visit # / Visits authorized: 4/10 (Vist #22 of 2019) - KX    Time In: 13:45  Time Out: 14:30  Total Billable Time: 45 minutes    Precautions: Standard, Diabetes, Fall and right hemiplegia, hearing aid, safety awareness, cognitive deficits, speech deficits    Subjective     Pt reports: that he is doing better today  HEP Compliance: pt reports he is doing all of his exercises at home.  Response to previous treatment: no complaints  Functional change: improved ability to side step and to step backward    Pain: 0/10  Location: N/A      Objective     Torito received therapeutic exercises to develop strength, endurance, ROM, flexibility and core stabilization for 25 minutes including:     X 6 min Sci Fit recumbent stepper L1, BLE and LUE for BLE muscular endurance, BLE reciprocal use, and BLE strength    Lower Extremity Strength  Right LE  Evaluation 03/19/19 05/14/19 Left LE  Evaluation 03/19/19 05/14/19   Hip Flexion: 4/5 4-/5 4-/5 Hip Flexion: 5/5 5/5 5/5   Hip Extension:  4-/5 4-/5 4-/5 Hip Extension: 5/5 5/5 5/5   Hip Abduction: 3/5 4/5 4/5 Hip Abduction: 5/5 5/5 5/5   Hip Adduction: 3/5 4/5 4+/5 Hip Adduction 5/5 5/5 5/5   Knee Extension: 4/5 4/5 4+/5 Knee Extension: 5/5 5/5 5/5   Knee Flexion: 4/5 4-/5 4-/5 Knee Flexion: 5/5 5/5 5/5   Ankle Dorsiflexion: 0/5 3+/5 3+/5 Ankle Dorsiflexion: 5/5 5/5 5/5   Ankle Plantarflexion: 2-/5 3/5 3+/5 Ankle Plantarflexion: 5/5 5/5 5/5        Evaluation 3/4/2019  03/19/19 05/14/19   30 second Chair Rise  (adults > 61 y/o) 11 completed with LUE, abigail walker, CGA 8 completed with LUE, no AD, SBA 9 completed with LUE, no AD, CBA at R knee, improved RLE use 10 completed with LUE, no AD, CGA at R knee, improved RLE use     3 x 10 RLE SAQs c/ 1.0# cuff weight  3 x 10 RLE hip abduction in hook lying with OTB    Patient participated in gait training activities to normalize gait pattern for 20 minutes. The following activities were included:   Gait belt used for safety. Assistive device: SBQC, little platform rolling walker        Evaluation 03/19/19 05/14/19   Timed Up and Go 34 sec c/ abigail walker c/ SBA 31 sec c/ SBQC c/ CGA to SBA 27 sec c/ SBQC and CGA   Self Selected Walking Speed 0.33 m/sec (6m/18s)  C/ abigail walker c/ SBA 0.35 m/sec (6m/17s)  c/ SBQC c/ SBA 0.40 m/sec (6m/15s)  c/ SBQC c/ CGA     X 300 feet ambulation in closed hallway with LITTLE platform RW, CGA with occ Min A with turning 180 degrees. Pt demonstrates more weight bearing on LLE compared to RLE. Increased R knee flexion in stance, increased RLE adduction in stance, increased RLE foot flat contact. Min A to navigate walker when stepping backward and side stepping.     Home Exercises Provided and Patient Education Provided     Education provided: Progress with therapy.   Pt and his wife reports to PT that they are interested in purchasing a platform rolling walker similar to the LITTLE platform rolling walker that pt uses to ambulate with PT during therapy sessions. PT reported that she would have to get the company's information for pateint and his wife as the company who supplies this particular piece of equipment does not take insurance. PT instructed both patient and his wife that if patient does purchase this rolling platform walker for home use, PT recommends that patient's wife be present AT ALL TIMES when patient is using the device as he does require occ Min A from PT to steer walker when turning and to maneuver  walker when stepping backward or laterally. Pt's wife verbalized good understanding of PT's instructions. Pt appeared reluctant in wanting wife to assist him, insisting that he could use the device by himself. PT once again instructed him that he is at high risk for another fall if he tries to use the platform rolling walker unsupervised since he requires assist from PT to use it in therapy sessions. Reinforcement needed.     Written Home Exercises Provided: Continue current HEP.   See EMR under Media for exercises provided 2/5/2019.    Assessment   Mr. Ugarte tolerated therapy session much better this afternoon compared to prior therapy session. Pt reassessed for POC update. Pt suffered fall at home ~2.5 weeks ago. He attempted to return to therapy last week, but became very emotional about his fall and did not want to finish his session. However, pt appears to have regained his motivation to participate in therapy. Despite fall at home, patient demonstrates good but slow progress with therapy.  During MMT strength testing of BLE, pt's RLE strength scores remain fairly consistent with prior assessment measurements. However, some increase in strength noted with right hip adduction, right knee extension, and right ankle plantarflexion. Pt also demonstrates improved chair rise score by 1 rep compared to prior trial. However, he continues to rely heavily on LUE and LLE to complete sit <> stand transfers. Pt demonstrates improved TUG score with 4 second improvement compared to prior trial. However, during performance of TUG, he demonstrates poor safety awareness with turning. Pt also demonstrates a 2 second improvement in SSWS compared to prior trial. His current score still places him at an elevated fall risk with community ambulation due to increased time needs. During gait trials with both SBQC and platform rolling walker, pt continues to rely predominantly on LLE for weight bearing. Additionally, he continues to  demonstrate poor gait mechanics most likely attributed to decreased RLE strength. Pt to benefit from continued PT intervention to continue to address remaining mobility deficits, to improve safety awareness with functional mobility, and to decrease risk of fall. Cont. POC.       Torito is progressing well towards his goals.   Pt prognosis is Good.     Pt will continue to benefit from skilled outpatient physical therapy to address the deficits listed in the problem list box on initial evaluation, provide pt/family education and to maximize pt's level of independence in the home and community environment.     Pt's spiritual, cultural and educational needs considered and pt agreeable to plan of care and goals.     Anticipated Barriers for therapy: hearing deficit, speech deficits, cognitive deficits, decreased safety awareness, fall risk, visual neglect, transportation assistance required    Goals:  Short Term Goals: 4 weeks   1. Pt to be (I) with established HEP MET 03/19/19  2. Pt to improve R hip flexion strength MMT score to at least 4+/5 for improved gait mechanics Ongoing  3. Pt to improve R hip extension strength MMT score to at least 4/5 for improved gait mechanics Ongoing  4. Pt to improve R hip AB and R hip AD strength MMT scores to at least 3+/5 for improved gait mechanics MET 03/04/19  5.  Pt to improve R knee flexion and extension strength MMT scores to at least 4+/5 for improved gait mechanics Ongoing  6. Pt to improve R ankle PF strength MMT score to at least 2/5 for improved gait mechanics MET 03/04/19  7. Pt to improve chair rise score to at least 5 times with no more than 1 UE support and S for improved endurance and safety with functional transfers MET 03/04/19  8. Pt to improve TUG score to at least 30 seconds for improved safety with household mobility Progressing  9. Pt to improve SSWS score to at least 0.40 m/sec for improved safety with community mobility. MET 05/14/19     Long Term Goals: 8  weeks   1. Pt to be (I) with advanced HEP Ongoing  2. Pt to improve R hip extension strength MMT score to at least 4+/5 for improved gait mechanics Ongoing  3. Pt to improve R hip AB and R hip AD strength MMT scores to at least 4-/5 for improved gait mechanics Ongoing  4. Pt to improve R ankle PF strength MMT score to at least 2+/5 for improved gait mechanics MET 03/19/19  5. Pt to improve chair rise score to at least 5 times with no UE support and S for improved endurance and safety with functional transfers Ongoing  6. Pt to improve TUG score to at least 26 seconds for improved safety with household mobility Ongoing  7. Pt to improve SSWS score to at least 0.50 m/sec for improved safety with community mobility. Ongoing     Plan     PT to extend POC x 8 weeks.     Continue outpatient physical therapy 2x weekly under current established Plan of Care,05/14/19 to 07/09/19, with treatment to include: pt education, HEP, therapeutic exercises, neuromuscular re-education/balance exercises, therapeutic activities, joint mobilizations, and modalities PRN, to work towards established goals. Pt may be seen by PTA to carry out plan of care.     Plan for next session:  RLE strengthening, mainly hip and knee  Safety with turning, side stepping, and backward stepping with gait  Weight bearing on RLE    Hanh Brooke, PT

## 2019-05-14 NOTE — PROGRESS NOTES
Outpatient Neurological Rehabilitation   Speech and Language Therapy Daily Note  Date:  5/14/2019     Name: Torito Harris   MRN: [id number]   Therapy Diagnosis:   Encounter Diagnosis   Name Primary?    Broca's aphasia    Physician: Ian Feliciano MD  Physician Orders: ST evaluate and treat  Medical Diagnosis: I69.30 (ICD-10-CM) - Late effect of stroke    Visit #/Visits authorized: 3/ 10 (20 prior to this auth) KX   Date of Evaluation:  1/28/19  Insurance Authorization Period: 3/7/2019 to 12/31/19   Plan of Care Expiration Date:  1/28/2019 to 3/25/19; 3/28/19 to 5/24/19  Extended POC: n/a   Cancelled Visits:  0   No Show Visits:  6     Time In:  1430   Time Out:  1515   Total Billable Time: 45 minutes    Precautions: Standard and Fall  Subjective:   Pt reports: that he is upset that his daughter does not communicate well with him anymore. Became labile while discussing this. Was very close to his daughter prior to CVA. Pt drove to therapy today. Unclear if he has been cleared to drive by MD. Reiterated that MD must clear to drive.  Pain Scale:  0/10 on VAS currently.   Pain Location: n/a  Objective:   UNTIMED  Procedure Min.   Speech- Language- Voice Therapy    45 minutes   Total Untimed Units: 1  Charges Billed/# of units: 1    Short Term Goals: (4 weeks) Current Progress:   1. Pt will name common objects with 90% Carmen.  Progressing/ Not Met 5/14/2019   53%indly, 100% acc given moderate semantic and phonemic cues   2. Pt will generate a response to basic sentence completions with 80% Carmen   Progressing/ Not Met 5/14/2019   Not formally addressed     3. Pt will name 5 concrete category items with Carmen.  Progressing/ Not Met 5/14/2019   Not formally addressed     4.  pt will follow 2 unit commands with visual stimuli with 90% acc indly to improve auditory comprehension.   Progressing/ Not Met 5/14/2019   Not formally addressed      5. pt and a conversational partner will use supported communication for persons  with   aphasia to facilitate 2 conversational turns with a communication partner.   Progressing/ Not Met 5/14/2019  Identified target topics for communication:   Fishing:   · 25 ft boat, 23 foot boat  · [place]  · lula humberto  · Red fish   · Trout   · sheephead   · Bait   · Live shrimp   · Dead shrimp    Will use to make communication board   6. Patient will write name and address to increase written expression skills with 90% accuracy given verbal presentation of letters  Progressing/ Not Met 5/14/2019  .  Not formally addressed    Previously Goal met x's 2   7.  pt will complete reading comprehension tasks at the sentence level with 90% acc indly.  Progressing/ Not Met 5/14/2019   Not formally addressed     8. pt will follow 3 unit body part commands with 90% acc indly.   Progressing/ Not Met 5/14/2019  1/2 commands indly   9. Patient will write grapheme to corresponding phoneme with 90% acc indly.  Progressing/ Not Met 5/14/2019     Not formally addressed       Patient Education/Response:    Pt given counseling and education regarding stroke recovery, focus on conversational scripts in future, and incorporating other communication partners. Pt and his wife indicated understanding.     Assessment:   Torito is progressing well towards his goals. Increased lability today possibly due to self reported depression after recent fall. Improved after redirection and addressing targets that he is more successful with. Current goals remain appropriate. Pt improved reading comprehension accuracy when given a field of 3 options to choose from. Pt with improvements in word-finding skills when clinician cued pt to describe function of object. Pt prognosis is Good. Pt will continue to benefit from skilled outpatient speech and language therapy to address the deficits listed in the problem list on initial evaluation, provide pt/family education and to maximize pt's level of independence in the home and community  environment.     Barriers to Therapy: sleeping pattern, possible transportation  Pt's spiritual, cultural and educational needs considered and pt agreeable to plan of care and goals.    Plan:   Continue POC with focus on written cues as support.     SERINA Mott, CCC-SLP  Speech Language Pathologist

## 2019-05-14 NOTE — PLAN OF CARE
Occupational Therapy Progress Note     Name: Torito Harris  [facility] Number: [id number]  Physician: Ian Feliciano MD  Medical Diagnosis: L CVA      Therapy Diagnosis:   Encounter Diagnoses   Name Primary?    Impaired mobility and activities of daily living     Weakness of right upper extremity      Visit Date: 5/14/2019  Physician Orders: Eval and Treat   Surgical Procedure and Date: NA  Evaluation Date: 1/28/19   Insurance Authorization Period Expiration: 12/31/19   Plan of Care Certification Period: 6/14/19   Date of Return to MD: unknown at this time     Visit # / Visits authorized: 23 ( 3/ 10 new auth)   Time In: 1300   Time Out: 1345    Total Billable Time: 45 minutes      Precautions: Standard, fall risk, hearing impaired     Subjective     Pt reports: He is feeling better since his fall and is willing to participate in today's tx session.    Patient reports compliance with HEP while out of therapy.     Response to previous treatment:positive   Functional change: progressing     Pain: none reported   Location: NA     Objective   Patient seen by OT this date.  Pt ambulated to treatment session with quad cane and CGA.  Patient's wife was present for treatment session.  Patient received individual therapy with activities as follows:     Patient has been out of occupational therapy since 4/23/19 due to a fall.  Measurements were performed again today to evaluate progress/performance since being out of therapy for an extended period of time.      PROGRESS AS FOLLOWS:       Range of Motion:      Joint Evaluation  AROM  1/28/2019 AROM  1/28/2019 PROM   1/28/2019 AROM  3/4/19 PROM  3/4/19 AROM  4/23/19 PROM   4/28/19 AROM  5/14/19 PROM  5/14/19     Left Right Right Right  Right  Right  Right  Right  Right    Shoulder flex 0-180 WNL  throughout 40 140 40 (NC) 165 (+25) 50 (+10) 165 (NC) 90 (+40) 170 (+5)   Shoulder Abd 0-180   25 120 35 (+10) 125 (+5)  40 (+5) 130 (+5) 95 (+55) 130 (NC)   Shoulder ER 0-90   50  90 50 (NC) 90 (NC) 20 (-30) 90 (NC) 40 (+20) 90 (NC)   Shoulder IR 0-90   30 80 40 (+10) 80 (NC) 30 (-10) 85 (+5) 40 (+10)  80 (-5)   Shoulder Extension 0-80   50 80 50 (NC)  70 (-10)  50 (NC) 75 (+5) 60 (+10) 75 (NC)   Elbow flex/ext 0-150   10-90 0-140  (-40 /+45)  0-140 (NC)   (+10 / -5) 0-140 (NC)  (NC/+10) 0-150 (NC/+10)   Wrist flexion    NT NT 50 90  70 (+20) 90 (NC) 65 (-5) 90 (NC)   Wrist extension    NT NT 35 75 30 (-5) 75 (NC) 45 (+15) 80 (+5)   Supination    NT NT 90 NT 85 (-5) NT 85 (NC)  NT    Pronation    NT NT 80 NT 85 (+5) NT 90 (+5) NT   UD   NT NT Trace 25 25 (+25) 35 (+10) 20 (-5) 30 (-5)   RD   NT NT Trace  20 15 (+15) 20 (NC) 20 (+5)  30 (+10)       Fist: Pt able to make loose fist        Strength: (CLAUDE Dynamometer in lbs.) Average 3 trials, Position II:      #1  1/28/19 1/28/19 3/4/19 4/23/19  5/14/19     Left Right Right  Right  Right    Rung II 86# 15# 10# (-5#) 10# (NC) 17# (7#)         Fine Motor Coordination: 9 Hole Peg Test  Left 1/28/2019 Right   1/28/2019 Right   3/4/19  Right 4/23/19 Right 5/14/19     Unable to perform unable to perform   unable Unable       Gross motor coordination:    · MORRIS (Rapid Alternating Movements): Normal on L. Delayed on R at wrist for supination/pronation   · Finger to Nose (5 times): severly impaired motor control on R    · Finger Flicks (coordination moving from digit flexion to digit extension): slow movement on R, unable to flick digits; slow composite flexion and extension; flexion is loose and extension is not full          Torito participated in therapeutic exercises to improve ROM and strength for 15 minutes:   - UBE level 4 x 4 min forwards x 4 min reverse   - towel slides for R shoulder AAROM; performed sitting EOM       Patient participated in neuromuscular reeducation activities for 10 minutes to maximize ROM, motor strength and neuromuscular and proprioceptive input to the RUE.  The following activities were included:    Sitting EOM:   - restoration of R hand arches and metacarpal stretches   - general R wrist stretches: 1. P-A glide of scaphoid on radius, 2. Radial deviation, 3. Increasing mobility of metacarpals, 4. Carpal roll, 5. Movement of forearm on hand towards wrist extension, 6. Movement of forearm on the hand toward supination/ pronation, 7. wrist ext with distraction   - body on arm weight shifts for heavy weight bearing into RUE      Functional mobility:   - sit <> stand with SBA  - gait with quad cane and CGA       Home Exercises and Education Provided     Education provided re:  - HEP   - Patient was instructed to wear splint for up to 3 hours and then remove and perform grasp and release tasks.   - Progress towards goals   - POC     Written Home Exercises Provided: no new, continue HEP    Patient instructed to cont prior HEP as verbally instructed in previous tx sessions. Exercises were reviewed and Torito was able to demonstrate them prior to the end of the session.  Torito demonstrated good  understanding of the HEP provided.       Pt has no cultural, educational or language barriers to learning provided.    Assessment   Patient was reassessed again today as he has been out of therapy since prior reassessment on 4/23/19 due to a fall.  Despite decreased participation, he demonstrates significant progress in R shoulder AROM in all assessed planes as noted above and reports he has been performing his HEP consistently since being out of skilled occupational therapy.  Small gains in active R wrist and forearm movements were also noted other than a 5 degree decline in wrist flexion and UD.  He is able to actively form a loose fist for light grasp and hold of objects, and his  strength increased by 7# since last reassessment.  Again he demonstrates fair ability to actively open (extend) digits for active release of objects.  He continues with gross and fine motor deficits including decreased speed, amplitude, and  control of movement and is unable to peform in 9 HPT assessment.     Based on the results of today's reassessment, patient would benefit from continued skilled occupational therapy.  OT recommends extending  his POC 2 times weekly for 4 weeks (from today) to maximize progress and improve residual physical and functional deficits.  Torito is progressing fairly towards his goals and there are no updates to goals at this time. Pt prognosis is Good due to patient remains motivated to participate and has good support from his spouse. Pt will continue to benefit from skilled outpatient occupational therapy to address the deficits listed in the problem list on initial evaluation provide pt/family education and to maximize pt's level of independence in the home and community environment.      Anticipated barriers to occupational therapy: none     Pt's spiritual, cultural and educational needs considered and pt agreeable to plan of care and goals.    Goals:  Short Term Goals: 3 weeks   ROM/Strength to perform the ADL's and functional activities listed below, - in progress  Pt will improve functional  strength needed for tasks to 20# in R hand. - in progress   Pt will improve AROM for shoulder flexion needed for functional tasks to 60* in R UE. - MET 5/14/19  UE dressing will improve to Min A - in progress   LB dressing will improve to Mod A  - in progress   Toileting will improve to Min A incorporating R hand for task  - in progress   Pt will be Independent with HEP to improve ROM and FM skills. - ongoing      Long Term Goals: 6 weeks   ROM/Strength to perform the ADL's and functional activities listed below - in progress   Pt will improve functional  strength needed for tasks to 30# in R hand. - in progress   Pt will improve AROM for shoulder flexion needed for functional tasks to 80* in R UE. - MET 4/14/19  UE dressing will improve to Supervision   LB dressing will improve to Min A   Toileting will improve to  Supervision incorporating R hand for task   Pt will complete clinical testing for skills needed for driving  G code self care CK - in progress     Plan   Continue POC in pursuit of OT goals - extend through 6/14/19.     Discussed Plan of Care with patient: Yes  Updates/Grading for next session: continue POC; advance as tolerated

## 2019-05-16 ENCOUNTER — CLINICAL SUPPORT (OUTPATIENT)
Dept: REHABILITATION | Facility: HOSPITAL | Age: 59
End: 2019-05-16
Attending: PSYCHIATRY & NEUROLOGY

## 2019-05-16 DIAGNOSIS — Z74.09 IMPAIRED MOBILITY AND ACTIVITIES OF DAILY LIVING: ICD-10-CM

## 2019-05-16 DIAGNOSIS — R29.898 WEAKNESS OF RIGHT LEG: ICD-10-CM

## 2019-05-16 DIAGNOSIS — R29.898 WEAKNESS OF RIGHT UPPER EXTREMITY: ICD-10-CM

## 2019-05-16 DIAGNOSIS — Z74.09 IMPAIRED FUNCTIONAL MOBILITY, BALANCE, GAIT, AND ENDURANCE: ICD-10-CM

## 2019-05-16 DIAGNOSIS — Z78.9 IMPAIRED MOBILITY AND ACTIVITIES OF DAILY LIVING: ICD-10-CM

## 2019-05-16 DIAGNOSIS — R47.01 BROCA'S APHASIA: ICD-10-CM

## 2019-05-16 PROCEDURE — 97116 GAIT TRAINING THERAPY: CPT | Mod: KX,PO

## 2019-05-16 PROCEDURE — 92507 TX SP LANG VOICE COMM INDIV: CPT | Mod: KX,PO

## 2019-05-16 PROCEDURE — 97110 THERAPEUTIC EXERCISES: CPT | Mod: KX,PO,59

## 2019-05-16 PROCEDURE — 97110 THERAPEUTIC EXERCISES: CPT | Mod: KX,PO

## 2019-05-16 PROCEDURE — 97112 NEUROMUSCULAR REEDUCATION: CPT | Mod: KX,PO,59

## 2019-05-16 NOTE — PROGRESS NOTES
Occupational Therapy Daily Treatment Note     [id number]  Physician: Ian Feliciano MD  Medical Diagnosis: L CVA      Therapy Diagnosis:   Encounter Diagnoses   Name Primary?    Impaired mobility and activities of daily living     Weakness of right upper extremity      Visit Date: 5/16/2019  Physician Orders: Eval and Treat   Surgical Procedure and Date: NA  Evaluation Date: 1/28/19   Insurance Authorization Period Expiration: 12/31/19   Plan of Care Certification Period: 6/14/19   Date of Return to MD: unknown at this time     Visit # / Visits authorized: 24 ( 4/ 10 new auth)   Time In: 1345   Time Out: 1430   Total Billable Time: 45 minutes      Precautions: Standard, fall risk, hearing impaired     Subjective     Pt reports: He got a new car and drove here today.  He reports he has been driving pretty frequently.  OT was unaware that patient has been driving - using LUE and L foot.     Patient reports compliance with HEP.   Response to previous treatment:positive   Functional change: progressing     Pain: none reported   Location: NA     Objective   Patient seen by OT this date.  Patient's wife was present for treatment session.     Torito participated in therapeutic exercises to improve ROM and strength for 30 minutes:   - UBE level 4 x 4 min forwards x 4 min reverse   - 1# chest press x 10 reps x 2 ; R hand paddled to dowel; Min A   - 1# shoulder flex/ext x 10 reps x 2 ; R hand paddled to dowel; Mod A   - 1# overhead press x 10 reps x 2; R hand paddled to dowel; Mod A   - 1# reverse curls x 10 reps x 2 ; R hand paddled to dowel; CGA      Patient participated in neuromuscular reeducation activities for 15 minutes to maximize ROM, motor strength and neuromuscular and proprioceptive input to the RUE.  The following activities were included:   Sitting EOM:   - restoration of R hand arches and metacarpal stretches   - general R wrist stretches: 1. P-A glide of scaphoid on  radius, 2. Radial deviation, 3. Increasing mobility of metacarpals, 4. Carpal roll, 5. Movement of forearm on hand towards wrist extension, 6. Movement of forearm on the hand toward supination/ pronation, 7. wrist ext with distraction   - body on arm weight shifts for heavy weight bearing into RUE  - grasp and release tasks with RUE - reaching forwards for cups and releasing them onto mat on R; self assist and Min A by OT PRN        Functional mobility:   - sit <> stand with SBA  - gait with quad cane and close SBA       Home Exercises and Education Provided     Education provided: Patient needs to obtain medical clearance by MD to drive.  Additionally, OT recommends participating in driving evaluation to assess on-road safety, physical and cognitive skills needed for driving, and appropriate adaptive equipment.    - Progress towards goals / POC       Written Home Exercises Provided: no new, continue HEP    Patient instructed to cont prior HEP as verbally instructed in previous tx sessions. Exercises were reviewed and Torito was able to demonstrate them prior to the end of the session.  Torito demonstrated good  understanding of the HEP provided.       Pt has no cultural, educational or language barriers to learning provided.    Assessment   Torito Harris tolerated treatment session well.  OT continued with internvetion techniques to increase RUE range of motion, strength, and motor control.  He demonstrates good participation and gradual improvements in performance.  Patient reported he has been driving - operating vehicle with L extremities only.  OT explained to patient that he needs to obtain medical clearance from his MD and highly recommended a driving evaluation.       Torito would continue to benefit from skilled occupational therapy. Torito is progressing fairly towards his goals and there are no updates to goals at this time. Pt prognosis is Good due to patient remains motivated to participate and has  good support from his spouse. Pt will continue to benefit from skilled outpatient occupational therapy to address the deficits listed in the problem list on initial evaluation provide pt/family education and to maximize pt's level of independence in the home and community environment.      Anticipated barriers to occupational therapy: none     Pt's spiritual, cultural and educational needs considered and pt agreeable to plan of care and goals.    Goals:  Short Term Goals: 3 weeks   ROM/Strength to perform the ADL's and functional activities listed below, - in progress  Pt will improve functional  strength needed for tasks to 20# in R hand. - in progress   Pt will improve AROM for shoulder flexion needed for functional tasks to 60* in R UE. - MET 5/14/19  UE dressing will improve to Min A - in progress   LB dressing will improve to Mod A  - in progress   Toileting will improve to Min A incorporating R hand for task  - in progress   Pt will be Independent with HEP to improve ROM and FM skills. - ongoing      Long Term Goals: 6 weeks   ROM/Strength to perform the ADL's and functional activities listed below - in progress   Pt will improve functional  strength needed for tasks to 30# in R hand. - in progress   Pt will improve AROM for shoulder flexion needed for functional tasks to 80* in R UE. - MET 4/14/19  UE dressing will improve to Supervision   LB dressing will improve to Min A   Toileting will improve to Supervision incorporating R hand for task   Pt will complete clinical testing for skills needed for driving  G code self care CK - in progress     Plan   Continue POC in pursuit of OT goals -  through 6/14/19.     Discussed Plan of Care with patient: Yes  Updates/Grading for next session: continue POC; advance as tolerated

## 2019-05-16 NOTE — PROGRESS NOTES
"Outpatient Neurological Rehabilitation   Speech and Language Therapy Daily Note  Date:  5/16/2019     Name: Torito Harris   MRN: [id number]   Therapy Diagnosis:   Encounter Diagnosis   Name Primary?    Broca's aphasia    Physician: Ian Feliciano MD  Physician Orders: ST evaluate and treat  Medical Diagnosis: I69.30 (ICD-10-CM) - Late effect of stroke    Visit #/Visits authorized: 4/ 10 (20 prior to this auth) KX   Date of Evaluation:  1/28/19  Insurance Authorization Period: 3/7/2019 to 12/31/19   Plan of Care Expiration Date:  1/28/2019 to 3/25/19; 3/28/19 to 5/24/19  Extended POC: n/a   Cancelled Visits:  0   No Show Visits:  6     Time In:  1305   Time Out:  1345   Total Billable Time: 40 minutes    Precautions: Standard and Fall  Subjective:   Pt reports:  "it good" and pointed to his new car in the parking lot.    Pain Scale:  0/10 on VAS currently.   Pain Location: n/a  Objective:   UNTIMED  Procedure Min.   Speech- Language- Voice Therapy    40 minutes   Total Untimed Units: 1  Charges Billed/# of units: 1    Short Term Goals: (4 weeks) Current Progress:   1. Pt will name common objects with 90% Carmen.  Progressing/ Not Met 5/16/2019   Not formally addressed     2. Pt will generate a response to basic sentence completions with 80% Carmen   Progressing/ Not Met 5/16/2019   50% acc indly, 90% acc given a repetition.    3. Pt will name 5 concrete category items with Carmen.  Progressing/ Not Met 5/16/2019   0% indly, 0% given cues   4.  pt will follow 2 unit commands with visual stimuli with 90% acc indly to improve auditory comprehension.  100% acc indly   Goal Met 5/16/19 / Discontinue    5. pt and a conversational partner will use supported communication for persons with   aphasia to facilitate 2 conversational turns with a communication partner.   Progressing/ Not Met 5/16/2019  Not formally addressed     6. Patient will write name and address to increase written expression skills with 90% accuracy given " verbal presentation of letters  Progressing/ Not Met 5/16/2019  .  Not formally addressed    Previously Goal met x's 2   7.  pt will complete reading comprehension tasks at the sentence level with 90% acc indly.  Progressing/ Not Met 5/16/2019   Not formally addressed     8. pt will follow 3 unit body part commands with 90% acc indly.   Progressing/ Not Met 5/16/2019  0/5 indly, 4/5 given a verbal cue   9. Patient will write grapheme to corresponding phoneme with 90% acc indly.  Progressing/ Not Met 5/16/2019   Not formally addressed     10. New Goal: pt will follow 3 unit commands with visual stimuli with 90% acc indly to improve auditory comprehension.  25% acc indly, 75% acc given a repetition and a cue.     Patient Education/Response:    Pt is driving. Unclear if he has obtained medical clearance by a doctor. Explained driving evaluation and SLP's recommendation for completion of evaluation before driving more. Pt and his wife indicated understanding.     Assessment:   Torito is progressing well towards his goals. Increased comprehension with 2 unit information today. Current goals remain appropriate. Pt improved reading comprehension accuracy when given a field of 3 options to choose from. Pt with improvements in word-finding skills when clinician cued pt to describe function of object. Pt prognosis is Good. Pt will continue to benefit from skilled outpatient speech and language therapy to address the deficits listed in the problem list on initial evaluation, provide pt/family education and to maximize pt's level of independence in the home and community environment.     Barriers to Therapy: sleeping pattern, possible transportation  Pt's spiritual, cultural and educational needs considered and pt agreeable to plan of care and goals.    Plan:   Continue POC with focus on written cues as support.   OT driving evaluation.    SERINA Mott, CCC-SLP  Speech Language Pathologist

## 2019-05-16 NOTE — PROGRESS NOTES
"  Physical Therapy Daily Treatment Note     [id number]    Therapy Diagnosis:   Encounter Diagnoses   Name Primary?    Weakness of right leg     Impaired functional mobility, balance, gait, and endurance      Physician: Ian Feliciano MD    Visit Date: 5/16/2019    Physician Orders: PT Eval and Treat   Medical Diagnosis from Referral: Late effect of stroke  Evaluation Date: 1/28/2019  Authorization Period Expiration: 02/12/19-12/31/19  Plan of Care Expiration: 05/14/19 to 07/09/19  Visit # / Visits authorized: 5/10 (Vist #22 of 2019) - KX    Time In: 14:30  Time Out: 15:15  Total Billable Time: 45 minutes    Precautions: Standard, Diabetes, Fall and right hemiplegia, hearing aid, safety awareness, cognitive deficits, speech deficits    Subjective     Pt reports: " I got a new car." " I want to do that machine."   HEP Compliance: pt reports he is doing all of his exercises at home.  Response to previous treatment: no complaints  Functional change: improved ability to side step and to step backward    Pain: 0/10  Location: N/A      Objective     Torito received therapeutic exercises to develop strength, endurance, ROM, flexibility and core stabilization for 28 minutes including:     X 10 min Sci Fit recumbent stepper L4, BLE and LUE for BLE muscular endurance, BLE reciprocal use, and BLE strength  3 x 10 reps of B LE squats on leg press with # 100 lbs applied.  3 x 10 reps of R LE single leg squat with # 60 lbs applied.     Patient participated in gait training activities to normalize gait pattern for 17 minutes. The following activities were included:   Gait belt used for safety. Assistive device: SBQC, little platform rolling walker      X 300 feet ambulation in closed hallway with LITTLE platform RW, CGA with occ Min A with turning 180 degrees. Pt demonstrates more weight bearing on LLE compared to RLE. Increased R knee flexion in stance, increased RLE adduction in stance, " increased RLE foot flat contact. Min A to navigate walker when stepping backward and side stepping.     X 75ft of ambulation with platform walker and CGA.      Home Exercises Provided and Patient Education Provided     Education provided: Progress with therapy.   Pt and his wife reports to PT that they are interested in purchasing a platform rolling walker similar to the STEPHANI platform rolling walker that pt uses to ambulate with PT during therapy sessions. PT reported that she would have to get the company's information for pateint and his wife as the company who supplies this particular piece of equipment does not take insurance. PT instructed both patient and his wife that if patient does purchase this rolling platform walker for home use, PT recommends that patient's wife be present AT ALL TIMES when patient is using the device as he does require occ Min A from PT to steer walker when turning and to maneuver walker when stepping backward or laterally. Pt's wife verbalized good understanding of PT's instructions. Pt appeared reluctant in wanting wife to assist him, insisting that he could use the device by himself. PT once again instructed him that he is at high risk for another fall if he tries to use the platform rolling walker unsupervised since he requires assist from PT to use it in therapy sessions. Reinforcement needed.     Written Home Exercises Provided: Continue current HEP.   See EMR under Media for exercises provided 2/5/2019.    Assessment   Mr. Ugarte tolerated tx session well and did not have any problems noted.  Pt was able to increase time on the stepper and was able to ambulate with the platform walker today.  Pt trying to decide about buying an STEPHANI walker vs a platform walker for home use.  Will practice more with the platform walker next tx session.      Torito is progressing well towards his goals.   Pt prognosis is Good.     Pt will continue to benefit from skilled outpatient physical therapy  to address the deficits listed in the problem list box on initial evaluation, provide pt/family education and to maximize pt's level of independence in the home and community environment.     Pt's spiritual, cultural and educational needs considered and pt agreeable to plan of care and goals.     Anticipated Barriers for therapy: hearing deficit, speech deficits, cognitive deficits, decreased safety awareness, fall risk, visual neglect, transportation assistance required    Goals:  Short Term Goals: 4 weeks   1. Pt to be (I) with established HEP MET 03/19/19  2. Pt to improve R hip flexion strength MMT score to at least 4+/5 for improved gait mechanics Ongoing  3. Pt to improve R hip extension strength MMT score to at least 4/5 for improved gait mechanics Ongoing  4. Pt to improve R hip AB and R hip AD strength MMT scores to at least 3+/5 for improved gait mechanics MET 03/04/19  5.  Pt to improve R knee flexion and extension strength MMT scores to at least 4+/5 for improved gait mechanics Ongoing  6. Pt to improve R ankle PF strength MMT score to at least 2/5 for improved gait mechanics MET 03/04/19  7. Pt to improve chair rise score to at least 5 times with no more than 1 UE support and S for improved endurance and safety with functional transfers MET 03/04/19  8. Pt to improve TUG score to at least 30 seconds for improved safety with household mobility Progressing  9. Pt to improve SSWS score to at least 0.40 m/sec for improved safety with community mobility. MET 05/14/19     Long Term Goals: 8 weeks   1. Pt to be (I) with advanced HEP Ongoing  2. Pt to improve R hip extension strength MMT score to at least 4+/5 for improved gait mechanics Ongoing  3. Pt to improve R hip AB and R hip AD strength MMT scores to at least 4-/5 for improved gait mechanics Ongoing  4. Pt to improve R ankle PF strength MMT score to at least 2+/5 for improved gait mechanics MET 03/19/19  5. Pt to improve chair rise score to at least 5  times with no UE support and S for improved endurance and safety with functional transfers Ongoing  6. Pt to improve TUG score to at least 26 seconds for improved safety with household mobility Ongoing  7. Pt to improve SSWS score to at least 0.50 m/sec for improved safety with community mobility. Ongoing     Plan     Continue outpatient physical therapy 2x weekly under current established Plan of Care,05/14/19 to 07/09/19, with treatment to include: pt education, HEP, therapeutic exercises, neuromuscular re-education/balance exercises, therapeutic activities, joint mobilizations, and modalities PRN, to work towards established goals. Pt may be seen by PTA to carry out plan of care.     Plan for next session:  RLE strengthening, mainly hip and knee  Safety with turning, side stepping, and backward stepping with gait  Weight bearing on RLE    Lucy Vanegas, PTA

## 2019-05-21 ENCOUNTER — CLINICAL SUPPORT (OUTPATIENT)
Dept: REHABILITATION | Facility: HOSPITAL | Age: 59
End: 2019-05-21
Attending: PSYCHIATRY & NEUROLOGY

## 2019-05-21 DIAGNOSIS — Z74.09 IMPAIRED FUNCTIONAL MOBILITY, BALANCE, GAIT, AND ENDURANCE: ICD-10-CM

## 2019-05-21 DIAGNOSIS — R47.01 BROCA'S APHASIA: ICD-10-CM

## 2019-05-21 DIAGNOSIS — Z74.09 IMPAIRED MOBILITY AND ACTIVITIES OF DAILY LIVING: ICD-10-CM

## 2019-05-21 DIAGNOSIS — R29.898 WEAKNESS OF RIGHT LEG: ICD-10-CM

## 2019-05-21 DIAGNOSIS — Z78.9 IMPAIRED MOBILITY AND ACTIVITIES OF DAILY LIVING: ICD-10-CM

## 2019-05-21 DIAGNOSIS — R29.898 WEAKNESS OF RIGHT UPPER EXTREMITY: ICD-10-CM

## 2019-05-21 PROCEDURE — 97116 GAIT TRAINING THERAPY: CPT | Mod: KX,PO

## 2019-05-21 PROCEDURE — 97112 NEUROMUSCULAR REEDUCATION: CPT | Mod: KX,PO,59

## 2019-05-21 PROCEDURE — 97110 THERAPEUTIC EXERCISES: CPT | Mod: KX,PO,59

## 2019-05-21 PROCEDURE — 92507 TX SP LANG VOICE COMM INDIV: CPT | Mod: KX,PO

## 2019-05-21 NOTE — PROGRESS NOTES
Outpatient Neurological Rehabilitation   Speech and Language Therapy Daily Note  Date:  5/21/2019     Name: Torito Harris   MRN: [id number]   Therapy Diagnosis:   Encounter Diagnosis   Name Primary?    Broca's aphasia    Physician: Ian Feliciano MD  Physician Orders: ST evaluate and treat  Medical Diagnosis: I69.30 (ICD-10-CM) - Late effect of stroke    Visit #/Visits authorized: 5/ 10 (20 prior to this auth) KX   Date of Evaluation:  1/28/19  Insurance Authorization Period: 3/7/2019 to 12/31/19   Plan of Care Expiration Date:  1/28/2019 to 3/25/19; 3/28/19 to 5/24/19  Extended POC: n/a   Cancelled Visits:  0   No Show Visits:  6     Time In:  1430   Time Out:  1512   Total Billable Time: 42 minutes    Precautions: Standard and Fall  Subjective:   Pt reports:  That he is having more dental work done next week  Pain Scale:  0/10 on VAS currently.   Pain Location: n/a  Objective:   UNTIMED  Procedure Min.   Speech- Language- Voice Therapy    42 minutes   Total Untimed Units: 1  Charges Billed/# of units: 1    Short Term Goals: (4 weeks) Current Progress:   1. Pt will name common objects with 90% Carmen.  Progressing/ Not Met 5/21/2019   40% acc indly, 100% acc given sentence completion   2. Pt will generate a response to basic sentence completions with 80% Carmen   Progressing/ Not Met 5/21/2019   Not formally addressed     3. Pt will name 5 concrete category items with Carmen.  Progressing/ Not Met 5/21/2019   Not formally addressed     4.  pt will follow 2 unit commands with visual stimuli with 90% acc indly to improve auditory comprehension.  Goal Met 5/16/19 / Discontinue    5. pt and a conversational partner will use supported communication for persons with   aphasia to facilitate 2 conversational turns with a communication partner.   Progressing/ Not Met 5/21/2019  Not formally addressed     6. Patient will write name and address to increase written expression skills with 90% accuracy given verbal presentation  of letters  Progressing/ Not Met 5/21/2019  .  Not formally addressed    Previously Goal met x's 2   7.  pt will complete reading comprehension tasks at the sentence level with 90% acc indly.  Progressing/ Not Met 5/21/2019   Not formally addressed     8. pt will follow 3 unit body part commands with 90% acc indly.   Progressing/ Not Met 5/21/2019  3/10 indly, 5/10 given a repetition and verbal cues, 9/10 given a model    9. Patient will write grapheme to corresponding phoneme with 90% acc indly.  Progressing/ Not Met 5/21/2019   Not formally addressed     10.  pt will follow 3 unit commands with visual stimuli with 90% acc indly to improve auditory comprehension.   Progressing/ Not Met 5/21/2019   4/10 indly, 9/10 given a repetition and a verbal cue   11. Pt will complete word finding task (i.e. Creating subject, verb, object pairs in VNEST protocol) with 90% acc min A to improve word fluency. Verb Network Strength Training (VNEST) was introduced today. VNEST focuses on verbs, encouraging people to think of people who perform actions (agents) and the objects or the actions that are performed on (patients). The idea is that by focusing on verbs, which require connection to nouns, you can strengthen all the words in the mental network around the verb. In this therapy technique, there are 6 steps each verb is taken through.   Step 1: Pt provided 2/6  subjects and objects in verb triads IND'ly, 6/6  given cues. 33% acc indly  Step 2: Pt read triads of words aloud with 66 % acc indly .  Step 3: pt expanded where with 0% acc indly, 100% acc given max cues, when with 0% acc, 100% acc given max cues, and why with 0% acc indly, 100% acc given max cues.   Step 4: pt identified whether sentences presented auditorily were correct or incorrect with 75% acc IND'ly.   Step 5: pt recalled the target verb with 0% acc IND'ly (0/1 ).  Step 6: not addressed     verb- catch     Patient Education/Response:   New goal reviewed today. Pt  and his wife indicated understanding.     Assessment:   Torito is progressing well towards his goals. Introduced verb nesting today to improve verbal fluency. Pt with good participation in task given verbal cues with increased fluency immediately after task. Pt prognosis is Good. Pt will continue to benefit from skilled outpatient speech and language therapy to address the deficits listed in the problem list on initial evaluation, provide pt/family education and to maximize pt's level of independence in the home and community environment.     Barriers to Therapy: sleeping pattern, possible transportation  Pt's spiritual, cultural and educational needs considered and pt agreeable to plan of care and goals.    Plan:   Continue POC with focus on written cues as support. Complete verb nesting at least 1 verb per session.  OT driving evaluation.    SERINA Mott, CCC-SLP  Speech Language Pathologist

## 2019-05-21 NOTE — PROGRESS NOTES
Occupational Therapy Daily Treatment Note     Name: Torito Harris  [facility] Number: [id number]  Physician: Ian Feliciano MD  Medical Diagnosis: L CVA      Therapy Diagnosis:   Encounter Diagnoses   Name Primary?    Impaired mobility and activities of daily living     Weakness of right upper extremity      Visit Date: 5/21/2019  Physician Orders: Eval and Treat   Surgical Procedure and Date: NA  Evaluation Date: 1/28/19   Insurance Authorization Period Expiration: 12/31/19   Plan of Care Certification Period: 6/14/19   Date of Return to MD: unknown at this time     Visit # / Visits authorized: 25 ( 5/ 10 new auth)   Time In: 1300   Time Out: 1345   Total Billable Time: 45 minutes      Precautions: Standard, fall risk, hearing impaired     Subjective     Pt reports: no new complaints    Patient reports compliance with HEP.   Response to previous treatment:positive   Functional change:progressing     Pain: none reported   Location: NA     Objective       Torito participated in therapeutic exercises to improve ROM and strength for 25 minutes:   - UBE level 4 x 4 min forwards x 4 min reverse   - overhead pulleys shoulder flexion x 2 min   - active assisted scap retraction   - R shoulder active assisted towel slides; [place] assist; performed standing at countertop (with GG splint to R hand)       Patient participated in neuromuscular reeducation activities for 20 minutes to maximize ROM, motor strength and neuromuscular and proprioceptive input to the RUE.  The following activities were included:   Sitting EOM:   - restoration of R hand arches and metacarpal stretches   - general R wrist stretches: 1. P-A glide of scaphoid on radius, 2. Radial deviation, 3. Increasing mobility of metacarpals, 4. Carpal roll, 5. Movement of forearm on hand towards wrist extension, 6. Movement of forearm on the hand toward supination/ pronation, 7. wrist ext with distraction   - body on arm weight shifts for heavy weight bearing into  RUE  - application of GG splint to R hand        Functional mobility:   - sit <> stand with SBA  - gait with quad cane and close SBA       Home Exercises and Education Provided     Education provided: HEP  - OT recommends driving evaluation   - Progress towards goals / POC       Written Home Exercises Provided: no new, continue HEP    Patient instructed to cont prior HEP as verbally instructed in previous tx sessions. Exercises were reviewed and Torito was able to demonstrate them prior to the end of the session.  Torito demonstrated good  understanding of the HEP provided.       Pt has no cultural, educational or language barriers to learning provided.    Assessment   Torito Harris tolerated treatment session well.  OT continued with internvetion techniques to increase RUE range of motion, strength, and motor control.  He demonstrates good participation and gradual gains in RUE AROM.  He used self Sam[place] assist for towel slide exercises to achieve full ROM.  He demonstrated good standing tolerance during towel slides and no LOB noted, but required multiple VCs to extend R knee.      Torito would continue to benefit from skilled occupational therapy. Torito is progressing fairly towards his goals and there are no updates to goals at this time. Pt prognosis is Good as he remains motivated to participate and has good support from his spouse. Pt will continue to benefit from skilled outpatient occupational therapy to address the deficits listed in the problem list on initial evaluation provide pt/family education and to maximize pt's level of independence in the home and community environment.      Anticipated barriers to occupational therapy: none     Pt's spiritual, cultural and educational needs considered and pt agreeable to plan of care and goals.    Goals:  Short Term Goals: 3 weeks   ROM/Strength to perform the ADL's and functional activities listed below, - in progress  Pt will improve functional  strength  needed for tasks to 20# in R hand. - in progress   Pt will improve AROM for shoulder flexion needed for functional tasks to 60* in R UE. - MET 5/14/19  UE dressing will improve to Min A - in progress   LB dressing will improve to Mod A  - in progress   Toileting will improve to Min A incorporating R hand for task  - in progress   Pt will be Independent with HEP to improve ROM and FM skills. - ongoing      Long Term Goals: 6 weeks   ROM/Strength to perform the ADL's and functional activities listed below - in progress   Pt will improve functional  strength needed for tasks to 30# in R hand. - in progress   Pt will improve AROM for shoulder flexion needed for functional tasks to 80* in R UE. - MET 4/14/19  UE dressing will improve to Supervision   LB dressing will improve to Min A   Toileting will improve to Supervision incorporating R hand for task   Pt will complete clinical testing for skills needed for driving  G code self care CK - in progress     Plan   Continue POC in pursuit of OT goals -  through 6/14/19.     Discussed Plan of Care with patient: Yes  Updates/Grading for next session: continue POC; advance as tolerated

## 2019-05-21 NOTE — PROGRESS NOTES
"  Physical Therapy Daily Treatment Note     [id number]    Therapy Diagnosis:   Encounter Diagnoses   Name Primary?    Weakness of right leg     Impaired functional mobility, balance, gait, and endurance      Physician: Ian Feliciano MD    Visit Date: 5/21/2019    Physician Orders: PT Eval and Treat   Medical Diagnosis from Referral: Late effect of stroke  Evaluation Date: 1/28/2019  Authorization Period Expiration: 02/12/19-12/31/19  Plan of Care Expiration: 05/14/19 to 07/09/19  Visit # / Visits authorized: 6/10 (Vist #23 of 2019) - KX    Time In: 13:45  Time Out: 14:30  Total Billable Time: 45 minutes    Precautions: Standard, Diabetes, Fall and right hemiplegia, hearing aid, safety awareness, cognitive deficits, speech deficits    Subjective     Pt reports: "I'm good."  HEP Compliance: pt reports he is doing all of his exercises at home.  Response to previous treatment: no complaints  Functional change: improved ability to side step and to step backward    Pain: 0/10  Location: N/A      Objective     Torito received therapeutic exercises to develop strength, endurance, ROM, flexibility and core stabilization for 15 minutes including:     3 x 10 RLE SAQs c/ 1.5 # cuff weight  3 x 10 R hip AB in hook lying with OTB  3 x 10 BLE bridges with 3" hold    Supine <> sit: supervision to Min A  Sit <> stand: supervision     Patient participated in gait training activities to normalize gait pattern for 30 minutes. The following activities were included:   Gait belt used for safety. Assistive device: Brittney platform rolling walker, standard bilateral platform rolling walker    Pt's wife present for gait training and family training with both Brittney platform rolling walker and standard bilateral platform rolling walker    Trial 1: Brittney platform rolling walker  Prior to ambulation trial, PT verbally instructed both patient and his wife in brakes, jose locks, height , and arm pad " adjustment and demonstrated proper use. PT then instructed patient's wife in proper CGA to Min A guarding technique when assisting her  during ambulation with Brittney platform rolling walker. Pt performs sit <> stand transfer from EOM > platform walker c/ CGA (for entire trial) provided by his wife, SBA from PT. Pt then ambulates x 300 feet with platform walker and CGA from his wife, SBA from PT. During obstacle negotiation, during 90 deg turning, during 180 degree turning, and when stepping backward with walker, pt required Min A from his wife to help steer the walker. Pt's wife demonstrates and verbalizes good understanding of proper guarding technique and proper platform walker management throughout this trial.     Trial 2: Standard bilateral platform rolling walker  Prior to ambulation trial PT instructed both patient and his wife in platform adjustment pieces, how to change the height of platform pieces, and available arms straps and demonstrated proper use. PT then instructed patient's wife in proper CGA to Min A guarding technique when assisting her  during ambulation with standard bilateral platform rolling walker. Pt performs sit <> stand transfer from EOM > platform walker c/ CGA provided by his wife, SBA from PT. Pt's wife able to secure RUE into platform pad with strap with VCs from PT. Pt then ambulates x 200 feet with platform walker and CGA (for entire trial) from his wife, SBA from PT. During obstacle negotiation, during 90 deg turning, during 180 degree turning, and when stepping backward with walker, pt required Min A from his wife to help steer the walker. Pt's wife demonstrates and verbalizes good understanding of proper guarding technique and proper platform walker management throughout this trial.      Gait deficits noted:  Throughout both trials listed above, patient continues to demonstrate predominant weight bearing through LUE and LLE, decreased weight acceptance on RUE and RLE,  increased R knee flexion in stance, decreased control with R foot placement upon initial contact, increased right hip adduction in stance, decreased right hip extension in stance    After both trials were completed, PT asked patient and his wife if they had any questions about techniques and equipment listed above. Pt and his wife both report good understanding to PT of education listed above. PT asked patient what was he seeking to use platform for at home. Pt and his wife report that they would both like him to be able to go to the park or to ambulate in the neighborhood. Although he is able to ambulate with SBQC supervision to Mod I, his walking speed is decreased with this device, and he would like to walk faster if walking at the park or in his neighborhood. PT instructed both patient and his wife that if they decide to purchase either the bilateral standard RW platform attachments or the Brittney platform rolling walker, then PT recommends that patient's wife is present to provide necessary assistance (that she was just educated on) at ALL TIMES when patient is using the platform rolling walker to prevent fall. When patient is ambulating unsupervised at home, he is to continue to use his SBQC. Pt and his wife verbalized good understanding of these instructions to PT.  Due to more affordable cost and the ability to fold and transport rolling walker with platform attachments, patient and his wife are interested in starting the process to acquire bilateral rolling walker platform attachments as they already own a rolling walker.     Home Exercises Provided and Patient Education Provided     Education provided:  Education provided to patient and his wife  -proper technique to assist with balance, gait, and RW management with Brittney platform rolling walker and standard bilateral platform rolling walker  -proper hand positioning when holding gait belt  -PT instructed both patient and his wife that if they decide to purchase  either the bilateral standard RW platform attachments or the Brittney platform rolling walker, then PT recommends that patient's wife is present to provide necessary assistance (that she was just educated on) at ALL TIMES when patient is using the platform rolling walker to prevent fall. When patient is ambulating unsupervised at home, he is to continue to use his SBQC. Pt and his wife verbalized good understanding of these instructions to PT.     Written Home Exercises Provided: Continue current HEP.   See EMR under Media for exercises provided 2/5/2019.    Assessment   Mr. Ugarte tolerated therapy session well this afternoon without complaint. Priority of therapy session today was family training for both patient and his wife on Brittney platform rolling walker and standard bilateral platform rolling walker since patient is eager to purchase one of these devices to increase his ambulation speed so that he can enjoy walking in the park or around his neighborhood with his family. Pt and his wife were both educated in device parts, proper guarding and assisting techniques/requirements when using both devices, and PT's recommendations for safety when using either device. Both patient and his wife verbalized and demonstrated good understanding of all education provided today and discussed in more detail above (in objective section). Plan to initiate steps to acquire platform attachment pieces for RW. Continue POC to focus on remaining RLE strength deficits, poor RLE weight acceptance, and safety with functional mobility.      Torito is progressing well towards his goals.   Pt prognosis is Good.     Pt will continue to benefit from skilled outpatient physical therapy to address the deficits listed in the problem list box on initial evaluation, provide pt/family education and to maximize pt's level of independence in the home and community environment.     Pt's spiritual, cultural and educational needs considered and pt agreeable  to plan of care and goals.     Anticipated Barriers for therapy: hearing deficit, speech deficits, cognitive deficits, decreased safety awareness, fall risk, visual neglect, transportation assistance required    Goals:  Short Term Goals: 4 weeks   1. Pt to be (I) with established HEP MET 03/19/19  2. Pt to improve R hip flexion strength MMT score to at least 4+/5 for improved gait mechanics Ongoing  3. Pt to improve R hip extension strength MMT score to at least 4/5 for improved gait mechanics Ongoing  4. Pt to improve R hip AB and R hip AD strength MMT scores to at least 3+/5 for improved gait mechanics MET 03/04/19  5.  Pt to improve R knee flexion and extension strength MMT scores to at least 4+/5 for improved gait mechanics Ongoing  6. Pt to improve R ankle PF strength MMT score to at least 2/5 for improved gait mechanics MET 03/04/19  7. Pt to improve chair rise score to at least 5 times with no more than 1 UE support and S for improved endurance and safety with functional transfers MET 03/04/19  8. Pt to improve TUG score to at least 30 seconds for improved safety with household mobility Progressing  9. Pt to improve SSWS score to at least 0.40 m/sec for improved safety with community mobility. MET 05/14/19     Long Term Goals: 8 weeks   1. Pt to be (I) with advanced HEP Ongoing  2. Pt to improve R hip extension strength MMT score to at least 4+/5 for improved gait mechanics Ongoing  3. Pt to improve R hip AB and R hip AD strength MMT scores to at least 4-/5 for improved gait mechanics Ongoing  4. Pt to improve R ankle PF strength MMT score to at least 2+/5 for improved gait mechanics MET 03/19/19  5. Pt to improve chair rise score to at least 5 times with no UE support and S for improved endurance and safety with functional transfers Ongoing  6. Pt to improve TUG score to at least 26 seconds for improved safety with household mobility Ongoing  7. Pt to improve SSWS score to at least 0.50 m/sec for improved  safety with community mobility. Ongoing     Plan     Plan for next session:  RLE strengthening, mainly hip and knee  Safety with turning, side stepping, and backward stepping with gait  Weight bearing on RLE    Hanh Brooke, PT

## 2019-05-23 ENCOUNTER — CLINICAL SUPPORT (OUTPATIENT)
Dept: REHABILITATION | Facility: HOSPITAL | Age: 59
End: 2019-05-23
Attending: PSYCHIATRY & NEUROLOGY

## 2019-05-23 DIAGNOSIS — Z74.09 IMPAIRED FUNCTIONAL MOBILITY, BALANCE, GAIT, AND ENDURANCE: ICD-10-CM

## 2019-05-23 DIAGNOSIS — Z78.9 IMPAIRED MOBILITY AND ACTIVITIES OF DAILY LIVING: ICD-10-CM

## 2019-05-23 DIAGNOSIS — Z74.09 IMPAIRED MOBILITY AND ACTIVITIES OF DAILY LIVING: ICD-10-CM

## 2019-05-23 DIAGNOSIS — R29.898 WEAKNESS OF RIGHT LEG: ICD-10-CM

## 2019-05-23 DIAGNOSIS — R47.01 BROCA'S APHASIA: ICD-10-CM

## 2019-05-23 DIAGNOSIS — R29.898 WEAKNESS OF RIGHT UPPER EXTREMITY: ICD-10-CM

## 2019-05-23 PROCEDURE — 97110 THERAPEUTIC EXERCISES: CPT | Mod: KX,PO,59

## 2019-05-23 PROCEDURE — 97116 GAIT TRAINING THERAPY: CPT | Mod: KX,PO,59

## 2019-05-23 PROCEDURE — 97530 THERAPEUTIC ACTIVITIES: CPT | Mod: KX,59,PO

## 2019-05-23 PROCEDURE — 92507 TX SP LANG VOICE COMM INDIV: CPT | Mod: KX,PO

## 2019-05-23 PROCEDURE — 97112 NEUROMUSCULAR REEDUCATION: CPT | Mod: KX,PO,59

## 2019-05-23 NOTE — PLAN OF CARE
Outpatient Neurological Rehabilitation Therapy  Updated POC     Date: 5/23/2019   Name: Torito Harris  Clinic Number: [id number]    Therapy Diagnosis:   Encounter Diagnosis   Name Primary?    Broca's aphasia      Physician: Ian Feliciano MD  Physician Orders: ST evaluate and treat  Medical Diagnosis: I69.30 (ICD-10-CM) - Late effect of stroke  Evaluation Date: 1/28/19    Total Visits Received: 26  Cancelled Visits: 0  No Show Visits: 6  Insurance Authorization Period: 3/7/19 to 12/31/19  Plan of Care Expiration:    1/28/2019 to 3/25/19; 3/28/19 to 5/24/19  New POC Certification Period:  5/23/2019 to 7/19/19    Precautions:Standard and Fall     Subjective     Update: pt feels he is having difficulty understanding his wife. Reviewed strategies to aid in this at home.     Objective     Update: see follow up note dated 5/23/2019    Assessment     Update: Torito Harris presents to [facility] s/p medical diagnosis of  CVA. Demonstrates impairments including limitations as described in the problem list. Positive prognostic factors include patient motivation. Negative prognostic factors include time post onset. He presents with Broca's aphasia c/b decreased verbal fluency, anomia, decreased auditory comprehension, decreased written expression, and decreased reading comprehension. Persistent deficits limit efficiency of conversation with others.  No barriers to therapy identified.. Patient will benefit from skilled, outpatient neurological rehabilitation speech therapy.    Rehab Potential: good     Education: Plan of Care and role of SLP in care reviewed with patient and his wife. Both verbalized understanding.     Previous Short Term Goals Status: 4 weeks  Short Term Goals: (4 weeks) Current Progress:   1. Pt will name common objects with 90% Carmen. Goal Not Met / Continue    2. Pt will generate a response to basic sentence completions with 80% Carmen  Goal Not Met / Continue    3. Pt will  name 5 concrete category items with Carmen. Goal Not Met / Discontinue     4.  pt will follow 2 unit commands with visual stimuli with 90% acc indly to improve auditory comprehension.  Goal Met 5/16/19 / Discontinue    5. pt and a conversational partner will use supported communication for persons with   aphasia to facilitate 2 conversational turns with a communication partner.  Goal Not Met / Discontinue     6. Patient will write name and address to increase written expression skills with 90% accuracy given verbal presentation of letters  Goal Met / Discontinue     7.  pt will complete reading comprehension tasks at the sentence level with 90% acc indly. Goal Not Met / Continue    8. pt will follow 3 unit body part commands with 90% acc indly.  Goal Not Met / Continue    9. Patient will write grapheme to corresponding phoneme with 90% acc indly. Goal Not Met / Discontinue     10.  pt will follow 3 unit commands with visual stimuli with 90% acc indly to improve auditory comprehension.  Goal Not Met / Continue    11. Pt will complete word finding task (i.e. Creating subject, verb, object pairs in VNEST protocol) with 90% acc min A to improve word fluency. Goal Not Met / Continue      New Short Term Goals: 4 weeks  Short Term Goals: (4 weeks) Current Progress:   1. Pt will name common objects with 90% Carmen. Goal Not Met / Continue    2. Pt will generate a response to basic sentence completions with 80% Carmen  Goal Not Met / Continue    3.  pt will complete reading comprehension tasks at the sentence level with 90% acc indly. Goal Not Met / Continue    4. pt will follow 3 unit body part commands with 90% acc indly.  Goal Not Met / Continue    5.  pt will follow 3 unit commands with visual stimuli with 90% acc indly to improve auditory comprehension.  Goal Not Met / Continue    6. Pt will complete word finding task (i.e. Creating subject, verb, object pairs in VNEST protocol) with 90% acc min A to improve word fluency.  Goal Not Met / Continue         Long Term Goal Status:  8 weeks  1. Pt will comprehend communication related to basic medical and social needs and utilize compensatory strategies to maintain safety and participate socially in functional living environment.   Goal Not Met / Continue   2. The patient will develop functional, cognitive-linguistic based skills and utilize compensatory strategies to communicate wants and needs effectively to different conversation partners, maintain safety and participate socially in functional living environment Goal Not Met / Continue   3. Pt will develop functional reading and writing skills and utilize compensatory strategies to maintain safety during ADL's and read and understand everyday adult material independently. Goal Not Met / Continue     Goals Previously Met:  - Pt will complete R/L body part discrimination tasks with 70% Carmen. Goal Met 2/7/19/ Discontinue   - Pt will repeat  3-5 word phrases with 80% Carmen. Goal Met 2/21/19 / Discontinue  - assess reading and writing skills Goal Met 2/26/19   - pt will follow 2 unit body part commands with 90% acc indly to improve auditory comprehension. Goal Met 3/4/19 / Discontinue  Patient will match phrases to pictures to increase reading comprehension skills with 90% accuracy given min A. Goal Met 3/4/19 / Discontinue    Plan     Updated Certification Period: 5/23/2019 to 7/19/19  Recommended Treatment Plan: Patient will participate in the [facility] neurological rehabilitation program for speech therapy 2 times per week to address his  Communication deficits, to educate patient and their family, and to participate in a home exercise program.     Other recommendations: 1. OT driving evaluation     Therapist's Name:  SERINA Paz, CCC-SLP   5/23/2019      I CERTIFY THE NEED FOR THESE SERVICES FURNISHED UNDER THIS PLAN OF TREATMENT AND WHILE UNDER MY CARE    Physician's comments:     Physician's Name:

## 2019-05-23 NOTE — PROGRESS NOTES
"  Physical Therapy Daily Treatment Note     [id number]    Therapy Diagnosis:   Encounter Diagnoses   Name Primary?    Weakness of right leg     Impaired functional mobility, balance, gait, and endurance      Physician: Ian Feliciano MD    Visit Date: 5/23/2019    Physician Orders: PT Eval and Treat   Medical Diagnosis from Referral: Late effect of stroke  Evaluation Date: 1/28/2019  Authorization Period Expiration: 02/12/19-12/31/19  Plan of Care Expiration: 05/14/19 to 07/09/19  Visit # / Visits authorized: 7/10 (Vist #23 of 2019) - KX    Time In: 13:45  Time Out: 14:30  Total Billable Time: 45 minutes    Precautions: Standard, Diabetes, Fall and right hemiplegia, hearing aid, safety awareness, cognitive deficits, speech deficits    Subjective     Pt reports: "I'm doing pretty good."   HEP Compliance: pt reports he is doing all of his exercises at home.  Response to previous treatment: no complaints  Functional change: improved ability to side step and to step backward    Pain: 0/10  Location: N/A      Objective     Torito received therapeutic exercises to develop strength, endurance, ROM, flexibility and core stabilization for 10 minutes including:   X 10 min on Sci Fit recumbent stepper.  B UE/B LE on level 7.0 initially, but after 6 min fatigued and unable to keep the screen on the last 4 mins.      Patient participated in gait training activities to normalize gait pattern for 15 minutes. The following activities were included:   Gait belt used for safety. Assistive device: Brittney platform rolling walker, standard bilateral platform rolling walker  X 6 laps of side stepping in // bars with 3 laps 1 UE support and 3 laps no UE support and CGA.  X 4 laps of backward ambulation in // bars with 1 Ue support and CGA      Patient participated in neuromuscular re-education activities to improve: Balance, Coordination, Kinesthetic, Sense, Proprioception and Posture for 15 minutes. " The following activities were included:   2 x 10 reps of b LE single leg step tap to 6 inch step with 1 UE support and CGA.    4 x 30 sec of tandem stance with 1 UE support and CGA  X 5 laps of forward tandem ambulation with 1 UE support and CGA        Home Exercises Provided and Patient Education Provided     Education provided:  Education provided to patient and his wife  -proper technique to assist with balance, gait, and RW management with Brittney platform rolling walker and standard bilateral platform rolling walker  -proper hand positioning when holding gait belt  -PT instructed both patient and his wife that if they decide to purchase either the bilateral standard RW platform attachments or the Brittney platform rolling walker, then PT recommends that patient's wife is present to provide necessary assistance (that she was just educated on) at ALL TIMES when patient is using the platform rolling walker to prevent fall. When patient is ambulating unsupervised at home, he is to continue to use his SBQC. Pt and his wife verbalized good understanding of these instructions to PT.     Written Home Exercises Provided: Continue current HEP.   See EMR under Media for exercises provided 2/5/2019.    Assessment   Mr. Ugarte tolerated tx session well but had a difficult time letting go and performing balance activities unassisted and with CGA.  Pt able to perform side stepping w/o UE support, but very hesitant.  No loss of balance noted with activities today and CGA for safety.  Cont with plan of care.      Torito is progressing well towards his goals.   Pt prognosis is Good.     Pt will continue to benefit from skilled outpatient physical therapy to address the deficits listed in the problem list box on initial evaluation, provide pt/family education and to maximize pt's level of independence in the home and community environment.     Pt's spiritual, cultural and educational needs considered and pt agreeable to plan of care and  goals.     Anticipated Barriers for therapy: hearing deficit, speech deficits, cognitive deficits, decreased safety awareness, fall risk, visual neglect, transportation assistance required    Goals:  Short Term Goals: 4 weeks   1. Pt to be (I) with established HEP MET 03/19/19  2. Pt to improve R hip flexion strength MMT score to at least 4+/5 for improved gait mechanics Ongoing  3. Pt to improve R hip extension strength MMT score to at least 4/5 for improved gait mechanics Ongoing  4. Pt to improve R hip AB and R hip AD strength MMT scores to at least 3+/5 for improved gait mechanics MET 03/04/19  5.  Pt to improve R knee flexion and extension strength MMT scores to at least 4+/5 for improved gait mechanics Ongoing  6. Pt to improve R ankle PF strength MMT score to at least 2/5 for improved gait mechanics MET 03/04/19  7. Pt to improve chair rise score to at least 5 times with no more than 1 UE support and S for improved endurance and safety with functional transfers MET 03/04/19  8. Pt to improve TUG score to at least 30 seconds for improved safety with household mobility Progressing  9. Pt to improve SSWS score to at least 0.40 m/sec for improved safety with community mobility. MET 05/14/19     Long Term Goals: 8 weeks   1. Pt to be (I) with advanced HEP Ongoing  2. Pt to improve R hip extension strength MMT score to at least 4+/5 for improved gait mechanics Ongoing  3. Pt to improve R hip AB and R hip AD strength MMT scores to at least 4-/5 for improved gait mechanics Ongoing  4. Pt to improve R ankle PF strength MMT score to at least 2+/5 for improved gait mechanics MET 03/19/19  5. Pt to improve chair rise score to at least 5 times with no UE support and S for improved endurance and safety with functional transfers Ongoing  6. Pt to improve TUG score to at least 26 seconds for improved safety with household mobility Ongoing  7. Pt to improve SSWS score to at least 0.50 m/sec for improved safety with community  mobility. Ongoing     Plan     Plan for next session:  RLE strengthening, mainly hip and knee  Safety with turning, side stepping, and backward stepping with gait  Weight bearing on RLE    Lucy Vanegas, PTA

## 2019-05-23 NOTE — PROGRESS NOTES
Occupational Therapy Daily Treatment Note     Name: Torito Harris  Clinic Number: [id number]  Physician: Ian Feliciano MD  Medical Diagnosis: L CVA    Therapy Diagnosis:   Encounter Diagnoses   Name Primary?    Impaired mobility and activities of daily living     Weakness of right upper extremity      Visit Date: 5/23/2019  Physician Orders: Eval and Treat   Surgical Procedure and Date: NA  Evaluation Date: 1/28/19   Insurance Authorization Period Expiration: 12/31/19   Plan of Care Certification Period: 6/14/19   Date of Return to MD: unknown at this time     Visit # / Visits authorized: 26 ( 6/ 10 new auth)   Time In: 1303   Time Out: 1348    Total Billable Time: 45 minutes      Precautions: Standard, fall risk, hearing impaired     Subjective     Pt reports: he is practicing picking things up with his R hand.   Patient reports compliance with HEP.   Response to previous treatment:positive   Functional change: progressing     Pain: none reported   Location: NA     Objective       Torito participated in therapeutic exercises to improve ROM and strength for 25 minutes:   - UBE level 4 x 4 min forwards x 4 min reverse   - 1# chest press x 10 reps x 2; R hand paddled to dowel; Min A   - 1# shoulder flex/ext x 10 reps x 2; R hand paddled to dowel; Mod A   - 1# reverse curls x 10 reps x 2; R hand paddled to dowel; CGA      Patient participated in neuromuscular reeducation activities for 10 minutes to maximize ROM, motor strength and neuromuscular and proprioceptive input to the RUE.  The following activities were included:   Sitting EOM:   - restoration of R hand arches and metacarpal stretches   - general R wrist stretches: 1. P-A glide of scaphoid on radius, 2. Radial deviation, 3. Increasing mobility of metacarpals, 4. Carpal roll, 5. Movement of forearm on hand towards wrist extension, 6. Movement of forearm on the hand toward supination/ pronation, 7. wrist ext with distraction   - body on arm weight  shifts for heavy weight bearing into RUE      Patient participated in therapeutic activities to improve functional performance in the home environment for 10 minutes.  The following activities were included:   - grasp and release of light weight balls; self assist and/or OT assist to reach forwards and laterally for objects. Frequent assist needed to oppose thumb around ball       Functional mobility:   - sit <> stand with SBA  - gait with quad cane and close SBA       Home Exercises and Education Provided     Education provided: HEP - reviewed self assist ROM   - OT recommends driving evaluation   - Progress towards goals / POC       Written Home Exercises Provided: no new, continue HEP    Patient instructed to cont prior HEP as verbally instructed in previous tx sessions. Exercises were reviewed and Torito was able to demonstrate them prior to the end of the session.  Torito demonstrated good  understanding of the HEP provided.       Pt has no cultural, educational or language barriers to learning provided.    Assessment   Torito Harris tolerated treatment session well.  OT continued with internvetion techniques to increase RUE range of motion, strength, and motor control.  He continues to demonstrate impaired functional reach and required self or OT assistance to reach forwards and grasp ball.  Forward shoulder flexion AROM is more restricted (less than 90*) when elbow is extended as required during functional reach.  AROM shoulder flexion to ~90* when elbow is flexed.  He continues to require Min A - Mod A during dowel therex 2/2 continued weakness.  Continue POC and advance appropriately.      Torito would continue to benefit from skilled occupational therapy. Torito is progressing fairly towards his goals and there are no updates to goals at this time. Pt prognosis is Good as he remains motivated to participate and has good support from his spouse. Pt will continue to benefit from skilled outpatient  occupational therapy to address the deficits listed in the problem list on initial evaluation provide pt/family education and to maximize pt's level of independence in the home and community environment.      Anticipated barriers to occupational therapy: none     Pt's spiritual, cultural and educational needs considered and pt agreeable to plan of care and goals.    Goals:  Short Term Goals: 3 weeks   ROM/Strength to perform the ADL's and functional activities listed below, - in progress  Pt will improve functional  strength needed for tasks to 20# in R hand. - in progress   Pt will improve AROM for shoulder flexion needed for functional tasks to 60* in R UE. - MET 5/14/19  UE dressing will improve to Min A - in progress   LB dressing will improve to Mod A  - in progress   Toileting will improve to Min A incorporating R hand for task  - in progress   Pt will be Independent with HEP to improve ROM and FM skills. - ongoing      Long Term Goals: 6 weeks   ROM/Strength to perform the ADL's and functional activities listed below - in progress   Pt will improve functional  strength needed for tasks to 30# in R hand. - in progress   Pt will improve AROM for shoulder flexion needed for functional tasks to 80* in R UE. - MET 4/14/19  UE dressing will improve to Supervision   LB dressing will improve to Min A   Toileting will improve to Supervision incorporating R hand for task   Pt will complete clinical testing for skills needed for driving  G code self care CK - in progress     Plan   Continue POC in pursuit of OT goals -  through 6/14/19.     Discussed Plan of Care with patient: Yes  Updates/Grading for next session: continue POC; advance as tolerated

## 2019-05-23 NOTE — PROGRESS NOTES
Outpatient Neurological Rehabilitation   Speech and Language Therapy Daily Note  Date:  5/23/2019     Name: Torito Harris   MRN: [id number]   Therapy Diagnosis:   Encounter Diagnosis   Name Primary?    Broca's aphasia    Physician: Ian Feliciano MD  Physician Orders: ST evaluate and treat  Medical Diagnosis: I69.30 (ICD-10-CM) - Late effect of stroke    Visit #/Visits authorized: 6/ 10 (20 prior to this auth) KX   Date of Evaluation:  1/28/19  Insurance Authorization Period: 3/7/2019 to 12/31/19   Plan of Care Expiration Date:  1/28/2019 to 3/25/19; 3/28/19 to 5/24/19  Extended POC: n/a   Cancelled Visits:  0   No Show Visits:  6     Time In:  1432   Time Out:  1515   Total Billable Time: 43 minutes    Precautions: Standard and Fall  Subjective:   Pt reports:  Via gesture that he is having a hard time understanding some of the tasks. Reviewed chunking strategy with patient's wife to increase comprehension at home.    Pain Scale:  0/10 on VAS currently.   Pain Location: n/a  Objective:   UNTIMED  Procedure Min.   Speech- Language- Voice Therapy    43 minutes   Total Untimed Units: 1  Charges Billed/# of units: 1    Short Term Goals: (4 weeks) Current Progress:   1. Pt will name common objects with 90% Carmen.  Progressing/ Not Met 5/23/2019   Not formally addressed     2. Pt will generate a response to basic sentence completions with 80% Carmen   Progressing/ Not Met 5/23/2019   Not formally addressed     3. Pt will name 5 concrete category items with Carmen.  Progressing/ Not Met 5/23/2019   Not formally addressed     4.  pt will follow 2 unit commands with visual stimuli with 90% acc indly to improve auditory comprehension.  Goal Met 5/16/19 / Discontinue    5. pt and a conversational partner will use supported communication for persons with   aphasia to facilitate 2 conversational turns with a communication partner.   Progressing/ Not Met 5/23/2019  Not formally addressed     6. Patient will write name and address  to increase written expression skills with 90% accuracy given verbal presentation of letters  Progressing/ Not Met 5/23/2019  .  Not formally addressed    Previously Goal met x's 2   7.  pt will complete reading comprehension tasks at the sentence level with 90% acc indly.  Progressing/ Not Met 5/23/2019   Not formally addressed     8. pt will follow 3 unit body part commands with 90% acc indly.   Progressing/ Not Met 5/23/2019  Not formally addressed     9. Patient will write grapheme to corresponding phoneme with 90% acc indly.  Progressing/ Not Met 5/23/2019   Not formally addressed     10.  pt will follow 3 unit commands with visual stimuli with 90% acc indly to improve auditory comprehension.   Progressing/ Not Met 5/23/2019   Not formally addressed     11. Pt will complete word finding task (i.e. Creating subject, verb, object pairs in VNEST protocol) with 90% acc min A to improve word fluency. Verb Network Strength Training (VNEST) was introduced today. VNEST focuses on verbs, encouraging people to think of people who perform actions (agents) and the objects or the actions that are performed on (patients). The idea is that by focusing on verbs, which require connection to nouns, you can strengthen all the words in the mental network around the verb. In this therapy technique, there are 6 steps each verb is taken through.   Step 1: Pt provided 5/12  subjects and objects in verb triads IND'ly, 12/12  given cues.   Step 2: Pt read triads of words aloud with 16 % acc indly, 100% acc given phonemic cues .  Step 3: pt expanded where with 0% acc indly, 100% acc given verbal cues, when with 0% acc, 100% acc given verbal cues, and why with 0% acc indly, 100% acc given verbal cues.   Step 4: pt identified whether sentences presented auditorily were correct or incorrect with 75% acc IND'ly.   Step 5: pt recalled the target verb with 0% acc IND'ly (0/2 ).  Step 6: not addressed     verb- ride, cut     Patient  Education/Response:   Chunking strategy (I.e. Saying only a few words at a time to allow processing time reviewed). Pt and his wife indicated understanding.     Assessment:   Torito is progressing well towards his goals. Increased fluency with decreased use of fillers immediately following VNEST task as compared to immediately prior to VNEST task. Good indicator for stimulability for increased verbal fluency using VNEST. Pt prognosis is Good. Pt will continue to benefit from skilled outpatient speech and language therapy to address the deficits listed in the problem list on initial evaluation, provide pt/family education and to maximize pt's level of independence in the home and community environment.     Barriers to Therapy: sleeping pattern, possible transportation  Pt's spiritual, cultural and educational needs considered and pt agreeable to plan of care and goals.    Plan:   Continue POC with focus on written cues as support. Complete verb nesting at least 1 verb per session.  OT driving evaluation.    SERINA Mott, CCC-SLP  Speech Language Pathologist

## 2019-05-28 ENCOUNTER — CLINICAL SUPPORT (OUTPATIENT)
Dept: REHABILITATION | Facility: HOSPITAL | Age: 59
End: 2019-05-28
Attending: PSYCHIATRY & NEUROLOGY

## 2019-05-28 DIAGNOSIS — Z74.09 IMPAIRED MOBILITY AND ACTIVITIES OF DAILY LIVING: ICD-10-CM

## 2019-05-28 DIAGNOSIS — R47.01 BROCA'S APHASIA: ICD-10-CM

## 2019-05-28 DIAGNOSIS — R29.898 WEAKNESS OF RIGHT LEG: ICD-10-CM

## 2019-05-28 DIAGNOSIS — Z78.9 IMPAIRED MOBILITY AND ACTIVITIES OF DAILY LIVING: ICD-10-CM

## 2019-05-28 DIAGNOSIS — R29.898 WEAKNESS OF RIGHT UPPER EXTREMITY: ICD-10-CM

## 2019-05-28 DIAGNOSIS — Z74.09 IMPAIRED FUNCTIONAL MOBILITY, BALANCE, GAIT, AND ENDURANCE: ICD-10-CM

## 2019-05-28 PROCEDURE — 97112 NEUROMUSCULAR REEDUCATION: CPT | Mod: KX,PO,59

## 2019-05-28 PROCEDURE — 97530 THERAPEUTIC ACTIVITIES: CPT | Mod: KX,59,PO

## 2019-05-28 PROCEDURE — 92507 TX SP LANG VOICE COMM INDIV: CPT | Mod: PO

## 2019-05-28 PROCEDURE — 97116 GAIT TRAINING THERAPY: CPT | Mod: KX,PO,59

## 2019-05-28 PROCEDURE — 97110 THERAPEUTIC EXERCISES: CPT | Mod: KX,PO,59

## 2019-05-28 NOTE — PROGRESS NOTES
"  Physical Therapy Daily Treatment Note     [id number]    Therapy Diagnosis:   Encounter Diagnoses   Name Primary?    Weakness of right leg     Impaired functional mobility, balance, gait, and endurance      Physician: Ian Feliciano MD    Visit Date: 5/28/2019    Physician Orders: PT Eval and Treat   Medical Diagnosis from Referral: Late effect of stroke  Evaluation Date: 1/28/2019  Authorization Period Expiration: 02/12/19-12/31/19  Plan of Care Expiration: 05/14/19 to 07/09/19  Visit # / Visits authorized: 8/10 (Vist #23 of 2019) - KX    Time In: 13:00  Time Out: 13:45  Total Billable Time: 45 minutes    Precautions: Standard, Diabetes, Fall and right hemiplegia, hearing aid, safety awareness, cognitive deficits, speech deficits    Subjective     Pt reports: "I'm doing pretty good."   HEP Compliance: pt reports he is doing all of his exercises at home.  Response to previous treatment: no complaints  Functional change: improved ability to side step and to step backward    Pain: 0/10  Location: N/A      Objective     Torito received therapeutic exercises to develop strength, endurance, ROM, flexibility and core stabilization for 35 minutes including:   X 10 min on Sci Fit recumbent stepper.  B UE/B LE on level 4.0   3 x 10 reps of R LE SAQ on large bolster with #1.5lb cuff weights, 3 sec hold  2 x 10 reps of R SLR, VC's to keep knee extended.     Side lying:  2 x 10 reps of R LE clamshells with AAROM to AROM      Patient participated in gait training activities to normalize gait pattern for 10 minutes. The following activities were included:   Gait belt used for safety. Assistive device:standard bilateral platform rolling walker  Pt ambulated ~ 250ft with platform rolling walker and CGA.  VC's to walk with heel strike.        Patient participated in neuromuscular re-education activities to improve: Balance, Coordination, Kinesthetic, Sense, Proprioception and Posture for 0 " minutes. The following activities were included:           Home Exercises Provided and Patient Education Provided     Education provided:  Education provided to patient and his wife  -proper technique to assist with balance, gait, and RW management with Brittney platform rolling walker and standard bilateral platform rolling walker  -proper hand positioning when holding gait belt  -PT instructed both patient and his wife that if they decide to purchase either the bilateral standard RW platform attachments or the Brittney platform rolling walker, then PT recommends that patient's wife is present to provide necessary assistance (that she was just educated on) at ALL TIMES when patient is using the platform rolling walker to prevent fall. When patient is ambulating unsupervised at home, he is to continue to use his SBQC. Pt and his wife verbalized good understanding of these instructions to PT.     Written Home Exercises Provided: Continue current HEP.   See EMR under Media for exercises provided 2/5/2019.    Assessment   Mr. Ugarte tolerated tx session well and did not have any problems.  Pt was able to increase weight on SAQ and progress to large bolster to increase difficulty.  Pt cont to require VC's to keep knee extended for SLR.   Cont with plan of care.      Torito is progressing well towards his goals.   Pt prognosis is Good.     Pt will continue to benefit from skilled outpatient physical therapy to address the deficits listed in the problem list box on initial evaluation, provide pt/family education and to maximize pt's level of independence in the home and community environment.     Pt's spiritual, cultural and educational needs considered and pt agreeable to plan of care and goals.     Anticipated Barriers for therapy: hearing deficit, speech deficits, cognitive deficits, decreased safety awareness, fall risk, visual neglect, transportation assistance required    Goals:  Short Term Goals: 4 weeks   1. Pt to be (I)  with established HEP MET 03/19/19  2. Pt to improve R hip flexion strength MMT score to at least 4+/5 for improved gait mechanics Ongoing  3. Pt to improve R hip extension strength MMT score to at least 4/5 for improved gait mechanics Ongoing  4. Pt to improve R hip AB and R hip AD strength MMT scores to at least 3+/5 for improved gait mechanics MET 03/04/19  5.  Pt to improve R knee flexion and extension strength MMT scores to at least 4+/5 for improved gait mechanics Ongoing  6. Pt to improve R ankle PF strength MMT score to at least 2/5 for improved gait mechanics MET 03/04/19  7. Pt to improve chair rise score to at least 5 times with no more than 1 UE support and S for improved endurance and safety with functional transfers MET 03/04/19  8. Pt to improve TUG score to at least 30 seconds for improved safety with household mobility Progressing  9. Pt to improve SSWS score to at least 0.40 m/sec for improved safety with community mobility. MET 05/14/19     Long Term Goals: 8 weeks   1. Pt to be (I) with advanced HEP Ongoing  2. Pt to improve R hip extension strength MMT score to at least 4+/5 for improved gait mechanics Ongoing  3. Pt to improve R hip AB and R hip AD strength MMT scores to at least 4-/5 for improved gait mechanics Ongoing  4. Pt to improve R ankle PF strength MMT score to at least 2+/5 for improved gait mechanics MET 03/19/19  5. Pt to improve chair rise score to at least 5 times with no UE support and S for improved endurance and safety with functional transfers Ongoing  6. Pt to improve TUG score to at least 26 seconds for improved safety with household mobility Ongoing  7. Pt to improve SSWS score to at least 0.50 m/sec for improved safety with community mobility. Ongoing     Plan     Plan for next session:  RLE strengthening, mainly hip and knee  Safety with turning, side stepping, and backward stepping with gait  Weight bearing on RLE    Lucy Vanegas, PTA

## 2019-05-28 NOTE — PROGRESS NOTES
Occupational Therapy Daily Treatment Note     Name: Torito Harris  [facility] Number: [id number]  Physician: Ian Feliciano MD  Medical Diagnosis: L CVA    Therapy Diagnosis:   Encounter Diagnoses   Name Primary?    Impaired mobility and activities of daily living     Weakness of right upper extremity      Visit Date: 5/28/2019  Physician Orders: Eval and Treat   Surgical Procedure and Date: NA  Evaluation Date: 1/28/19   Insurance Authorization Period Expiration: 12/31/19   Plan of Care Certification Period: 6/14/19   Date of Return to MD: unknown at this time     Visit # / Visits authorized: 27 ( 7/10 new auth)   Time In: 1347   Time Out: 1430     Total Billable Time: 43 minutes      Precautions: Standard, fall risk, hearing impaired     Subjective     Pt reports: no new complaints.    Patient reports compliance with HEP.   Response to previous treatment:positive   Functional change: progressing     Pain: none reported   Location: NA     Objective       Torito participated in therapeutic exercises to improve ROM and strength for 8 minutes:   - UBE level 4 x 4 min forwards x 4 min reverse     Patient participated in neuromuscular reeducation activities for 25 minutes to maximize ROM, motor strength and neuromuscular and proprioceptive input to the RUE.  The following activities were included:   Sitting EOM:   - restoration of R hand arches and metacarpal stretches   - general R wrist stretches: 1. P-A glide of scaphoid on radius, 2. Radial deviation, 3. Increasing mobility of metacarpals, 4. Carpal roll, 5. Movement of forearm on hand towards wrist extension, 6. Movement of forearm on the hand toward supination/ pronation, 7. wrist ext with distraction   - body on arm weight shifts for heavy weight bearing into RUE  - application of GG splint to R hand   - right upper extremity weight bearing on a dynamic surface with body on arm weight shifts with CGA to maintain RUE on dynamic surface     Standing at EOM:   -  right upper extremity weight bearing on a dynamic surface with arm on body weight shifts with Mod A.      Patient participated in therapeutic activities to improve functional performance in the home environment for 10 minutes.  The following activities were included:   Sitting at tabletop:   - grasp and release of cups; self assist and/or OT assist to reach forwards and laterally for objects. Frequent assist needed for placement of digits on cups and to oppose thumb around cups       Functional mobility:   - sit <> stand with SBA  - gait with quad cane and close SBA       Home Exercises and Education Provided     Education provided: HEP - reviewed self assist ROM   - OT recommends driving evaluation   - Progress towards goals / POC       Written Home Exercises Provided: no new, continue HEP    Patient instructed to cont prior HEP as verbally instructed in previous tx sessions. Exercises were reviewed and Torito was able to demonstrate them prior to the end of the session.  Torito demonstrated good  understanding of the HEP provided.       Pt has no cultural, educational or language barriers to learning provided.    Assessment   Torito Harris tolerated treatment session well.  No significant changes in performance noted today.  He required multiple verbal cues for body mechanics and proper movement patterns during functional reach and grasp tasks.  Decreased RUE range of motion, strength, and motor control continue to limit progress.  Continue POC and advance appropriately.      Torito would continue to benefit from skilled occupational therapy. Torito is progressing fairly towards his goals and there are no updates to goals at this time. Pt prognosis is Good as he remains motivated to participate and has good support from his spouse. Pt will continue to benefit from skilled outpatient occupational therapy to address the deficits listed in the problem list on initial evaluation provide pt/family education and to  maximize pt's level of independence in the home and community environment.      Anticipated barriers to occupational therapy: none     Pt's spiritual, cultural and educational needs considered and pt agreeable to plan of care and goals.    Goals:  Short Term Goals: 3 weeks   ROM/Strength to perform the ADL's and functional activities listed below, - in progress  Pt will improve functional  strength needed for tasks to 20# in R hand. - in progress   Pt will improve AROM for shoulder flexion needed for functional tasks to 60* in R UE. - MET 5/14/19  UE dressing will improve to Min A - in progress   LB dressing will improve to Mod A  - in progress   Toileting will improve to Min A incorporating R hand for task  - in progress   Pt will be Independent with HEP to improve ROM and FM skills. - ongoing      Long Term Goals: 6 weeks   ROM/Strength to perform the ADL's and functional activities listed below - in progress   Pt will improve functional  strength needed for tasks to 30# in R hand. - in progress   Pt will improve AROM for shoulder flexion needed for functional tasks to 80* in R UE. - MET 4/14/19  UE dressing will improve to Supervision   LB dressing will improve to Min A   Toileting will improve to Supervision incorporating R hand for task   Pt will complete clinical testing for skills needed for driving  G code self care CK - in progress     Plan   Continue POC in pursuit of OT goals -  through 6/14/19.     Discussed Plan of Care with patient: Yes  Updates/Grading for next session: continue POC; advance as tolerated

## 2019-05-28 NOTE — PROGRESS NOTES
"Outpatient Neurological Rehabilitation   Speech and Language Therapy Daily Note  Date:  5/28/2019     Name: Torito Harris   MRN: [id number]   Therapy Diagnosis:   No diagnosis found.Physician: Ian Feliciano MD  Physician Orders: ST evaluate and treat  Medical Diagnosis: I69.30 (ICD-10-CM) - Late effect of stroke    Visit #/Visits authorized: 7/ 10 (20 prior to this auth) KX   Date of Evaluation:  1/28/19  Insurance Authorization Period: 3/7/2019 to 12/31/19   Plan of Care Expiration Date:  1/28/2019 to 3/25/19; 3/28/19 to 5/24/19; 5/23/19-7/19/19  Extended POC: n/a   Cancelled Visits:  0   No Show Visits:  6     Time In:  1432   Time Out:  1515   Total Billable Time: 43 minutes    Precautions: Standard and Fall  Subjective:   Pt reports:  "Nothing" when asked what he did on Memorial Day. Pt's wife reported that he went to the dentist to have his tooth fixed.  Pain Scale:  0/10 on VAS currently.   Pain Location: n/a  Objective:   UNTIMED  Procedure Min.   Speech- Language- Voice Therapy    43 minutes   Total Untimed Units: 1  Charges Billed/# of units: 1    Short Term Goals: (4 weeks) Current Progress:   1. Pt will name common objects with 90% Carmen.  Progressing/ Not Met 5/28/2019   30% ind'ly; 80% with a sentence completion    2. Pt will generate a response to basic sentence completions with 80% Carmen   Progressing/ Not Met 5/28/2019   50% acc ind'ly up to 75% with initial phonemic cue   3.  pt will complete reading comprehension tasks at the sentence level with 90% acc indly.  Progressing/ Not Met 5/28/2019   Pt matched an object to a short phrase (LARK) in a Fx3 with 62% acc ind'ly.     Attempted similar task with 2 columns (One column with 5 pictured objects and one column with 5 short phrases to match via drawing line); pt unable to complete task within this format despite maxA. After completing phrase to picture match Fx3, pt was able to complete 1 trial correctly within this format.    4. pt will follow 3 " "unit body part commands with 90% acc indly.   Progressing/ Not Met 5/28/2019  Not formally addressed     5.  pt will follow 3 unit commands with visual stimuli with 90% acc indly to improve auditory comprehension.   Progressing/ Not Met 5/28/2019   Not formally addressed     6. Pt will complete word finding task (i.e. Creating subject, verb, object pairs in VNEST protocol) with 90% acc min A to improve word fluency. Not formally addressed        Patient Education/Response:   Education on word description strategy as pt perseverates on "I can't say it" for most stimuli presented. Pt and his wife indicated understanding.     Assessment:   Torito is progressing well towards his goals.He became upset during sentence completion task today as he was unable to generate a response to several simple phrase completions (ie; three strikes and you're ___). Greater accuracy for reading at the short phrase level with a Fx3 answer choice. Pt prognosis is Good. Pt will continue to benefit from skilled outpatient speech and language therapy to address the deficits listed in the problem list on initial evaluation, provide pt/family education and to maximize pt's level of independence in the home and community environment.     Barriers to Therapy: sleeping pattern, possible transportation  Pt's spiritual, cultural and educational needs considered and pt agreeable to plan of care and goals.    Plan:   Continue POC with focus on written cues as support.     Farrah Atkinson M.S. CCC-SLP  Speech Language Pathologist   5/28/2019    "

## 2019-05-29 ENCOUNTER — CLINICAL SUPPORT (OUTPATIENT)
Dept: REHABILITATION | Facility: HOSPITAL | Age: 59
End: 2019-05-29
Attending: PSYCHIATRY & NEUROLOGY

## 2019-05-29 DIAGNOSIS — R47.01 BROCA'S APHASIA: ICD-10-CM

## 2019-05-29 PROCEDURE — 92507 TX SP LANG VOICE COMM INDIV: CPT | Mod: KX,PO

## 2019-05-29 NOTE — PROGRESS NOTES
"Outpatient Neurological Rehabilitation   Speech and Language Therapy Daily Note  Date:  5/29/2019     Name: Torito Harris   MRN: [id number]   Therapy Diagnosis:   Encounter Diagnosis   Name Primary?    Broca's aphasia    Physician: Ian Feliciano MD  Physician Orders: ST evaluate and treat  Medical Diagnosis: I69.30 (ICD-10-CM) - Late effect of stroke    Visit #/Visits authorized: 8/ 10 (20 prior to this auth) KX   Date of Evaluation:  1/28/19  Insurance Authorization Period: 3/7/2019 to 12/31/19   Plan of Care Expiration Date:  1/28/2019 to 3/25/19; 3/28/19 to 5/24/19; 5/23/19-7/19/19  Extended POC: n/a   Cancelled Visits:  0   No Show Visits:  6     Time In:  1113   Time Out:  1153   Total Billable Time: 40 minutes    Precautions: Standard and Fall  Subjective:   Pt reports:  That he had a hard time in yesterdays session. Reported "I can't" repeatedly. Discussed that pt perseveration on "I can't" can prevent him from understanding the cue that the SLP or family is trying to provide. Discussed replacing "I can't" with "need help". Discussed how to cue this new phrase in home environment to redirect to accomplishing a task. Patient and his wife verbalized understanding to all.  Pain Scale:  0/10 on VAS currently.   Pain Location: n/a  Objective:   UNTIMED  Procedure Min.   Speech- Language- Voice Therapy    40 minutes   Total Untimed Units: 1  Charges Billed/# of units: 1    Short Term Goals: (4 weeks) Current Progress:   1. Pt will name common objects with 90% Carmen.  Progressing/ Not Met 5/29/2019   80% acc given a the object, 100% acc given phrase completion.   2. Pt will generate a response to basic sentence completions with 80% Carmen   Progressing/ Not Met 5/29/2019   60% acc indly, 93% acc given a repetition and verbal cues.    3.  pt will complete reading comprehension tasks at the sentence level with 90% acc indly.  Progressing/ Not Met 5/29/2019   Pt matched an object to a short phrase (LARK) in a Fx3 " "with 91% acc ind'ly.   METx1   4. pt will follow 3 unit body part commands with 90% acc indly.   Progressing/ Not Met 5/29/2019  60% acc indly, 100% acc given a model    5.  pt will follow 3 unit commands with visual stimuli with 90% acc indly to improve auditory comprehension.   Progressing/ Not Met 5/29/2019   30% acc indly, 70% acc given a repetition   6. Pt will complete word finding task (i.e. Creating subject, verb, object pairs in VNEST protocol) with 90% acc min A to improve word fluency. Not formally addressed        Patient Education/Response:   Education on replacing "I cant"" with a request for help to improve functional communication rather than abandoning the task . Pt and his wife indicated understanding.     Assessment:   Torito is progressing well towards his goals. Increased accuracy across all targeted skills today once patient was redirected from "I can't" to requests for functional assistance. Pt prognosis is Good. Pt will continue to benefit from skilled outpatient speech and language therapy to address the deficits listed in the problem list on initial evaluation, provide pt/family education and to maximize pt's level of independence in the home and community environment.     Barriers to Therapy: sleeping pattern, possible transportation  Pt's spiritual, cultural and educational needs considered and pt agreeable to plan of care and goals.    Plan:   Continue POC with focus on written cues as support.     SERINA Mott, CCC-SLP  Speech Language Pathologist    5/29/2019  "

## 2019-05-30 ENCOUNTER — CLINICAL SUPPORT (OUTPATIENT)
Dept: REHABILITATION | Facility: HOSPITAL | Age: 59
End: 2019-05-30
Attending: PSYCHIATRY & NEUROLOGY

## 2019-05-30 ENCOUNTER — DOCUMENTATION ONLY (OUTPATIENT)
Dept: REHABILITATION | Facility: HOSPITAL | Age: 59
End: 2019-05-30

## 2019-05-30 DIAGNOSIS — Z74.09 IMPAIRED MOBILITY AND ACTIVITIES OF DAILY LIVING: ICD-10-CM

## 2019-05-30 DIAGNOSIS — R29.898 WEAKNESS OF RIGHT LEG: ICD-10-CM

## 2019-05-30 DIAGNOSIS — Z78.9 IMPAIRED MOBILITY AND ACTIVITIES OF DAILY LIVING: ICD-10-CM

## 2019-05-30 DIAGNOSIS — R29.898 WEAKNESS OF RIGHT UPPER EXTREMITY: ICD-10-CM

## 2019-05-30 DIAGNOSIS — Z74.09 IMPAIRED FUNCTIONAL MOBILITY, BALANCE, GAIT, AND ENDURANCE: ICD-10-CM

## 2019-05-30 PROCEDURE — 97110 THERAPEUTIC EXERCISES: CPT | Mod: KX,PO

## 2019-05-30 PROCEDURE — 97112 NEUROMUSCULAR REEDUCATION: CPT | Mod: KX,PO

## 2019-05-30 NOTE — PROGRESS NOTES
Pt left prior to participating in PT session. Pt reports not feeling well during OT session. No charges posted today.

## 2019-05-30 NOTE — PROGRESS NOTES
Occupational Therapy Daily Treatment Note     Name: Torito Harris  [facility] Number: [id number]  Physician: Ian Feliciano MD  Medical Diagnosis: L CVA    Therapy Diagnosis:   Encounter Diagnoses   Name Primary?    Impaired mobility and activities of daily living     Weakness of right upper extremity      Visit Date: 5/30/2019  Physician Orders: Eval and Treat   Surgical Procedure and Date: NA  Evaluation Date: 1/28/19   Insurance Authorization Period Expiration: 12/31/19   Plan of Care Certification Period: 6/14/19   Date of Return to MD: unknown at this time    `  Visit # / Visits authorized: 28 ( 8/10 new [facility])   Time In: 1300   Time Out: 1330    Total Billable Time: 30 minutes      Precautions: Standard, fall risk, hearing impaired     Subjective     Pt reports: patient reported he was not feeling well 30 min into treatment session and stated he wanted to go home.    Patient reports compliance with HEP.   Response to previous treatment:positive   Functional change: progressing     Pain: none reported   Location: NA     Objective       Torito participated in therapeutic exercises to improve ROM and strength for 15 minutes:   - UBE level 4 x 4 min forwards x 4 min reverse   - passive prolonged stretch R shoulder flexion   - passive prolonged stretch R shoulder abduction   - passive prolonged stretch R shoulder ER        Patient participated in neuromuscular reeducation activities for 15 minutes to maximize ROM, motor strength and neuromuscular and proprioceptive input to the RUE:  Sitting EOM:   - restoration of R hand arches and metacarpal stretches   - general R wrist stretches: 1. P-A glide of scaphoid on radius, 2. Radial deviation, 3. Increasing mobility of metacarpals, 4. Carpal roll, 5. Movement of forearm on hand towards wrist extension, 6. Movement of forearm on the hand toward supination/ pronation, 7. wrist ext with distraction   - body on arm weight shifts for heavy weight bearing into RUE  -  application of GG splint to R hand       Functional mobility:   - sit <> stand with SBA  - gait with quad cane and close SBA       Home Exercises and Education Provided     Education provided re:   - HEP   - OT recommends driving evaluation   - Progress towards goals / POC       Written Home Exercises Provided: no new, continue HEP    Patient instructed to cont prior HEP as verbally instructed in previous tx sessions. Exercises were reviewed and Torito was able to demonstrate them prior to the end of the session.  Torito demonstrated good  understanding of the HEP provided.       Pt has no cultural, educational or language barriers to learning provided.    Assessment   Torito Harris demonstrated poor tolerance to passive R shoulder abduction and was limited to 90* 2/2 subjective pain.  Additionally, he was very guarded with R shoulder ER and refused to participate in this stretch.  He complained of R hand pain with GG splint and therefore splint was removed.  No visible adverse reactions or increased swelling in R and noted with removal of splint.  Patient then reported he did not feel well and requested to end treatment session.  He was not agreeable to participate in other intervention activities when offered.  Treatment session ended 15 min early per pt request.  Patient did not stay to participate in following PT session.      Torito would continue to benefit from skilled occupational therapy. Torito is progressing fairly towards his goals and there are no updates to goals at this time. Pt prognosis is Good as he remains motivated to participate and has good support from his spouse. Pt will continue to benefit from skilled outpatient occupational therapy to address the deficits listed in the problem list on initial evaluation provide pt/family education and to maximize pt's level of independence in the home and community environment.      Anticipated barriers to occupational therapy: none     Pt's spiritual,  cultural and educational needs considered and pt agreeable to plan of care and goals.    Goals:  Short Term Goals: 3 weeks   ROM/Strength to perform the ADL's and functional activities listed below, - in progress  Pt will improve functional  strength needed for tasks to 20# in R hand. - in progress   Pt will improve AROM for shoulder flexion needed for functional tasks to 60* in R UE. - MET 5/14/19  UE dressing will improve to Min A - in progress   LB dressing will improve to Mod A  - in progress   Toileting will improve to Min A incorporating R hand for task  - in progress   Pt will be Independent with HEP to improve ROM and FM skills. - ongoing      Long Term Goals: 6 weeks   ROM/Strength to perform the ADL's and functional activities listed below - in progress   Pt will improve functional  strength needed for tasks to 30# in R hand. - in progress   Pt will improve AROM for shoulder flexion needed for functional tasks to 80* in R UE. - MET 4/14/19  UE dressing will improve to Supervision   LB dressing will improve to Min A   Toileting will improve to Supervision incorporating R hand for task   Pt will complete clinical testing for skills needed for driving  G code self care CK - in progress     Plan   Continue POC in pursuit of OT goals -  through 6/14/19.     Discussed Plan of Care with patient: Yes  Updates/Grading for next session: continue POC; advance as tolerated

## 2019-05-30 NOTE — PROGRESS NOTES
PT/PTA met face to face to discuss pt's treatment plan and progress towards established goals.  Continue with current PT POC with focus on weight acceptance, endurance and strengthening.  Patient will be seen by physical therapist at least every sixth treatment or 30 days, whichever occurs first.    Lucy Vanegas, PTA  05/30/2019

## 2019-05-31 ENCOUNTER — DOCUMENTATION ONLY (OUTPATIENT)
Dept: REHABILITATION | Facility: HOSPITAL | Age: 59
End: 2019-05-31

## 2019-05-31 NOTE — PROGRESS NOTES
Face to face meeting completed with Hanh Brooke PT regarding current status and progress of   Torito Harris . Weight acceptance and hip knee strengthening Alvarado Escobedo, PTA

## 2019-06-03 NOTE — PROGRESS NOTES
"Outpatient Neurological Rehabilitation   Speech and Language Therapy Daily Note  Date:  6/4/2019     Name: Torito Harris   MRN: [id number]   Therapy Diagnosis:   Encounter Diagnosis   Name Primary?    Broca's aphasia    Physician: Ian Feliciano MD  Physician Orders: ST evaluate and treat  Medical Diagnosis: I69.30 (ICD-10-CM) - Late effect of stroke    Visit #/Visits authorized: 9/ 10 (20 prior to this auth) KX   Date of Evaluation:  1/28/19  Insurance Authorization Period: 3/7/2019 to 12/31/19   Plan of Care Expiration Date:  1/28/2019 to 3/25/19; 3/28/19 to 5/24/19; 5/23/19-7/19/19  Extended POC: n/a   Cancelled Visits:  0   No Show Visits:  6     Time In:  ***   Time Out:  ***   Total Billable Time: *** minutes    Precautions: Standard and Fall  Subjective:   Pt reports:  That he had a hard time in yesterdays session. Reported "I can't" repeatedly. Discussed that pt perseveration on "I can't" can prevent him from understanding the cue that the SLP or family is trying to provide. Discussed replacing "I can't" with "need help". Discussed how to cue this new phrase in home environment to redirect to accomplishing a task. Patient and his wife verbalized understanding to all.  Pain Scale:  0/10 on VAS currently.   Pain Location: n/a  Objective:   UNTIMED  Procedure Min.   Speech- Language- Voice Therapy    40 minutes   Total Untimed Units: 1  Charges Billed/# of units: 1    Short Term Goals: (4 weeks) Current Progress:   1. Pt will name common objects with 90% Carmen.  Progressing/ Not Met 6/4/2019   80% acc given a the object, 100% acc given phrase completion.   2. Pt will generate a response to basic sentence completions with 80% Carmen   Progressing/ Not Met 6/4/2019   60% acc indly, 93% acc given a repetition and verbal cues.    3.  pt will complete reading comprehension tasks at the sentence level with 90% acc indly.  Progressing/ Not Met 6/4/2019   Pt matched an object to a short phrase (LARK) in a Fx3 with " "91% acc ind'ly.   METx1   4. pt will follow 3 unit body part commands with 90% acc indly.   Progressing/ Not Met 6/4/2019  60% acc indly, 100% acc given a model    5.  pt will follow 3 unit commands with visual stimuli with 90% acc indly to improve auditory comprehension.   Progressing/ Not Met 6/4/2019   30% acc indly, 70% acc given a repetition   6. Pt will complete word finding task (i.e. Creating subject, verb, object pairs in VNEST protocol) with 90% acc min A to improve word fluency. Not formally addressed        Patient Education/Response:   Education on replacing "I cant"" with a request for help to improve functional communication rather than abandoning the task . Pt and his wife indicated understanding.     Assessment:   Torito is progressing well towards his goals. Increased accuracy across all targeted skills today once patient was redirected from "I can't" to requests for functional assistance. Pt prognosis is Good. Pt will continue to benefit from skilled outpatient speech and language therapy to address the deficits listed in the problem list on initial evaluation, provide pt/family education and to maximize pt's level of independence in the home and community environment.     Barriers to Therapy: sleeping pattern, possible transportation  Pt's spiritual, cultural and educational needs considered and pt agreeable to plan of care and goals.    Plan:   Continue POC with focus on written cues as support.     SERINA Mott, CCC-SLP  Speech Language Pathologist    6/4/2019  "

## 2019-06-04 ENCOUNTER — CLINICAL SUPPORT (OUTPATIENT)
Dept: REHABILITATION | Facility: HOSPITAL | Age: 59
End: 2019-06-04
Attending: PSYCHIATRY & NEUROLOGY
Payer: MEDICARE

## 2019-06-04 DIAGNOSIS — R47.01 BROCA'S APHASIA: ICD-10-CM

## 2019-06-04 DIAGNOSIS — R29.898 WEAKNESS OF RIGHT UPPER EXTREMITY: ICD-10-CM

## 2019-06-04 DIAGNOSIS — Z74.09 IMPAIRED FUNCTIONAL MOBILITY, BALANCE, GAIT, AND ENDURANCE: ICD-10-CM

## 2019-06-04 DIAGNOSIS — Z74.09 IMPAIRED MOBILITY AND ACTIVITIES OF DAILY LIVING: ICD-10-CM

## 2019-06-04 DIAGNOSIS — Z78.9 IMPAIRED MOBILITY AND ACTIVITIES OF DAILY LIVING: ICD-10-CM

## 2019-06-04 DIAGNOSIS — R29.898 WEAKNESS OF RIGHT LEG: ICD-10-CM

## 2019-06-04 PROCEDURE — 97110 THERAPEUTIC EXERCISES: CPT | Mod: KX,PO,59

## 2019-06-04 PROCEDURE — 97110 THERAPEUTIC EXERCISES: CPT | Mod: KX,PO

## 2019-06-04 PROCEDURE — 97116 GAIT TRAINING THERAPY: CPT | Mod: KX,PO,59

## 2019-06-04 PROCEDURE — 97112 NEUROMUSCULAR REEDUCATION: CPT | Mod: KX,PO,59

## 2019-06-04 PROCEDURE — 92507 TX SP LANG VOICE COMM INDIV: CPT | Mod: PO

## 2019-06-04 NOTE — PROGRESS NOTES
"Outpatient Neurological Rehabilitation   Speech and Language Therapy Daily Note  Date:  6/4/2019     Name: Torito Harris   MRN: [id number]   Therapy Diagnosis:   Encounter Diagnosis   Name Primary?   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 He shook his head "yes" when reminded that "I can't" needs to be replaced with "I need help" during tasks.   Pain Scale:  0/10 on VAS currently.   Pain Location: n/a  Objective:   UNTIMED  Procedure Min.   Speech- Language- Voice Therapy    45 minutes   Total Untimed Units: 1  Charges Billed/# of units: 1    Short Term Goals: (4 weeks) Current Progress:   1. Pt will name common objects with 90% Carmen.  Progressing/ Not Met 6/4/2019   20% acc given a the object, 100% acc given phrase completion.   2. Pt will generate a response to basic sentence completions with 80% Carmen   Progressing/ Not Met 6/4/2019   80% acc indly, 93% acc given a repetition and verbal cues.    3.  pt will complete reading comprehension tasks at the sentence level with 90% acc indly.  Progressing/ Not Met 6/4/2019   Pt matched an object to a short phrase (LARK) in a Fx3 with 60% acc ind'ly.   METx1   4. pt will follow 3 unit body part commands with 90% acc indly.   Progressing/ Not Met 6/4/2019  50% acc indly, 80% acc given a model    5.  pt will follow 3 unit commands with visual stimuli with " 90% acc indly to improve auditory comprehension.   Progressing/ Not Met 6/4/2019   Not formally addressed      6. Pt will complete word finding task (i.e. Creating subject, verb, object pairs in VNEST protocol) with 90% acc min A to improve word fluency. Not formally addressed        Patient Education/Response:   Education on using gestures and description when attempting to communicate information in conversation. Pt and his wife indicated understanding.     Assessment:   Torito is progressing well towards his goals. Variance in accuracy with tasks noted; reduced accuracy with object naming, but greater accuracy with sentence completions. Pt became frustrated when trying to tell a story about a family friend today; when he used gestures and decription as cued, his wife was able to understand what he was attempting to communicate and assist him in telling the story.  Pt prognosis is Good. Pt will continue to benefit from skilled outpatient speech and language therapy to address the deficits listed in the problem list on initial evaluation, provide pt/family education and to maximize pt's level of independence in the home and community environment.     Barriers to Therapy: sleeping pattern, possible transportation  Pt's spiritual, cultural and educational needs considered and pt agreeable to plan of care and goals.    Plan:   Continue POC with focus on written cues as support as well as further education on the importance of nonverbal communication to asssist verbal communication.    Farrah Atkinson M.S. CCC-SLP  Speech Language Pathologist   6/4/2019

## 2019-06-04 NOTE — PROGRESS NOTES
"  Physical Therapy Daily Treatment Note     Name: Torito Harris  [facility] Number: [id number]    Therapy Diagnosis:   Encounter Diagnoses   Name Primary?    Weakness of right leg     Impaired functional mobility, balance, gait, and endurance      Physician: Ian Feliciano MD    Visit Date: 6/4/2019    Physician Orders: PT Eval and Treat   Medical Diagnosis from Referral: Late effect of stroke  Evaluation Date: 1/28/2019  Authorization Period Expiration: 02/12/19-12/31/19  Plan of Care Expiration: 05/14/19 to 07/09/19  Visit # / Visits authorized: 9/10 (Vist #24 of 2019) - KX    Time In: 1515  Time Out: 1600  Total Billable Time: 45 minutes    Precautions: Standard, Diabetes, Fall and right hemiplegia, hearing aid, safety awareness, cognitive deficits, speech deficits    Subjective     Pt reports: "I'm doing pretty good."   HEP Compliance: pt reports he is doing all of his exercises at home.  Response to previous treatment: no complaints  Functional change: improved ability to side step and to step backward    Pain: 0/10  Location: N/A      Objective     Torito received therapeutic exercises to develop strength, endurance, ROM, flexibility and core stabilization for 35 minutes including:   X 10 min on Sci Fit recumbent stepper.  B UE/B LE on level 4.0   3 x 10 reps of R LE SAQ on large bolster with #2.5lb cuff weights, 3 sec hold  2 x 10 reps of R SLR, added #1lb cuff weights, VC's to keep knee extended.         Patient participated in gait training activities to normalize gait pattern for 10 minutes. The following activities were included:   Gait belt used for safety. Assistive device:standard bilateral platform rolling walker  Pt ambulated ~ 300ft with STEPHANI rolling walker and CGA.  VC's to walk with heel strike.        Patient participated in neuromuscular re-education activities to improve: Balance, Coordination, Kinesthetic, Sense, Proprioception and Posture for 0 minutes. The following activities were " included:           Home Exercises Provided and Patient Education Provided     Education provided:  Education provided to patient and his wife  -proper technique to assist with balance, gait, and RW management with Brittney platform rolling walker and standard bilateral platform rolling walker  -proper hand positioning when holding gait belt  -PT instructed both patient and his wife that if they decide to purchase either the bilateral standard RW platform attachments or the Brittney platform rolling walker, then PT recommends that patient's wife is present to provide necessary assistance (that she was just educated on) at ALL TIMES when patient is using the platform rolling walker to prevent fall. When patient is ambulating unsupervised at home, he is to continue to use his SBQC. Pt and his wife verbalized good understanding of these instructions to PT.     Written Home Exercises Provided: Continue current HEP.   See EMR under Media for exercises provided 2/5/2019.    Assessment   Mr. Ugarte tolerated tx session well and did not have any problems.  Pt was able to increase weights for R SAQ over bolster and progressed to #1.5 lb cuff weights to SLR.  Pt with increased gait distance noted.  Pt willing to try stationary bike and exercises on the pilates chair next tx session.  Cont with plan of care.      Torito is progressing well towards his goals.   Pt prognosis is Good.     Pt will continue to benefit from skilled outpatient physical therapy to address the deficits listed in the problem list box on initial evaluation, provide pt/family education and to maximize pt's level of independence in the home and community environment.     Pt's spiritual, cultural and educational needs considered and pt agreeable to plan of care and goals.     Anticipated Barriers for therapy: hearing deficit, speech deficits, cognitive deficits, decreased safety awareness, fall risk, visual neglect, transportation assistance required    Goals:  Short  Term Goals: 4 weeks   1. Pt to be (I) with established HEP MET 03/19/19  2. Pt to improve R hip flexion strength MMT score to at least 4+/5 for improved gait mechanics Ongoing  3. Pt to improve R hip extension strength MMT score to at least 4/5 for improved gait mechanics Ongoing  4. Pt to improve R hip AB and R hip AD strength MMT scores to at least 3+/5 for improved gait mechanics MET 03/04/19  5.  Pt to improve R knee flexion and extension strength MMT scores to at least 4+/5 for improved gait mechanics Ongoing  6. Pt to improve R ankle PF strength MMT score to at least 2/5 for improved gait mechanics MET 03/04/19  7. Pt to improve chair rise score to at least 5 times with no more than 1 UE support and S for improved endurance and safety with functional transfers MET 03/04/19  8. Pt to improve TUG score to at least 30 seconds for improved safety with household mobility Progressing  9. Pt to improve SSWS score to at least 0.40 m/sec for improved safety with community mobility. MET 05/14/19     Long Term Goals: 8 weeks   1. Pt to be (I) with advanced HEP Ongoing  2. Pt to improve R hip extension strength MMT score to at least 4+/5 for improved gait mechanics Ongoing  3. Pt to improve R hip AB and R hip AD strength MMT scores to at least 4-/5 for improved gait mechanics Ongoing  4. Pt to improve R ankle PF strength MMT score to at least 2+/5 for improved gait mechanics MET 03/19/19  5. Pt to improve chair rise score to at least 5 times with no UE support and S for improved endurance and safety with functional transfers Ongoing  6. Pt to improve TUG score to at least 26 seconds for improved safety with household mobility Ongoing  7. Pt to improve SSWS score to at least 0.50 m/sec for improved safety with community mobility. Ongoing     Plan     Plan for next session:  Stationary bike, pilates chair resistance training.     Lucy Vanegas, PTA

## 2019-06-04 NOTE — PROGRESS NOTES
Occupational Therapy Daily Treatment Note     Name: Torito Harris  [facility] Number: [id number]  Physician: Ian Feliciano MD  Medical Diagnosis: L CVA    Therapy Diagnosis:   Encounter Diagnoses   Name Primary?    Impaired mobility and activities of daily living     Weakness of right upper extremity      Visit Date: 6/4/2019  Physician Orders: Eval and Treat   Surgical Procedure and Date: NA  Evaluation Date: 1/28/19   Insurance Authorization Period Expiration: 12/31/19   Plan of Care Certification Period: 6/14/19   Date of Return to MD: unknown at this time      Visit # / Visits authorized: 29 ( 9/10 new auth)   Time In: 1430   Time Out: 1515    Total Billable Time: 45 minutes      Precautions: Standard, fall risk, hearing impaired     Subjective     Pt reports: he was sore after last session from passive shoulder stretches   Patient reports compliance with HEP.   Response to previous treatment:positive   Functional change: progressing     Pain: during passive shoulder abd and ER - not rated   Location: R shoulder     Objective       Torito participated in therapeutic exercises to improve ROM and strength for 25 minutes:   - UBE level 4 x 4 min forwards x 4 min reverse   - supine passive prolonged stretch R shoulder flexion   - supine passive prolonged stretch R shoulder abduction   - supine passive prolonged stretch R shoulder ER        Patient participated in neuromuscular reeducation activities for 20 minutes to maximize ROM, motor strength and neuromuscular and proprioceptive input to the RUE:  Sitting EOM:   - restoration of R hand arches and metacarpal stretches   - general R wrist stretches: 1. P-A glide of scaphoid on radius, 2. Radial deviation, 3. Increasing mobility of metacarpals, 4. Carpal roll, 5. Movement of forearm on hand towards wrist extension, 6. Movement of forearm on the hand toward supination/ pronation, 7. wrist ext with distraction   - body on arm weight shifts for heavy weight  bearing into RUE  - application of GG splint to R hand   - right upper extremity weight bearing on a dynamic surface       Functional mobility:   - sit > supine with SBA  - supine > sit with Min A for trunk management   - sit <> stand with SBA  - gait with quad cane and close SBA       Home Exercises and Education Provided     Education provided: importance of stretching for joint mobility, soft tissue elasticity, and prevention of orthopedic issues such as frozen shoulder - patient and wife education   - HEP   - OT recommends driving evaluation   - Progress towards goals / POC       Written Home Exercises Provided: no new, continue HEP - reinforced passive stretching with caregiver    Patient instructed to cont prior HEP as verbally instructed in previous tx sessions. Exercises were reviewed and Torito was able to demonstrate them prior to the end of the session.  Torito demonstrated good  understanding of the HEP provided.       Pt has no cultural, educational or language barriers to learning provided.    Assessment   Torito Harris tolerated treatment session fairly.  Passive shoulder stretches performed /attemtped again today to improve R shoulder joint mobility and soft tissue extensibility.  He again demonstrated poor tolerance to shoulder abd and ER and would stop OT from performing these passive stretches.  OT educated patient and his wife on importance of passive stretches and reinforced daily compliance with PROM HEP.  Despite education, he was not agreeable to participate in passive stretches and requested to perform other intervention activities.  He tolerated shoulder abd to 90* in weight bearing without any problems or complaints.  OT will continue to advance as tolerated.       Torito would continue to benefit from skilled occupational therapy. Torito is progressing fairly towards his goals and there are no updates to goals at this time. Pt prognosis is Good as he remains motivated to participate  and has good support from his spouse. Pt will continue to benefit from skilled outpatient occupational therapy to address the deficits listed in the problem list on initial evaluation provide pt/family education and to maximize pt's level of independence in the home and community environment.      Anticipated barriers to occupational therapy: none     Pt's spiritual, cultural and educational needs considered and pt agreeable to plan of care and goals.    Goals:  Short Term Goals: 3 weeks   ROM/Strength to perform the ADL's and functional activities listed below, - in progress  Pt will improve functional  strength needed for tasks to 20# in R hand. - in progress   Pt will improve AROM for shoulder flexion needed for functional tasks to 60* in R UE. - MET 5/14/19  UE dressing will improve to Min A - in progress   LB dressing will improve to Mod A  - in progress   Toileting will improve to Min A incorporating R hand for task  - in progress   Pt will be Independent with HEP to improve ROM and FM skills. - ongoing      Long Term Goals: 6 weeks   ROM/Strength to perform the ADL's and functional activities listed below - in progress   Pt will improve functional  strength needed for tasks to 30# in R hand. - in progress   Pt will improve AROM for shoulder flexion needed for functional tasks to 80* in R UE. - MET 4/14/19  UE dressing will improve to Supervision   LB dressing will improve to Min A   Toileting will improve to Supervision incorporating R hand for task   Pt will complete clinical testing for skills needed for driving  G code self care CK - in progress     Plan   Continue POC in pursuit of OT goals -  through 6/14/19.     Discussed Plan of Care with patient: Yes  Updates/Grading for next session: ROM

## 2019-06-05 ENCOUNTER — DOCUMENTATION ONLY (OUTPATIENT)
Dept: REHABILITATION | Facility: HOSPITAL | Age: 59
End: 2019-06-05

## 2019-06-06 ENCOUNTER — DOCUMENTATION ONLY (OUTPATIENT)
Dept: REHABILITATION | Facility: HOSPITAL | Age: 59
End: 2019-06-06

## 2019-06-06 DIAGNOSIS — R47.01 BROCA'S APHASIA: ICD-10-CM

## 2019-06-06 NOTE — PROGRESS NOTES
No Show Note/Documentation    Patient: Torito Harris  Date of Session: 6/6/2019  Diagnosis:   Encounter Diagnosis   Name Primary?    Broca's aphasia       MRN: [id number]    Torito Harris cancelled  his  scheduled therapy appointment today. This is the 5th appointment that he has not attended. Next appointment is scheduled for 6/11/19 and will follow up with patient at that time. No charges have been posted today.     SERINA Paz, CCC-SLP   6/6/2019

## 2019-06-06 NOTE — PROGRESS NOTES
PT/PTA met face to face to discuss pt's treatment plan and progress towards established goals. Continue with current PT POC, including: hip/knee strengthening. Pt will be seen by physical therapist at least every 6th treatment day or every 30 days, whichever occurs first.      Jeff Solis, PTA  5/31/19

## 2019-06-06 NOTE — PROGRESS NOTES
Documentation Only/ No Show      Patient: Torito Harris  Date of Session: 6/6/2019  Diagnosis: No diagnosis found.  MRN: [id number]  Torito Harris did not attend his/her scheduled therapy appointment today. Torito did not call to cancel nor reschedule.  Next appointment is scheduled for 6/11/19 will follow up with patient at that time. No charges have been posted today.     Lucy Vanegas, PTA  06/06/2019

## 2019-06-11 ENCOUNTER — CLINICAL SUPPORT (OUTPATIENT)
Dept: REHABILITATION | Facility: HOSPITAL | Age: 59
End: 2019-06-11
Attending: PSYCHIATRY & NEUROLOGY
Payer: MEDICARE

## 2019-06-11 DIAGNOSIS — R29.898 WEAKNESS OF RIGHT LEG: ICD-10-CM

## 2019-06-11 DIAGNOSIS — R47.01 BROCA'S APHASIA: ICD-10-CM

## 2019-06-11 DIAGNOSIS — Z74.09 IMPAIRED FUNCTIONAL MOBILITY, BALANCE, GAIT, AND ENDURANCE: ICD-10-CM

## 2019-06-11 DIAGNOSIS — Z78.9 IMPAIRED MOBILITY AND ACTIVITIES OF DAILY LIVING: ICD-10-CM

## 2019-06-11 DIAGNOSIS — Z74.09 IMPAIRED MOBILITY AND ACTIVITIES OF DAILY LIVING: ICD-10-CM

## 2019-06-11 DIAGNOSIS — R29.898 WEAKNESS OF RIGHT UPPER EXTREMITY: ICD-10-CM

## 2019-06-11 PROCEDURE — 97110 THERAPEUTIC EXERCISES: CPT | Mod: KX,PO,59

## 2019-06-11 PROCEDURE — 97110 THERAPEUTIC EXERCISES: CPT | Mod: PO

## 2019-06-11 PROCEDURE — 92507 TX SP LANG VOICE COMM INDIV: CPT | Mod: KX,PO

## 2019-06-11 PROCEDURE — 97116 GAIT TRAINING THERAPY: CPT | Mod: KX,PO,59

## 2019-06-11 NOTE — PROGRESS NOTES
"  Physical Therapy Daily Treatment Note     Name: Torito Harris  [facility] Number: [id number]    Therapy Diagnosis:   Encounter Diagnoses   Name Primary?    Weakness of right leg     Impaired functional mobility, balance, gait, and endurance      Physician: Ian Feliciano MD    Visit Date: 6/11/2019    Physician Orders: PT Eval and Treat   Medical Diagnosis from Referral: Late effect of stroke  Evaluation Date: 1/28/2019  Authorization Period Expiration: 06/06/19-12/31/19  Plan of Care Expiration: 05/14/19 to 07/09/19  Visit # / Visits authorized: 1/20 (Vist #25 of 2019) - KX    Time In: 13:45  Time Out: 14:30  Total Billable Time: 45 minutes    Precautions: Standard, Diabetes, Fall and right hemiplegia, hearing aid, safety awareness, cognitive deficits, speech deficits    Subjective     Pt reports: "Good."  HEP Compliance: pt reports he is doing all of his exercises at home.  Response to previous treatment: no complaints  Functional change: improved ability to side step and to step backward    Pain: 0/10  Location: N/A      Objective     Torito received therapeutic exercises to develop strength, endurance, ROM, flexibility and core stabilization for 30 minutes including:     Lower Extremity Strength (performed with patient in sitting)  Right LE  Evaluation 03/19/19 05/14/19 06/11/19 Left LE  Evaluation 03/19/19 05/14/19 06/11/19   Hip Flexion: 4/5 4-/5 4-/5 4/5 Hip Flexion: 5/5 5/5 5/5 5/5   Hip Extension:  4-/5 4-/5 4-/5 4/5 Hip Extension: 5/5 5/5 5/5 5/5   Hip Abduction: 3/5 4/5 4/5 4+/5 Hip Abduction: 5/5 5/5 5/5 5/5   Hip Adduction: 3/5 4/5 4+/5 4+/5 Hip Adduction 5/5 5/5 5/5 5/5   Knee Extension: 4/5 4/5 4+/5 4+/5 Knee Extension: 5/5 5/5 5/5 5/5   Knee Flexion: 4/5 4-/5 4-/5 4-/5 Knee Flexion: 5/5 5/5 5/5 5/5   Ankle Dorsiflexion: 0/5 3+/5 3+/5 3+/5 Ankle Dorsiflexion: 5/5 5/5 5/5 5/5   Ankle Plantarflexion: 2-/5 3/5 3+/5 3+/5 Ankle Plantarflexion: 5/5 5/5 5/5 5/5        Evaluation 3/4/2019 03/19/19 " 05/14/19 06/11/19   30 second Chair Rise  (adults > 61 y/o) 11 completed with LUE, abigail walker, CGA 8 completed with LUE, no AD, SBA 9 completed with LUE, no AD, CBA at R knee, improved RLE use 10 completed with LUE, no AD, CGA at R knee, improved RLE use 15 completed with LUE, no AD, CGA at R knee, improved RLE use     X 8 min on Sci Fit recumbent stepper, BLE only for BLE strength and endurance,  L2    3 x 10 seated RLE hamstring curls with BTB    X 10 sit <> stand transfers from elevated mat of 21 in height with no UE support, CGA at R knee     Patient participated in gait training activities to normalize gait pattern for 15 minutes. The following activities were included:   Gait belt used for safety. Assistive device: SBQC, little platform rolling walker        Evaluation 03/19/19 05/14/19 06/11/19   Timed Up and Go 34 sec c/ abigail walker c/ SBA 31 sec c/ SBQC c/ CGA to SBA 27 sec c/ SBQC and CGA 29 sec c/ SBQC and SBA   Self Selected Walking Speed 0.33 m/sec (6m/18s)  C/ abigail walker c/ SBA 0.35 m/sec (6m/17s)  c/ SBQC c/ SBA 0.40 m/sec (6m/15s)  c/ SBQC c/ CGA 0.46 m/sec (6m/13s)  c/ SBQC c/ CGA      X 300 feet ambulation in closed hallway with LITTLE platform RW, CGA with occ Min A with turning 180 degrees. Pt demonstrates more weight bearing on LLE compared to RLE. Increased R knee flexion in stance, increased RLE adduction in stance, increased RLE foot flat contact. Min A to navigate walker when stepping backward and side stepping.       Home Exercises Provided and Patient Education Provided     Education provided:  Education provided to patient and his wife  -proper technique to assist with balance, gait, and RW management with Little platform rolling walker and standard bilateral platform rolling walker  -proper hand positioning when holding gait belt  -PT instructed both patient and his wife that if they decide to purchase either the bilateral standard RW platform attachments or the Little platform rolling walker,  then PT recommends that patient's wife is present to provide necessary assistance (that she was just educated on) at ALL TIMES when patient is using the platform rolling walker to prevent fall. When patient is ambulating unsupervised at home, he is to continue to use his SBQC. Pt and his wife verbalized good understanding of these instructions to PT.     Written Home Exercises Provided: Continue current HEP.   See EMR under Media for exercises provided 2/5/2019.    Assessment   Mr. Ugarte tolerated reassessment well this afternoon. Reassessment period 05/14/19 to 06/11/19. Pt continues to make good and steady progress with therapy. He demonstrates improved R hip flexion, R hip extension and R hip abduction MMT scores compared to prior assessment. Right knee and R ankle MMT strength scores remain consistent with prior scores.  SSWS score improved by 2 seconds compared to prior assessment; however, his current score continues to place him in a limited community ambulator category. His TUG score increased by 2 points compared to last assessment, but he required less assist from PT throughout turning portions of trial. Current TUG score does still place patient at an elevated fall risk for household and community ambulation. He also improved his 30 second chair rise score by 5 reps compared to prior trial; however, he still relies on LUE support to perform this activity. Therefore, PT focused on sit <> stand transfer training from elevated mat without use of UE. During ambulation and functional transfers, patient continues to rely heavily on LUE and LLE to perform majority of weight bearing. Continue POC to continue to address remaining mobility deficits.     Torito is progressing well towards his goals.   Pt prognosis is Good.     Pt will continue to benefit from skilled outpatient physical therapy to address the deficits listed in the problem list box on initial evaluation, provide pt/family education and to maximize  pt's level of independence in the home and community environment.     Pt's spiritual, cultural and educational needs considered and pt agreeable to plan of care and goals.     Anticipated Barriers for therapy: hearing deficit, speech deficits, cognitive deficits, decreased safety awareness, fall risk, visual neglect, transportation assistance required    Goals:  Short Term Goals: 4 weeks   1. Pt to be (I) with established HEP MET 03/19/19  2. Pt to improve R hip flexion strength MMT score to at least 4+/5 for improved gait mechanics Progressing  3. Pt to improve R hip extension strength MMT score to at least 4/5 for improved gait mechanics MET 06/11/19  4. Pt to improve R hip AB and R hip AD strength MMT scores to at least 3+/5 for improved gait mechanics MET 03/04/19  5.  Pt to improve R knee flexion and extension strength MMT scores to at least 4+/5 for improved gait mechanics Ongoing  6. Pt to improve R ankle PF strength MMT score to at least 2/5 for improved gait mechanics MET 03/04/19  7. Pt to improve chair rise score to at least 5 times with no more than 1 UE support and S for improved endurance and safety with functional transfers MET 03/04/19  8. Pt to improve TUG score to at least 30 seconds for improved safety with household mobility MET 06/11/19  9. Pt to improve SSWS score to at least 0.40 m/sec for improved safety with community mobility. MET 05/14/19     Long Term Goals: 8 weeks   1. Pt to be (I) with advanced HEP Ongoing  2. Pt to improve R hip extension strength MMT score to at least 4+/5 for improved gait mechanics Ongoing  3. Pt to improve R hip AB and R hip AD strength MMT scores to at least 4-/5 for improved gait mechanics MET 06/11/19  4. Pt to improve R ankle PF strength MMT score to at least 2+/5 for improved gait mechanics MET 03/19/19  5. Pt to improve chair rise score to at least 5 times with no UE support and S for improved endurance and safety with functional transfers Ongoing  6. Pt to  improve TUG score to at least 26 seconds for improved safety with household mobility Ongoing  7. Pt to improve SSWS score to at least 0.50 m/sec for improved safety with community mobility. Progressing     Plan     Continue to address RLE strength deficits with emphasis on hamstring and R ankle strengthening. Focus on sit <> stand transfers without UE support. Focus on safety with ambulation.     Hanh Brooke, PT

## 2019-06-11 NOTE — PROGRESS NOTES
Occupational Therapy Progress Note     Name: Torito Harris  [facility] Number: [id number]  Physician: Ian Feliciano MD  Medical Diagnosis: L CVA    Therapy Diagnosis:   Encounter Diagnoses   Name Primary?    Impaired mobility and activities of daily living     Weakness of right upper extremity      Visit Date: 6/11/2019  Physician Orders: Eval and Treat   Surgical Procedure and Date: NA  Evaluation Date: 1/28/19   Insurance Authorization Period Expiration: 12/31/19   Plan of Care Certification Period: 90 days (for 12 treatment sessions)   Date of Return to MD: unknown at this time      Visit # / Visits authorized: 30 ( 10/10 new auth)   Time In: 1430   Time Out: 1515    Total Billable Time: 45 minutes      Precautions: Standard, fall risk, hearing impaired     Subjective     Pt reports: he purchased PVC pipe and has been using it for shoulder exercises.    Patient reports compliance with HEP.   Response to previous treatment:positive   Functional change: progressing     Pain: with passive shoulder abd - not rated   Location: R shoulder     Objective     PROGRESS AS FOLLOWS:       Range of Motion:      Joint Evaluation  AROM  1/28/2019 AROM  1/28/2019 PROM   1/28/2019 AROM  3/4/19 PROM  3/4/19 AROM  4/23/19 PROM   4/28/19 AROM  5/14/19 PROM  5/14/19 AROM   6/11/19 PROM   6/11/19     Left Right Right Right  Right  Right  Right  Right  Right  Right Right    Shoulder flex 0-180 WNL throughout   40 140 40 (NC) 165 (+25) 50 (+10) 165 (NC) 90 (+40) 170 (+5) 123 (+33) 175 (+5)   Shoulder Abd 0-180   25 120 35 (+10) 125 (+5)  40 (+5) 130 (+5) 95 (+55) 130 (NC) 110 (+15) 175 (+45)   Shoulder ER 0-90   50 90 50 (NC) 90 (NC) 20 (-30) 90 (NC) 40 (+20) 90 (NC) 60 (+20) 90 (NC)   Shoulder IR 0-90   30 80 40 (+10) 80 (NC) 30 (-10) 85 (+5) 40 (+10)  80 (-5) 40 (NC) 70 (-10)   Shoulder Extension 0-80   50 80 50 (NC)  70 (-10)  50 (NC) 75 (+5) 60 (+10) 75 (NC) 50 (-10)  75 (NC)   Elbow flex/ext 0-150   10-90 0-140  (-40  /+45)  0-140 (NC)   (+10 / -5) 0-140 (NC)  (NC/+10) 0-150 (NC/+10)  (+15/+5) 0-150 (NC)   Wrist flexion    NT NT 50 90  70 (+20) 90 (NC) 65 (-5) 90 (NC) 45 (-20) 90 (NC)   Wrist extension    NT NT 35 75 30 (-5) 75 (NC) 45 (+15) 80 (+5) 65 (+20)  85 (+5)    Supination    NT NT 90 NT 85 (-5) NT 85 (NC)  NT  80 (-5) NT   Pronation    NT NT 80 NT 85 (+5) NT 90 (+5) NT 85 (-5) NT   UD   NT NT Trace 25 25 (+25) 35 (+10) 20 (-5) 30 (-5) 20 (NC) NT   RD   NT NT Trace  20 15 (+15) 20 (NC) 20 (+5)  30 (+10)  30 (NC)  NT    Comments: (+/-) as compared to prior assessment      Fist: Pt able to make loose fist - nearly full fist at this time.        Strength: (CLAUDE Dynamometer in lbs.) Average 3 trials, Position II:      #1  1/28/19 1/28/19 3/4/19 4/23/19  5/14/19 6/11/19     Left Right Right  Right  Right  Right    Rung II 86# 15# 10# (-5#) 10# (NC) 17# (7#) 10# (-7)          Fine Motor Coordination: 9 Hole Peg Test  Left 1/28/2019 Right   1/28/2019 Right   3/4/19  Right 4/23/19 Right 5/14/19     Unable to perform unable to perform   unable Unable       Gross motor coordination:    · MORRIS (Rapid Alternating Movements): Normal on L. Delayed on R at wrist for supination/pronation   · Finger to Nose (5 times): imppaired motor control for smooth out and in movement - performs through decreased ROM   · Finger Flicks (coordination moving from digit flexion to digit extension): unable to flick digits; slow composite flexion and extension; flexion is loose and extension is not full          Torito participated in therapeutic exercises to improve ROM and strength for 20 minutes:   - UBE level 4 x 4 min forwards x 4 min reverse   - towel slides for R shoulder AAROM; Mod A; performed standing at table top        Functional mobility:   - sit <> stand with SBA  - gait with quad cane and close SBA       Home Exercises and Education Provided     Education provided: continue HEP   - OT recommends driving evaluation   -  Progress towards goals / POC       Written Home Exercises Provided: no new, continue HEP    Patient instructed to cont prior HEP as verbally instructed in previous tx sessions. Exercises were reviewed and Torito was able to demonstrate them prior to the end of the session.  Torito demonstrated good  understanding of the HEP provided.       Pt has no cultural, educational or language barriers to learning provided.    Assessment   Torito Harris was reassessed in today's treatment session.  Improvements most noted in R shoulder active flexion, abduction, and ER this date.  He also demonstrated an improved tolerance to passive shoulder abd to nearly WNL this date.  He reports consistent compliance with his HEP to maximize progress outside of therapy.  He remains motivated to continue to participate in skilled occupational therapy and improve residual functional and physical deficits.  He continues with limitations including decreased ROM, decreased mm strength, impaired motor control, impaired motor coordination, and decreased functional use of the LUE.  These deficits affect his performance and participation in desired occupations including self care, IADLs, community/social participation, and leisure pursuits.       Patient would benefit from continued skilled occupational therapy.  OT recommends extending his POC 2 times weekly for 12 treatment sessions.  Torito is progressing fairly towards his goals and there are no updates to goals at this time.  Pt prognosis is Good as he remains motivated to participate and has good support from his spouse. Pt will continue to benefit from skilled outpatient occupational therapy to address the deficits listed in the problem list on initial evaluation provide pt/family education and to maximize pt's level of independence in the home and community environment.      Anticipated barriers to occupational therapy: none     Pt's spiritual, cultural and educational needs considered  and pt agreeable to plan of care and goals.    Goals:  Short Term Goals: 3 weeks   ROM/Strength to perform the ADL's and functional activities listed below, - in progress  Pt will improve functional  strength needed for tasks to 20# in R hand. - in progress   Pt will improve AROM for shoulder flexion needed for functional tasks to 60* in R UE. - MET 5/14/19  UE dressing will improve to Min A - in progress   LB dressing will improve to Mod A  - in progress   Toileting will improve to Min A incorporating R hand for task  - in progress   Pt will be Independent with HEP to improve ROM and FM skills. - MET 6/11/19      Long Term Goals: 6 weeks   ROM/Strength to perform the ADL's and functional activities listed below - in progress   Pt will improve functional  strength needed for tasks to 30# in R hand. - in progress   Pt will improve AROM for shoulder flexion needed for functional tasks to 80* in R UE. - MET 4/14/19  UE dressing will improve to Supervision   LB dressing will improve to Min A   Toileting will improve to Supervision incorporating R hand for task   Pt will complete clinical testing for skills needed for driving - not met; patient reports he is driving and has received MD clearance   G code self care CK - in progress     Plan   Continue POC in pursuit of OT goals.  Treatment certification period: 90 days.     Discussed Plan of Care with patient: Yes  Updates/Grading for next session: AROM

## 2019-06-11 NOTE — PROGRESS NOTES
Outpatient Neurological Rehabilitation   Speech and Language Therapy Daily Note  Date:  6/11/2019     Name: Torito Harris   MRN: [id number]   Therapy Diagnosis:   Encounter Diagnosis   Name Primary?    Broca's aphasia    Physician: Ian Feliciano MD  Physician Orders: ST evaluate and treat  Medical Diagnosis: I69.30 (ICD-10-CM) - Late effect of stroke    Visit #/Visits authorized: 9/ 10 (20 prior to this auth) KX   Date of Evaluation:  1/28/19  Insurance Authorization Period: 3/7/2019 to 12/31/19   Plan of Care Expiration Date:  1/28/2019 to 3/25/19; 3/28/19 to 5/24/19; 5/23/19-7/19/19  Extended POC: n/a   Cancelled Visits:  0   No Show Visits:  7    Time In:  1305   Time Out:  1342   Total Billable Time: 37 minutes    Precautions: Standard and Fall  Subjective:   Pt reports:  That they missed last week because of the weather last week .   Pain Scale:  0/10 on VAS currently.   Pain Location: n/a  Objective:   UNTIMED  Procedure Min.   Speech- Language- Voice Therapy    37 minutes   Total Untimed Units: 1  Charges Billed/# of units: 1    Short Term Goals: (4 weeks) Current Progress:   1. Pt will name common objects with 90% Carmen.  Progressing/ Not Met 6/11/2019   53% acc idnly, 100% acc given a semantic cue.   2. Pt will generate a response to basic sentence completions with 80% Carmen   Progressing/ Not Met 6/11/2019   50% acc indly, 80% acc given a cue and a repetition.    3.  pt will complete reading comprehension tasks at the sentence level with 90% acc indly.  Progressing/ Not Met 6/11/2019   Pt matched an object to a short phrase ([employer]) in a Fx3 with 90% acc ind'ly.   METx1  Pt matched a picture to a sentence ([employer]) in a field of 3 with 90% acc indly, 100% acc given min A.    4. pt will follow 3 unit body part commands with 90% acc indly.   Progressing/ Not Met 6/11/2019  63% acc indly, 75% acc given moderate verbal cues   5.  pt will follow 3 unit commands with visual stimuli with 90%  acc indly to improve auditory comprehension.   Progressing/ Not Met 6/11/2019   Not formally addressed      6. Pt will complete word finding task (i.e. Creating subject, verb, object pairs in VNEST protocol) with 90% acc min A to improve word fluency. Not formally addressed        Patient Education/Response:   Discussed scheduling more visits through end of POC and participating in re-assessment before scheduling more visits. Pt's wife reports patient is labeling more items at home, asking more questions, and attempting participate in conversation since beginning ST. Pt and his wife indicated understanding.     Assessment:   Torito is progressing well towards his goals. Varied accuracy compared to previous three sessions. Increased accuracy on labeling tasks and reading comprehension tasks.  Pt prognosis is Good. Pt will continue to benefit from skilled outpatient speech and language therapy to address the deficits listed in the problem list on initial evaluation, provide pt/family education and to maximize pt's level of independence in the home and community environment.     Barriers to Therapy: sleeping pattern, possible transportation  Pt's spiritual, cultural and educational needs considered and pt agreeable to plan of care and goals.    Plan:   Continue POC with focus on written cues as support as well as further education on the importance of nonverbal communication to asssist verbal communication.    SERINA Mott, CCC-SLP  Speech Language Pathologist   6/11/2019

## 2019-06-12 NOTE — PLAN OF CARE
Occupational Therapy Progress Note     Name: Torito Harris  [facility] Number: [id number]  Physician: Ian Feliciano MD  Medical Diagnosis: L CVA    Therapy Diagnosis:   Encounter Diagnoses   Name Primary?    Impaired mobility and activities of daily living     Weakness of right upper extremity      Visit Date: 6/11/2019  Physician Orders: Eval and Treat   Surgical Procedure and Date: NA  Evaluation Date: 1/28/19   Insurance Authorization Period Expiration: 12/31/19   Plan of Care Certification Period: 90 days (for 12 treatment sessions)   Date of Return to MD: unknown at this time      Visit # / Visits authorized: 30 ( 10/10 new auth)   Time In: 1430   Time Out: 1515    Total Billable Time: 45 minutes      Precautions: Standard, fall risk, hearing impaired     Subjective     Pt reports: he purchased PVC pipe and has been using it for shoulder exercises.    Patient reports compliance with HEP.   Response to previous treatment:positive   Functional change: progressing     Pain: with passive shoulder abd - not rated   Location: R shoulder     Objective     PROGRESS AS FOLLOWS:       Range of Motion:      Joint Evaluation  AROM  1/28/2019 AROM  1/28/2019 PROM   1/28/2019 AROM  3/4/19 PROM  3/4/19 AROM  4/23/19 PROM   4/28/19 AROM  5/14/19 PROM  5/14/19 AROM   6/11/19 PROM   6/11/19     Left Right Right Right  Right  Right  Right  Right  Right  Right Right    Shoulder flex 0-180 WNL throughout   40 140 40 (NC) 165 (+25) 50 (+10) 165 (NC) 90 (+40) 170 (+5) 123 (+33) 175 (+5)   Shoulder Abd 0-180   25 120 35 (+10) 125 (+5)  40 (+5) 130 (+5) 95 (+55) 130 (NC) 110 (+15) 175 (+45)   Shoulder ER 0-90   50 90 50 (NC) 90 (NC) 20 (-30) 90 (NC) 40 (+20) 90 (NC) 60 (+20) 90 (NC)   Shoulder IR 0-90   30 80 40 (+10) 80 (NC) 30 (-10) 85 (+5) 40 (+10)  80 (-5) 40 (NC) 70 (-10)   Shoulder Extension 0-80   50 80 50 (NC)  70 (-10)  50 (NC) 75 (+5) 60 (+10) 75 (NC) 50 (-10)  75 (NC)   Elbow flex/ext 0-150   10-90 0-140  (-40  /+45)  0-140 (NC)   (+10 / -5) 0-140 (NC)  (NC/+10) 0-150 (NC/+10)  (+15/+5) 0-150 (NC)   Wrist flexion    NT NT 50 90  70 (+20) 90 (NC) 65 (-5) 90 (NC) 45 (-20) 90 (NC)   Wrist extension    NT NT 35 75 30 (-5) 75 (NC) 45 (+15) 80 (+5) 65 (+20)  85 (+5)    Supination    NT NT 90 NT 85 (-5) NT 85 (NC)  NT  80 (-5) NT   Pronation    NT NT 80 NT 85 (+5) NT 90 (+5) NT 85 (-5) NT   UD   NT NT Trace 25 25 (+25) 35 (+10) 20 (-5) 30 (-5) 20 (NC) NT   RD   NT NT Trace  20 15 (+15) 20 (NC) 20 (+5)  30 (+10)  30 (NC)  NT    Comments: (+/-) as compared to prior assessment      Fist: Pt able to make loose fist - nearly full fist at this time.        Strength: (CLAUDE Dynamometer in lbs.) Average 3 trials, Position II:      #1  1/28/19 1/28/19 3/4/19 4/23/19  5/14/19 6/11/19     Left Right Right  Right  Right  Right    Rung II 86# 15# 10# (-5#) 10# (NC) 17# (7#) 10# (-7)          Fine Motor Coordination: 9 Hole Peg Test  Left 1/28/2019 Right   1/28/2019 Right   3/4/19  Right 4/23/19 Right 5/14/19     Unable to perform unable to perform   unable Unable       Gross motor coordination:    · MORRIS (Rapid Alternating Movements): Normal on L. Delayed on R at wrist for supination/pronation   · Finger to Nose (5 times): imppaired motor control for smooth out and in movement - performs through decreased ROM   · Finger Flicks (coordination moving from digit flexion to digit extension): unable to flick digits; slow composite flexion and extension; flexion is loose and extension is not full          Torito participated in therapeutic exercises to improve ROM and strength for 20 minutes:   - UBE level 4 x 4 min forwards x 4 min reverse   - towel slides for R shoulder AAROM; Mod A; performed standing at table top        Functional mobility:   - sit <> stand with SBA  - gait with quad cane and close SBA       Home Exercises and Education Provided     Education provided: continue HEP   - OT recommends driving evaluation   -  Progress towards goals / POC       Written Home Exercises Provided: no new, continue HEP    Patient instructed to cont prior HEP as verbally instructed in previous tx sessions. Exercises were reviewed and Torito was able to demonstrate them prior to the end of the session.  Torito demonstrated good  understanding of the HEP provided.       Pt has no cultural, educational or language barriers to learning provided.    Assessment   Torito Harris was reassessed in today's treatment session.  Improvements most noted in R shoulder active flexion, abduction, and ER this date.  He also demonstrated an improved tolerance to passive shoulder abd to nearly WNL this date.  He reports consistent compliance with his HEP to maximize progress outside of therapy.  He remains motivated to continue to participate in skilled occupational therapy and improve residual functional and physical deficits.  He continues with limitations including decreased ROM, decreased mm strength, impaired motor control, impaired motor coordination, and decreased functional use of the LUE.  These deficits affect his performance and participation in desired occupations including self care, IADLs, community/social participation, and leisure pursuits.       Patient would benefit from continued skilled occupational therapy.  OT recommends extending his POC 2 times weekly for 12 treatment sessions.  Torito is progressing fairly towards his goals and there are no updates to goals at this time.  Pt prognosis is Good as he remains motivated to participate and has good support from his spouse. Pt will continue to benefit from skilled outpatient occupational therapy to address the deficits listed in the problem list on initial evaluation provide pt/family education and to maximize pt's level of independence in the home and community environment.      Anticipated barriers to occupational therapy: none     Pt's spiritual, cultural and educational needs considered  and pt agreeable to plan of care and goals.    Goals:  Short Term Goals: 3 weeks   ROM/Strength to perform the ADL's and functional activities listed below, - in progress  Pt will improve functional  strength needed for tasks to 20# in R hand. - in progress   Pt will improve AROM for shoulder flexion needed for functional tasks to 60* in R UE. - MET 5/14/19  UE dressing will improve to Min A - in progress   LB dressing will improve to Mod A  - in progress   Toileting will improve to Min A incorporating R hand for task  - in progress   Pt will be Independent with HEP to improve ROM and FM skills. - MET 6/11/19      Long Term Goals: 6 weeks   ROM/Strength to perform the ADL's and functional activities listed below - in progress   Pt will improve functional  strength needed for tasks to 30# in R hand. - in progress   Pt will improve AROM for shoulder flexion needed for functional tasks to 80* in R UE. - MET 4/14/19  UE dressing will improve to Supervision   LB dressing will improve to Min A   Toileting will improve to Supervision incorporating R hand for task   Pt will complete clinical testing for skills needed for driving - not met; patient reports he is driving and has received MD clearance   G code self care CK - in progress     Plan   Continue POC in pursuit of OT goals.  Treatment certification period: 90 days.     Discussed Plan of Care with patient: Yes  Updates/Grading for next session: AROM

## 2019-06-13 ENCOUNTER — CLINICAL SUPPORT (OUTPATIENT)
Dept: REHABILITATION | Facility: HOSPITAL | Age: 59
End: 2019-06-13
Attending: PSYCHIATRY & NEUROLOGY
Payer: MEDICARE

## 2019-06-13 DIAGNOSIS — Z74.09 IMPAIRED FUNCTIONAL MOBILITY, BALANCE, GAIT, AND ENDURANCE: ICD-10-CM

## 2019-06-13 DIAGNOSIS — R29.898 WEAKNESS OF RIGHT LEG: ICD-10-CM

## 2019-06-13 PROCEDURE — 97110 THERAPEUTIC EXERCISES: CPT | Mod: KX,PO

## 2019-06-13 PROCEDURE — 97116 GAIT TRAINING THERAPY: CPT | Mod: KX,PO

## 2019-06-13 NOTE — PROGRESS NOTES
"  Physical Therapy Daily Treatment Note     [id number]    Therapy Diagnosis:   Encounter Diagnoses   Name Primary?    Weakness of right leg     Impaired functional mobility, balance, gait, and endurance      Physician: Ian Feliciano MD    Visit Date: 6/13/2019    Physician Orders: PT Eval and Treat   Medical Diagnosis from Referral: Late effect of stroke  Evaluation Date: 1/28/2019  Authorization Period Expiration: 06/06/19-12/31/19  Plan of Care Expiration: 05/14/19 to 07/09/19  Visit # / Visits authorized: 2/20 (Vist #26 of 2019) - KX    Time In: 13:45  Time Out: 14:30  Total Billable Time: 45 minutes    Precautions: Standard, Diabetes, Fall and right hemiplegia, hearing aid, safety awareness, cognitive deficits, speech deficits    Subjective     Pt reports: "Good."  HEP Compliance: pt reports he is doing all of his exercises at home.  Response to previous treatment: no complaints  Functional change: improved ability to side step and to step backward    Pain: 0/10  Location: N/A      Objective     Torito received therapeutic exercises to develop strength, endurance, ROM, flexibility and core stabilization for 30 minutes including:     3 x 10 seated RLE hamstring curls with BTB    X 10 sit <> stand transfers from elevated mat of 20 in height with no UE support, CGA at R knee occ Min A at right hip    BLE leg press 2 x10 reps, 100#, TCs at RLE for positioning  RLE leg press 3 x 10, 80#, TCs at RLE for positioning     X 10 reps forward step up onto 4" step with RLE leading, CGA at R knee, LUE support  2 x 10 reps forward step up onto 6" step with RLE leading, CGA at R knee, LUE support    Patient participated in gait training activities to normalize gait pattern for 15 minutes. The following activities were included:   Gait belt used for safety. Assistive device: SBQC, little platform rolling walker     2 x 10 forward <> backward stepping over medium bolster with RLE leading, " LUE support, Min A at RLE for proper mechanics when stepping forward and backward over obstacle     X 300 feet ambulation in closed hallway with BRITTNEY platform RW, CGA with occ Min A with turning 180 degrees. Pt demonstrates more weight bearing on LLE compared to RLE. Increased R knee flexion in stance, increased RLE adduction in stance, increased RLE foot flat contact. Min A to CGA to navigate walker when stepping backward and side stepping.       Home Exercises Provided and Patient Education Provided     Education provided:  Education provided to patient and his wife  -proper technique to assist with balance, gait, and RW management with Brittney platform rolling walker and standard bilateral platform rolling walker  -proper hand positioning when holding gait belt  -PT instructed both patient and his wife that if they decide to purchase either the bilateral standard RW platform attachments or the Brittney platform rolling walker, then PT recommends that patient's wife is present to provide necessary assistance (that she was just educated on) at ALL TIMES when patient is using the platform rolling walker to prevent fall. When patient is ambulating unsupervised at home, he is to continue to use his SBQC. Pt and his wife verbalized good understanding of these instructions to PT.     Written Home Exercises Provided: Continue current HEP.   See EMR under Media for exercises provided 2/5/2019.    Assessment   Mr. Ugarte tolerated therapy session well this afternoon without complaint. Able to progress sit <> stand transfers from elevated mat with no UE support from 21 inch seat height to 20 in seat height; however, patient did need more assist from PT to steady hips during elevation phase. Remainder of session focused on improved weight bearing and weight acceptance throughout RLE during ambulation, stepping up onto a step, and stepping over obstacle. Pt continues to require TCs for proper RLE mechanics throughout various standing  activities. Continue POC to further progress remaining mobility deficits.     Torito is progressing well towards his goals.   Pt prognosis is Good.     Pt will continue to benefit from skilled outpatient physical therapy to address the deficits listed in the problem list box on initial evaluation, provide pt/family education and to maximize pt's level of independence in the home and community environment.     Pt's spiritual, cultural and educational needs considered and pt agreeable to plan of care and goals.     Anticipated Barriers for therapy: hearing deficit, speech deficits, cognitive deficits, decreased safety awareness, fall risk, visual neglect, transportation assistance required    Goals:  Short Term Goals: 4 weeks   1. Pt to be (I) with established HEP MET 03/19/19  2. Pt to improve R hip flexion strength MMT score to at least 4+/5 for improved gait mechanics Progressing  3. Pt to improve R hip extension strength MMT score to at least 4/5 for improved gait mechanics MET 06/11/19  4. Pt to improve R hip AB and R hip AD strength MMT scores to at least 3+/5 for improved gait mechanics MET 03/04/19  5.  Pt to improve R knee flexion and extension strength MMT scores to at least 4+/5 for improved gait mechanics Ongoing  6. Pt to improve R ankle PF strength MMT score to at least 2/5 for improved gait mechanics MET 03/04/19  7. Pt to improve chair rise score to at least 5 times with no more than 1 UE support and S for improved endurance and safety with functional transfers MET 03/04/19  8. Pt to improve TUG score to at least 30 seconds for improved safety with household mobility MET 06/11/19  9. Pt to improve SSWS score to at least 0.40 m/sec for improved safety with community mobility. MET 05/14/19     Long Term Goals: 8 weeks   1. Pt to be (I) with advanced HEP Ongoing  2. Pt to improve R hip extension strength MMT score to at least 4+/5 for improved gait mechanics Ongoing  3. Pt to improve R hip AB and R hip AD  strength MMT scores to at least 4-/5 for improved gait mechanics MET 06/11/19  4. Pt to improve R ankle PF strength MMT score to at least 2+/5 for improved gait mechanics MET 03/19/19  5. Pt to improve chair rise score to at least 5 times with no UE support and S for improved endurance and safety with functional transfers Ongoing  6. Pt to improve TUG score to at least 26 seconds for improved safety with household mobility Ongoing  7. Pt to improve SSWS score to at least 0.50 m/sec for improved safety with community mobility. Progressing     Plan     Continue to address RLE strength deficits with emphasis on hamstring and R ankle strengthening. Focus on sit <> stand transfers without UE support. Focus on safety with ambulation.     Hanh Brooke, PT

## 2019-06-18 ENCOUNTER — CLINICAL SUPPORT (OUTPATIENT)
Dept: REHABILITATION | Facility: HOSPITAL | Age: 59
End: 2019-06-18
Attending: PSYCHIATRY & NEUROLOGY
Payer: MEDICARE

## 2019-06-18 DIAGNOSIS — Z74.09 IMPAIRED FUNCTIONAL MOBILITY, BALANCE, GAIT, AND ENDURANCE: ICD-10-CM

## 2019-06-18 DIAGNOSIS — R29.898 WEAKNESS OF RIGHT LEG: ICD-10-CM

## 2019-06-18 DIAGNOSIS — R47.01 BROCA'S APHASIA: ICD-10-CM

## 2019-06-18 PROCEDURE — 92507 TX SP LANG VOICE COMM INDIV: CPT | Mod: KX,PO

## 2019-06-18 PROCEDURE — 97110 THERAPEUTIC EXERCISES: CPT | Mod: 59,PO

## 2019-06-18 PROCEDURE — 97116 GAIT TRAINING THERAPY: CPT | Mod: PO,59

## 2019-06-18 NOTE — PROGRESS NOTES
"Outpatient Neurological Rehabilitation   Speech and Language Therapy Daily Note  Date:  6/18/2019     Name: Torito Harris   MRN: [id number]   Therapy Diagnosis:   Encounter Diagnosis   Name Primary?    Broca's aphasia    Physician: Ian Feliciano MD  Physician Orders: ST evaluate and treat  Medical Diagnosis: I69.30 (ICD-10-CM) - Late effect of stroke    Visit #/Visits authorized: 10/ 10 (20 prior to this auth) KX   Date of Evaluation:  1/28/19  Insurance Authorization Period: 3/7/2019 to 12/31/19   Plan of Care Expiration Date:  1/28/2019 to 3/25/19; 3/28/19 to 5/24/19; 5/23/19-7/19/19  Extended POC: n/a   Cancelled Visits:  0   No Show Visits:  7    Time In:  1300   Time Out:  1340   Total Billable Time: 40 minutes    Precautions: Standard and Fall  Subjective:   Pt reports:  That he is having a bad day today. Markedly less spontaneous speech today. Wife reports "he gets like this sometimes".   Pain Scale:  0/10 on VAS currently.   Pain Location: n/a  Objective:   UNTIMED  Procedure Min.   Speech- Language- Voice Therapy    40 minutes   Total Untimed Units: 1  Charges Billed/# of units: 1    Short Term Goals: (4 weeks) Current Progress:   1. Pt will name common objects with 90% Carmen.  Progressing/ Not Met 6/18/2019   Pt named objects in pictures with 30% acc idnly, 50% acc given a semantic cue.   2. Pt will generate a response to basic sentence completions with 80% Carmne   Progressing/ Not Met 6/18/2019   60% acc indly, 90% acc given a phonemic cue and a repetition.    3.  pt will complete reading comprehension tasks at the sentence level with 90% acc indly.  Progressing/ Not Met 6/18/2019   Pt read a command and followed it with 0% acc indly, 100% acc given a model.    4. pt will follow 3 unit body part commands with 90% acc indly.   Progressing/ Not Met 6/18/2019  40% acc indly, 60% acc given moderate verbal cues, 80% acc given a model.    5.  pt will follow 3 unit commands with visual stimuli with 90% acc " indly to improve auditory comprehension.   Progressing/ Not Met 6/18/2019   Not formally addressed      6. Pt will complete word finding task (i.e. Creating subject, verb, object pairs in VNEST protocol) with 90% acc min A to improve word fluency. Not formally addressed        Patient Education/Response:   Pt progress reviewed. Pt and his wife indicated understanding.     Assessment:   Torito is progressing well towards his goals. Decreased verbal output today intermittently aided with sentence completion and phonemic cues. Pt required repetition of questions in conversation today.   Pt prognosis is Good. Pt will continue to benefit from skilled outpatient speech and language therapy to address the deficits listed in the problem list on initial evaluation, provide pt/family education and to maximize pt's level of independence in the home and community environment.     Barriers to Therapy: sleeping pattern, possible transportation  Pt's spiritual, cultural and educational needs considered and pt agreeable to plan of care and goals.    Plan:   Continue POC with focus on written cues as support as well as further education on the importance of nonverbal communication to asssist verbal communication.    SERINA Mott, CCC-SLP  Speech Language Pathologist   6/18/2019

## 2019-06-18 NOTE — PROGRESS NOTES
"  Physical Therapy Daily Treatment Note     [id number]    Therapy Diagnosis:   Encounter Diagnoses   Name Primary?    Weakness of right leg     Impaired functional mobility, balance, gait, and endurance      Physician: Ian Feliciano MD    Visit Date: 6/18/2019    Physician Orders: PT Eval and Treat   Medical Diagnosis from Referral: Late effect of stroke  Evaluation Date: 1/28/2019  Authorization Period Expiration: 06/06/19-12/31/19  Plan of Care Expiration: 05/14/19 to 07/09/19  Visit # / Visits authorized: 3/20 (Vist #27 of 2019) - KX    Time In: 13:45  Time Out: 14:30  Total Billable Time: 45 minutes    Precautions: Standard, Diabetes, Fall and right hemiplegia, hearing aid, safety awareness, cognitive deficits, speech deficits    Subjective     Pt reports: "Good."  HEP Compliance: pt reports he is doing all of his exercises at home.  Response to previous treatment: no complaints  Functional change: improved ability to side step and to step backward    Pain: 0/10  Location: N/A      Objective     Torito received therapeutic exercises to develop strength, endurance, ROM, flexibility and core stabilization for 35 minutes including:     3 x 10 seated RLE hamstring curls with BTB    X 10 sit <> stand transfers from elevated mat of 20 in height with no UE support, CGA at R knee     BLE leg press 2 x10 reps, 120#, TCs at RLE for positioning  RLE leg press 3 x 10, 80#, TCs at RLE for positioning     X 10 reps each LE, forward foot placement on top of 8" step with emphasis on proper weight shift and improved posture throughout, BUE support, CGA to Min A from PT at R knee and at pelvis.   X 10 reps, each LE forward foot placement on top of 8" step + weight shift onto foot on top of step, with emphasis on proper weight shift and improved posture throughout, BUE support, CGA to Min A from PT at R knee and at pelvis.     Patient participated in gait training activities to normalize " gait pattern for 10 minutes. The following activities were included:   Gait belt used for safety. Assistive device: SBQC, little platform rolling walker        X 300 feet ambulation in closed hallway with LITTLE platform RW, CGA with occ Min A with turning 180 degrees. Pt demonstrates more weight bearing on LLE compared to RLE. Increased R knee flexion in stance, increased RLE adduction in stance, increased RLE foot flat contact. Min A to CGA to navigate walker when stepping backward and side stepping.       Home Exercises Provided and Patient Education Provided     Education provided:  Education provided to patient and his wife  -proper technique to assist with balance, gait, and RW management with Little platform rolling walker and standard bilateral platform rolling walker  -proper hand positioning when holding gait belt  -PT instructed both patient and his wife that if they decide to purchase either the bilateral standard RW platform attachments or the Little platform rolling walker, then PT recommends that patient's wife is present to provide necessary assistance (that she was just educated on) at ALL TIMES when patient is using the platform rolling walker to prevent fall. When patient is ambulating unsupervised at home, he is to continue to use his SBQC. Pt and his wife verbalized good understanding of these instructions to PT.     Written Home Exercises Provided: Continue current HEP.   See EMR under Media for exercises provided 2/5/2019.    Assessment   Mr. Ugarte tolerated therapy session well this afternoon without complaint. Patient required less assist from PT during sit <> stand transfers from 20 in seat height today compared to prior trial. Remainder of session focused on improved weight bearing and weight acceptance throughout RLE during ambulation. However, patient continues to favor LLE for majority of weight bearing in standing and during ambulation.  Continue POC to further progress remaining mobility  deficits.     Torito is progressing well towards his goals.   Pt prognosis is Good.     Pt will continue to benefit from skilled outpatient physical therapy to address the deficits listed in the problem list box on initial evaluation, provide pt/family education and to maximize pt's level of independence in the home and community environment.     Pt's spiritual, cultural and educational needs considered and pt agreeable to plan of care and goals.     Anticipated Barriers for therapy: hearing deficit, speech deficits, cognitive deficits, decreased safety awareness, fall risk, visual neglect, transportation assistance required    Goals:  Short Term Goals: 4 weeks   1. Pt to be (I) with established HEP MET 03/19/19  2. Pt to improve R hip flexion strength MMT score to at least 4+/5 for improved gait mechanics Progressing  3. Pt to improve R hip extension strength MMT score to at least 4/5 for improved gait mechanics MET 06/11/19  4. Pt to improve R hip AB and R hip AD strength MMT scores to at least 3+/5 for improved gait mechanics MET 03/04/19  5.  Pt to improve R knee flexion and extension strength MMT scores to at least 4+/5 for improved gait mechanics Ongoing  6. Pt to improve R ankle PF strength MMT score to at least 2/5 for improved gait mechanics MET 03/04/19  7. Pt to improve chair rise score to at least 5 times with no more than 1 UE support and S for improved endurance and safety with functional transfers MET 03/04/19  8. Pt to improve TUG score to at least 30 seconds for improved safety with household mobility MET 06/11/19  9. Pt to improve SSWS score to at least 0.40 m/sec for improved safety with community mobility. MET 05/14/19     Long Term Goals: 8 weeks   1. Pt to be (I) with advanced HEP Ongoing  2. Pt to improve R hip extension strength MMT score to at least 4+/5 for improved gait mechanics Ongoing  3. Pt to improve R hip AB and R hip AD strength MMT scores to at least 4-/5 for improved gait  mechanics MET 06/11/19  4. Pt to improve R ankle PF strength MMT score to at least 2+/5 for improved gait mechanics MET 03/19/19  5. Pt to improve chair rise score to at least 5 times with no UE support and S for improved endurance and safety with functional transfers Ongoing  6. Pt to improve TUG score to at least 26 seconds for improved safety with household mobility Ongoing  7. Pt to improve SSWS score to at least 0.50 m/sec for improved safety with community mobility. Progressing     Plan     Continue to address RLE strength deficits with emphasis on hamstring and R ankle strengthening. Focus on sit <> stand transfers without UE support. Focus on safety with ambulation.     Hanh Brooke, PT

## 2019-06-20 ENCOUNTER — CLINICAL SUPPORT (OUTPATIENT)
Dept: REHABILITATION | Facility: HOSPITAL | Age: 59
End: 2019-06-20
Attending: PSYCHIATRY & NEUROLOGY
Payer: MEDICARE

## 2019-06-20 DIAGNOSIS — R29.898 WEAKNESS OF RIGHT LEG: ICD-10-CM

## 2019-06-20 DIAGNOSIS — Z74.09 IMPAIRED FUNCTIONAL MOBILITY, BALANCE, GAIT, AND ENDURANCE: ICD-10-CM

## 2019-06-20 DIAGNOSIS — R47.01 BROCA'S APHASIA: ICD-10-CM

## 2019-06-20 PROCEDURE — 92507 TX SP LANG VOICE COMM INDIV: CPT | Mod: KX,PO

## 2019-06-20 PROCEDURE — 97116 GAIT TRAINING THERAPY: CPT | Mod: KX,PO

## 2019-06-20 PROCEDURE — 97110 THERAPEUTIC EXERCISES: CPT | Mod: KX,59,PO

## 2019-06-20 NOTE — PROGRESS NOTES
"  Physical Therapy Daily Treatment Note     [id number]    Therapy Diagnosis:   Encounter Diagnoses   Name Primary?    Weakness of right leg     Impaired functional mobility, balance, gait, and endurance      Physician: Ian Feliciano MD    Visit Date: 6/20/2019    Physician Orders: PT Eval and Treat   Medical Diagnosis from Referral: Late effect of stroke  Evaluation Date: 1/28/2019  Authorization Period Expiration: 06/06/19-12/31/19  Plan of Care Expiration: 05/14/19 to 07/09/19  Visit # / Visits authorized: 4/20 (Vist #28 of 2019) - KX    Time In: 13:45  Time Out: 14:30  Total Billable Time: 45 minutes    Precautions: Standard, Diabetes, Fall and right hemiplegia, hearing aid, safety awareness, cognitive deficits, speech deficits    Subjective     Pt reports: "Good."  HEP Compliance: pt reports he is doing all of his exercises at home.  Response to previous treatment: no complaints  Functional change: improved ability to side step and to step backward    Pain: 0/10  Location: N/A      Objective     Torito received therapeutic exercises to develop strength, endurance, ROM, flexibility and core stabilization for 25 minutes including:     X 10 sit <> stand transfers from elevated mat of 19 in height with no UE support, CGA at R knee     X 10 min seated SCI Fit recumbent stepper with BLE only, L2 for CV endurance and BLE strength    3 x 10 RLE standing hamstring curls, BUE support, TCs for improved motor control    3 x 10 seated R ankle DF<>PF AAROM    Patient participated in gait training activities to normalize gait pattern for 20 minutes. The following activities were included:   Gait belt used for safety. Assistive device: SBQC, little platform rolling walker      X 300 feet ambulation in closed hallway with LITTLE platform RW, CGA with occ Min A with turning 180 degrees. Pt demonstrates more weight bearing on LLE compared to RLE. Increased R knee flexion in stance, increased " RLE adduction in stance, increased RLE foot flat contact. Min A to CGA to navigate walker when stepping backward and side stepping.     X 4 laps forward non-reciprocal stepping over 5 rows of small cones, BUE support, Min A from tech at RUE.     X 6 laps R<>L side stepping over 5 rows of small cones, BUE support, Min A from tech at RUE, CGA at back from PT    Home Exercises Provided and Patient Education Provided     Education provided:  Education provided to patient and his wife  -proper technique to assist with balance, gait, and RW management with Brittney platform rolling walker and standard bilateral platform rolling walker  -proper hand positioning when holding gait belt  -PT instructed both patient and his wife that if they decide to purchase either the bilateral standard RW platform attachments or the Brittney platform rolling walker, then PT recommends that patient's wife is present to provide necessary assistance (that she was just educated on) at ALL TIMES when patient is using the platform rolling walker to prevent fall. When patient is ambulating unsupervised at home, he is to continue to use his SBQC. Pt and his wife verbalized good understanding of these instructions to PT.     Written Home Exercises Provided: Continue current HEP.   See EMR under Media for exercises provided 2/5/2019.    Assessment   Mr. Ugarte tolerated therapy session well this afternoon without complaint. Able to progress sit <> stand transfers without UE support from 20 in seat height to 19 in seat height today.  Remainder of session continued to focus on improved RLE control in standing and with ambulation. Continue POC to further progress remaining mobility deficits.     Torito is progressing well towards his goals.   Pt prognosis is Good.     Pt will continue to benefit from skilled outpatient physical therapy to address the deficits listed in the problem list box on initial evaluation, provide pt/family education and to maximize pt's  level of independence in the home and community environment.     Pt's spiritual, cultural and educational needs considered and pt agreeable to plan of care and goals.     Anticipated Barriers for therapy: hearing deficit, speech deficits, cognitive deficits, decreased safety awareness, fall risk, visual neglect, transportation assistance required    Goals:  Short Term Goals: 4 weeks   1. Pt to be (I) with established HEP MET 03/19/19  2. Pt to improve R hip flexion strength MMT score to at least 4+/5 for improved gait mechanics Progressing  3. Pt to improve R hip extension strength MMT score to at least 4/5 for improved gait mechanics MET 06/11/19  4. Pt to improve R hip AB and R hip AD strength MMT scores to at least 3+/5 for improved gait mechanics MET 03/04/19  5.  Pt to improve R knee flexion and extension strength MMT scores to at least 4+/5 for improved gait mechanics Ongoing  6. Pt to improve R ankle PF strength MMT score to at least 2/5 for improved gait mechanics MET 03/04/19  7. Pt to improve chair rise score to at least 5 times with no more than 1 UE support and S for improved endurance and safety with functional transfers MET 03/04/19  8. Pt to improve TUG score to at least 30 seconds for improved safety with household mobility MET 06/11/19  9. Pt to improve SSWS score to at least 0.40 m/sec for improved safety with community mobility. MET 05/14/19     Long Term Goals: 8 weeks   1. Pt to be (I) with advanced HEP Ongoing  2. Pt to improve R hip extension strength MMT score to at least 4+/5 for improved gait mechanics Ongoing  3. Pt to improve R hip AB and R hip AD strength MMT scores to at least 4-/5 for improved gait mechanics MET 06/11/19  4. Pt to improve R ankle PF strength MMT score to at least 2+/5 for improved gait mechanics MET 03/19/19  5. Pt to improve chair rise score to at least 5 times with no UE support and S for improved endurance and safety with functional transfers Ongoing  6. Pt to  improve TUG score to at least 26 seconds for improved safety with household mobility Ongoing  7. Pt to improve SSWS score to at least 0.50 m/sec for improved safety with community mobility. Progressing     Plan     Continue to address RLE strength deficits with emphasis on hamstring and R ankle strengthening. Focus on sit <> stand transfers without UE support. Focus on safety with ambulation.     Hanh Brooke, PT

## 2019-06-20 NOTE — PROGRESS NOTES
"Outpatient Neurological Rehabilitation   Speech and Language Therapy Daily Note  Date:  6/20/2019     Name: Torito Harris   MRN: [id number]   Therapy Diagnosis:   Encounter Diagnosis   Name Primary?    Broca's aphasia    Physician: Ian Feliciano MD  Physician Orders: ST evaluate and treat  Medical Diagnosis: I69.30 (ICD-10-CM) - Late effect of stroke    Visit #/Visits authorized: 3 / 20 (29 prior to this auth) KX   Date of Evaluation:  1/28/19  Insurance Authorization Period: 6/11/19 to 12/31/19  Plan of Care Expiration Date:  1/28/2019 to 3/25/19; 3/28/19 to 5/24/19; 5/23/19-7/19/19  Extended POC: n/a   Cancelled Visits:  0   No Show Visits:  7    Time In:  1430   Time Out:  1510   Total Billable Time: 40 minutes    Precautions: Standard and Fall  Subjective:   Pt reports:  " alright ". Excited for son to come in town.   Pain Scale:  0/10 on VAS currently.   Pain Location: n/a  Objective:   UNTIMED  Procedure Min.   Speech- Language- Voice Therapy    40 minutes   Total Untimed Units: 1  Charges Billed/# of units: 1    Short Term Goals: (4 weeks) Current Progress:   1. Pt will name common objects with 90% Carmen.  Progressing/ Not Met 6/20/2019   Not formally addressed     2. Pt will generate a response to basic sentence completions with 80% Carmen   Progressing/ Not Met 6/20/2019   Attempted longer sentence completion with 0% acc.  Completed short sentences with 70% acc indly, 90% acc given a repetition and aphonemic cue   3.  pt will complete reading comprehension tasks at the sentence level with 90% acc indly.  Progressing/ Not Met 6/20/2019   Pt read a y/n question and pointed to yes/ no with 0% acc indly.   Pt selected a sentence from a group of four that described a picture with 50% acc indly   4. pt will follow 3 unit body part commands with 90% acc indly.   Progressing/ Not Met 6/20/2019  Not formally addressed     5.  pt will follow 3 unit commands with visual stimuli with 90% acc indly to improve " auditory comprehension.   Progressing/ Not Met 6/20/2019   40% acc indly 70% acc given a cue     6. Pt will complete word finding task (i.e. Creating subject, verb, object pairs in VNEST protocol) with 90% acc min A to improve word fluency. Not formally addressed        Patient Education/Response:   Progress and [employer] therapy myriam for home environment . Pt and his wife indicated understanding.     Assessment:   Torito is progressing well towards his goals. Increased spontaneous verbal output compared to previous session; however, comprehension deficits in reading comprehension persist.  Pt prognosis is Good. Pt will continue to benefit from skilled outpatient speech and language therapy to address the deficits listed in the problem list on initial evaluation, provide pt/family education and to maximize pt's level of independence in the home and community environment.     Barriers to Therapy: sleeping pattern, possible transportation  Pt's spiritual, cultural and educational needs considered and pt agreeable to plan of care and goals.    Plan:   Continue POC with focus on written cues, reading comprehension, and education on the importance of nonverbal communication to asssist verbal communication.    SERINA Mott, CCC-SLP  Speech Language Pathologist   6/20/2019

## 2019-06-25 ENCOUNTER — CLINICAL SUPPORT (OUTPATIENT)
Dept: REHABILITATION | Facility: HOSPITAL | Age: 59
End: 2019-06-25
Attending: PSYCHIATRY & NEUROLOGY
Payer: MEDICARE

## 2019-06-25 DIAGNOSIS — R29.898 WEAKNESS OF RIGHT LEG: ICD-10-CM

## 2019-06-25 DIAGNOSIS — Z74.09 IMPAIRED FUNCTIONAL MOBILITY, BALANCE, GAIT, AND ENDURANCE: ICD-10-CM

## 2019-06-25 DIAGNOSIS — R47.01 BROCA'S APHASIA: ICD-10-CM

## 2019-06-25 PROCEDURE — 92507 TX SP LANG VOICE COMM INDIV: CPT | Mod: KX,PO

## 2019-06-25 PROCEDURE — 97116 GAIT TRAINING THERAPY: CPT | Mod: KX,PO

## 2019-06-25 PROCEDURE — 97110 THERAPEUTIC EXERCISES: CPT | Mod: KX,PO

## 2019-06-25 NOTE — PROGRESS NOTES
"  Physical Therapy Daily Treatment Note     [id number]    Therapy Diagnosis:   Encounter Diagnoses   Name Primary?    Weakness of right leg     Impaired functional mobility, balance, gait, and endurance      Physician: Ian Feliciano MD    Visit Date: 6/25/2019    Physician Orders: PT Eval and Treat   Medical Diagnosis from Referral: Late effect of stroke  Evaluation Date: 1/28/2019  Authorization Period Expiration: 06/06/19-12/31/19  Plan of Care Expiration: 05/14/19 to 07/09/19  Visit # / Visits authorized: 5/20 (Vist #29 of 2019) - KX    Time In: 13:00  Time Out: 13:45  Total Billable Time: 45 minutes    Precautions: Standard, Diabetes, Fall and right hemiplegia, hearing aid, safety awareness, cognitive deficits, speech deficits    Subjective     Pt reports: "Good." Pt's wife reports that he received his RW platform attachments.    HEP Compliance: pt reports he is doing all of his exercises at home.  Response to previous treatment: no complaints  Functional change: improved ability to side step and to step backward    Pain: 0/10  Location: N/A      Objective     Torito received therapeutic exercises to develop strength, endurance, ROM, flexibility and core stabilization for 25 minutes including:     X 10 sit <> stand transfers from elevated mat of 19 in height with no UE support, CGA at R knee     X 10 min seated SCI Fit recumbent stepper with BLE only, L2 for CV endurance and BLE strength    3 x 10 RLE standing hamstring curls, BUE support, TCs for improved motor control    3 x 10 seated R ankle DF<>PF A/AROM    Patient participated in gait training activities to normalize gait pattern for 20 minutes. The following activities were included:   Gait belt used for safety. Assistive device: SBQC, little platform rolling walker      X 300 feet ambulation in closed hallway with LITTLE platform RW, CGA with occ Min A with turning 180 degrees. Pt demonstrates more weight bearing on " LLE compared to RLE. Increased R knee flexion in stance, increased RLE adduction in stance, increased RLE foot flat contact. CGA to navigate walker when stepping backward and side stepping.     X 6 laps forward reciprocal stepping over 5 rows of small cones, BUE support, Min A from tech at RUE.     X 6 laps R<>L side stepping over 5 rows of small cones, BUE support, Min A from tech at RUE, Min A from PT for improved RLE placements    Home Exercises Provided and Patient Education Provided     Education provided:  Education provided to patient and his wife  -proper technique to assist with balance, gait, and RW management with Brittney platform rolling walker and standard bilateral platform rolling walker  -proper hand positioning when holding gait belt  -PT instructed both patient and his wife that if they decide to purchase either the bilateral standard RW platform attachments or the Brittney platform rolling walker, then PT recommends that patient's wife is present to provide necessary assistance (that she was just educated on) at ALL TIMES when patient is using the platform rolling walker to prevent fall. When patient is ambulating unsupervised at home, he is to continue to use his SBQC. Pt and his wife verbalized good understanding of these instructions to PT.     Written Home Exercises Provided: Continue current HEP.   See EMR under Media for exercises provided 2/5/2019.    Assessment   Mr. Ugarte tolerated therapy session well this afternoon without complaint. Able to progress stepping in hallway from no-reciprocal stepping to reciprocal stepping. However, he continues to require TCs from PT for improved RLE placement. Patient able to negotiate BRITTNEY platform RW through turns and backward ambulation without assist from PT today. Mobility remains limited by RLE strength deficits, poor posture, and poor RLE motor control.  Continue POC to further progress remaining mobility deficits.     Torito is progressing well towards  his goals.   Pt prognosis is Good.     Pt will continue to benefit from skilled outpatient physical therapy to address the deficits listed in the problem list box on initial evaluation, provide pt/family education and to maximize pt's level of independence in the home and community environment.     Pt's spiritual, cultural and educational needs considered and pt agreeable to plan of care and goals.     Anticipated Barriers for therapy: hearing deficit, speech deficits, cognitive deficits, decreased safety awareness, fall risk, visual neglect, transportation assistance required    Goals:  Short Term Goals: 4 weeks   1. Pt to be (I) with established HEP MET 03/19/19  2. Pt to improve R hip flexion strength MMT score to at least 4+/5 for improved gait mechanics Progressing  3. Pt to improve R hip extension strength MMT score to at least 4/5 for improved gait mechanics MET 06/11/19  4. Pt to improve R hip AB and R hip AD strength MMT scores to at least 3+/5 for improved gait mechanics MET 03/04/19  5.  Pt to improve R knee flexion and extension strength MMT scores to at least 4+/5 for improved gait mechanics Ongoing  6. Pt to improve R ankle PF strength MMT score to at least 2/5 for improved gait mechanics MET 03/04/19  7. Pt to improve chair rise score to at least 5 times with no more than 1 UE support and S for improved endurance and safety with functional transfers MET 03/04/19  8. Pt to improve TUG score to at least 30 seconds for improved safety with household mobility MET 06/11/19  9. Pt to improve SSWS score to at least 0.40 m/sec for improved safety with community mobility. MET 05/14/19     Long Term Goals: 8 weeks   1. Pt to be (I) with advanced HEP Ongoing  2. Pt to improve R hip extension strength MMT score to at least 4+/5 for improved gait mechanics Ongoing  3. Pt to improve R hip AB and R hip AD strength MMT scores to at least 4-/5 for improved gait mechanics MET 06/11/19  4. Pt to improve R ankle PF  strength MMT score to at least 2+/5 for improved gait mechanics MET 03/19/19  5. Pt to improve chair rise score to at least 5 times with no UE support and S for improved endurance and safety with functional transfers Ongoing  6. Pt to improve TUG score to at least 26 seconds for improved safety with household mobility Ongoing  7. Pt to improve SSWS score to at least 0.50 m/sec for improved safety with community mobility. Progressing     Plan     Continue to address RLE strength deficits with emphasis on hamstring and R ankle strengthening. Focus on sit <> stand transfers without UE support. Focus on safety with ambulation.     Hanh Brooke, PT

## 2019-06-25 NOTE — PROGRESS NOTES
"Outpatient Neurological Rehabilitation   Speech and Language Therapy Daily Note  Date:  6/25/2019     Name: Torito Harris   MRN: [id number]   Therapy Diagnosis:   Encounter Diagnosis   Name Primary?    Broca's aphasia    Physician: Ian Feliciano MD  Physician Orders: ST evaluate and treat  Medical Diagnosis: I69.30 (ICD-10-CM) - Late effect of stroke    Visit #/Visits authorized: 4/ 20 (29 prior to this auth) KX   Date of Evaluation:  1/28/19  Insurance Authorization Period: 6/11/19 to 12/31/19  Plan of Care Expiration Date:  1/28/2019 to 3/25/19; 3/28/19 to 5/24/19; 5/23/19-7/19/19  Extended POC: n/a   Cancelled Visits:  0   No Show Visits:  7    Time In:  1345  Time Out:  1430   Total Billable Time: 45 minutes    Precautions: Standard and Fall  Subjective:   Pt reports: Pt tried to communicate tasks that were completed in his last speech therapy session but he was unsuccessful. He became frustrated with his wife when we talked about getting an iPad for the [employer] program. He did not understand why he did not have one already. They planned to purchase an iPad following therapy today.    Pain Scale:  0/10 on VAS currently.   Pain Location: n/a  Objective:   UNTIMED  Procedure Min.   Speech- Language- Voice Therapy    45   Total Untimed Units: 1  Charges Billed/# of units: 1    Short Term Goals: (4 weeks) Current Progress:   1. Pt will name common objects with 90% Carmen.  Progressing/ Not Met 6/25/2019   60% acc ind'ly 100% acc given min phonemic cues.      Pt attempted to answer questions in conversation but was unable to provide relevant information. His speech was mostly perseverative with few spontaneous utterances like " I don't know that one."    MET x 1    2. Pt will generate a response to basic sentence completions with 80% Carmen   Progressing/ Not Met 6/25/2019   Not formally addressed      3.  pt will complete reading comprehension tasks at the sentence level with 90% acc indly.  Progressing/ " Not Met 6/25/2019   Not formally addressed      4. pt will follow 3 unit body part commands with 90% acc indly.   Progressing/ Not Met 6/25/2019  Not formally addressed    5.  pt will follow 3 unit commands with visual stimuli with 90% acc indly to improve auditory comprehension.   Progressing/ Not Met 6/25/2019   Not formally addressed       6. Pt will complete word finding task (i.e. Creating subject, verb, object pairs in VNEST protocol) with 90% acc min A to improve word fluency. Not formally addressed        NOTE: Pt identified items in fo3 to 6 with 100% acc ind'ly on the [employer] [facility] Comprehension program. Pt also wrote his first name ind'ly and last name with visual cues on the [employer] [facility] white board.     Patient Education/Response:   Progress and [employer] therapy myriam for home environment. Pt and his wife indicated understanding.     Assessment:   Torito is progressing well towards his goals. Pt exhibits frustrations when he can not communicate his thought or when he feels his wife is not helping with a therapy task. Some spontaneous speech noted but still hesitant, labored speech with significant word finding. Increased accuracy writing his name and naming pictured objects.   Pt prognosis is Good. Pt will continue to benefit from skilled outpatient speech and language therapy to address the deficits listed in the problem list on initial evaluation, provide pt/family education and to maximize pt's level of independence in the home and community environment.     Barriers to Therapy: sleeping pattern, possible transportation  Pt's spiritual, cultural and educational needs considered and pt agreeable to plan of care and goals.    Plan:   Continue POC with focus on written cues, reading comprehension, and education on the importance of nonverbal communication to asssist verbal communication.    CATIE Tran., CCC-SLP, CBIS  Speech-Language Pathologist  6/25/2019

## 2019-06-27 ENCOUNTER — DOCUMENTATION ONLY (OUTPATIENT)
Dept: REHABILITATION | Facility: HOSPITAL | Age: 59
End: 2019-06-27

## 2019-07-01 ENCOUNTER — DOCUMENTATION ONLY (OUTPATIENT)
Dept: REHABILITATION | Facility: HOSPITAL | Age: 59
End: 2019-07-01

## 2019-07-01 NOTE — PROGRESS NOTES
PT/PTA met face to face to discuss pt's treatment plan and progress towards established goals.  Continue with current PT POC with focus on weight acceptance, balance, strengthening and endurance.  Patient will be seen by physical therapist at least every sixth treatment or 30 days, whichever occurs first.    Lucy Vanegas, PTA  07/01/2019

## 2019-07-02 ENCOUNTER — CLINICAL SUPPORT (OUTPATIENT)
Dept: REHABILITATION | Facility: HOSPITAL | Age: 59
End: 2019-07-02
Attending: PSYCHIATRY & NEUROLOGY
Payer: COMMERCIAL

## 2019-07-02 DIAGNOSIS — R29.898 WEAKNESS OF RIGHT LEG: ICD-10-CM

## 2019-07-02 DIAGNOSIS — Z74.09 IMPAIRED FUNCTIONAL MOBILITY, BALANCE, GAIT, AND ENDURANCE: ICD-10-CM

## 2019-07-02 DIAGNOSIS — Z78.9 IMPAIRED MOBILITY AND ACTIVITIES OF DAILY LIVING: ICD-10-CM

## 2019-07-02 DIAGNOSIS — R29.898 WEAKNESS OF RIGHT UPPER EXTREMITY: ICD-10-CM

## 2019-07-02 DIAGNOSIS — R47.01 BROCA'S APHASIA: ICD-10-CM

## 2019-07-02 DIAGNOSIS — Z74.09 IMPAIRED MOBILITY AND ACTIVITIES OF DAILY LIVING: ICD-10-CM

## 2019-07-02 PROCEDURE — 97112 NEUROMUSCULAR REEDUCATION: CPT | Mod: KX,PO

## 2019-07-02 PROCEDURE — 97110 THERAPEUTIC EXERCISES: CPT | Mod: KX,PO,59

## 2019-07-02 PROCEDURE — 97116 GAIT TRAINING THERAPY: CPT | Mod: KX,PO

## 2019-07-02 PROCEDURE — 92507 TX SP LANG VOICE COMM INDIV: CPT | Mod: KX,PO

## 2019-07-02 NOTE — PROGRESS NOTES
Outpatient Neurological Rehabilitation   Speech and Language Therapy Daily Note  Date:  7/2/2019     Name: Torito Harris   MRN: [id number]   Therapy Diagnosis:   Encounter Diagnosis   Name Primary?    Broca's aphasia    Physician: Ian Feliciano MD  Physician Orders: ST evaluate and treat  Medical Diagnosis: I69.30 (ICD-10-CM) - Late effect of stroke    Visit #/Visits authorized: 5/ 20 (29 prior to this auth) KX   Date of Evaluation:  1/28/19  Insurance Authorization Period: 6/11/19 to 12/31/19  Plan of Care Expiration Date:  1/28/2019 to 3/25/19; 3/28/19 to 5/24/19; 5/23/19-7/19/19  Extended POC: n/a   Cancelled Visits:  0   No Show Visits:  7    Time In:  1300   Time Out:  1344  Total Billable Time: 44 minutes    Precautions: Standard and Fall  Subjective:   Pt reports: via his wife and gesture that they purchased the [employer] [facility] myriam for home practice. Patient has been enjoying practice at home.   Pain Scale:  0/10 on VAS currently.   Pain Location: n/a  Objective:   UNTIMED  Procedure Min.   Speech- Language- Voice Therapy    44   Total Untimed Units: 1  Charges Billed/# of units: 1    Short Term Goals: (4 weeks) Current Progress:   1. Pt will name common objects with 90% Carmen.  Progressing/ Not Met 7/2/2019   40% acc indly, 80% acc given a phrase and phonemic cue.     2. Pt will generate a response to basic sentence completions with 80% Carmen   Progressing/ Not Met 7/2/2019   60% acc indly, 70% acc given a repetition and verbal cue    3.  pt will complete reading comprehension tasks at the sentence level with 90% acc indly.  Progressing/ Not Met 7/2/2019   Not formally addressed      4. pt will follow 3 unit body part commands with 90% acc indly.   Progressing/ Not Met 7/2/2019  0% acc indly, 100% acc given a repetition, a cue, and a model   5.  pt will follow 3 unit commands with visual stimuli with 90% acc indly to improve auditory comprehension.   Progressing/ Not Met 7/2/2019   Not formally addressed        6. Pt will complete word finding task (i.e. Creating subject, verb, object pairs in VNEST protocol) with 90% acc min A to improve word fluency. Not formally addressed      7. New Goal:  Pt will write the name for an item pictured on 3/4 trials. Pt will participate in ACRT treatment immediately after incorrect productions Introduced ACRT task today.   Pt wrote label for items on 0 /4 trials.  Pt participatedin ACRT therapy.   Pt recalled target work on 2 /4 trials      Patient Education/Response:   Progress and [employer] therapy myriam for home environment. Pt and his wife indicated understanding.     Assessment:   Torito is progressing well towards his goals. Written expression at single word level considered poor. Improved slightly with ACRT treatment and oral spelling. Labeling skills and phrase completion have remained consistent.   Pt prognosis is Good. Pt will continue to benefit from skilled outpatient speech and language therapy to address the deficits listed in the problem list on initial evaluation, provide pt/family education and to maximize pt's level of independence in the home and community environment.     Barriers to Therapy: sleeping pattern, possible transportation  Pt's spiritual, cultural and educational needs considered and pt agreeable to plan of care and goals.    Plan:   Continue POC with focus on written cues, reading comprehension, and education on the importance of nonverbal communication to asssist verbal communication.  Re-assess next session.    SERINA Mott, CCC-SLP  Speech Language Pathologist   7/2/2019

## 2019-07-02 NOTE — PROGRESS NOTES
"  Physical Therapy Daily Treatment Note     [id number]    Therapy Diagnosis:   Encounter Diagnoses   Name Primary?    Weakness of right leg     Impaired functional mobility, balance, gait, and endurance      Physician: Ian Feliciano MD    Visit Date: 7/2/2019    Physician Orders: PT Eval and Treat   Medical Diagnosis from Referral: Late effect of stroke  Evaluation Date: 1/28/2019  Authorization Period Expiration: 06/06/19-12/31/19  Plan of Care Expiration: 05/14/19 to 07/09/19  Visit # / Visits authorized: 6/20 (Vist #30 of 2019) - KX     Time In: 14:30  Time Out: 15:15  Total Billable Time: 45 minutes    Precautions: Standard, Diabetes, Fall and right hemiplegia, hearing aid, safety awareness, cognitive deficits, speech deficits    Subjective     Pt reports: "Good." Pt's wife reports that he received his RW platform attachments but that she has had to help him when ambulating at home with this device for stability.  HEP Compliance: pt reports he is doing all of his exercises at home.  Response to previous treatment: no complaints  Functional change: improved ability to side step and to step backward    Pain: 0/10  Location: N/A      Objective     Torito received therapeutic exercises to develop strength, endurance, ROM, flexibility and core stabilization for 25 minutes including:     X 10 sit <> stand transfers from elevated mat of 19 in height with no UE support, CGA at R knee     X 10 min seated SCI Fit recumbent stepper with BLE only, L2 for CV endurance and BLE strength    3 x 10 seated R ankle DF<>PF AROM c/ YTB    Seated thoracic LTR with stretch held at end range, 3 x 30" each side  Supine lumbar LTR with thera-ball under knees, x 30 reps    X 20 reps, supine BLE double knee to chest with thera-ball under knees    X 10 reps, RLE forward step ups onto 8" step with LUE support, CGA at right knee    Patient participated in gait training activities to normalize gait " pattern for 15 minutes. The following activities were included:   Gait belt used for safety. Assistive device: SBQC, little platform rolling walker      X 300 feet ambulation in closed hallway with LITTLE platform RW, CGA with CGA with turning 180 degrees. Pt demonstrates more weight bearing on LLE compared to RLE. Increased R knee flexion in stance, increased RLE adduction in stance, increased RLE foot flat contact. CGA to navigate walker when stepping backward and side stepping.     X 4 laps R<>L side stepping in // bars with emphasis on motion coming from hip and not trunk, BUE support, Min A from tech at RUE, Min A from PT for improved RLE placements    Home Exercises Provided and Patient Education Provided     Education provided:  Education provided to patient and his wife  -proper technique to assist with balance, gait, and RW management with Little platform rolling walker and standard bilateral platform rolling walker  -proper hand positioning when holding gait belt  -PT instructed both patient and his wife that if they decide to purchase either the bilateral standard RW platform attachments or the Little platform rolling walker, then PT recommends that patient's wife is present to provide necessary assistance (that she was just educated on) at ALL TIMES when patient is using the platform rolling walker to prevent fall. When patient is ambulating unsupervised at home, he is to continue to use his SBQC. Pt and his wife verbalized good understanding of these instructions to PT.     Written Home Exercises Provided: Continue current HEP.   See EMR under Media for exercises provided 2/5/2019.    Assessment   Mr. Ugarte tolerated therapy session well this afternoon without complaint. Therapy session continued to focus on RLE strength and endurance deficits. PT also initiated thoracic and lumbar mobility to help improve poor trunk posture to potentially help make gait more efficient and less compensatory in nature. Continue POC  to further address remaining mobility deficits.     Torito is progressing well towards his goals.   Pt prognosis is Good.     Pt will continue to benefit from skilled outpatient physical therapy to address the deficits listed in the problem list box on initial evaluation, provide pt/family education and to maximize pt's level of independence in the home and community environment.     Pt's spiritual, cultural and educational needs considered and pt agreeable to plan of care and goals.     Anticipated Barriers for therapy: hearing deficit, speech deficits, cognitive deficits, decreased safety awareness, fall risk, visual neglect, transportation assistance required    Goals:  Short Term Goals: 4 weeks   1. Pt to be (I) with established HEP MET 03/19/19  2. Pt to improve R hip flexion strength MMT score to at least 4+/5 for improved gait mechanics Progressing  3. Pt to improve R hip extension strength MMT score to at least 4/5 for improved gait mechanics MET 06/11/19  4. Pt to improve R hip AB and R hip AD strength MMT scores to at least 3+/5 for improved gait mechanics MET 03/04/19  5.  Pt to improve R knee flexion and extension strength MMT scores to at least 4+/5 for improved gait mechanics Ongoing  6. Pt to improve R ankle PF strength MMT score to at least 2/5 for improved gait mechanics MET 03/04/19  7. Pt to improve chair rise score to at least 5 times with no more than 1 UE support and S for improved endurance and safety with functional transfers MET 03/04/19  8. Pt to improve TUG score to at least 30 seconds for improved safety with household mobility MET 06/11/19  9. Pt to improve SSWS score to at least 0.40 m/sec for improved safety with community mobility. MET 05/14/19     Long Term Goals: 8 weeks   1. Pt to be (I) with advanced HEP Ongoing  2. Pt to improve R hip extension strength MMT score to at least 4+/5 for improved gait mechanics Ongoing  3. Pt to improve R hip AB and R hip AD strength MMT scores to at  least 4-/5 for improved gait mechanics MET 06/11/19  4. Pt to improve R ankle PF strength MMT score to at least 2+/5 for improved gait mechanics MET 03/19/19  5. Pt to improve chair rise score to at least 5 times with no UE support and S for improved endurance and safety with functional transfers Ongoing  6. Pt to improve TUG score to at least 26 seconds for improved safety with household mobility Ongoing  7. Pt to improve SSWS score to at least 0.50 m/sec for improved safety with community mobility. Progressing     Plan     Continue to address RLE strength deficits with emphasis on hamstring and R ankle strengthening. Focus on sit <> stand transfers without UE support. Focus on safety with ambulation.     Hanh Brooke, PT

## 2019-07-02 NOTE — PROGRESS NOTES
Occupational Therapy Daily Treatment Note     Name: Torito Harris  [facility] Number: [id number]  Physician: Ian Feliciano MD  Medical Diagnosis: L CVA    Therapy Diagnosis:   Encounter Diagnoses   Name Primary?    Impaired mobility and activities of daily living     Weakness of right upper extremity      Visit Date: 7/2/2019  Physician Orders: Eval and Treat   Surgical Procedure and Date: NA  Evaluation Date: 1/28/19   Insurance Authorization Period Expiration: 12/31/19   Plan of Care Certification Period: 90 days from 6/11/19 (for 12 treatment sessions)   Date of Return to MD: unknown at this time      Visit # / Visits authorized: 31 (1/12 POC) ( 1/20 new auth)  Time In: 1345   Time Out: 1430   Total Billable Time: 45 minutes      Precautions: Standard, fall risk, hearing impaired     Subjective     Pt reports: he has been out of therapy since reassessment on 6/11/19 due to scheduling conflicts.  He reports compliance with HEP while out of therapy.    Response to previous treatment:positive   Functional change: progressing     Pain: none reported   Location: NA      Objective     Torito participated in therapeutic exercises to improve ROM and strength for 30 minutes:   - UBE level 4.5 x 4 min forwards x 4 min reverse     Supine:   Pt performed 2 x 10 of the following with 1# dowel; R hand paddled to dowel:    - shoulder flexion   - chest press   - skull crushers (triceps); min A    - horizontal add/abd; min A       Patient participated in neuromuscular reeducation activities to maximize ROM, motor strength and neuromuscular and proprioceptive input to the RUE for 8 minutes   Sitting EOM:   - restoration of R hand arches and metacarpal stretches   - general R wrist stretches: 1. P-A glide of scaphoid on radius, 2. Radial deviation, 3. Increasing mobility of metacarpals, 4. Carpal roll, 5. Movement of forearm on hand towards wrist extension, 6. Movement of forearm on the hand toward supination/ pronation, 7.  wrist ext with distraction   - body on arm weight shifts to promote wrist extension   - active assisted scap adduction       Patient participated in therapeutic activities to improve functional performance in the home environment for 7 minutes:   - reach and grasp of cups with R hand - assistance for forward reach and for thumb opposition around cup        Functional mobility:   - sit <> stand with SBA  - gait with quad cane and close SBA       Home Exercises and Education Provided     Education provided: continue HEP   - Progress towards goals / POC       Written Home Exercises Provided: no new, continue HEP    Patient instructed to cont prior HEP as verbally instructed in previous tx sessions. Exercises were reviewed and Torito was able to demonstrate them prior to the end of the session.  Torito demonstrated good  understanding of the HEP provided.     Pt has no cultural, educational or language barriers to learning provided.    Assessment   Torito Harris tolerated treatment session well.  He has been out of OT for 2+ weeks due to scheduling conflicts.  Despite decreased attendance, he demonstrated progress in RUE ROM and strength as he required less OT assistance for performance of dowel nancy exercises.  He reports daily compliance with his HEP, which is evident by the progress he has made outside of therapy.  He continues to require assistance for RUE functional reach and for functional R hand grasp.  Continue POC and advance appropriately to address residual functional and physical deficits.     Patient would benefit from continued skilled occupational therapy.  Torito is progressing fairly towards his goals and there are no updates to goals at this time.  Pt prognosis is Good as he remains motivated to participate and has good support from his spouse. Pt will continue to benefit from skilled outpatient occupational therapy to address the deficits listed in the problem list on initial evaluation provide  pt/family education and to maximize pt's level of independence in the home and community environment.      Anticipated barriers to occupational therapy: none     Pt's spiritual, cultural and educational needs considered and pt agreeable to plan of care and goals.    Goals:  Short Term Goals: 3 weeks   ROM/Strength to perform the ADL's and functional activities listed below, - in progress  Pt will improve functional  strength needed for tasks to 20# in R hand. - in progress   Pt will improve AROM for shoulder flexion needed for functional tasks to 60* in R UE. - MET 5/14/19  UE dressing will improve to Min A - in progress   LB dressing will improve to Mod A  - in progress   Toileting will improve to Min A incorporating R hand for task  - in progress   Pt will be Independent with HEP to improve ROM and FM skills. - MET 6/11/19      Long Term Goals: 6 weeks   ROM/Strength to perform the ADL's and functional activities listed below - in progress   Pt will improve functional  strength needed for tasks to 30# in R hand. - in progress   Pt will improve AROM for shoulder flexion needed for functional tasks to 80* in R UE. - MET 4/14/19  UE dressing will improve to Supervision   LB dressing will improve to Min A   Toileting will improve to Supervision incorporating R hand for task   Pt will complete clinical testing for skills needed for driving - not met; patient reports he is driving and has received MD clearance   G code self care CK - in progress     Plan   Continue POC in pursuit of OT goals.     Discussed Plan of Care with patient: Yes  Updates/Grading for next session: R hand functional reach/grasp

## 2019-07-04 ENCOUNTER — DOCUMENTATION ONLY (OUTPATIENT)
Dept: REHABILITATION | Facility: HOSPITAL | Age: 59
End: 2019-07-04

## 2019-07-05 ENCOUNTER — CLINICAL SUPPORT (OUTPATIENT)
Dept: REHABILITATION | Facility: HOSPITAL | Age: 59
End: 2019-07-05
Attending: PSYCHIATRY & NEUROLOGY
Payer: COMMERCIAL

## 2019-07-05 ENCOUNTER — DOCUMENTATION ONLY (OUTPATIENT)
Dept: REHABILITATION | Facility: HOSPITAL | Age: 59
End: 2019-07-05

## 2019-07-05 DIAGNOSIS — Z74.09 IMPAIRED MOBILITY AND ACTIVITIES OF DAILY LIVING: ICD-10-CM

## 2019-07-05 DIAGNOSIS — R29.898 WEAKNESS OF RIGHT UPPER EXTREMITY: ICD-10-CM

## 2019-07-05 DIAGNOSIS — Z78.9 IMPAIRED MOBILITY AND ACTIVITIES OF DAILY LIVING: ICD-10-CM

## 2019-07-05 DIAGNOSIS — Z74.09 IMPAIRED FUNCTIONAL MOBILITY, BALANCE, GAIT, AND ENDURANCE: ICD-10-CM

## 2019-07-05 DIAGNOSIS — R29.898 WEAKNESS OF RIGHT LEG: ICD-10-CM

## 2019-07-05 PROCEDURE — 97116 GAIT TRAINING THERAPY: CPT | Mod: KX,PO

## 2019-07-05 PROCEDURE — 97110 THERAPEUTIC EXERCISES: CPT | Mod: KX,PO

## 2019-07-05 PROCEDURE — 97112 NEUROMUSCULAR REEDUCATION: CPT | Mod: PO,59 | Performed by: OPTOMETRIST

## 2019-07-05 NOTE — PROGRESS NOTES
PT/PTA met face to face to discuss pt's treatment plan and progress towards established goals. Continue with current PT POC, including: weight acceptance on R ankle. Pt will be seen by physical therapist at least every 6th treatment day or every 30 days, whichever occurs first.      Jeff Solis, PTA  7/01/19

## 2019-07-05 NOTE — PLAN OF CARE
"  Physical Therapy Daily Treatment Note     Name: Torito Harris  Clinic Number: [id number]    Therapy Diagnosis:   Encounter Diagnoses   Name Primary?    Weakness of right leg     Impaired functional mobility, balance, gait, and endurance      Physician: Ian Feliciano MD    Visit Date: 7/5/2019    Physician Orders: PT Eval and Treat   Medical Diagnosis from Referral: Late effect of stroke  Evaluation Date: 1/28/2019  Authorization Period Expiration: 06/06/19-12/31/19  Plan of Care Expiration: 07/05/19 to 08/30/19  Visit # / Visits authorized: 7/20 (Vist #31 of 2019) - KX     Time In: 13:00  Time Out: 13:45  Total Billable Time: 45 minutes    Precautions: Standard, Diabetes, Fall and right hemiplegia, hearing aid, safety awareness, cognitive deficits, speech deficits    Subjective     Pt reports: "Good." no new complaints  HEP Compliance: pt reports he is doing all of his exercises at home.  Response to previous treatment: no complaints  Functional change: improved ability to side step and to step backward    Pain: 0/10  Location: N/A      Objective     Torito received therapeutic exercises to develop strength, endurance, ROM, flexibility and core stabilization for 35 minutes including:     Lower Extremity Strength (performed with patient in sitting)  Right LE  Evaluation 03/19/19 05/14/19 06/11/19 07/05/19 Left LE  Evaluation 03/19/19 05/14/19 06/11/19 07/05/19   Hip Flexion: 4/5 4-/5 4-/5 4/5 4/5 Hip Flexion: 5/5 5/5 5/5 5/5 5/5   Hip Extension:  4-/5 4-/5 4-/5 4/5 4/5 Hip Extension: 5/5 5/5 5/5 5/5 5/5   Hip Abduction: 3/5 4/5 4/5 4+/5 4+/5 Hip Abduction: 5/5 5/5 5/5 5/5 5/5   Hip Adduction: 3/5 4/5 4+/5 4+/5 5/5 Hip Adduction 5/5 5/5 5/5 5/5 5/5   Knee Extension: 4/5 4/5 4+/5 4+/5 4+/5 Knee Extension: 5/5 5/5 5/5 5/5 5/5   Knee Flexion: 4/5 4-/5 4-/5 4-/5 4-/5 Knee Flexion: 5/5 5/5 5/5 5/5 5/5   Ankle Dorsiflexion: 0/5 3+/5 3+/5 3+/5 3+/5 Ankle Dorsiflexion: 5/5 5/5 5/5 5/5 5/5   Ankle Plantarflexion: 2-/5 " "3/5 3+/5 3+/5 4/-5 Ankle Plantarflexion: 5/5 5/5 5/5 5/5 5/5        Evaluation 3/4/2019 03/19/19 05/14/19 06/11/19 07/05/19   30 second Chair Rise  (adults > 61 y/o) 11 completed with LUE, abigail walker, CGA 8 completed with LUE, no AD, SBA 9 completed with LUE, no AD, CBA at R knee, improved RLE use 10 completed with LUE, no AD, CGA at R knee, improved RLE use 15 completed with LUE, no AD, CGA at R knee, improved RLE use 8 completed with no UE support (LUE resting on L knee), CGA at hips and R knee       3 x 10 seated R ankle DF AROM c/ orange TB  3 x 10 seated R ankle PF AROM c/ orange TB    Seated thoracic LTR with stretch held at end range, 3 x 30" each side  Seated thoracic side bend stretch held at end range, 3 x 30" each side    Leg Press:  3 x 10 BLE at 120#, TCs at RLE  3 x 10 RLE at 80#, TCs at RLe    Patient participated in gait training activities to normalize gait pattern for 10 minutes. The following activities were included:   Gait belt used for safety. Assistive device: SBQC, little platform rolling walker       Evaluation 03/19/19 05/14/19 06/11/19 07/05/19   Timed Up and Go 34 sec c/ abigail walker c/ SBA 31 sec c/ SBQC c/ CGA to SBA 27 sec c/ SBQC and CGA 29 sec c/ SBQC and SBA 24 sec c/ SBQC and CGA to SBA   Self Selected Walking Speed 0.33 m/sec (6m/18s)  C/ abigail walker c/ SBA 0.35 m/sec (6m/17s)  c/ SBQC c/ SBA 0.40 m/sec (6m/15s)  c/ SBQC c/ CGA 0.46 m/sec (6m/13s)  c/ SBQC c/  0.50 m/sec (6m/12s)  c/ SBQC c/       X 300 feet ambulation in closed hallway with LITTLE platform RW, CGA with CGA with turning 180 degrees. Pt demonstrates more weight bearing on LLE compared to RLE. Increased R knee flexion in stance, increased RLE adduction in stance, increased RLE foot flat contact. CGA to navigate walker when stepping backward and side stepping.       Home Exercises Provided and Patient Education Provided     Education provided:  Education provided to patient and his wife  -proper technique to assist with " balance, gait, and RW management with Brittney platform rolling walker and standard bilateral platform rolling walker  -proper hand positioning when holding gait belt  -PT instructed both patient and his wife that if they decide to purchase either the bilateral standard RW platform attachments or the Brittney platform rolling walker, then PT recommends that patient's wife is present to provide necessary assistance (that she was just educated on) at ALL TIMES when patient is using the platform rolling walker to prevent fall. When patient is ambulating unsupervised at home, he is to continue to use his SBQC. Pt and his wife verbalized good understanding of these instructions to PT.     Written Home Exercises Provided: Continue current HEP.   See EMR under Media for exercises provided 2/5/2019.    Assessment   Mr. Ugarte tolerated POC reassessment well this afternoon. He continues to remain strongly motivated to improve with therapy and demonstrates good and steady progress with therapy. BLE strength remains fairly consistent with prior formal assessment with some gains noted with R hip adduction and R ankle plantarflexion. RLE gross strength remains decreased compared to LLE gross strength with right ankle region remaining most limited strength area at this time. Patient does demonstrate 5 second improvement on TUG compared to prior assessment, however, his current score continues to place him in an elevated fall risk category. SSWS score also improved by 1 sec with current score still placing patient in limited community ambulator category which indicates that he remains at an increased fall risk for community mobility. Therapy session has recently increased focus on thoracic, lumbar, and pelvic mobility to help improve poor posture which has resulted from compensatory patterns utilized with ambulation and other functional transfers. Patient to benefit from continued PT intervention to continue to focus on remaining strength,  posture, motor control, and endurance deficits to continue to improve safety and independence with functional mobility and to decrease risk of fall. PT to extend POC x 8 more weeks.     Torito is progressing well towards his goals.   Pt prognosis is Good.     Pt will continue to benefit from skilled outpatient physical therapy to address the deficits listed in the problem list box on initial evaluation, provide pt/family education and to maximize pt's level of independence in the home and community environment.     Pt's spiritual, cultural and educational needs considered and pt agreeable to plan of care and goals.     Anticipated Barriers for therapy: hearing deficit, speech deficits, cognitive deficits, decreased safety awareness, fall risk, visual neglect, transportation assistance required    Goals:  Short Term Goals: 4 weeks   1. Pt to be (I) with established HEP MET 03/19/19  2. Pt to improve R hip flexion strength MMT score to at least 4+/5 for improved gait mechanics Progressing  3. Pt to improve R hip extension strength MMT score to at least 4/5 for improved gait mechanics MET 06/11/19  4. Pt to improve R hip AB and R hip AD strength MMT scores to at least 3+/5 for improved gait mechanics MET 03/04/19  5.  Pt to improve R knee flexion and extension strength MMT scores to at least 4+/5 for improved gait mechanics Ongoing  6. Pt to improve R ankle PF strength MMT score to at least 2/5 for improved gait mechanics MET 03/04/19  7. Pt to improve chair rise score to at least 5 times with no more than 1 UE support and S for improved endurance and safety with functional transfers MET 03/04/19  8. Pt to improve TUG score to at least 30 seconds for improved safety with household mobility MET 06/11/19  9. Pt to improve SSWS score to at least 0.40 m/sec for improved safety with community mobility. MET 05/14/19     Long Term Goals: 8 weeks   1. Pt to be (I) with advanced HEP Ongoing  2. Pt to improve R hip extension  strength MMT score to at least 4+/5 for improved gait mechanics Ongoing  3. Pt to improve R hip AB and R hip AD strength MMT scores to at least 4-/5 for improved gait mechanics MET 06/11/19  4. Pt to improve R ankle PF strength MMT score to at least 2+/5 for improved gait mechanics MET 03/19/19  5. Pt to improve chair rise score to at least 5 times with no UE support and S for improved endurance and safety with functional transfers Progressing--CGA needed,  07/05/19  6. Pt to improve TUG score to at least 26 seconds for improved safety with household mobility Progressing-- 24 sec but CGA for safety 07/05/19  7. Pt to improve SSWS score to at least 0.50 m/sec for improved safety with community mobility. MET 07/05/19     Plan     PT to extend POC x 8 more weeks.     Continue outpatient physical therapy 2x weekly under current established Plan of Care,07/05/19 to 08/30/19, with treatment to include: pt education, HEP, therapeutic exercises, neuromuscular re-education/balance exercises, therapeutic activities, joint mobilizations, and modalities PRN, to work towards established goals. Pt may be seen by PTA to carry out plan of care.       Continue to address RLE strength deficits with emphasis on hamstring and R ankle strengthening. Focus on sit <> stand transfers without UE support. Focus on safety with ambulation. Focus on trunk flexibility and improved posture.     Hanh Brooke, PT

## 2019-07-05 NOTE — PROGRESS NOTES
Occupational Therapy Daily Treatment Note     Name: Torito Harris  [facility] Number: [id number]  Physician: Ian Feliciano MD  Medical Diagnosis: L CVA    Therapy Diagnosis:   Encounter Diagnoses   Name Primary?    Impaired mobility and activities of daily living     Weakness of right upper extremity      Visit Date: 7/5/2019  Physician Orders: Eval and Treat   Surgical Procedure and Date: NA  Evaluation Date: 1/28/19   Insurance Authorization Period Expiration: 12/31/19   Plan of Care Certification Period: 90 days from 6/11/19 (for 12 treatment sessions)   Date of Return to MD: unknown at this time      Visit # / Visits authorized: 32 (2/12 POC) ( 2/20 new auth)  Time In: 1345   Time Out: 1430   Total Billable Time: 45 minutes      Precautions: Standard, fall risk, hearing impaired     Subjective     Pt reports: That he is excited that his movement in his R arm is improving.     Response to previous treatment:positive   Functional change: progressing     Pain: none reported   Location: NA      Objective     Torito participated in neuromuscular reeducation activities to maximize ROM, motor strength and neuromuscular and proprioceptive input to the RUE for 45 minutes   Sitting EOM:   - restoration of R hand arches and metacarpal stretches   - general R wrist stretches: 1. P-A glide of scaphoid on radius, 2. Radial deviation, 3. Increasing mobility of metacarpals, 4. Carpal roll, 5. Movement of forearm on hand towards wrist extension, 6. Movement of forearm on the hand toward supination/ pronation, 7. wrist ext with distraction   - body on arm weight shifts to promote wrist extension   - Scapular mobilization for elevation, depression, adduction and protraction where AAROM was performed to right side.  - Assisted shoulder flexion from sitting at EOM combined with reaching for targets in various planes  Pt was able to move to supine on his own with cues  From supine:   - Assisted shoulder flexion combined with  scapular mobilization and once in full flexion Pt performed trunk rotation for long stretch to entire R side.   - AAROM to flexion and abduction while assist from therapist for full elbow flexion and extension  - With R UE pt was given cues for reach, grasp and then release of a small towel roll that was placed in various planes where he was prompted to move from his R side to crossing midline and for shoulder flexion and extension movements. He needed cues for release and needed assist for position for grasp but R UE movements were under his control. Pt's control decreased with fatigue and rest breaks were needed to improve.  Pt needed Min A to return to sitting at EOM.     Pt's wife was present and his return to driving was discussed as well as the benefits of a formal driving evaluation to show that he is safe to drive. He may also be in need of adaptive equipment to drive that can only be assessed in a formal in-car evaluation.       Functional mobility:   - sit <> stand with SBA  - gait with quad cane and close SBA       Home Exercises and Education Provided     Education provided: continue HEP   - Progress towards goals / POC       Written Home Exercises Provided: no new, continue HEP    Patient instructed to cont prior HEP as verbally instructed in previous tx sessions. Exercises were reviewed and Torito was able to demonstrate them prior to the end of the session.  Torito demonstrated good  understanding of the HEP provided.     Pt has no cultural, educational or language barriers to learning provided.    Assessment   Torito Harris has made progress in RUE ROM and strength as well as simple grasp and release with his R hand. He is now able to perform simple grasp and release activities but concentration and increased time is needed.  He reports daily compliance with his HEP, which is evident by the progress he has made outside of therapy. His improvements are good but he still needs assist and cues as his  R UE has not improved enough for functional use.  Continue POC and advance appropriately to address residual functional and physical deficits.     Patient would benefit from continued skilled occupational therapy.  Torito is progressing fairly towards his goals and there are no updates to goals at this time.  Pt prognosis is Good as he remains motivated to participate and has good support from his spouse. Pt will continue to benefit from skilled outpatient occupational therapy to address the deficits listed in the problem list on initial evaluation provide pt/family education and to maximize pt's level of independence in the home and community environment.      Anticipated barriers to occupational therapy: none     Pt's spiritual, cultural and educational needs considered and pt agreeable to plan of care and goals.    Goals:  Short Term Goals: 3 weeks   ROM/Strength to perform the ADL's and functional activities listed below, - in progress  Pt will improve functional  strength needed for tasks to 20# in R hand. - in progress   Pt will improve AROM for shoulder flexion needed for functional tasks to 60* in R UE. - MET 5/14/19  UE dressing will improve to Min A - in progress   LB dressing will improve to Mod A  - in progress   Toileting will improve to Min A incorporating R hand for task  - in progress   Pt will be Independent with HEP to improve ROM and FM skills. - MET 6/11/19      Long Term Goals: 6 weeks   ROM/Strength to perform the ADL's and functional activities listed below - in progress   Pt will improve functional  strength needed for tasks to 30# in R hand. - in progress   Pt will improve AROM for shoulder flexion needed for functional tasks to 80* in R UE. - MET 4/14/19  UE dressing will improve to Supervision   LB dressing will improve to Min A   Toileting will improve to Supervision incorporating R hand for task   Pt will complete clinical testing for skills needed for driving - not met; patient  reports he is driving and has received MD clearance   G code self care CK - in progress     Plan   Continue POC in pursuit of OT goals.     Discussed Plan of Care with patient: Yes  Updates/Grading for next session: R hand functional reach/grasp

## 2019-07-05 NOTE — PROGRESS NOTES
"  Physical Therapy Daily Treatment Note     Name: Torito Harris  Clinic Number: [id number]    Therapy Diagnosis:   Encounter Diagnoses   Name Primary?    Weakness of right leg     Impaired functional mobility, balance, gait, and endurance      Physician: Ian Feliciano MD    Visit Date: 7/5/2019    Physician Orders: PT Eval and Treat   Medical Diagnosis from Referral: Late effect of stroke  Evaluation Date: 1/28/2019  Authorization Period Expiration: 06/06/19-12/31/19  Plan of Care Expiration: 07/05/19 to 08/30/19  Visit # / Visits authorized: 7/20 (Vist #31 of 2019) - KX     Time In: 13:00  Time Out: 13:45  Total Billable Time: 45 minutes    Precautions: Standard, Diabetes, Fall and right hemiplegia, hearing aid, safety awareness, cognitive deficits, speech deficits    Subjective     Pt reports: "Good." no new complaints  HEP Compliance: pt reports he is doing all of his exercises at home.  Response to previous treatment: no complaints  Functional change: improved ability to side step and to step backward    Pain: 0/10  Location: N/A      Objective     Torito received therapeutic exercises to develop strength, endurance, ROM, flexibility and core stabilization for 35 minutes including:     Lower Extremity Strength (performed with patient in sitting)  Right LE  Evaluation 03/19/19 05/14/19 06/11/19 07/05/19 Left LE  Evaluation 03/19/19 05/14/19 06/11/19 07/05/19   Hip Flexion: 4/5 4-/5 4-/5 4/5 4/5 Hip Flexion: 5/5 5/5 5/5 5/5 5/5   Hip Extension:  4-/5 4-/5 4-/5 4/5 4/5 Hip Extension: 5/5 5/5 5/5 5/5 5/5   Hip Abduction: 3/5 4/5 4/5 4+/5 4+/5 Hip Abduction: 5/5 5/5 5/5 5/5 5/5   Hip Adduction: 3/5 4/5 4+/5 4+/5 5/5 Hip Adduction 5/5 5/5 5/5 5/5 5/5   Knee Extension: 4/5 4/5 4+/5 4+/5 4+/5 Knee Extension: 5/5 5/5 5/5 5/5 5/5   Knee Flexion: 4/5 4-/5 4-/5 4-/5 4-/5 Knee Flexion: 5/5 5/5 5/5 5/5 5/5   Ankle Dorsiflexion: 0/5 3+/5 3+/5 3+/5 3+/5 Ankle Dorsiflexion: 5/5 5/5 5/5 5/5 5/5   Ankle Plantarflexion: 2-/5 " "3/5 3+/5 3+/5 4/-5 Ankle Plantarflexion: 5/5 5/5 5/5 5/5 5/5        Evaluation 3/4/2019 03/19/19 05/14/19 06/11/19 07/05/19   30 second Chair Rise  (adults > 61 y/o) 11 completed with LUE, abigail walker, CGA 8 completed with LUE, no AD, SBA 9 completed with LUE, no AD, CBA at R knee, improved RLE use 10 completed with LUE, no AD, CGA at R knee, improved RLE use 15 completed with LUE, no AD, CGA at R knee, improved RLE use 8 completed with no UE support (LUE resting on L knee), CGA at hips and R knee       3 x 10 seated R ankle DF AROM c/ orange TB  3 x 10 seated R ankle PF AROM c/ orange TB    Seated thoracic LTR with stretch held at end range, 3 x 30" each side  Seated thoracic side bend stretch held at end range, 3 x 30" each side    Leg Press:  3 x 10 BLE at 120#, TCs at RLE  3 x 10 RLE at 80#, TCs at RLe    Patient participated in gait training activities to normalize gait pattern for 10 minutes. The following activities were included:   Gait belt used for safety. Assistive device: SBQC, little platform rolling walker       Evaluation 03/19/19 05/14/19 06/11/19 07/05/19   Timed Up and Go 34 sec c/ abigail walker c/ SBA 31 sec c/ SBQC c/ CGA to SBA 27 sec c/ SBQC and CGA 29 sec c/ SBQC and SBA 24 sec c/ SBQC and CGA to SBA   Self Selected Walking Speed 0.33 m/sec (6m/18s)  C/ abigail walker c/ SBA 0.35 m/sec (6m/17s)  c/ SBQC c/ SBA 0.40 m/sec (6m/15s)  c/ SBQC c/ CGA 0.46 m/sec (6m/13s)  c/ SBQC c/  0.50 m/sec (6m/12s)  c/ SBQC c/       X 300 feet ambulation in closed hallway with LITTLE platform RW, CGA with CGA with turning 180 degrees. Pt demonstrates more weight bearing on LLE compared to RLE. Increased R knee flexion in stance, increased RLE adduction in stance, increased RLE foot flat contact. CGA to navigate walker when stepping backward and side stepping.       Home Exercises Provided and Patient Education Provided     Education provided:  Education provided to patient and his wife  -proper technique to assist with " balance, gait, and RW management with Brittney platform rolling walker and standard bilateral platform rolling walker  -proper hand positioning when holding gait belt  -PT instructed both patient and his wife that if they decide to purchase either the bilateral standard RW platform attachments or the Brittney platform rolling walker, then PT recommends that patient's wife is present to provide necessary assistance (that she was just educated on) at ALL TIMES when patient is using the platform rolling walker to prevent fall. When patient is ambulating unsupervised at home, he is to continue to use his SBQC. Pt and his wife verbalized good understanding of these instructions to PT.     Written Home Exercises Provided: Continue current HEP.   See EMR under Media for exercises provided 2/5/2019.    Assessment   Mr. Ugarte tolerated POC reassessment well this afternoon. He continues to remain strongly motivated to improve with therapy and demonstrates good and steady progress with therapy. BLE strength remains fairly consistent with prior formal assessment with some gains noted with R hip adduction and R ankle plantarflexion. RLE gross strength remains decreased compared to LLE gross strength with right ankle region remaining most limited strength area at this time. Patient does demonstrate 5 second improvement on TUG compared to prior assessment, however, his current score continues to place him in an elevated fall risk category. SSWS score also improved by 1 sec with current score still placing patient in limited community ambulator category which indicates that he remains at an increased fall risk for community mobility. Therapy session has recently increased focus on thoracic, lumbar, and pelvic mobility to help improve poor posture which has resulted from compensatory patterns utilized with ambulation and other functional transfers. Patient to benefit from continued PT intervention to continue to focus on remaining strength,  posture, motor control, and endurance deficits to continue to improve safety and independence with functional mobility and to decrease risk of fall. PT to extend POC x 8 more weeks.     Torito is progressing well towards his goals.   Pt prognosis is Good.     Pt will continue to benefit from skilled outpatient physical therapy to address the deficits listed in the problem list box on initial evaluation, provide pt/family education and to maximize pt's level of independence in the home and community environment.     Pt's spiritual, cultural and educational needs considered and pt agreeable to plan of care and goals.     Anticipated Barriers for therapy: hearing deficit, speech deficits, cognitive deficits, decreased safety awareness, fall risk, visual neglect, transportation assistance required    Goals:  Short Term Goals: 4 weeks   1. Pt to be (I) with established HEP MET 03/19/19  2. Pt to improve R hip flexion strength MMT score to at least 4+/5 for improved gait mechanics Progressing  3. Pt to improve R hip extension strength MMT score to at least 4/5 for improved gait mechanics MET 06/11/19  4. Pt to improve R hip AB and R hip AD strength MMT scores to at least 3+/5 for improved gait mechanics MET 03/04/19  5.  Pt to improve R knee flexion and extension strength MMT scores to at least 4+/5 for improved gait mechanics Ongoing  6. Pt to improve R ankle PF strength MMT score to at least 2/5 for improved gait mechanics MET 03/04/19  7. Pt to improve chair rise score to at least 5 times with no more than 1 UE support and S for improved endurance and safety with functional transfers MET 03/04/19  8. Pt to improve TUG score to at least 30 seconds for improved safety with household mobility MET 06/11/19  9. Pt to improve SSWS score to at least 0.40 m/sec for improved safety with community mobility. MET 05/14/19     Long Term Goals: 8 weeks   1. Pt to be (I) with advanced HEP Ongoing  2. Pt to improve R hip extension  strength MMT score to at least 4+/5 for improved gait mechanics Ongoing  3. Pt to improve R hip AB and R hip AD strength MMT scores to at least 4-/5 for improved gait mechanics MET 06/11/19  4. Pt to improve R ankle PF strength MMT score to at least 2+/5 for improved gait mechanics MET 03/19/19  5. Pt to improve chair rise score to at least 5 times with no UE support and S for improved endurance and safety with functional transfers Progressing--CGA needed,  07/05/19  6. Pt to improve TUG score to at least 26 seconds for improved safety with household mobility Progressing-- 24 sec but CGA for safety 07/05/19  7. Pt to improve SSWS score to at least 0.50 m/sec for improved safety with community mobility. MET 07/05/19     Plan     PT to extend POC x 8 more weeks.     Continue outpatient physical therapy 2x weekly under current established Plan of Care,07/05/19 to 08/30/19, with treatment to include: pt education, HEP, therapeutic exercises, neuromuscular re-education/balance exercises, therapeutic activities, joint mobilizations, and modalities PRN, to work towards established goals. Pt may be seen by PTA to carry out plan of care.       Continue to address RLE strength deficits with emphasis on hamstring and R ankle strengthening. Focus on sit <> stand transfers without UE support. Focus on safety with ambulation. Focus on trunk flexibility and improved posture.     Hanh Brooke, PT

## 2019-07-05 NOTE — PROGRESS NOTES
Face to face meeting completed with Hanh Brooke PT regarding current status and progress of   Torito Christiansen LE weight bearing  .  Alvarado Escobedo, PTA

## 2019-07-09 ENCOUNTER — CLINICAL SUPPORT (OUTPATIENT)
Dept: REHABILITATION | Facility: HOSPITAL | Age: 59
End: 2019-07-09
Attending: PSYCHIATRY & NEUROLOGY
Payer: COMMERCIAL

## 2019-07-09 DIAGNOSIS — Z78.9 IMPAIRED MOBILITY AND ACTIVITIES OF DAILY LIVING: ICD-10-CM

## 2019-07-09 DIAGNOSIS — R47.01 BROCA'S APHASIA: ICD-10-CM

## 2019-07-09 DIAGNOSIS — Z74.09 IMPAIRED MOBILITY AND ACTIVITIES OF DAILY LIVING: ICD-10-CM

## 2019-07-09 DIAGNOSIS — Z74.09 IMPAIRED FUNCTIONAL MOBILITY, BALANCE, GAIT, AND ENDURANCE: ICD-10-CM

## 2019-07-09 DIAGNOSIS — R29.898 WEAKNESS OF RIGHT UPPER EXTREMITY: ICD-10-CM

## 2019-07-09 DIAGNOSIS — R29.898 WEAKNESS OF RIGHT LEG: ICD-10-CM

## 2019-07-09 PROCEDURE — 97110 THERAPEUTIC EXERCISES: CPT | Mod: KX,PO,59

## 2019-07-09 PROCEDURE — 97112 NEUROMUSCULAR REEDUCATION: CPT | Mod: PO

## 2019-07-09 PROCEDURE — 92507 TX SP LANG VOICE COMM INDIV: CPT | Mod: KX,PO

## 2019-07-09 PROCEDURE — 97110 THERAPEUTIC EXERCISES: CPT | Mod: PO

## 2019-07-09 PROCEDURE — 97116 GAIT TRAINING THERAPY: CPT | Mod: KX,PO

## 2019-07-09 NOTE — PROGRESS NOTES
"  Physical Therapy Daily Treatment Note     [id number]    Therapy Diagnosis:   Encounter Diagnoses   Name Primary?    Weakness of right leg     Impaired functional mobility, balance, gait, and endurance      Physician: Ian Feliciano MD    Visit Date: 7/9/2019    Physician Orders: PT Eval and Treat   Medical Diagnosis from Referral: Late effect of stroke  Evaluation Date: 1/28/2019  Authorization Period Expiration: 06/06/19-12/31/19  Plan of Care Expiration: 07/05/19 to 08/30/19  Visit # / Visits authorized: 8/20 (Vist #32 of 2019) - KX     Time In: 13:00  Time Out: 13:45  Total Billable Time: 45 minutes    Precautions: Standard, Diabetes, Fall and right hemiplegia, hearing aid, safety awareness, cognitive deficits, speech deficits    Subjective     Pt reports: "Can we do that machine?"  Pointing to the leg press  HEP Compliance: pt reports he is doing all of his exercises at home.  Response to previous treatment: no complaints  Functional change: improved ability to side step and to step backward    Pain: 0/10  Location: N/A      Objective     Torito received therapeutic exercises to develop strength, endurance, ROM, flexibility and core stabilization for 35 minutes including:     Supine therex:   X 30 reps of R LE DF with OTB   X 30 reps of R LE SAQ over bolster with #2.5 lb cuff weights   2 x 10 reps of R LE SLR with quad set, pt requires VC's for fully extended knee    Leg Press:  3 x 10 reps of B LE at 140lbs, TCs at RLE  3 x 10 BLE at 160#, TCs at RLE  3 x 10 RLE at 80#, TCs at RLe    Patient participated in gait training activities to normalize gait pattern for 10 minutes. The following activities were included:   Gait belt used for safety. Assistive device: SBQC, little platform rolling walker     Pt ambulated ~2 laps up/down gym hallway with LITTLE platform RW, CGA/SBA, CGA with turning 180 degrees. VCs to not cross his legs when turing.     Home Exercises Provided and " Patient Education Provided     Education provided:  Education provided to patient and his wife  -proper technique to assist with balance, gait, and RW management with Brittney platform rolling walker and standard bilateral platform rolling walker  -proper hand positioning when holding gait belt  -PT instructed both patient and his wife that if they decide to purchase either the bilateral standard RW platform attachments or the Brittney platform rolling walker, then PT recommends that patient's wife is present to provide necessary assistance (that she was just educated on) at ALL TIMES when patient is using the platform rolling walker to prevent fall. When patient is ambulating unsupervised at home, he is to continue to use his SBQC. Pt and his wife verbalized good understanding of these instructions to PT.     Written Home Exercises Provided: Continue current HEP.   See EMR under Media for exercises provided 2/5/2019.    Assessment   Mr. Ugarte tolerated tx session well and did not have any problems noted.  Pt was able to increase weight on B leg press and increase amount of repetitions performed.  Pt was able to perform SLR today with better technique, but still requires VCs occasionally.  VCs during gait to ambulate with his R hand open and hanging by his lateral side instead of medially.      Torito is progressing well towards his goals.   Pt prognosis is Good.     Pt will continue to benefit from skilled outpatient physical therapy to address the deficits listed in the problem list box on initial evaluation, provide pt/family education and to maximize pt's level of independence in the home and community environment.     Pt's spiritual, cultural and educational needs considered and pt agreeable to plan of care and goals.     Anticipated Barriers for therapy: hearing deficit, speech deficits, cognitive deficits, decreased safety awareness, fall risk, visual neglect, transportation assistance required    Goals:  Short Term  Goals: 4 weeks   1. Pt to be (I) with established HEP MET 03/19/19  2. Pt to improve R hip flexion strength MMT score to at least 4+/5 for improved gait mechanics Progressing  3. Pt to improve R hip extension strength MMT score to at least 4/5 for improved gait mechanics MET 06/11/19  4. Pt to improve R hip AB and R hip AD strength MMT scores to at least 3+/5 for improved gait mechanics MET 03/04/19  5.  Pt to improve R knee flexion and extension strength MMT scores to at least 4+/5 for improved gait mechanics Ongoing  6. Pt to improve R ankle PF strength MMT score to at least 2/5 for improved gait mechanics MET 03/04/19  7. Pt to improve chair rise score to at least 5 times with no more than 1 UE support and S for improved endurance and safety with functional transfers MET 03/04/19  8. Pt to improve TUG score to at least 30 seconds for improved safety with household mobility MET 06/11/19  9. Pt to improve SSWS score to at least 0.40 m/sec for improved safety with community mobility. MET 05/14/19     Long Term Goals: 8 weeks   1. Pt to be (I) with advanced HEP Ongoing  2. Pt to improve R hip extension strength MMT score to at least 4+/5 for improved gait mechanics Ongoing  3. Pt to improve R hip AB and R hip AD strength MMT scores to at least 4-/5 for improved gait mechanics MET 06/11/19  4. Pt to improve R ankle PF strength MMT score to at least 2+/5 for improved gait mechanics MET 03/19/19  5. Pt to improve chair rise score to at least 5 times with no UE support and S for improved endurance and safety with functional transfers Progressing--CGA needed,  07/05/19  6. Pt to improve TUG score to at least 26 seconds for improved safety with household mobility Progressing-- 24 sec but CGA for safety 07/05/19  7. Pt to improve SSWS score to at least 0.50 m/sec for improved safety with community mobility. MET 07/05/19     Plan     Continue to address RLE strength deficits with emphasis on hamstring and R ankle  strengthening. Focus on sit <> stand transfers without UE support. Focus on safety with ambulation. Focus on trunk flexibility and improved posture.     Lucy Vanegas, PTA

## 2019-07-09 NOTE — PROGRESS NOTES
"  Occupational Therapy Daily Treatment Note     Name: Torito Harris  [facility] Number: [id number]  Physician: Ian Feliciano MD  Medical Diagnosis: L CVA    Therapy Diagnosis:   Encounter Diagnoses   Name Primary?    Impaired mobility and activities of daily living     Weakness of right upper extremity      Visit Date: 7/9/2019  Physician Orders: Eval and Treat   Surgical Procedure and Date: NA  Evaluation Date: 1/28/19   Insurance Authorization Period Expiration: 12/31/19   Plan of Care Certification Period: 90 days from 6/11/19 (for 12 treatment sessions)   Date of Return to MD: unknown at this time      Visit # / Visits authorized: 33 (3/12 POC) ( 3/20 new auth)  Time In: 1345   Time Out: 1430   Total Billable Time: 45 minutes      Precautions: Standard, fall risk, hearing impaired     Subjective     Pt reports: "I do my exercises all day, every day." Patient's wife reported they are going to look into driving evaluation.   Response to previous treatment: positive; increased knowledge and interest in driving eval   Functional change: progressing     Pain: none reported   Location: NA      Objective     Torito participated in therapeutic exercises to improve ROM and strength for 35 minutes:   - UBE level 5 x 4 min forwards x 4 min reverse   - overhead pulleys shoulder flexion x 2 min   - AAROM elbow flexion and extension   - AAROM shoulder flexion  - pilates ring chest squeezes x 2 x 10   - pilates ring elbow flexion and extension x 2 x 10    - pilates ring chest press x 2 x 10   - pilates ring shoulder flexion/extension x 2 x 10       Torito participated in neuromuscular reeducation activities to maximize ROM, motor strength and neuromuscular and proprioceptive input to the RUE for 10 minutes   Sitting EOM:   - restoration of R hand arches and metacarpal stretches   - general R wrist stretches: 1. P-A glide of scaphoid on radius, 2. Radial deviation, 3. Increasing mobility of metacarpals, 4. Carpal roll, 5. " Movement of forearm on hand towards wrist extension, 6. Movement of forearm on the hand toward supination/ pronation, 7. wrist ext with distraction   - scap mobilization for adduction           Functional mobility:   - sit <> stand with SBA  - gait with quad cane and close SBA       Home Exercises and Education Provided     Education provided: continue HEP   - Progress towards goals / POC       Written Home Exercises Provided: no new, continue HEP    Patient instructed to cont prior HEP as verbally instructed in previous tx sessions. Exercises were reviewed and Torito was able to demonstrate them prior to the end of the session.  Torito demonstrated good  understanding of the HEP provided.     Pt has no cultural, educational or language barriers to learning provided.    Assessment   Torito Harris tolerated treatment session well.  Pilates ring used today for self active assisted ROM and strengthening; he only required min OT assistance to perform and maintain elbow extension.  He was able to maintain R hand  on pilates ring, UBE, and pulleys without assistance.   He demonstrates steady progress, but he still needs assist and cues as his RUE has not improved enough for functional/purposeful use.  Continue POC and advance appropriately to address residual functional and physical deficits.     Patient would benefit from continued skilled occupational therapy.  Torito is progressing well towards his goals and there are no updates to goals at this time.  Pt prognosis is Good as he remains motivated to participate and has good support from his spouse. Pt will continue to benefit from skilled outpatient occupational therapy to address the deficits listed in the problem list on initial evaluation provide pt/family education and to maximize pt's level of independence in the home and community environment.      Anticipated barriers to occupational therapy: none     Pt's spiritual, cultural and educational needs  considered and pt agreeable to plan of care and goals.    Goals:  Short Term Goals: 3 weeks   ROM/Strength to perform the ADL's and functional activities listed below, - in progress  Pt will improve functional  strength needed for tasks to 20# in R hand. - in progress   Pt will improve AROM for shoulder flexion needed for functional tasks to 60* in R UE. - MET 5/14/19  UE dressing will improve to Min A - in progress   LB dressing will improve to Mod A  - in progress   Toileting will improve to Min A incorporating R hand for task  - in progress   Pt will be Independent with HEP to improve ROM and FM skills. - MET 6/11/19      Long Term Goals: 6 weeks   ROM/Strength to perform the ADL's and functional activities listed below - in progress   Pt will improve functional  strength needed for tasks to 30# in R hand. - in progress   Pt will improve AROM for shoulder flexion needed for functional tasks to 80* in R UE. - MET 4/14/19  UE dressing will improve to Supervision   LB dressing will improve to Min A   Toileting will improve to Supervision incorporating R hand for task   Pt will complete clinical testing for skills needed for driving - not met; patient reports he is driving and has received MD clearance   G code self care CK - in progress     Plan   Continue POC in pursuit of OT goals.     Discussed Plan of Care with patient: Yes  Updates/Grading for next session: R hand functional reach/grasp

## 2019-07-09 NOTE — PROGRESS NOTES
"Outpatient Neurological Rehabilitation   Speech and Language Therapy Daily Note  Date:  7/9/2019     Name: Toirto Harris   MRN: [id number]   Therapy Diagnosis:   Encounter Diagnosis   Name Primary?    Broca's aphasia    Physician: Ian Feliciano MD  Physician Orders: ST evaluate and treat  Medical Diagnosis: I69.30 (ICD-10-CM) - Late effect of stroke    Visit #/Visits authorized: 6/ 20 (29 prior to this auth) KX   Date of Evaluation:  1/28/19  Insurance Authorization Period: 6/11/19 to 12/31/19  Plan of Care Expiration Date:  1/28/2019 to 3/25/19; 3/28/19 to 5/24/19; 5/23/19-7/19/19  Extended POC: n/a   Cancelled Visits:  0   No Show Visits:  7    Time In:  1430  Time Out:  1515  Total Billable Time: 45 minutes    Precautions: Standard and Fall  Subjective:   Pt reports: that he has been practicing at home via gesture and his wife. working at tactus therapy "its good"    Pain Scale:  0/10 on VAS currently.   Pain Location: n/a  Objective:   UNTIMED  Procedure Min.   Speech- Language- Voice Therapy    45   Total Untimed Units: 1  Charges Billed/# of units: 1    Short Term Goals: (4 weeks) Current Progress:   1. Pt will name common objects with 90% Carmen.  Progressing/ Not Met 7/9/2019   16% acc indly, 50% acc given a phonemic cue.    2. Pt will generate a response to basic sentence completions with 80% Carmen   Progressing/ Not Met 7/9/2019   Not formally addressed     3.  pt will complete reading comprehension tasks at the sentence level with 90% acc indly.  Progressing/ Not Met 7/9/2019   Pt read y/n questions and pointed to the answer with 40% acc indly, 80% acc given verbal cues and SLP reading the question   4. pt will follow 3 unit body part commands with 90% acc indly.   Progressing/ Not Met 7/9/2019  Not formally addressed     5.  pt will follow 3 unit commands with visual stimuli with 90% acc indly to improve auditory comprehension.   Progressing/ Not Met 7/9/2019   Not formally addressed       6. Pt will " complete word finding task (i.e. Creating subject, verb, object pairs in VNEST protocol) with 90% acc min A to improve word fluency.  Progressing/ Not Met 7/9/2019   Not formally addressed      7. Pt will write the name for an item pictured on 3/4 trials. Pt will participate in ACRT treatment immediately after incorrect productions  Progressing/ Not Met 7/9/2019   Pt wrote label for items on 0  /4 trials.  Pt participatedin ACRT therapy.   Pt recalled target word on 1  /4 trials      Patient Education/Response:   Progress and [facility] myriam for home environment. Pt and his wife indicated understanding.     Assessment:   Torito is progressing well towards his goals. Decreased accuracy on all goals today. Pt easily distracted from discrete therapy activities.    Pt prognosis is Good. Pt will continue to benefit from skilled outpatient speech and language therapy to address the deficits listed in the problem list on initial evaluation, provide pt/family education and to maximize pt's level of independence in the home and community environment.     Barriers to Therapy: sleeping pattern, possible transportation  Pt's spiritual, cultural and educational needs considered and pt agreeable to plan of care and goals.    Plan:   Continue POC with focus on written cues, reading comprehension, and education on the importance of nonverbal communication to asssist verbal communication.  Re-assess next session.    SERINA Mott, CCC-SLP  Speech Language Pathologist   7/9/2019

## 2019-07-16 ENCOUNTER — CLINICAL SUPPORT (OUTPATIENT)
Dept: REHABILITATION | Facility: HOSPITAL | Age: 59
End: 2019-07-16
Attending: PSYCHIATRY & NEUROLOGY
Payer: COMMERCIAL

## 2019-07-16 DIAGNOSIS — R29.898 WEAKNESS OF RIGHT LEG: ICD-10-CM

## 2019-07-16 DIAGNOSIS — Z74.09 IMPAIRED FUNCTIONAL MOBILITY, BALANCE, GAIT, AND ENDURANCE: ICD-10-CM

## 2019-07-16 DIAGNOSIS — R47.01 BROCA'S APHASIA: ICD-10-CM

## 2019-07-16 DIAGNOSIS — R29.898 WEAKNESS OF RIGHT UPPER EXTREMITY: ICD-10-CM

## 2019-07-16 DIAGNOSIS — Z78.9 IMPAIRED MOBILITY AND ACTIVITIES OF DAILY LIVING: ICD-10-CM

## 2019-07-16 DIAGNOSIS — Z74.09 IMPAIRED MOBILITY AND ACTIVITIES OF DAILY LIVING: ICD-10-CM

## 2019-07-16 PROCEDURE — 97110 THERAPEUTIC EXERCISES: CPT | Mod: PO,59

## 2019-07-16 PROCEDURE — 92507 TX SP LANG VOICE COMM INDIV: CPT | Mod: KX,PO

## 2019-07-16 PROCEDURE — 97530 THERAPEUTIC ACTIVITIES: CPT | Mod: PO,59

## 2019-07-16 PROCEDURE — 97116 GAIT TRAINING THERAPY: CPT | Mod: KX,PO

## 2019-07-16 PROCEDURE — 97112 NEUROMUSCULAR REEDUCATION: CPT | Mod: PO,59

## 2019-07-16 PROCEDURE — 97110 THERAPEUTIC EXERCISES: CPT | Mod: KX,PO,59

## 2019-07-16 NOTE — PROGRESS NOTES
Physical Therapy Daily Treatment Note     [id number]    Therapy Diagnosis:   Encounter Diagnoses   Name Primary?    Weakness of right leg     Impaired functional mobility, balance, gait, and endurance      Physician: Ian Feliciano MD    Visit Date: 7/16/2019    Physician Orders: PT Eval and Treat   Medical Diagnosis from Referral: Late effect of stroke  Evaluation Date: 1/28/2019  Authorization Period Expiration: 06/06/19-12/31/19  Plan of Care Expiration: 07/05/19 to 08/30/19  Visit # / Visits authorized: 9/20 (Vist #33 of 2019) - KX     Time In: 14:30  Time Out: 15:15  Total Billable Time: 45 minutes    Precautions: Standard, Diabetes, Fall and right hemiplegia, hearing aid, safety awareness, cognitive deficits, speech deficits    Subjective     Pt reports: that he wants to do the leg press again today.   HEP Compliance: pt reports he is doing all of his exercises at home.  Response to previous treatment: no complaints  Functional change: improved ability to side step and to step backward    Pain: 0/10  Location: N/A      Objective     Torito received therapeutic exercises to develop strength, endurance, ROM, flexibility and core stabilization for 37 minutes including:     X 8 min Sci Fit recumbent stepper BLE only for BLE endurance and strength    X 12 reps sit <> stand transfers from elevated mat of 19 in height with no UE support, CGA at R knee     Seated therex:   X 30 reps of R LE DF with OTB     Standing therex:   3 x 10 reps RLE standing hamstring curls with AAROM with LUE support     Leg Press:  3 x 10 BLE at 180#, TCs at RLE  3 x 10 RLE at 80#, TCs at RLE    Patient participated in gait training activities to normalize gait pattern for 8 minutes. The following activities were included:   Gait belt used for safety. Assistive device: SBQC, little platform rolling walker     X 300 feet ambulation in closed hallway with LITTLE platform RW, CGA to SBA with CGA with  turning 180 degrees. Pt demonstrates more weight bearing on LLE compared to RLE. Increased R knee flexion in stance, increased RLE adduction in stance, increased RLE foot flat contact. CGA to navigate walker when stepping backward and side stepping    Home Exercises Provided and Patient Education Provided     Education provided:  Education provided to patient and his wife  -proper technique to assist with balance, gait, and RW management with Brittney platform rolling walker and standard bilateral platform rolling walker  -proper hand positioning when holding gait belt  -PT instructed both patient and his wife that if they decide to purchase either the bilateral standard RW platform attachments or the Brittney platform rolling walker, then PT recommends that patient's wife is present to provide necessary assistance (that she was just educated on) at ALL TIMES when patient is using the platform rolling walker to prevent fall. When patient is ambulating unsupervised at home, he is to continue to use his SBQC. Pt and his wife verbalized good understanding of these instructions to PT.     Written Home Exercises Provided: Continue current HEP.   See EMR under Media for exercises provided 2/5/2019.    Assessment   Mr. Ugarte tolerated therapy session well this afternoon. He was able to perform sit <> stand transfers s/ UE support with more control compared to prior trials. Patient required less assist from PT during standing hamstring curls today compared to previous trials. Mobility remains limited by RLE hemiparesis, poor trunk posture, and patient's reluctance to accept weight throughout LLE. Continue POC to further address these deficits.     Torito is progressing well towards his goals.   Pt prognosis is Good.     Pt will continue to benefit from skilled outpatient physical therapy to address the deficits listed in the problem list box on initial evaluation, provide pt/family education and to maximize pt's level of  independence in the home and community environment.     Pt's spiritual, cultural and educational needs considered and pt agreeable to plan of care and goals.     Anticipated Barriers for therapy: hearing deficit, speech deficits, cognitive deficits, decreased safety awareness, fall risk, visual neglect, transportation assistance required    Goals:  Short Term Goals: 4 weeks   1. Pt to be (I) with established HEP MET 03/19/19  2. Pt to improve R hip flexion strength MMT score to at least 4+/5 for improved gait mechanics Progressing  3. Pt to improve R hip extension strength MMT score to at least 4/5 for improved gait mechanics MET 06/11/19  4. Pt to improve R hip AB and R hip AD strength MMT scores to at least 3+/5 for improved gait mechanics MET 03/04/19  5.  Pt to improve R knee flexion and extension strength MMT scores to at least 4+/5 for improved gait mechanics Ongoing  6. Pt to improve R ankle PF strength MMT score to at least 2/5 for improved gait mechanics MET 03/04/19  7. Pt to improve chair rise score to at least 5 times with no more than 1 UE support and S for improved endurance and safety with functional transfers MET 03/04/19  8. Pt to improve TUG score to at least 30 seconds for improved safety with household mobility MET 06/11/19  9. Pt to improve SSWS score to at least 0.40 m/sec for improved safety with community mobility. MET 05/14/19     Long Term Goals: 8 weeks   1. Pt to be (I) with advanced HEP Ongoing  2. Pt to improve R hip extension strength MMT score to at least 4+/5 for improved gait mechanics Ongoing  3. Pt to improve R hip AB and R hip AD strength MMT scores to at least 4-/5 for improved gait mechanics MET 06/11/19  4. Pt to improve R ankle PF strength MMT score to at least 2+/5 for improved gait mechanics MET 03/19/19  5. Pt to improve chair rise score to at least 5 times with no UE support and S for improved endurance and safety with functional transfers Progressing--CGA needed,   07/05/19  6. Pt to improve TUG score to at least 26 seconds for improved safety with household mobility Progressing-- 24 sec but CGA for safety 07/05/19  7. Pt to improve SSWS score to at least 0.50 m/sec for improved safety with community mobility. MET 07/05/19     Plan     Continue to address RLE strength deficits with emphasis on hamstring and R ankle strengthening. Focus on sit <> stand transfers without UE support. Focus on safety with ambulation. Focus on trunk flexibility and improved posture.     Hanh Brooke, PT

## 2019-07-16 NOTE — PROGRESS NOTES
"Outpatient Neurological Rehabilitation   Speech and Language Therapy Daily Note  Date:  7/16/2019     Name: Torito Harris   MRN: [id number]   Therapy Diagnosis:   Encounter Diagnosis   Name Primary?    Broca's aphasia    Physician: Ian Feliciano MD  Physician Orders: ST evaluate and treat  Medical Diagnosis: I69.30 (ICD-10-CM) - Late effect of stroke    Visit #/Visits authorized: 7/ 20 (29 prior to this auth) KX   Date of Evaluation:  1/28/19  Insurance Authorization Period: 6/11/19 to 12/31/19  Plan of Care Expiration Date:  1/28/2019 to 3/25/19; 3/28/19 to 5/24/19; 5/23/19-7/19/19  Extended POC: n/a   Cancelled Visits:  0   No Show Visits:  7    Time In:  1300  Time Out:  1345  Total Billable Time: 45 minutes    Precautions: Standard and Fall  Subjective:   Pt reports: that he has been practicing at home via gesture and his wife. working at tactus therapy "its good"    Pain Scale:  0/10 on VAS currently.   Pain Location: n/a  Objective:   UNTIMED  Procedure Min.   Speech- Language- Voice Therapy    45   Total Untimed Units: 1  Charges Billed/# of units: 1    Short Term Goals: (4 weeks) Current Progress:   1. Pt will name common objects with 90% Carmen.  Progressing/ Not Met 7/16/2019   Not formally addressed      2. Pt will generate a response to basic sentence completions with 80% Carmen   Progressing/ Not Met 7/16/2019   Not formally addressed     3.  pt will complete reading comprehension tasks at the sentence level with 90% acc indly.  Progressing/ Not Met 7/16/2019   Not formally addressed      4. pt will follow 3 unit body part commands with 90% acc indly.   Progressing/ Not Met 7/16/2019  Not formally addressed     5.  pt will follow 3 unit commands with visual stimuli with 90% acc indly to improve auditory comprehension.   Progressing/ Not Met 7/16/2019   Not formally addressed       6. Pt will complete word finding task (i.e. Creating subject, verb, object pairs in VNEST protocol) with 90% acc min A to " improve word fluency.  Progressing/ Not Met 7/16/2019   Not formally addressed      7. Pt will write the name for an item pictured on 3/4 trials. Pt will participate in ACRT treatment immediately after incorrect productions  Progressing/ Not Met 7/16/2019   Not formally addressed        Western Aphasia Battery - Revised (WAB-R) was administered to evaluate the patient's receptive and expressive language function.The purpose stated in the manual for the WAB-R is to determine the presence, severity, and type of aphasia; measure the patient's level of performance to provide a baseline for detecting change over time; provide a comprehensive assessment of the patients language strengths and deficits in order to guide treatment and management; infer the location and etiology of the lesion causing the aphasia.  The following results were revealed:     Spontaneous Speech Score: 10/20    Patient able to respond to general conversation questions given min cues. Patient able to label items/actions (5) in picture description task, though speech was  nonfluent with  telegraphic + halting speech. Patient benefited from sentence completion cueing.   Auditory Comprehension Score: 7.25/10   Patient answered simple comprehension questions with 93% accuracy; answered complex comprehension questions with 67% accuracy. Cues for  repetition  provided as patient requested. Patient able to identify real objects/pictured objects/shapes/colors/numbers with 100% accuracy; identified letters with 66% acc;  identified body parts/fingers with 82% acc. Patient with difficulty identifying right/left body parts (43%). Patient able to follow simple 1-2 step commands; difficulty  following complex spatial/temporal commands despite repetitions. Improvement in auditory comprehension skills noted compared to initial evaluation.   Repetition Score:   5.7/10   Patient able to repeat simple words/phrases, difficulty repeating sentences of increasing  complexity + omitting function words. Patient appeared to benefit from  repetition of stimuli during this task.   Naming and Word Finding Score:  3.8/10   Patient named common objects with 30% accuracy independently; accuracy improved to 90% when provided with moderate phonemic/semantic cueing. Difficulty  participating in word fluency task despite max cueing. Patient able to complete sentence completion task with 40% accuracy; able to complete responsive speech  task with 40% accuracy.   Aphasia Quotient (AQ):   53.5/100  Aphasia Classification: Broca's Aphasia   Original evaluation results reported on 1/28/19.     Patient Education/Response:   Updated POC. Patient + wife in agreement and verbalized understanding.     Assessment:   Torito is progressing well towards his goals. Following re-administration of WAB, pt's auditory comprehension skills + repetition skills have improved since initial evaluation; naming + word finding skills appear to remain at same skill level as the initial evalauation?  Pt prognosis is Good. Pt will continue to benefit from skilled outpatient speech and language therapy to address the deficits listed in the problem list on initial evaluation, provide pt/family education and to maximize pt's level of independence in the home and community environment.     Barriers to Therapy: sleeping pattern, possible transportation  Pt's spiritual, cultural and educational needs considered and pt agreeable to plan of care and goals.    Plan:   See updated POC.       SERINA Yang, CF-SP  Speech Language Pathologist   7/16/2019

## 2019-07-16 NOTE — PROGRESS NOTES
Occupational Therapy Daily Treatment Note     Name: Torito Harris  [facility] Number: [id number]  Physician: Ian Feliciano MD  Medical Diagnosis: L CVA    Therapy Diagnosis:   Encounter Diagnoses   Name Primary?    Impaired mobility and activities of daily living     Weakness of right upper extremity      Visit Date: 7/16/2019  Physician Orders: Eval and Treat   Surgical Procedure and Date: NA  Evaluation Date: 1/28/19   Insurance Authorization Period Expiration: 12/31/19   Plan of Care Certification Period: 90 days from 6/11/19 (for 12 treatment sessions)   Date of Return to MD: unknown at this time      Visit # / Visits authorized: 34 (4/12 POC) ( 4/20 new auth)  Time In: 1345   Time Out: 1430   Total Billable Time: 45 minutes      Precautions: Standard, fall risk, hearing impaired     Subjective     Pt reports: no new complaints   Response to previous treatment: positive; increased knowledge and interest in driving eval   Functional change: progressing     Pain: none reported   Location: NA      Objective     Torito participated in therapeutic exercises to improve ROM and strength for 10 minutes:   - UBE level 5 x 4 min forwards x 4 min reverse   - overhead pulleys shoulder flexion x 2 min       Torito participated in neuromuscular reeducation activities to maximize ROM, motor strength and neuromuscular and proprioceptive input to the RUE for 25 minutes   Sitting EOM:   - restoration of R hand arches and metacarpal stretches   - general R wrist stretches: 1. P-A glide of scaphoid on radius, 2. Radial deviation, 3. Increasing mobility of metacarpals, 4. Carpal roll, 5. Movement of forearm on hand towards wrist extension, 6. Movement of forearm on the hand toward supination/ pronation, 7. wrist ext with distraction   - right upper extremity weight bearing on a dynamic surface with body on arm weight shifts and arm on body weight shifts in sit and in stand; self [place] assist and Min A provided by OT     Patient  participated in therapeutic activities to improve functional performance in the home environment for 10 minutes:   - reach and grasp of cups with R hand - assistance for forward reach and for thumb opposition around cup          Functional mobility:   - sit <> stand with SBA  - gait with quad cane and close SBA       Home Exercises and Education Provided     Education provided: continue HEP   - Progress towards goals / POC       Written Home Exercises Provided: no new, continue HEP    Patient instructed to cont prior HEP as verbally instructed in previous tx sessions. Exercises were reviewed and Torito was able to demonstrate them prior to the end of the session.  Torito demonstrated good  understanding of the HEP provided.     Pt has no cultural, educational or language barriers to learning provided.    Assessment   Torito Harris tolerated treatment session well.  He demonstrates steady progress, but he still needs assist and cues as his RUE has not improved enough for functional/purposeful use.  Most difficulty noted with functional reach due to residual mm weakness.  Continue POC and advance appropriately to address residual functional and physical deficits.     Patient would benefit from continued skilled occupational therapy.  Torito is progressing well towards his goals and there are no updates to goals at this time.  Pt prognosis is Good as he remains motivated to participate and has good support from his spouse. Pt will continue to benefit from skilled outpatient occupational therapy to address the deficits listed in the problem list on initial evaluation provide pt/family education and to maximize pt's level of independence in the home and community environment.      Anticipated barriers to occupational therapy: none     Pt's spiritual, cultural and educational needs considered and pt agreeable to plan of care and goals.    Goals:  Short Term Goals: 3 weeks   ROM/Strength to perform the ADL's and  functional activities listed below, - in progress  Pt will improve functional  strength needed for tasks to 20# in R hand. - in progress   Pt will improve AROM for shoulder flexion needed for functional tasks to 60* in R UE. - MET 5/14/19  UE dressing will improve to Min A - in progress   LB dressing will improve to Mod A  - in progress   Toileting will improve to Min A incorporating R hand for task  - in progress   Pt will be Independent with HEP to improve ROM and FM skills. - MET 6/11/19      Long Term Goals: 6 weeks   ROM/Strength to perform the ADL's and functional activities listed below - in progress   Pt will improve functional  strength needed for tasks to 30# in R hand. - in progress   Pt will improve AROM for shoulder flexion needed for functional tasks to 80* in R UE. - MET 4/14/19  UE dressing will improve to Supervision   LB dressing will improve to Min A   Toileting will improve to Supervision incorporating R hand for task   Pt will complete clinical testing for skills needed for driving - not met; patient reports he is driving and has received MD clearance   G code self care CK - in progress     Plan   Continue POC in pursuit of OT goals.     Discussed Plan of Care with patient: Yes  Updates/Grading for next session: continue R hand functional reach/grasp

## 2019-07-16 NOTE — PLAN OF CARE
Outpatient Neurological Rehabilitation Therapy  Updated POC     Date: 7/16/2019   Name: Torito Harris  Clinic Number: [id number]    Therapy Diagnosis:   Encounter Diagnosis   Name Primary?    Broca's aphasia      Physician: Ian Feliciano MD  Physician Orders: ST evaluate and treat  Medical Diagnosis: I69.30 (ICD-10-CM) - Late effect of stroke     Visit #/Visits authorized: 7/ 20 (29 prior to this auth) KX   Date of Evaluation:  1/28/19  Insurance Authorization Period: 6/11/19 to 12/31/19  Plan of Care Expiration Date:  1/28/2019 to 3/25/19; 3/28/19 to 5/24/19; 5/23/19-7/19/19; 7/16/19 to 8/30/19  Extended POC: n/a   Cancelled Visits:  0   No Show Visits:  7    Precautions:Standard and Fall     Subjective     Update: pt and his wife both feel he is making significant progress.     Objective     Update: see follow up note dated 7/16/2019    Assessment     Update: Torito Harris presents to [facility] s/p medical diagnosis of  CVA. Demonstrates impairments including limitations as described in the problem list. Positive prognostic factors include patient motivation. Negative prognostic factors include time post onset. He presents with broca's aphasia c/b decreased verbal expression, comprehension, and repetition skills. Following re-administration of WAB, pt's auditory comprehension skills + repetition skills have improved since initial evaluation; naming + word finding skills appear to remain at same skill level as the initial evalauation?  Pt prognosis is Good. Pt will continue to benefit from skilled outpatient speech and language therapy to address the deficits listed in the problem list on initial evaluation, provide pt/family education and to maximize pt's level of independence in the home and community environment. No barriers to therapy identified.. Patient will benefit from skilled, outpatient neurological rehabilitation speech therapy.    Rehab Potential: good     Education:  Plan of Care reviewed with patient and wife. Both verbalized understanding.     Previous Short Term Goals Status: 4 weeks  Short Term Goals: (4 weeks) Current Progress:   1. Pt will name common objects with 90% Carmen.   Goal Not Met / Continue    2. Pt will generate a response to basic sentence completions with 80% Carmen  Goal Not Met / Continue    3.  pt will complete reading comprehension tasks at the sentence level with 90% acc indly. Goal Not Met / Continue    4. pt will follow 3 unit body part commands with 90% acc indly.  Goal Not Met / Continue    5.  pt will follow 3 unit commands with visual stimuli with 90% acc indly to improve auditory comprehension.   Goal Not Met / Continue    6. Pt will complete word finding task (i.e. Creating subject, verb, object pairs in VNEST protocol) with 90% acc min A to improve word fluency. Goal Not Met / Continue    7. Pt will write the name for an item pictured on 3/4 trials. Pt will participate in ACRT treatment immediately after incorrect productions Goal Not Met / Continue         New Short Term Goals: 3 weeks  Short Term Goals: (3 weeks) Current Progress:   1. Pt will name common objects with 90% Carmen.   Goal Not Met / Continue    2. Pt will generate a response to basic sentence completions with 80% Carmen  Goal Not Met / Continue    3.  pt will complete reading comprehension tasks at the sentence level with 90% acc indly. Goal Not Met / Continue    4. pt will follow 3 unit body part commands with 90% acc indly.  Goal Not Met / Continue    5.  pt will follow 3 unit commands with visual stimuli with 90% acc indly to improve auditory comprehension.   Goal Not Met / Continue    6. Pt will complete word finding task (i.e. Creating subject, verb, object pairs in VNEST protocol) with 90% acc min A to improve word fluency. Goal Not Met / Continue    7. Pt will write the name for an item pictured on 3/4 trials. Pt will participate in ACRT treatment immediately after incorrect  productions Goal Not Met / Continue       Long Term Goal Status:  6 weeks  1. Pt will comprehend communication related to basic medical and social needs and utilize compensatory strategies to maintain safety and participate socially in functional living environment.   Goal Not Met / Continue   2. The patient will develop functional, cognitive-linguistic based skills and utilize compensatory strategies to communicate wants and needs effectively to different conversation partners, maintain safety and participate socially in functional living environment Goal Not Met / Continue   3. Pt will develop functional reading and writing skills and utilize compensatory strategies to maintain safety during ADL's and read and understand everyday adult material independently. Goal Not Met / Continue     Goals Previously Met:  - Pt will complete R/L body part discrimination tasks with 70% Carmen. Goal Met 2/7/19/ Discontinue   - Pt will repeat  3-5 word phrases with 80% Carmen. Goal Met 2/21/19 / Discontinue  - assess reading and writing skills Goal Met 2/26/19   - pt will follow 2 unit body part commands with 90% acc indly to improve auditory comprehension. Goal Met 3/4/19 / Discontinue  - Patient will match phrases to pictures to increase reading comprehension skills with 90% accuracy given min A. Goal Met 3/4/19 / Discontinue  - Patient will write grapheme to corresponding phoneme with 90% acc indly. Goal Not Met / Discontinue    - Patient will write name and address to increase written expression skills with 90% accuracy given verbal presentation of letters Goal Not Met / Discontinue    - pt will follow 2 unit commands with visual stimuli with 90% acc indly to improve auditory comprehension. Goal Met 5/16/19 / Discontinue   - Pt will name 5 concrete category items with Carmen. Goal Not Met / Discontinue      Plan     Updated Certification Period: 7/16/2019 to 8/30/19  Recommended Treatment Plan: Patient will participate in the  [facility] neurological rehabilitation program for speech therapy 2 times per week to address his  Communication and Motor Speech deficits, to educate patient and their family, and to participate in a home exercise program.     Other recommendations: n/a     Therapist's Name:  SERINA Paz, CCC-SLP   7/16/2019      I CERTIFY THE NEED FOR THESE SERVICES FURNISHED UNDER THIS PLAN OF TREATMENT AND WHILE UNDER MY CARE    Physician's comments:     Physician's Name:

## 2019-07-18 ENCOUNTER — DOCUMENTATION ONLY (OUTPATIENT)
Dept: REHABILITATION | Facility: HOSPITAL | Age: 59
End: 2019-07-18

## 2019-07-18 DIAGNOSIS — R47.01 BROCA'S APHASIA: ICD-10-CM

## 2019-07-18 NOTE — PROGRESS NOTES
No Show Note/Documentation    Patient: Torito Harris  Date of Session: 7/18/2019  Diagnosis:   Encounter Diagnosis   Name Primary?    Broca's aphasia       MRN: [id number]    Torito Harris did not attend his  scheduled therapy appointment today. He did not call to cancel nor reschedule. This is the 7th appointment that he has not attended. SERINA Paz, CCC-SLP   7/18/2019

## 2019-07-23 ENCOUNTER — DOCUMENTATION ONLY (OUTPATIENT)
Dept: REHABILITATION | Facility: HOSPITAL | Age: 59
End: 2019-07-23

## 2019-07-23 ENCOUNTER — TELEPHONE (OUTPATIENT)
Dept: REHABILITATION | Facility: HOSPITAL | Age: 59
End: 2019-07-23

## 2019-07-23 DIAGNOSIS — R47.01 BROCA'S APHASIA: ICD-10-CM

## 2019-07-23 NOTE — TELEPHONE ENCOUNTER
Pt missed appointment because his wife is sick and he was not able to get to appointment. Pt's wife apologized.     SERINA Mott, CCC-SLP  Speech Language Pathologist

## 2019-07-23 NOTE — PROGRESS NOTES
No Show Note/Documentation    Patient: Torito Harris  Date of Session: 7/23/2019  Diagnosis:   Encounter Diagnosis   Name Primary?    Broca's aphasia       MRN: [id number]    Torito Harris did not attend his  scheduled therapy appointment today. He did not call to cancel nor reschedule. This is the 8th appointment that he has not attended. SERINA Paz, CCC-SLP   7/23/2019

## 2019-07-25 ENCOUNTER — CLINICAL SUPPORT (OUTPATIENT)
Dept: REHABILITATION | Facility: HOSPITAL | Age: 59
End: 2019-07-25
Attending: PSYCHIATRY & NEUROLOGY
Payer: COMMERCIAL

## 2019-07-25 DIAGNOSIS — R47.01 BROCA'S APHASIA: ICD-10-CM

## 2019-07-25 DIAGNOSIS — Z74.09 IMPAIRED MOBILITY AND ACTIVITIES OF DAILY LIVING: ICD-10-CM

## 2019-07-25 DIAGNOSIS — Z74.09 IMPAIRED FUNCTIONAL MOBILITY, BALANCE, GAIT, AND ENDURANCE: ICD-10-CM

## 2019-07-25 DIAGNOSIS — R29.898 WEAKNESS OF RIGHT UPPER EXTREMITY: ICD-10-CM

## 2019-07-25 DIAGNOSIS — Z78.9 IMPAIRED MOBILITY AND ACTIVITIES OF DAILY LIVING: ICD-10-CM

## 2019-07-25 DIAGNOSIS — R29.898 WEAKNESS OF RIGHT LEG: ICD-10-CM

## 2019-07-25 PROCEDURE — 97116 GAIT TRAINING THERAPY: CPT | Mod: KX,PO,59

## 2019-07-25 PROCEDURE — 97110 THERAPEUTIC EXERCISES: CPT | Mod: KX,PO,59

## 2019-07-25 PROCEDURE — 92507 TX SP LANG VOICE COMM INDIV: CPT | Mod: KX,PO

## 2019-07-25 PROCEDURE — 97530 THERAPEUTIC ACTIVITIES: CPT | Mod: KX,PO,59

## 2019-07-25 PROCEDURE — 97110 THERAPEUTIC EXERCISES: CPT | Mod: KX,PO

## 2019-07-25 NOTE — PROGRESS NOTES
Outpatient Neurological Rehabilitation   Speech and Language Therapy Daily Note  Date:  7/25/2019     Name: Torito Harris   MRN: [id number]   Therapy Diagnosis:   Encounter Diagnosis   Name Primary?    Broca's aphasia    Physician: Ian Feliciano MD  Physician Orders: ST evaluate and treat  Medical Diagnosis: I69.30 (ICD-10-CM) - Late effect of stroke    Visit #/Visits authorized: 8/ 20 (29 prior to this auth) KX   Date of Evaluation:  1/28/19  Insurance Authorization Period: 6/11/19 to 12/31/19  Plan of Care Expiration Date:  1/28/2019 to 3/25/19; 3/28/19 to 5/24/19; 5/23/19-7/19/19  Extended POC: n/a   Cancelled Visits:  0   No Show Visits:  9     Time In:  1430  Time Out:  1513  Total Billable Time: 43 minutes    Precautions: Standard and Fall  Subjective:   Pt's wife reports that she has been sick and that is why they have missed therapy. Reports that patient is going to the eye doctor the following day. Activities today to target accurate labeling of letters for  Accurate eye doctor appointment.   Pain Scale:  0/10 on VAS currently.   Pain Location: n/a  Objective:   UNTIMED  Procedure Min.   Speech- Language- Voice Therapy    43   Total Untimed Units: 1  Charges Billed/# of units: 1    Short Term Goals: (4 weeks) Current Progress:   1. Pt will name common objects with 90% Carmen.  Progressing/ Not Met 7/25/2019   Not formally addressed      2. Pt will generate a response to basic sentence completions with 80% Carmen   Progressing/ Not Met 7/25/2019   Not formally addressed     3.  pt will complete reading comprehension tasks at the sentence level with 90% acc indly.  Progressing/ Not Met 7/25/2019   Pt selected the appropriate word to finish a sentence from a field of 4 with 53% acc indly 80% acc given moderate verbal cues   4. pt will follow 3 unit body part commands with 90% acc indly.   Progressing/ Not Met 7/25/2019  Not formally addressed     5.  pt will follow 3 unit commands with visual stimuli with  90% acc indly to improve auditory comprehension.   Progressing/ Not Met 7/25/2019   Not formally addressed       6. Pt will complete word finding task (i.e. Creating subject, verb, object pairs in VNEST protocol) with 90% acc min A to improve word fluency.  Progressing/ Not Met 7/25/2019   Not formally addressed      7. Pt will write the name for an item pictured on 3/4 trials. Pt will participate in ACRT treatment immediately after incorrect productions  Progressing/ Not Met 7/25/2019   Not formally addressed        Pt labeled letters in a random series with 0% accuracy.  Pt identified letters on a letter board with 100% acc indly. ABC letter board created and sent home with patient for home use. Training with family on how to encourage use of letter board for purpose of eye doctor appointment reviewed.      Patient Education/Response:   Aphasia ID card, ABC letterboard use reviewed. Pt and wife verbalized understanding.     Assessment:   Torito is progressing well towards his goals. Increased accuracy labeling letters using alternative means of communication rather than a verbal response. Pt prognosis is Good. Pt will continue to benefit from skilled outpatient speech and language therapy to address the deficits listed in the problem list on initial evaluation, provide pt/family education and to maximize pt's level of independence in the home and community environment.     Barriers to Therapy: sleeping pattern, possible transportation  Pt's spiritual, cultural and educational needs considered and pt agreeable to plan of care and goals.    Plan:   Continue POC with focus on external aids for communication.     SERINA Mott, CCC-SLP  Speech Language Pathologist   7/25/2019

## 2019-07-25 NOTE — PROGRESS NOTES
Physical Therapy Daily Treatment Note     [id number]    Therapy Diagnosis:   Encounter Diagnoses   Name Primary?    Weakness of right leg     Impaired functional mobility, balance, gait, and endurance      Physician: Ian Feliciano MD    Visit Date: 7/25/2019    Physician Orders: PT Eval and Treat   Medical Diagnosis from Referral: Late effect of stroke  Evaluation Date: 1/28/2019  Authorization Period Expiration: 06/06/19-12/31/19  Plan of Care Expiration: 07/05/19 to 08/30/19  Visit # / Visits authorized: 10/20 (Vist #34 of 2019) - KX     Time In: 13:00  Time Out: 13:45  Total Billable Time: 45 minutes    Precautions: Standard, Diabetes, Fall and right hemiplegia, hearing aid, safety awareness, cognitive deficits, speech deficits    Subjective     Pt reports: that he wants to do the leg press again today.   HEP Compliance: pt reports he is doing all of his exercises at home.  Response to previous treatment: no complaints  Functional change: improved ability to side step and to step backward    Pain: 0/10  Location: N/A      Objective     Torito received therapeutic exercises to develop strength, endurance, ROM, flexibility and core stabilization for 37 minutes including:     X 10 min Sci Fit recumbent stepper BLE only for BLE endurance and strength, L2    X 15 reps sit <> stand transfers from elevated mat of 19 in height with no UE support, CGA at R knee     Seated therex:   2 X 20 reps of R LE DF with OTB   2 x 20 reps of RLE PF with OTB     Standing therex:   3 x 10 reps RLE standing hamstring curls with AAROM with LUE support     Leg Press:  3 x 10 BLE at 180#, TCs at RLE  1 x 10 RLE at 80#, TCs at RLE - activity stopped due to patient becoming argumentative with PT about adding weight    Patient participated in gait training activities to normalize gait pattern for 8 minutes. The following activities were included:   Gait belt used for safety. Assistive device: SBQC,  little platform rolling walker     X 300 feet ambulation in closed hallway with LITTLE platform RW, CGA to SBA with CGA with turning 180 degrees. Pt demonstrates more weight bearing on LLE compared to RLE. Increased R knee flexion in stance, increased RLE adduction in stance, increased RLE foot flat contact. CGA to navigate walker when stepping backward and side stepping    Home Exercises Provided and Patient Education Provided     Education provided:  Education provided to patient and his wife  -proper technique to assist with balance, gait, and RW management with Little platform rolling walker and standard bilateral platform rolling walker  -proper hand positioning when holding gait belt  -PT instructed both patient and his wife that if they decide to purchase either the bilateral standard RW platform attachments or the Little platform rolling walker, then PT recommends that patient's wife is present to provide necessary assistance (that she was just educated on) at ALL TIMES when patient is using the platform rolling walker to prevent fall. When patient is ambulating unsupervised at home, he is to continue to use his SBQC. Pt and his wife verbalized good understanding of these instructions to PT.     Written Home Exercises Provided: Continue current HEP.   See EMR under Media for exercises provided 2/5/2019.    Assessment   Mr. Ugarte tolerated therapy session well this afternoon. He was able to increase reps of sit <> stand transfers s/ UE support compared to prior trials. Also able to increase duration of CV endurance activity by 2 min without complaint.  However, during leg press activity, patient became argumentative with therapist as patient requesting for PT to add more weight. PT explained to patient that resistance was not going to be added today as current weight appears appropriate challenge based on PT's observation of patient's performance. Pt became mildly agitated and insisted on more weight. PT instructed  patient again that it is PT's job to maintain safety while appropriately challenging patient and adding resistance was not safe at this time. Therefore, PT stopped activity to prevent further escalation of emotion. Mobility remains limited by RLE hemiparesis, poor trunk posture, and patient's reluctance to accept weight throughout LLE. Continue POC to further address these deficits.     Torito is progressing well towards his goals.   Pt prognosis is Good.     Pt will continue to benefit from skilled outpatient physical therapy to address the deficits listed in the problem list box on initial evaluation, provide pt/family education and to maximize pt's level of independence in the home and community environment.     Pt's spiritual, cultural and educational needs considered and pt agreeable to plan of care and goals.     Anticipated Barriers for therapy: hearing deficit, speech deficits, cognitive deficits, decreased safety awareness, fall risk, visual neglect, transportation assistance required    Goals:  Short Term Goals: 4 weeks   1. Pt to be (I) with established HEP MET 03/19/19  2. Pt to improve R hip flexion strength MMT score to at least 4+/5 for improved gait mechanics Progressing  3. Pt to improve R hip extension strength MMT score to at least 4/5 for improved gait mechanics MET 06/11/19  4. Pt to improve R hip AB and R hip AD strength MMT scores to at least 3+/5 for improved gait mechanics MET 03/04/19  5.  Pt to improve R knee flexion and extension strength MMT scores to at least 4+/5 for improved gait mechanics Ongoing  6. Pt to improve R ankle PF strength MMT score to at least 2/5 for improved gait mechanics MET 03/04/19  7. Pt to improve chair rise score to at least 5 times with no more than 1 UE support and S for improved endurance and safety with functional transfers MET 03/04/19  8. Pt to improve TUG score to at least 30 seconds for improved safety with household mobility MET 06/11/19  9. Pt to  improve SSWS score to at least 0.40 m/sec for improved safety with community mobility. MET 05/14/19     Long Term Goals: 8 weeks   1. Pt to be (I) with advanced HEP Ongoing  2. Pt to improve R hip extension strength MMT score to at least 4+/5 for improved gait mechanics Ongoing  3. Pt to improve R hip AB and R hip AD strength MMT scores to at least 4-/5 for improved gait mechanics MET 06/11/19  4. Pt to improve R ankle PF strength MMT score to at least 2+/5 for improved gait mechanics MET 03/19/19  5. Pt to improve chair rise score to at least 5 times with no UE support and S for improved endurance and safety with functional transfers Progressing--CGA needed,  07/05/19  6. Pt to improve TUG score to at least 26 seconds for improved safety with household mobility Progressing-- 24 sec but CGA for safety 07/05/19  7. Pt to improve SSWS score to at least 0.50 m/sec for improved safety with community mobility. MET 07/05/19     Plan     Continue to address RLE strength deficits with emphasis on hamstring and R ankle strengthening. Focus on sit <> stand transfers without UE support. Focus on safety with ambulation. Focus on trunk flexibility and improved posture.     Hanh Brooke, PT

## 2019-07-25 NOTE — PATIENT INSTRUCTIONS
For your eye doctor appointment tomorrow:  · Remember to tell the doctor that you have aphasia and do not always name letters correctly.  · Ask if you can point to the letter you see rather than say it out loud.   · Bring letter board provided to your appointment.       SERINA Mott, CCC-SLP  Speech Language Pathologist

## 2019-07-25 NOTE — PROGRESS NOTES
Occupational Therapy Daily Treatment Note     Name: Torito Harris  [facility] Number: [id number]  Physician: Ian Feliciano MD  Medical Diagnosis: L CVA    Therapy Diagnosis:   Encounter Diagnoses   Name Primary?    Impaired mobility and activities of daily living     Weakness of right upper extremity      Visit Date: 7/25/2019  Physician Orders: Eval and Treat   Surgical Procedure and Date: NA  Evaluation Date: 1/28/19   Insurance Authorization Period Expiration: 12/31/19   Plan of Care Certification Period: 90 days from 6/11/19 (for 12 treatment sessions)   Date of Return to MD: unknown at this time      Visit # / Visits authorized: 35 (5/12 POC) ( 5/20 new auth)  Time In: 1345   Time Out: 1430   Total Billable Time: 45 minutes      Precautions: Standard, fall risk, hearing impaired     Subjective     Pt reports: patient's wife reports she was sick last week and that is why pt did not make his past two appointments.    Response to previous treatment: positive; increased knowledge and interest in driving eval   Functional change: progressing     Pain: none reported   Location: NA      Objective     Torito participated in therapeutic exercises to improve ROM and strength for 25 minutes:   - UBE level 5 x 5 min forwards x 5 min reverse   - arm skate for RUE AAROM x 8 min; Min A   - AAROM R elbow flex/ext x 3 x 10; Min A   - AAROM R shoulder flex/ext x 3 x 10; Mod A      Patient participated in therapeutic activities to improve functional performance in the home environment for 20 minutes:   - reach and grasp with various size/material balls with R hand and releasing them into container located forwards and laterally- assistance for forward and lateral reach and for thumb opposition around ball          Functional mobility:   - sit <> stand with SBA  - gait with quad cane and close SBA       Home Exercises and Education Provided     Education provided: continue HEP   - Progress towards goals / POC     Written Home  Exercises Provided: no new, continue HEP    Patient instructed to cont prior HEP as verbally instructed in previous tx sessions. Exercises were reviewed and Torito was able to demonstrate them prior to the end of the session.  Torito demonstrated good  understanding of the HEP provided.     Pt has no cultural, educational or language barriers to learning provided.    Assessment   Torito Harris tolerated treatment session well.  He continues with RUE weakness and requires OT assistance (min-mod as noted above) to move through full range of motion.  He required cues to eliminate compensatory movement patterns.  Continue POC and advance appropriately to address residual functional and physical deficits.     Patient would benefit from continued skilled occupational therapy.  Torito is progressing well towards his goals and there are no updates to goals at this time.  Pt prognosis is Good as he remains motivated to participate and has good support from his spouse. Pt will continue to benefit from skilled outpatient occupational therapy to address the deficits listed in the problem list on initial evaluation provide pt/family education and to maximize pt's level of independence in the home and community environment.      Anticipated barriers to occupational therapy: none     Pt's spiritual, cultural and educational needs considered and pt agreeable to plan of care and goals.    Goals:  Short Term Goals: 3 weeks   ROM/Strength to perform the ADL's and functional activities listed below, - in progress  Pt will improve functional  strength needed for tasks to 20# in R hand. - in progress   Pt will improve AROM for shoulder flexion needed for functional tasks to 60* in R UE. - MET 5/14/19  UE dressing will improve to Min A - in progress   LB dressing will improve to Mod A  - in progress   Toileting will improve to Min A incorporating R hand for task  - in progress   Pt will be Independent with HEP to improve ROM and FM  skills. - MET 6/11/19      Long Term Goals: 6 weeks   ROM/Strength to perform the ADL's and functional activities listed below - in progress   Pt will improve functional  strength needed for tasks to 30# in R hand. - in progress   Pt will improve AROM for shoulder flexion needed for functional tasks to 80* in R UE. - MET 4/14/19  UE dressing will improve to Supervision   LB dressing will improve to Min A   Toileting will improve to Supervision incorporating R hand for task   Pt will complete clinical testing for skills needed for driving - not met; patient reports he is driving and has received MD clearance   G code self care CK - in progress     Plan   Continue POC in pursuit of OT goals.     Discussed Plan of Care with patient: Yes  Updates/Grading for next session: continue R hand functional reach/grasp

## 2019-07-29 NOTE — PROGRESS NOTES
Outpatient Neurological Rehabilitation   Speech and Language Therapy Daily Note  Date:  7/30/2019     Name: Torito Harris   MRN: [id number]   Therapy Diagnosis:   Encounter Diagnosis   Name Primary?    Broca's aphasia    Physician: Ian Feliciano MD  Physician Orders: ST evaluate and treat  Medical Diagnosis: I69.30 (ICD-10-CM) - Late effect of stroke    Visit #/Visits authorized: 9/ 20 (29 prior to this auth) KX   Date of Evaluation:  1/28/19  Insurance Authorization Period: 6/11/19 to 12/31/19  Plan of Care Expiration Date:  1/28/2019 to 3/25/19; 3/28/19 to 5/24/19; 5/23/19-7/19/19  Extended POC: 7/16/19 to 8/30/19  Cancelled Visits:  0   No Show Visits:  9     Time In:  1300   Time Out:  1343   Total Billable Time: 43 minutes    Precautions: Standard and Fall  Subjective:   Pt reports  Eye doctor went well. Wife added that he needs some glasses to address eye issues but went on to say no issues in vision. Unclear if discussing a visual cut or neglect.  Pain Scale:  0/10 on VAS currently.   Pain Location: n/a  Objective:   UNTIMED  Procedure Min.   Speech- Language- Voice Therapy    43   Total Untimed Units: 1  Charges Billed/# of units: 1    Short Term Goals: (4 weeks) Current Progress:   1. Pt will name common objects with 90% Carmen.  Progressing/ Not Met 7/30/2019   28% acc indly, 57% acc given phrase completion, 86% acc given phrase completion and a phonemic cue   2. Pt will generate a response to basic sentence completions with 80% Carmen   Progressing/ Not Met 7/30/2019   60% acc indly, 80% acc given a repetition, 90% acc given a repetition and acue   3.  pt will complete reading comprehension tasks at the sentence level with 90% acc indly.  Progressing/ Not Met 7/30/2019   Not formally addressed     4. pt will follow 3 unit body part commands with 90% acc indly.   Progressing/ Not Met 7/30/2019  30% acc indly, 40% acc given a repetition and a cue, 60% acc given a repetition and model   5.  pt will follow  3 unit commands with visual stimuli with 90% acc indly to improve auditory comprehension.   Progressing/ Not Met 7/30/2019   Not formally addressed       6. Pt will complete word finding task (i.e. Creating subject, verb, object pairs in VNEST protocol) with 90% acc min A to improve word fluency.  Progressing/ Not Met 7/30/2019   Not formally addressed      7. Pt will write the name for an item pictured on 3/4 trials. Pt will participate in ACRT treatment immediately after incorrect productions  Progressing/ Not Met 7/30/2019   Not formally addressed          Patient Education/Response:   Tactus therapy as home program and independence completing program reviewed . Pt and wife verbalized understanding.     Assessment:   Torito is progressing well towards his goals. Decreased motivation to participate in therapy today likely contributed to lower comprehension accuracies. Pt prognosis is Good. Pt will continue to benefit from skilled outpatient speech and language therapy to address the deficits listed in the problem list on initial evaluation, provide pt/family education and to maximize pt's level of independence in the home and community environment.     Barriers to Therapy: sleeping pattern, possible transportation  Pt's spiritual, cultural and educational needs considered and pt agreeable to plan of care and goals.    Plan:   Continue POC with focus on external aids for communication.     SERINA Mott, CCC-SLP  Speech Language Pathologist   7/30/2019   all other ROS negative except as per HPI

## 2019-07-30 ENCOUNTER — CLINICAL SUPPORT (OUTPATIENT)
Dept: REHABILITATION | Facility: HOSPITAL | Age: 59
End: 2019-07-30
Attending: PSYCHIATRY & NEUROLOGY
Payer: COMMERCIAL

## 2019-07-30 DIAGNOSIS — Z74.09 IMPAIRED MOBILITY AND ACTIVITIES OF DAILY LIVING: ICD-10-CM

## 2019-07-30 DIAGNOSIS — Z78.9 IMPAIRED MOBILITY AND ACTIVITIES OF DAILY LIVING: ICD-10-CM

## 2019-07-30 DIAGNOSIS — R29.898 WEAKNESS OF RIGHT UPPER EXTREMITY: ICD-10-CM

## 2019-07-30 DIAGNOSIS — R47.01 BROCA'S APHASIA: ICD-10-CM

## 2019-07-30 PROCEDURE — 97112 NEUROMUSCULAR REEDUCATION: CPT | Mod: PO,59

## 2019-07-30 PROCEDURE — 97110 THERAPEUTIC EXERCISES: CPT | Mod: PO,59

## 2019-07-30 PROCEDURE — 92507 TX SP LANG VOICE COMM INDIV: CPT | Mod: KX,PO

## 2019-07-30 PROCEDURE — 97530 THERAPEUTIC ACTIVITIES: CPT | Mod: PO,59

## 2019-07-30 NOTE — PROGRESS NOTES
Occupational Therapy Daily Treatment Note     Name: Torito Harris  [facility] Number: [id number]  Physician: Ian Feliciano MD  Medical Diagnosis: L CVA    Therapy Diagnosis:   Encounter Diagnoses   Name Primary?    Impaired mobility and activities of daily living     Weakness of right upper extremity      Visit Date: 7/30/2019  Physician Orders: Eval and Treat   Surgical Procedure and Date: NA  Evaluation Date: 1/28/19   Insurance Authorization Period Expiration: 12/31/19   Plan of Care Certification Period: 90 days from 6/11/19 (for 12 treatment sessions)   Date of Return to MD: unknown at this time      Visit # / Visits authorized: 36 (6/12 POC) ( 6/20 new auth)  Time In: 1345   Time Out: 1430   Total Billable Time: 45 minutes      Precautions: Standard, fall risk, hearing impaired     Subjective     Pt reports: no new complaints     Response to previous treatment: positive; increased knowledge and interest in driving eval   Functional change: progressing     Pain: none reported   Location: NA      Objective     Torito participated in therapeutic exercises to improve ROM and strength for 12 minutes:   - UBE level 5 x 4 min forwards x 4 min reverse   - pulleys shoulder flex x 2'   - active assisted scap retraction x 15       Torito participated in neuromuscular reeducation activities to maximize ROM, motor strength and neuromuscular and proprioceptive input to the RUE for 20 minutes   - arm with body weight shifts on a dynamic surface while seated   - arm on body weight shifts on a dynamic surface in stand; Min A - Mod A   - body on arm weight shifts on a static surface; assistance to maintain flat hand position on table top       Patient participated in therapeutic activities to improve functional performance in the home environment for 13 minutes:   - reach and grasp of soft objects with R hand and releasing them into container located forwards and laterally- assistance for forward and lateral reach and for  thumb opposition around object          Functional mobility:   - sit <> stand with SBA  - gait with quad cane and close SBA       Home Exercises and Education Provided     Education provided: continue HEP   - Progress towards goals / POC     Written Home Exercises Provided: no new, continue HEP    Patient instructed to cont prior HEP as verbally instructed in previous tx sessions. Exercises were reviewed and Torito was able to demonstrate them prior to the end of the session.  Torito demonstrated good  understanding of the HEP provided.     Pt has no cultural, educational or language barriers to learning provided.    Assessment   Torito Harris tolerated treatment session well.  He continues with RUE weakness and requires OT assistance (min-mod as noted above) to for functional reaching tasks and to achieve full ROM during arm on body movements. He required cues to eliminate compensatory movement patterns.  He continues with limited thumb movemnet and requires assistance to oppose around objects during .  Continue POC and advance appropriately to address residual functional and physical deficits.     Patient would benefit from continued skilled occupational therapy.  Torito is progressing well towards his goals and there are no updates to goals at this time.  Pt prognosis is Good as he remains motivated to participate and has good support from his spouse. Pt will continue to benefit from skilled outpatient occupational therapy to address the deficits listed in the problem list on initial evaluation provide pt/family education and to maximize pt's level of independence in the home and community environment.      Anticipated barriers to occupational therapy: none     Pt's spiritual, cultural and educational needs considered and pt agreeable to plan of care and goals.    Goals:  Short Term Goals: 3 weeks   ROM/Strength to perform the ADL's and functional activities listed below, - in progress  Pt will improve  functional  strength needed for tasks to 20# in R hand. - in progress   Pt will improve AROM for shoulder flexion needed for functional tasks to 60* in R UE. - MET 5/14/19  UE dressing will improve to Min A - in progress   LB dressing will improve to Mod A  - in progress   Toileting will improve to Min A incorporating R hand for task  - in progress   Pt will be Independent with HEP to improve ROM and FM skills. - MET 6/11/19      Long Term Goals: 6 weeks   ROM/Strength to perform the ADL's and functional activities listed below - in progress   Pt will improve functional  strength needed for tasks to 30# in R hand. - in progress   Pt will improve AROM for shoulder flexion needed for functional tasks to 80* in R UE. - MET 4/14/19  UE dressing will improve to Supervision   LB dressing will improve to Min A   Toileting will improve to Supervision incorporating R hand for task   Pt will complete clinical testing for skills needed for driving - not met; patient reports he is driving and has received MD clearance   G code self care CK - in progress     Plan   Continue POC in pursuit of OT goals.     Discussed Plan of Care with patient: Yes  Updates/Grading for next session: continue R hand functional reach/grasp

## 2019-08-01 NOTE — PROGRESS NOTES
"Outpatient Neurological Rehabilitation   Speech and Language Therapy Daily Note  Date:  8/2/2019     Name: Torito Harris   MRN: [id number]   Therapy Diagnosis:   Encounter Diagnosis   Name Primary?    Broca's aphasia    Physician: Ian Feliciano MD  Physician Orders: ST evaluate and treat  Medical Diagnosis: I69.30 (ICD-10-CM) - Late effect of stroke    Visit #/Visits authorized: 10/ 20 (29 prior to this auth) KX   Date of Evaluation:  1/28/19  Insurance Authorization Period: 6/11/19 to 12/31/19  Plan of Care Expiration Date:  1/28/2019 to 3/25/19; 3/28/19 to 5/24/19; 5/23/19-7/19/19  Extended POC: 7/16/19 to 8/30/19  Cancelled Visits:  0   No Show Visits:  9     Time In:  1346   Time Out:  1430   Total Billable Time: 44 minutes    Precautions: Standard and Fall  Subjective:   Pt reports "I cant"   Pain Scale:  0/10 on VAS currently.   Pain Location: n/a  Objective:   UNTIMED  Procedure Min.   Speech- Language- Voice Therapy    44    Total Untimed Units: 1  Charges Billed/# of units: 1    Short Term Goals: (4 weeks) Current Progress:   1. Pt will name common objects with 90% Carmen.  Progressing/ Not Met 8/2/2019   Not formally addressed     2. Pt will generate a response to basic sentence completions with 80% Carmen   Progressing/ Not Met 8/2/2019   Not formally addressed     3.  pt will complete reading comprehension tasks at the sentence level with 90% acc indly.  Progressing/ Not Met 8/2/2019   Pt read y/n questions and answered yes or no aloud with 73% acc indly, 93% given verbal cues    4. pt will follow 3 unit body part commands with 90% acc indly.   Progressing/ Not Met 8/2/2019  Not formally addressed     5.  pt will follow 3 unit commands with visual stimuli with 90% acc indly to improve auditory comprehension.   Progressing/ Not Met 8/2/2019   Not formally addressed       6. Pt will complete word finding task (i.e. Creating subject, verb, object pairs in VNEST protocol) with 90% acc min A to improve word " "fluency.  Progressing/ Not Met 8/2/2019   Not formally addressed      7. Pt will write the name for an item pictured on 3/4 trials. Pt will participate in ACRT treatment immediately after incorrect productions  Progressing/ Not Met 8/2/2019   0/4 indly, 4/4 given phonemic cues     Pt answered wh- questions after a sentence with 50% acc indly, 90% acc given a repetition and cue.   Patient Education/Response:   Writing portion of CDNlion therapy myriam operation reviewed. Pt and wife verbalized understanding.     Assessment:   Torito is progressing well towards his goals. Perseveration on "I can't" re-initiated today resulting in slower pace of session with increased cues to redirect and attend to task required.. Pt prognosis is Good. Pt will continue to benefit from skilled outpatient speech and language therapy to address the deficits listed in the problem list on initial evaluation, provide pt/family education and to maximize pt's level of independence in the home and community environment.     Barriers to Therapy: sleeping pattern, possible transportation  Pt's spiritual, cultural and educational needs considered and pt agreeable to plan of care and goals.    Plan:   Continue POC with focus on external aids for communication.     SERINA Mott, CCC-SLP  Speech Language Pathologist   8/2/2019  "

## 2019-08-02 ENCOUNTER — CLINICAL SUPPORT (OUTPATIENT)
Dept: REHABILITATION | Facility: HOSPITAL | Age: 59
End: 2019-08-02
Attending: PSYCHIATRY & NEUROLOGY
Payer: MEDICARE

## 2019-08-02 DIAGNOSIS — Z78.9 IMPAIRED MOBILITY AND ACTIVITIES OF DAILY LIVING: ICD-10-CM

## 2019-08-02 DIAGNOSIS — R29.898 WEAKNESS OF RIGHT UPPER EXTREMITY: ICD-10-CM

## 2019-08-02 DIAGNOSIS — Z74.09 IMPAIRED MOBILITY AND ACTIVITIES OF DAILY LIVING: ICD-10-CM

## 2019-08-02 DIAGNOSIS — R47.01 BROCA'S APHASIA: ICD-10-CM

## 2019-08-02 PROCEDURE — 92507 TX SP LANG VOICE COMM INDIV: CPT | Mod: KX,PO

## 2019-08-02 PROCEDURE — 97112 NEUROMUSCULAR REEDUCATION: CPT | Mod: KX,PO

## 2019-08-02 NOTE — PROGRESS NOTES
Occupational Therapy Daily Treatment Note     [id number]  Physician: Ian Feliciano MD  Medical Diagnosis: L CVA    Therapy Diagnosis:   Encounter Diagnoses   Name Primary?    Impaired mobility and activities of daily living     Weakness of right upper extremity      Visit Date: 8/2/2019  Physician Orders: Eval and Treat   Surgical Procedure and Date: NA  Evaluation Date: 1/28/19   Insurance Authorization Period Expiration: 12/31/19   Plan of Care Certification Period: 90 days from 6/11/19 (for 12 treatment sessions)   Date of Return to MD: unknown at this time      Visit # / Visits authorized: 38 (8/12 POC) ( 8/20 new auth) KX  Time In: 1430   Time Out: 1515  Total Billable Time: 45 minutes      Precautions: Standard, fall risk, hearing impaired     Subjective     Pt reports:Wife reports he washed clothes himself.    Response to previous treatment: Pt. Feels more confident to do things for himself.  Functional change:put clothes in wash and turned it on.     Pain: none reported   Location: NA      Objective       Torito participated in neuromuscular reeducation activities to maximize ROM, motor strength and neuromuscular and proprioceptive input to the RUE for 45 minutes   -General wrist stretches including 1. Scaphoid on radius 2. Increasing mobility of metacarpals 3. Carpal rolls 4. Increasing mobility towards radial deviation 5. Increasing mobility of wrist towards extension 6. Increasing mobility of wrist towards supination/pronation  - arm with body weight shifts on a static surface while seated with assisted scapular mobilizations  - arm on body weight shifts on a static surface in stand and then arm on body weight shift; Min A - Mod A for shoulder flexion.  Grasp /release tasks with nerf balls with min a to keep R hand pronated and wrist extended during reach and grasp. Cue to move R arm on his own without L reaching over and moving it. Discussed items he could  work on grasping at home. Grasp and release with cup manipulation with cues for pronation and wrist extension.   -AAROM for wrist and forearm movements.     Functional mobility:   - sit <> stand with supervision  - gait with quad cane and close supervision      Home Exercises and Education Provided     Education provided: continue HEP   - Progress towards goals / POC     Written Home Exercises Provided: suggested grasp/ release of rolled up socks, continue HEP    Patient instructed to cont prior HEP as verbally instructed in previous tx sessions. Exercises were reviewed and Torito was able to demonstrate them prior to the end of the session.  Torito demonstrated good  understanding of the HEP provided.     Pt has no cultural, educational or language barriers to learning provided.    Assessment   Torito Harris has moderated R sided apraxia with increased tone in shoulder and hand..  He continues with RUE limited function and requires OT assistance (min-mod as noted above) to for functional reaching tasks and to achieve full ROM during arm on body movements. He required cues to eliminate compensatory movement patterns.  He continues with limited thumb movemnet and requires assistance to oppose around objects during .  Continue POC and advance appropriately to address residual functional and physical deficits.     Patient would benefit from continued skilled occupational therapy.  Torito is progressing well towards his goals and there are no updates to goals at this time.  Pt prognosis is Good as he remains motivated to participate and has good support from his spouse. Pt will continue to benefit from skilled outpatient occupational therapy to address the deficits listed in the problem list on initial evaluation provide pt/family education and to maximize pt's level of independence in the home and community environment.      Anticipated barriers to occupational therapy: none     Pt's spiritual, cultural and  educational needs considered and pt agreeable to plan of care and goals.    Goals:  Short Term Goals: 3 weeks   ROM/Strength to perform the ADL's and functional activities listed below, - in progress  Pt will improve functional  strength needed for tasks to 20# in R hand. - in progress   Pt will improve AROM for shoulder flexion needed for functional tasks to 60* in R UE. - MET 5/14/19  UE dressing will improve to Min A - in progress   LB dressing will improve to Mod A  - in progress   Toileting will improve to Min A incorporating R hand for task  - in progress   Pt will be Independent with HEP to improve ROM and FM skills. - MET 6/11/19      Long Term Goals: 6 weeks   ROM/Strength to perform the ADL's and functional activities listed below - in progress   Pt will improve functional  strength needed for tasks to 30# in R hand. - in progress   Pt will improve AROM for shoulder flexion needed for functional tasks to 80* in R UE. - MET 4/14/19  UE dressing will improve to Supervision   LB dressing will improve to Min A   Toileting will improve to Supervision incorporating R hand for task   Pt will complete clinical testing for skills needed for driving - not met; patient reports he is driving and has received MD clearance   G code self care CK - in progress     Plan   Continue POC in pursuit of OT goals.     Discussed Plan of Care with patient: Yes  Updates/Grading for next session: continue R hand functional reach/grasp

## 2019-08-05 ENCOUNTER — DOCUMENTATION ONLY (OUTPATIENT)
Dept: REHABILITATION | Facility: HOSPITAL | Age: 59
End: 2019-08-05

## 2019-08-05 NOTE — PROGRESS NOTES
PT/PTA met face to face to discuss pt's treatment plan and progress towards established goals.  Continue with current PT POC with focus on weight bearing on the R LE, R LE strengthening, balance and endurance.  Patient will be seen by physical therapist at least every sixth treatment or 30 days, whichever occurs first.    Lucy Vanegas, PTA  08/05/2019

## 2019-08-06 ENCOUNTER — DOCUMENTATION ONLY (OUTPATIENT)
Dept: REHABILITATION | Facility: HOSPITAL | Age: 59
End: 2019-08-06

## 2019-08-07 ENCOUNTER — CLINICAL SUPPORT (OUTPATIENT)
Dept: REHABILITATION | Facility: HOSPITAL | Age: 59
End: 2019-08-07
Attending: PSYCHIATRY & NEUROLOGY
Payer: MEDICARE

## 2019-08-07 DIAGNOSIS — R29.898 WEAKNESS OF RIGHT LEG: ICD-10-CM

## 2019-08-07 DIAGNOSIS — R47.01 BROCA'S APHASIA: ICD-10-CM

## 2019-08-07 DIAGNOSIS — Z74.09 IMPAIRED FUNCTIONAL MOBILITY, BALANCE, GAIT, AND ENDURANCE: ICD-10-CM

## 2019-08-07 PROCEDURE — 97110 THERAPEUTIC EXERCISES: CPT | Mod: KX,PO

## 2019-08-07 PROCEDURE — 97116 GAIT TRAINING THERAPY: CPT | Mod: KX,PO

## 2019-08-07 PROCEDURE — 92507 TX SP LANG VOICE COMM INDIV: CPT | Mod: KX,PO | Performed by: SPEECH-LANGUAGE PATHOLOGIST

## 2019-08-07 NOTE — PROGRESS NOTES
"Outpatient Neurological Rehabilitation   Speech and Language Therapy Daily Note  Date:  8/7/2019     Name: Torito Harris   MRN: [id number]   Therapy Diagnosis:   Encounter Diagnosis   Name Primary?    Broca's aphasia       Physician: Ian Feliciano MD  Physician Orders: ST evaluate and treat  Medical Diagnosis: I69.30 (ICD-10-CM) - Late effect of stroke    Visit #/Visits authorized: 11/20 (29 prior to this auth) KX   Date of Evaluation:  1/28/19  Insurance Authorization Period: 6/11/19 to 12/31/19  Plan of Care Expiration Date:  1/28/2019 to 3/25/19; 3/28/19 to 5/24/19; 5/23/19-7/19/19  Extended POC: 7/16/19 to 8/30/19  Cancelled Visits:  0   No Show Visits:  9     Time In:  1256  Time Out:  1341   Total Billable Time: 45  minutes    Precautions: Standard and Fall  Subjective:   Pt reports he is "alright". Patient pleasant and ready to participate in skilled ST services. Pt's wife present in session, providing support.   Pain Scale:  0/10 on VAS currently.   Pain Location: n/a  Objective:   UNTIMED  Procedure Min.   Speech- Language- Voice Therapy    45   Total Untimed Units: 1  Charges Billed/# of units: 1    Short Term Goals: (4 weeks) Current Progress:   1. Pt will name common objects with 90% Carmen.  Progressing/ Not Met 8/7/2019   Patient named objects with 56% acc indly; 94% acc given mod verbal cues (pt appeared to benefit more from phonemic cues as compared to semantic cues)   2. Pt will generate a response to basic sentence completions with 80% Carmen   Progressing/ Not Met 8/7/2019   Pt completed sentence completion task with 84% acc indly; 100% acc given min cues (phonemic)   3.  pt will complete reading comprehension tasks at the sentence level with 90% acc indly.  Progressing/ Not Met 8/7/2019   Pt read y/n questions and answered yes or no aloud with 86% acc indly, 93% acc given verbal cues    4. pt will follow 3 unit body part commands with 90% acc indly.   Progressing/ Not Met 8/7/2019  Pt followed " 3 unit body part commands with 60% acc indly; 90% acc given repetitions + cues to parse information   5.  pt will follow 3 unit commands with visual stimuli with 90% acc indly to improve auditory comprehension.   Progressing/ Not Met 8/7/2019   Not formally addressed       6. Pt will complete word finding task (i.e. Creating subject, verb, object pairs in VNEST protocol) with 90% acc min A to improve word fluency.  Progressing/ Not Met 8/7/2019   Not formally addressed      7. Pt will write the name for an item pictured on 3/4 trials. Pt will participate in ACRT treatment immediately after incorrect productions  Progressing/ Not Met 8/7/2019   Not formally addressed        Patient requested to use Direct Access Software Therapy Levy on iPad. Patient with 90% acc matching spoken word to written word (FO4-6) indly. Patient matched picture to sentence (FO4) with 60% acc indly; 100% acc given mod verbal cues.    Patient Education/Response:   Use of Direct Access Software Therapy levy + tasks to complete using levy reviewed. Word finding strategies reviewed with patient + wife. Pt + wife verbalized understanding of all discussed.       Assessment:   Torito is progressing well towards his goals. Patient with decreased naming ability independently; appears more functional in conversation. Patient observed to use word finding strategies (describe object + gesture) independently. Pt prognosis is Good. Pt will continue to benefit from skilled outpatient speech and language therapy to address the deficits listed in the problem list on initial evaluation, provide pt/family education and to maximize pt's level of independence in the home and community environment.     Barriers to Therapy: sleeping pattern, possible transportation  Pt's spiritual, cultural and educational needs considered and pt agreeable to plan of care and goals.    Plan:   Continue POC with focus on word finding strategies + external aids for communication.       SERINA Yang,  CF-SP  Speech Language Pathologist   8/7/2019

## 2019-08-07 NOTE — PROGRESS NOTES
Physical Therapy Daily Treatment Note     [id number]    Therapy Diagnosis:   No diagnosis found.  Physician: Ian Feliciano MD    Visit Date: 8/7/2019    Physician Orders: PT Eval and Treat   Medical Diagnosis from Referral: Late effect of stroke  Evaluation Date: 1/28/2019  Authorization Period Expiration: 06/06/19-12/31/19  Plan of Care Expiration: 07/05/19 to 08/30/19  Visit # / Visits authorized: 11/20 (Vist #35 of 2019) - KX     Time In: 13:48  Time Out: 14:30  Total Billable Time: 42 minutes    Precautions: Standard, Diabetes, Fall and right hemiplegia, hearing aid, safety awareness, cognitive deficits, speech deficits    Subjective     Pt reports: family member reports that yesterday the pt walked out in the porch, sat on the step and was able to get up using the railing.  HEP Compliance: pt reports he is doing all of his exercises at home.  Response to previous treatment: no complaints  Functional change: improved stability with sit to stands    Pain: 0/10  Location: N/A      Objective     Torito received therapeutic exercises to develop strength, endurance, ROM, flexibility and core stabilization for 34 minutes including:     X 8 min Sci Fit recumbent stepper BLE only for BLE endurance and strength, L2    X 15 reps sit <> stand transfers from elevated mat of 19 in height with no UE support, CGA at R knee     Seated therex:   2 X 20 reps of R LE DF with OTB   2 x 20 reps of RLE PF with OTB     Standing therex:   3 x 10 reps RLE standing hamstring curls with AAROM with LUE support     Leg Press:  2 x 10 BLE at 180#, TCs at RLE  2 x 10 RLE at 80#, TCs at RLE    Patient participated in gait training activities to normalize gait pattern for 8 minutes. The following activities were included:   Gait belt used for safety. Assistive device: SBQC, little platform rolling walker     X 400 feet ambulation in closed hallway with LITTLE platform RW, CGA to SBA with CGA with turning 180  degrees. Pt demonstrates more weight bearing on LLE compared to RLE. Increased R knee flexion in stance, increased RLE adduction in stance, increased RLE foot flat contact. CGA to navigate walker when stepping backward and side stepping    Home Exercises Provided and Patient Education Provided     Education provided:  Education provided to patient and his wife  -proper technique to assist with balance, gait, and RW management with Brittney platform rolling walker and standard bilateral platform rolling walker  -proper hand positioning when holding gait belt  -PT instructed both patient and his wife that if they decide to purchase either the bilateral standard RW platform attachments or the Brittney platform rolling walker, then PT recommends that patient's wife is present to provide necessary assistance (that she was just educated on) at ALL TIMES when patient is using the platform rolling walker to prevent fall. When patient is ambulating unsupervised at home, he is to continue to use his SBQC. Pt and his wife verbalized good understanding of these instructions to PT.     Written Home Exercises Provided: Continue current HEP.   See EMR under Media for exercises provided 2/5/2019.    Assessment   Pt tolerated tx session well. It was noted that pt required a rest break to perform 15 reps of sit to stands at 19 inches. Mobility remains limited by RLE hemiparesis, poor trunk posture, and patient's reluctance to accept weight throughout LLE. Continue POC to further address these deficits. Torito is progressing well towards his goals.     Pt prognosis is Good.     Pt will continue to benefit from skilled outpatient physical therapy to address the deficits listed in the problem list box on initial evaluation, provide pt/family education and to maximize pt's level of independence in the home and community environment.     Pt's spiritual, cultural and educational needs considered and pt agreeable to plan of care and goals.      Anticipated Barriers for therapy: hearing deficit, speech deficits, cognitive deficits, decreased safety awareness, fall risk, visual neglect, transportation assistance required    Goals:  Short Term Goals: 4 weeks   1. Pt to be (I) with established HEP MET 03/19/19  2. Pt to improve R hip flexion strength MMT score to at least 4+/5 for improved gait mechanics Progressing  3. Pt to improve R hip extension strength MMT score to at least 4/5 for improved gait mechanics MET 06/11/19  4. Pt to improve R hip AB and R hip AD strength MMT scores to at least 3+/5 for improved gait mechanics MET 03/04/19  5.  Pt to improve R knee flexion and extension strength MMT scores to at least 4+/5 for improved gait mechanics Ongoing  6. Pt to improve R ankle PF strength MMT score to at least 2/5 for improved gait mechanics MET 03/04/19  7. Pt to improve chair rise score to at least 5 times with no more than 1 UE support and S for improved endurance and safety with functional transfers MET 03/04/19  8. Pt to improve TUG score to at least 30 seconds for improved safety with household mobility MET 06/11/19  9. Pt to improve SSWS score to at least 0.40 m/sec for improved safety with community mobility. MET 05/14/19     Long Term Goals: 8 weeks   1. Pt to be (I) with advanced HEP Ongoing  2. Pt to improve R hip extension strength MMT score to at least 4+/5 for improved gait mechanics Ongoing  3. Pt to improve R hip AB and R hip AD strength MMT scores to at least 4-/5 for improved gait mechanics MET 06/11/19  4. Pt to improve R ankle PF strength MMT score to at least 2+/5 for improved gait mechanics MET 03/19/19  5. Pt to improve chair rise score to at least 5 times with no UE support and S for improved endurance and safety with functional transfers Progressing--CGA needed,  07/05/19  6. Pt to improve TUG score to at least 26 seconds for improved safety with household mobility Progressing-- 24 sec but CGA for safety 07/05/19  7. Pt to  improve SSWS score to at least 0.50 m/sec for improved safety with community mobility. MET 07/05/19     Plan     Continue to address RLE strength deficits with emphasis on hamstring and R ankle strengthening. Focus on sit <> stand transfers without UE support. Focus on safety with ambulation. Focus on trunk flexibility and improved posture.     Jeff Solis, PTA

## 2019-08-13 ENCOUNTER — CLINICAL SUPPORT (OUTPATIENT)
Dept: REHABILITATION | Facility: HOSPITAL | Age: 59
End: 2019-08-13
Attending: PSYCHIATRY & NEUROLOGY
Payer: MEDICARE

## 2019-08-13 DIAGNOSIS — Z78.9 IMPAIRED MOBILITY AND ACTIVITIES OF DAILY LIVING: ICD-10-CM

## 2019-08-13 DIAGNOSIS — Z74.09 IMPAIRED FUNCTIONAL MOBILITY, BALANCE, GAIT, AND ENDURANCE: ICD-10-CM

## 2019-08-13 DIAGNOSIS — R47.01 BROCA'S APHASIA: ICD-10-CM

## 2019-08-13 DIAGNOSIS — R29.898 WEAKNESS OF RIGHT LEG: ICD-10-CM

## 2019-08-13 DIAGNOSIS — Z74.09 IMPAIRED MOBILITY AND ACTIVITIES OF DAILY LIVING: ICD-10-CM

## 2019-08-13 DIAGNOSIS — R29.898 WEAKNESS OF RIGHT UPPER EXTREMITY: ICD-10-CM

## 2019-08-13 PROCEDURE — 97110 THERAPEUTIC EXERCISES: CPT | Mod: PO

## 2019-08-13 PROCEDURE — 97530 THERAPEUTIC ACTIVITIES: CPT | Mod: PO

## 2019-08-13 PROCEDURE — 92507 TX SP LANG VOICE COMM INDIV: CPT | Mod: KX,PO

## 2019-08-13 PROCEDURE — 97110 THERAPEUTIC EXERCISES: CPT | Mod: KX,PO,59

## 2019-08-13 NOTE — PROGRESS NOTES
"Outpatient Neurological Rehabilitation   Speech and Language Therapy Daily Note  Date:  8/13/2019     Name: Torito Harris   MRN: [id number]   Therapy Diagnosis:   Encounter Diagnosis   Name Primary?    Broca's aphasia       Physician: Ian Feliciano MD  Physician Orders: ST evaluate and treat  Medical Diagnosis: I69.30 (ICD-10-CM) - Late effect of stroke    Visit #/Visits authorized: 12/20 (29 prior to this auth) KX   Date of Evaluation:  1/28/19  Insurance Authorization Period: 6/11/19 to 12/31/19  Plan of Care Expiration Date:  1/28/2019 to 3/25/19; 3/28/19 to 5/24/19; 5/23/19-7/19/19  Extended POC: 7/16/19 to 8/30/19  Cancelled Visits:  1  No Show Visits:  9     Time In:  1300   Time Out:  1345   Total Billable Time: 45 minutes    Precautions: Standard and Fall  Subjective:   Pt reports he is "alright". Patient pleasant and ready to participate in skilled ST services. Pt's wife present in session, providing support.   Pain Scale:  0/10 on VAS currently.   Pain Location: n/a  Objective:   UNTIMED  Procedure Min.   Speech- Language- Voice Therapy    45   Total Untimed Units: 1  Charges Billed/# of units: 1    Short Term Goals: (4 weeks) Current Progress:   1. Pt will name common objects with 90% Carmen.  Progressing/ Not Met 8/13/2019   Not formally addressed     2. Pt will generate a response to basic sentence completions with 80% Carmen   Progressing/ Not Met 8/13/2019   Pt completed automatic phrase completion task with 80 % acc indly; 90 % acc given min cues   Pt completed phrases 73% acc indly, 80% acc given a repetition.   3.  pt will complete reading comprehension tasks at the sentence level with 90% acc indly.  Progressing/ Not Met 8/13/2019   Pt read phrases and identified what completed the phrase in a field of 3 with 35% acc indly, 100% acc given moderate cues     4. pt will follow 3 unit body part commands with 90% acc indly.   Progressing/ Not Met 8/13/2019  Not formally addressed     5.  pt will " "follow 3 unit commands with visual stimuli with 90% acc indly to improve auditory comprehension.   Progressing/ Not Met 8/13/2019   30% acc indly, 30% acc given cues and repetition   6. Pt will complete word finding task (i.e. Creating subject, verb, object pairs in VNEST protocol) with 90% acc min A to improve word fluency.  Progressing/ Not Met 8/13/2019   Not formally addressed      7. Pt will write the name for an item pictured on 3/4 trials. Pt will participate in ACRT treatment immediately after incorrect productions  Progressing/ Not Met 8/13/2019   Not formally addressed          Patient Education/Response:   Discussed patient progress and upcoming discharge from therapy. Pt's wife asked "so is he ever going to talk". Discussed progress that has been made (increase in variety and complexity of nouns and verbs used) and areas that have not progressed as well (putting words together, reading, and auditory comprehension for complex information). Discussed that everyone's stroke recovery is different and that some people have aphasia chronically after a stroke. Pt + wife verbalized understanding of all discussed.       Assessment:   Torito is progressing well towards his goals. Decreased accuracy following commands, reading phrases, and completing sentences today. Pt prognosis is Good. Pt will continue to benefit from skilled outpatient speech and language therapy to address the deficits listed in the problem list on initial evaluation, provide pt/family education and to maximize pt's level of independence in the home and community environment.     Barriers to Therapy: sleeping pattern, possible transportation  Pt's spiritual, cultural and educational needs considered and pt agreeable to plan of care and goals.    Plan:   Continue POC with focus on word finding strategies + external aids for communication.       SERINA Mott, CCC-SLP  Speech Language Pathologist   8/13/2019  "

## 2019-08-13 NOTE — PLAN OF CARE
Occupational Therapy Progress Note     Name: Torito Harris  [facility] Number: [id number]  Physician: Ian Feliciano MD  Medical Diagnosis: L CVA    Therapy Diagnosis:   Encounter Diagnoses   Name Primary?    Impaired mobility and activities of daily living     Weakness of right upper extremity      Visit Date: 8/13/2019  Physician Orders: Eval and Treat   Surgical Procedure and Date: NA  Evaluation Date: 1/28/19   Insurance Authorization Period Expiration: 12/31/19   Plan of Care Certification Period: 90 days ( 8 treatments prior to next reassessment)   Date of Return to MD: unknown at this time      Visit # / Visits authorized: 40/ medical necessity.  Time In: 1430   Time Out: 1515    Total Billable Time: 45 minutes      Precautions: Standard, fall risk, hearing impaired     Subjective     Pt reports: He had ER visit due to stomach ache. No results. Pt. Sad today because he cannot do things he did before.   Patient reports compliance with HEP.   Response to previous treatment:positive   Functional change: reassessing today. He lets his wife help him when he can do things himself but does not because it is hard.     Pain: 0/10  Location: R shoulder     Objective     PROGRESS AS FOLLOWS:     R scapula depressed, Mild flexion tone RUE  Range of Motion:      Joint Evaluation  AROM  1/28/2019 AROM  1/28/2019 PROM   1/28/2019 AROM  3/4/19 PROM  3/4/19 AROM  4/23/19 PROM   4/28/19 AROM  5/14/19 PROM  5/14/19 AROM   6/11/19 PROM   6/11/19 AROM 8/13/19 PROM 8/13/19     Left Right Right Right  Right  Right  Right  Right  Right  Right Right  Right Right   Shoulder flex 0-180 WNL throughout   40 140 40 (NC) 165 (+25) 50 (+10) 165 (NC) 90 (+40) 170 (+5) 123 (+33) 175 (+5) 123 180   Shoulder Abd 0-180   25 120 35 (+10) 125 (+5)  40 (+5) 130 (+5) 95 (+55) 130 (NC) 110 (+15) 175 (+45) 90 170   Shoulder ER 0-90   50 90 50 (NC) 90 (NC) 20 (-30) 90 (NC) 40 (+20) 90 (NC) 60 (+20) 90 (NC) 70 90   Shoulder IR 0-90   30 80 40 (+10)  80 (NC) 30 (-10) 85 (+5) 40 (+10)  80 (-5) 40 (NC) 70 (-10) 70 80   Shoulder Extension 0-80   50 80 50 (NC)  70 (-10)  50 (NC) 75 (+5) 60 (+10) 75 (NC) 50 (-10)  75 (NC) 65 75   Elbow flex/ext 0-150   10-90 0-140  (-40 /+45)  0-140 (NC)   (+10 / -5) 0-140 (NC)  (NC/+10) 0-150 (NC/+10)  (+15/+5) 0-150 (NC) WNL WNL   Wrist flexion    NT NT 50 90  70 (+20) 90 (NC) 65 (-5) 90 (NC) 45 (-20) 90 (NC) 50 75   Wrist extension    NT NT 35 75 30 (-5) 75 (NC) 45 (+15) 80 (+5) 65 (+20)  85 (+5)  40 75   Supination    NT NT 90 NT 85 (-5) NT 85 (NC)  NT  80 (-5) NT WFL WFL   Pronation    NT NT 80 NT 85 (+5) NT 90 (+5) NT 85 (-5) NT WFL WFL   UD   NT NT Trace 25 25 (+25) 35 (+10) 20 (-5) 30 (-5) 20 (NC) NT 40 WFL   RD   NT NT Trace  20 15 (+15) 20 (NC) 20 (+5)  30 (+10)  30 (NC)  NT  30  WFL        Fist: Pt able to make loose fist and open. Opposition difficult due to poor coordination        Strength: (CLAUDE Dynamometer in lbs.) Average 3 trials, Position II:      #1  1/28/19 1/28/19 3/4/19 4/23/19  5/14/19 6/11/19 8/13/19     Left Right Right  Right  Right  Right  Right   Rung II 86# 15# 10# (-5#) 10# (NC) 17# (7#) 10# (-7)  16#         Fine Motor Coordination: 9 Hole Peg Test  Left 1/28/2019 Right   1/28/2019 Right   3/4/19  Right 4/23/19 Right 5/14/19     Unable to perform unable to perform   unable Unable       Gross motor coordination:    · MORRIS (Rapid Alternating Movements): Normal on L. R slow and dysmetric  · Finger to Nose (5 times): ROM not adequate for this test.    · Finger Flicks (coordination moving from digit flexion to digit extension): unable to flick digits; slow composite flexion and extension; flexion is loose and extension is not full  , cogwheeling noted        Torito participated in therapeutic activities to improve ROM and strength for 45 minutes:   - Reach, grasp and place small balls with R hand lateral and across midline with physical cue to keep forearm in neutral towards  pronation and to not elevate shoulder during humeral flexion.   - Reviewed activities with pt. And wife for HEP with reaching.       Functional mobility:   - sit <> stand with SBA  - gait with quad cane and close SBA       Home Exercises and Education Provided     Education provided: continue HEP , encouraged practice of hard tasks to improve vs. Giving up and allowing wife to help.    - Progress towards goals / POC       Written Home Exercises Provided: no new, continue HEP    Patient instructed to cont prior HEP as verbally instructed in previous tx sessions. Exercises were reviewed and Torito was able to demonstrate them prior to the end of the session.  Torito demonstrated good  understanding of the HEP provided.   Pt. Given 3 tennis balls and a bucket to practice grasp, reach and release.     Pt has no cultural, educational or language barriers to learning provided.    Assessment   Torito Harris was reassessed in today's treatment session. Improvement noted in  strength   He reports consistent compliance with his HEP to maximize progress outside of therapy.  He remains motivated to continue to participate in skilled occupational therapy and improve residual functional and physical deficits.  He continues with limitations including decreased ROM, decreased mm strength, impaired motor control, impaired motor coordination, and decreased functional use of the LUE.  These deficits affect his performance and participation in desired occupations including self care, IADLs, community/social participation, and leisure pursuits.       Patient would benefit from continued skilled occupational therapy.  OT recommends extending his POC 2 times weekly for 8 treatment sessions.  Torito is progressing fairly towards his goals and there are no updates to goals at this time.  Pt prognosis is Good as he remains motivated to participate and has good support from his spouse. Pt will continue to benefit from skilled  outpatient occupational therapy to address the deficits listed in the problem list on initial evaluation provide pt/family education and to maximize pt's level of independence in the home and community environment.      Anticipated barriers to occupational therapy: none     Pt's spiritual, cultural and educational needs considered and pt agreeable to plan of care and goals.    Goals:  Short Term Goals: 3 weeks   ROM/Strength to perform the ADL's and functional activities listed below, - in progress  Pt will improve functional  strength needed for tasks to 20# in R hand. - in progress   Pt will improve AROM for shoulder flexion needed for functional tasks to 60* in R UE. - MET 5/14/19  UE dressing will improve to Min A -MET  LB dressing will improve to Mod A  -MET , gets help for fastening   Toileting will improve to Min A incorporating R hand for task  -MET  Pt will be Independent with HEP to improve ROM and FM skills. - MET 6/11/19      Long Term Goals: 6 weeks   ROM/Strength to perform the ADL's and functional activities listed below - in progress   Pt will improve functional  strength needed for tasks to 30# in R hand. - in progress   Pt will improve AROM for shoulder flexion needed for functional tasks to 80* in R UE. - MET 4/14/19  UE dressing will improve to Supervision  SBA  LB dressing will improve to Min A help for fasteners only.   Toileting will improve to Supervision incorporating R hand for task Mod I using L hand  Pt will complete clinical testing for skills needed for driving - not met; patient reports he is driving and has received MD clearance   G code self care CK - in progress     Plan   Continue POC in pursuit of OT goals.  Treatment certification period: 90 days.   8 visits and then reassess.  Discussed Plan of Care with patient: Yes  Updates/Grading for next session: AROM       Occupational Therapy Progress Note     [id number]  Physician: Braden  Ian AGUILAR MD  Medical Diagnosis: L CVA    Therapy Diagnosis:   Encounter Diagnoses   Name Primary?    Impaired mobility and activities of daily living     Weakness of right upper extremity      Visit Date: 8/13/2019  Physician Orders: Eval and Treat   Surgical Procedure and Date: NA  Evaluation Date: 1/28/19   Insurance Authorization Period Expiration: 12/31/19   Plan of Care Certification Period: 90 days ( 8 treatments prior to next reassessment)   Date of Return to MD: unknown at this time      Visit # / Visits authorized: 40/ medical necessity.  Time In: 1430   Time Out: 1515    Total Billable Time: 45 minutes      Precautions: Standard, fall risk, hearing impaired     Subjective     Pt reports: He had ER visit due to stomach ache. No results. Pt. Sad today because he cannot do things he did before.   Patient reports compliance with HEP.   Response to previous treatment:positive   Functional change: reassessing today. He lets his wife help him when he can do things himself but does not because it is hard.     Pain: 0/10  Location: R shoulder     Objective     PROGRESS AS FOLLOWS:     R scapula depressed, Mild flexion tone RUE  Range of Motion:      Joint Evaluation  AROM  1/28/2019 AROM  1/28/2019 PROM   1/28/2019 AROM  3/4/19 PROM  3/4/19 AROM  4/23/19 PROM   4/28/19 AROM  5/14/19 PROM  5/14/19 AROM   6/11/19 PROM   6/11/19 AROM 8/13/19 PROM 8/13/19     Left Right Right Right  Right  Right  Right  Right  Right  Right Right  Right Right   Shoulder flex 0-180 WNL throughout   40 140 40 (NC) 165 (+25) 50 (+10) 165 (NC) 90 (+40) 170 (+5) 123 (+33) 175 (+5) 123 180   Shoulder Abd 0-180   25 120 35 (+10) 125 (+5)  40 (+5) 130 (+5) 95 (+55) 130 (NC) 110 (+15) 175 (+45) 90 170   Shoulder ER 0-90   50 90 50 (NC) 90 (NC) 20 (-30) 90 (NC) 40 (+20) 90 (NC) 60 (+20) 90 (NC) 70 90   Shoulder IR 0-90   30 80 40 (+10) 80 (NC) 30 (-10) 85 (+5) 40 (+10)  80 (-5) 40 (NC) 70 (-10) 70 80   Shoulder Extension 0-80   50 80 50 (NC)   70 (-10)  50 (NC) 75 (+5) 60 (+10) 75 (NC) 50 (-10)  75 (NC) 65 75   Elbow flex/ext 0-150   10-90 0-140  (-40 /+45)  0-140 (NC)   (+10 / -5) 0-140 (NC)  (NC/+10) 0-150 (NC/+10)  (+15/+5) 0-150 (NC) WNL WNL   Wrist flexion    NT NT 50 90  70 (+20) 90 (NC) 65 (-5) 90 (NC) 45 (-20) 90 (NC) 50 75   Wrist extension    NT NT 35 75 30 (-5) 75 (NC) 45 (+15) 80 (+5) 65 (+20)  85 (+5)  40 75   Supination    NT NT 90 NT 85 (-5) NT 85 (NC)  NT  80 (-5) NT WFL WFL   Pronation    NT NT 80 NT 85 (+5) NT 90 (+5) NT 85 (-5) NT WFL WFL   UD   NT NT Trace 25 25 (+25) 35 (+10) 20 (-5) 30 (-5) 20 (NC) NT 40 WFL   RD   NT NT Trace  20 15 (+15) 20 (NC) 20 (+5)  30 (+10)  30 (NC)  NT  30  WFL        Fist: Pt able to make loose fist and open. Opposition difficult due to poor coordination        Strength: (CLAUDE Dynamometer in lbs.) Average 3 trials, Position II:      #1  1/28/19 1/28/19 3/4/19 4/23/19  5/14/19 6/11/19 8/13/19     Left Right Right  Right  Right  Right  Right   Rung II 86# 15# 10# (-5#) 10# (NC) 17# (7#) 10# (-7)  16#         Fine Motor Coordination: 9 Hole Peg Test  Left 1/28/2019 Right   1/28/2019 Right   3/4/19  Right 4/23/19 Right 5/14/19     Unable to perform unable to perform   unable Unable       Gross motor coordination:    · MORRIS (Rapid Alternating Movements): Normal on L. R slow and dysmetric  · Finger to Nose (5 times): ROM not adequate for this test.    · Finger Flicks (coordination moving from digit flexion to digit extension): unable to flick digits; slow composite flexion and extension; flexion is loose and extension is not full  , cogwheeling noted        Torito participated in therapeutic activities to improve ROM and strength for 45 minutes:   - Reach, grasp and place small balls with R hand lateral and across midline with physical cue to keep forearm in neutral towards pronation and to not elevate shoulder during humeral flexion.   - Reviewed activities with pt. And wife for HEP  with reaching.       Functional mobility:   - sit <> stand with SBA  - gait with quad cane and close SBA       Home Exercises and Education Provided     Education provided: continue HEP , encouraged practice of hard tasks to improve vs. Giving up and allowing wife to help.    - Progress towards goals / POC       Written Home Exercises Provided: no new, continue HEP    Patient instructed to cont prior HEP as verbally instructed in previous tx sessions. Exercises were reviewed and Torito was able to demonstrate them prior to the end of the session.  Torito demonstrated good  understanding of the HEP provided.   Pt. Given 3 tennis balls and a bucket to practice grasp, reach and release.     Pt has no cultural, educational or language barriers to learning provided.    Assessment   Torito Harris was reassessed in today's treatment session. Improvement noted in  strength   He reports consistent compliance with his HEP to maximize progress outside of therapy.  He remains motivated to continue to participate in skilled occupational therapy and improve residual functional and physical deficits.  He continues with limitations including decreased ROM, decreased mm strength, impaired motor control, impaired motor coordination, and decreased functional use of the LUE.  These deficits affect his performance and participation in desired occupations including self care, IADLs, community/social participation, and leisure pursuits.       Patient would benefit from continued skilled occupational therapy.  OT recommends extending his POC 2 times weekly for 8 treatment sessions.  Torito is progressing fairly towards his goals and there are no updates to goals at this time.  Pt prognosis is Good as he remains motivated to participate and has good support from his spouse. Pt will continue to benefit from skilled outpatient occupational therapy to address the deficits listed in the problem list on initial evaluation provide  pt/family education and to maximize pt's level of independence in the home and community environment.      Anticipated barriers to occupational therapy: none     Pt's spiritual, cultural and educational needs considered and pt agreeable to plan of care and goals.    Goals:  Short Term Goals: 3 weeks   ROM/Strength to perform the ADL's and functional activities listed below, - in progress  Pt will improve functional  strength needed for tasks to 20# in R hand. - in progress   Pt will improve AROM for shoulder flexion needed for functional tasks to 60* in R UE. - MET 5/14/19  UE dressing will improve to Min A -MET  LB dressing will improve to Mod A  -MET , gets help for fastening   Toileting will improve to Min A incorporating R hand for task  -MET  Pt will be Independent with HEP to improve ROM and FM skills. - MET 6/11/19      Long Term Goals: 6 weeks   ROM/Strength to perform the ADL's and functional activities listed below - in progress   Pt will improve functional  strength needed for tasks to 30# in R hand. - in progress   Pt will improve AROM for shoulder flexion needed for functional tasks to 80* in R UE. - MET 4/14/19  UE dressing will improve to Supervision  SBA  LB dressing will improve to Min A help for fasteners only.   Toileting will improve to Supervision incorporating R hand for task Mod I using L hand  Pt will complete clinical testing for skills needed for driving - not met; patient reports he is driving and has received MD clearance   G code self care CK - in progress     Plan   Continue POC in pursuit of OT goals.  Treatment certification period: 90 days.   8 visits and then reassess.  Discussed Plan of Care with patient: Yes  Updates/Grading for next session: AROM

## 2019-08-13 NOTE — PROGRESS NOTES
"  Physical Therapy Daily Treatment Note     [id number]    Therapy Diagnosis:   Encounter Diagnoses   Name Primary?    Weakness of right leg     Impaired functional mobility, balance, gait, and endurance      Physician: Ian Feliciano MD    Visit Date: 8/13/2019    Physician Orders: PT Eval and Treat   Medical Diagnosis from Referral: Late effect of stroke  Evaluation Date: 1/28/2019  Authorization Period Expiration: 06/06/19-12/31/19  Plan of Care Expiration: 07/05/19 to 08/30/19  Visit # / Visits authorized: 12/20 (Vist #[id number] of 2019) - KX     Time In: 13:45  Time Out: 14:30  Total Billable Time: 45 minutes    Precautions: Standard, Diabetes, Fall and right hemiplegia, hearing aid, safety awareness, cognitive deficits, speech deficits    Subjective     Pt reports: " Ok."   Wife reports: " I had to bring him into the ER over the weekend for stomach pains and acid reflux."   HEP Compliance: pt reports he is doing all of his exercises at home.  Response to previous treatment: no complaints  Functional change: improved stability with sit to stands    Pain: 0/10  Location: N/A      Objective   Pt very quiet today and not interacting with therapist. Speech therapist and PTA had to take 10 min of session to get pt to participate in session.     Pt took a long time to get motivated after speech to stand up from the chair and participate in therapy.      Torito received therapeutic exercises to develop strength, endurance, ROM, flexibility and core stabilization for 35 minutes including:     X 10 min Sci Fit recumbent stepper BLE only for BLE endurance and strength, L4, hills program 1    X 15 reps sit <> stand transfers from elevated mat of 19 in height with no UE support, CGA at R knee     Seated therex:   2 X 20 reps of R LE DF with OTB   2 x 20 reps of RLE PF with OTB   X 30 reps of R LE HS curls on creeper with GTB       Patient participated in gait training activities to " normalize gait pattern for 0 minutes. The following activities were included:   Gait belt used for safety. Assistive device: SBQC, little platform rolling walker       Home Exercises Provided and Patient Education Provided     Education provided:  Education provided to patient and his wife  -proper technique to assist with balance, gait, and RW management with Little platform rolling walker and standard bilateral platform rolling walker  -proper hand positioning when holding gait belt  -PT instructed both patient and his wife that if they decide to purchase either the bilateral standard RW platform attachments or the Little platform rolling walker, then PT recommends that patient's wife is present to provide necessary assistance (that she was just educated on) at ALL TIMES when patient is using the platform rolling walker to prevent fall. When patient is ambulating unsupervised at home, he is to continue to use his SBQC. Pt and his wife verbalized good understanding of these instructions to PT.     Written Home Exercises Provided: Continue current HEP.   See EMR under Media for exercises provided 2/5/2019.    Assessment   Pt tolerated tx session fairly today.  Pt progressed to hills on Sci Fit at an increased resistance, but had a hard time on the leg press and took a long time to convince patient to exercise.  Speech therapy and PTA had to speak to patient about participating and the benefits of therapy.  Pt did not complain of any pain in his stomach despite ER visit over the weekend.  Cont with plan of care.    Pt prognosis is Good.     Pt will continue to benefit from skilled outpatient physical therapy to address the deficits listed in the problem list box on initial evaluation, provide pt/family education and to maximize pt's level of independence in the home and community environment.     Pt's spiritual, cultural and educational needs considered and pt agreeable to plan of care and goals.     Anticipated Barriers for  therapy: hearing deficit, speech deficits, cognitive deficits, decreased safety awareness, fall risk, visual neglect, transportation assistance required    Goals:  Short Term Goals: 4 weeks   1. Pt to be (I) with established HEP MET 03/19/19  2. Pt to improve R hip flexion strength MMT score to at least 4+/5 for improved gait mechanics Progressing  3. Pt to improve R hip extension strength MMT score to at least 4/5 for improved gait mechanics MET 06/11/19  4. Pt to improve R hip AB and R hip AD strength MMT scores to at least 3+/5 for improved gait mechanics MET 03/04/19  5.  Pt to improve R knee flexion and extension strength MMT scores to at least 4+/5 for improved gait mechanics Ongoing  6. Pt to improve R ankle PF strength MMT score to at least 2/5 for improved gait mechanics MET 03/04/19  7. Pt to improve chair rise score to at least 5 times with no more than 1 UE support and S for improved endurance and safety with functional transfers MET 03/04/19  8. Pt to improve TUG score to at least 30 seconds for improved safety with household mobility MET 06/11/19  9. Pt to improve SSWS score to at least 0.40 m/sec for improved safety with community mobility. MET 05/14/19     Long Term Goals: 8 weeks   1. Pt to be (I) with advanced HEP Ongoing  2. Pt to improve R hip extension strength MMT score to at least 4+/5 for improved gait mechanics Ongoing  3. Pt to improve R hip AB and R hip AD strength MMT scores to at least 4-/5 for improved gait mechanics MET 06/11/19  4. Pt to improve R ankle PF strength MMT score to at least 2+/5 for improved gait mechanics MET 03/19/19  5. Pt to improve chair rise score to at least 5 times with no UE support and S for improved endurance and safety with functional transfers Progressing--CGA needed,  07/05/19  6. Pt to improve TUG score to at least 26 seconds for improved safety with household mobility Progressing-- 24 sec but CGA for safety 07/05/19  7. Pt to improve SSWS score to at least  0.50 m/sec for improved safety with community mobility. MET 07/05/19     Plan     Continue to address RLE strength deficits with emphasis on hamstring and R ankle strengthening. Focus on sit <> stand transfers without UE support. Focus on safety with ambulation. Focus on trunk flexibility and improved posture.     Lucy Vanegas, PTA

## 2019-08-13 NOTE — PROGRESS NOTES
Occupational Therapy Progress Note     Name: Torito Harris  [facility] Number: [id number]  Physician: Ian Feliciano MD  Medical Diagnosis: L CVA    Therapy Diagnosis:   Encounter Diagnoses   Name Primary?    Impaired mobility and activities of daily living     Weakness of right upper extremity      Visit Date: 8/13/2019  Physician Orders: Eval and Treat   Surgical Procedure and Date: NA  Evaluation Date: 1/28/19   Insurance Authorization Period Expiration: 12/31/19   Plan of Care Certification Period: 90 days ( 8 treatments prior to next reassessment)   Date of Return to MD: unknown at this time      Visit # / Visits authorized: 40/ medical necessity.  Time In: 1430   Time Out: 1515    Total Billable Time: 45 minutes      Precautions: Standard, fall risk, hearing impaired     Subjective     Pt reports: He had ER visit due to stomach ache. No results. Pt. Sad today because he cannot do things he did before.   Patient reports compliance with HEP.   Response to previous treatment:positive   Functional change: reassessing today. He lets his wife help him when he can do things himself but does not because it is hard.     Pain: 0/10  Location: R shoulder     Objective     PROGRESS AS FOLLOWS:     R scapula depressed, Mild flexion tone RUE  Range of Motion:      Joint Evaluation  AROM  1/28/2019 AROM  1/28/2019 PROM   1/28/2019 AROM  3/4/19 PROM  3/4/19 AROM  4/23/19 PROM   4/28/19 AROM  5/14/19 PROM  5/14/19 AROM   6/11/19 PROM   6/11/19 AROM 8/13/19 PROM 8/13/19     Left Right Right Right  Right  Right  Right  Right  Right  Right Right  Right Right   Shoulder flex 0-180 WNL throughout   40 140 40 (NC) 165 (+25) 50 (+10) 165 (NC) 90 (+40) 170 (+5) 123 (+33) 175 (+5) 123 180   Shoulder Abd 0-180   25 120 35 (+10) 125 (+5)  40 (+5) 130 (+5) 95 (+55) 130 (NC) 110 (+15) 175 (+45) 90 170   Shoulder ER 0-90   50 90 50 (NC) 90 (NC) 20 (-30) 90 (NC) 40 (+20) 90 (NC) 60 (+20) 90 (NC) 70 90   Shoulder IR 0-90   30 80 40 (+10)  80 (NC) 30 (-10) 85 (+5) 40 (+10)  80 (-5) 40 (NC) 70 (-10) 70 80   Shoulder Extension 0-80   50 80 50 (NC)  70 (-10)  50 (NC) 75 (+5) 60 (+10) 75 (NC) 50 (-10)  75 (NC) 65 75   Elbow flex/ext 0-150   10-90 0-140  (-40 /+45)  0-140 (NC)   (+10 / -5) 0-140 (NC)  (NC/+10) 0-150 (NC/+10)  (+15/+5) 0-150 (NC) WNL WNL   Wrist flexion    NT NT 50 90  70 (+20) 90 (NC) 65 (-5) 90 (NC) 45 (-20) 90 (NC) 50 75   Wrist extension    NT NT 35 75 30 (-5) 75 (NC) 45 (+15) 80 (+5) 65 (+20)  85 (+5)  40 75   Supination    NT NT 90 NT 85 (-5) NT 85 (NC)  NT  80 (-5) NT WFL WFL   Pronation    NT NT 80 NT 85 (+5) NT 90 (+5) NT 85 (-5) NT WFL WFL   UD   NT NT Trace 25 25 (+25) 35 (+10) 20 (-5) 30 (-5) 20 (NC) NT 40 WFL   RD   NT NT Trace  20 15 (+15) 20 (NC) 20 (+5)  30 (+10)  30 (NC)  NT  30  WFL        Fist: Pt able to make loose fist and open. Opposition difficult due to poor coordination        Strength: (CLAUDE Dynamometer in lbs.) Average 3 trials, Position II:      #1  1/28/19 1/28/19 3/4/19 4/23/19  5/14/19 6/11/19 8/13/19     Left Right Right  Right  Right  Right  Right   Rung II 86# 15# 10# (-5#) 10# (NC) 17# (7#) 10# (-7)  16#         Fine Motor Coordination: 9 Hole Peg Test  Left 1/28/2019 Right   1/28/2019 Right   3/4/19  Right 4/23/19 Right 5/14/19     Unable to perform unable to perform   unable Unable       Gross motor coordination:    · MORRIS (Rapid Alternating Movements): Normal on L. R slow and dysmetric  · Finger to Nose (5 times): ROM not adequate for this test.    · Finger Flicks (coordination moving from digit flexion to digit extension): unable to flick digits; slow composite flexion and extension; flexion is loose and extension is not full  , cogwheeling noted        Torito participated in therapeutic activities to improve ROM and strength for 45 minutes:   - Reach, grasp and place small balls with R hand lateral and across midline with physical cue to keep forearm in neutral towards  pronation and to not elevate shoulder during humeral flexion.   - Reviewed activities with pt. And wife for HEP with reaching.       Functional mobility:   - sit <> stand with SBA  - gait with quad cane and close SBA       Home Exercises and Education Provided     Education provided: continue HEP , encouraged practice of hard tasks to improve vs. Giving up and allowing wife to help.    - Progress towards goals / POC       Written Home Exercises Provided: no new, continue HEP    Patient instructed to cont prior HEP as verbally instructed in previous tx sessions. Exercises were reviewed and Torito was able to demonstrate them prior to the end of the session.  Torito demonstrated good  understanding of the HEP provided.   Pt. Given 3 tennis balls and a bucket to practice grasp, reach and release.     Pt has no cultural, educational or language barriers to learning provided.    Assessment   Torito Harris was reassessed in today's treatment session. Improvement noted in  strength   He reports consistent compliance with his HEP to maximize progress outside of therapy.  He remains motivated to continue to participate in skilled occupational therapy and improve residual functional and physical deficits.  He continues with limitations including decreased ROM, decreased mm strength, impaired motor control, impaired motor coordination, and decreased functional use of the LUE.  These deficits affect his performance and participation in desired occupations including self care, IADLs, community/social participation, and leisure pursuits.       Patient would benefit from continued skilled occupational therapy.  OT recommends extending his POC 2 times weekly for 8 treatment sessions.  Torito is progressing fairly towards his goals and there are no updates to goals at this time.  Pt prognosis is Good as he remains motivated to participate and has good support from his spouse. Pt will continue to benefit from skilled  outpatient occupational therapy to address the deficits listed in the problem list on initial evaluation provide pt/family education and to maximize pt's level of independence in the home and community environment.      Anticipated barriers to occupational therapy: none     Pt's spiritual, cultural and educational needs considered and pt agreeable to plan of care and goals.    Goals:  Short Term Goals: 3 weeks   ROM/Strength to perform the ADL's and functional activities listed below, - in progress  Pt will improve functional  strength needed for tasks to 20# in R hand. - in progress   Pt will improve AROM for shoulder flexion needed for functional tasks to 60* in R UE. - MET 5/14/19  UE dressing will improve to Min A -MET  LB dressing will improve to Mod A  -MET , gets help for fastening   Toileting will improve to Min A incorporating R hand for task  -MET  Pt will be Independent with HEP to improve ROM and FM skills. - MET 6/11/19      Long Term Goals: 6 weeks   ROM/Strength to perform the ADL's and functional activities listed below - in progress   Pt will improve functional  strength needed for tasks to 30# in R hand. - in progress   Pt will improve AROM for shoulder flexion needed for functional tasks to 80* in R UE. - MET 4/14/19  UE dressing will improve to Supervision  SBA  LB dressing will improve to Min A help for fasteners only.   Toileting will improve to Supervision incorporating R hand for task Mod I using L hand  Pt will complete clinical testing for skills needed for driving - not met; patient reports he is driving and has received MD clearance   G code self care CK - in progress     Plan   Continue POC in pursuit of OT goals.  Treatment certification period: 90 days.   8 visits and then reassess.  Discussed Plan of Care with patient: Yes  Updates/Grading for next session: AROM

## 2019-08-15 ENCOUNTER — DOCUMENTATION ONLY (OUTPATIENT)
Dept: REHABILITATION | Facility: HOSPITAL | Age: 59
End: 2019-08-15

## 2019-08-15 DIAGNOSIS — R47.01 BROCA'S APHASIA: ICD-10-CM

## 2019-08-15 NOTE — PROGRESS NOTES
No Show Note/Documentation    Patient: Torito Harris  Date of Session: 8/15/2019  Diagnosis:   Encounter Diagnosis   Name Primary?    Broca's aphasia       MRN: [id number]    Torito Harris did not attend his  scheduled therapy appointment today. He did not call to cancel nor reschedule. This is the 9th appointment that he has not attended.  No charges have been posted today.     SERINA Paz, CCC-SLP   8/15/2019

## 2019-08-20 ENCOUNTER — CLINICAL SUPPORT (OUTPATIENT)
Dept: REHABILITATION | Facility: HOSPITAL | Age: 59
End: 2019-08-20
Attending: PSYCHIATRY & NEUROLOGY
Payer: MEDICARE

## 2019-08-20 DIAGNOSIS — R29.898 WEAKNESS OF RIGHT LEG: ICD-10-CM

## 2019-08-20 DIAGNOSIS — R47.01 BROCA'S APHASIA: ICD-10-CM

## 2019-08-20 DIAGNOSIS — Z74.09 IMPAIRED FUNCTIONAL MOBILITY, BALANCE, GAIT, AND ENDURANCE: ICD-10-CM

## 2019-08-20 PROCEDURE — 97110 THERAPEUTIC EXERCISES: CPT | Mod: KX,59,PO

## 2019-08-20 PROCEDURE — 97116 GAIT TRAINING THERAPY: CPT | Mod: KX,PO

## 2019-08-20 PROCEDURE — 92507 TX SP LANG VOICE COMM INDIV: CPT | Mod: PO

## 2019-08-20 NOTE — PROGRESS NOTES
"Outpatient Neurological Rehabilitation   Speech and Language Therapy Daily Note  Date:  8/20/2019     Name: Torito Harris   MRN: [id number]   Therapy Diagnosis:   Encounter Diagnosis   Name Primary?    Broca's aphasia       Physician: Ian Feliciano MD  Physician Orders: ST evaluate and treat  Medical Diagnosis: I69.30 (ICD-10-CM) - Late effect of stroke    Visit #/Visits authorized: 13/20 (29 prior to this auth) KX   Date of Evaluation:  1/28/19  Insurance Authorization Period: 6/11/19 to 12/31/19  Plan of Care Expiration Date:  1/28/2019 to 3/25/19; 3/28/19 to 5/24/19; 5/23/19-7/19/19  Extended POC: 7/16/19 to 8/30/19  Cancelled Visits:  1  No Show Visits:  10    Time In:  1345   Time Out:  1430   Total Billable Time: 45 minutes    Precautions: Standard and Fall  Subjective:   "alright". Used occasional 2-3 word phrases in conversation today.    Pain Scale:  0/10 on VAS currently.   Pain Location: n/a  Objective:   UNTIMED  Procedure Min.   Speech- Language- Voice Therapy    45   Total Untimed Units: 1  Charges Billed/# of units: 1    Short Term Goals: (4 weeks) Current Progress:   1. Pt will name common objects with 90% Carmen.  Progressing/ Not Met 8/20/2019   27% acc indly, 100% acc max cues        2. Pt will generate a response to basic sentence completions with 80% Carmen   Progressing/ Not Met 8/20/2019   Not formally addressed     3.  pt will complete reading comprehension tasks at the sentence level with 90% acc indly.  Progressing/ Not Met 8/20/2019   Pt read sentences and identified what completed the phrase in a field of 3 with 50% acc indly, 60% acc given moderate cues     4. pt will follow 3 unit body part commands with 90% acc indly.   Progressing/ Not Met 8/20/2019  20% acc indly, 40% acc given a repetition or a cue, 70% acc given a repetition, a verbal cue, and a model.    5.  pt will follow 3 unit commands with visual stimuli with 90% acc indly to improve auditory comprehension.   Progressing/ " Not Met 8/20/2019   Not formally addressed     6. Pt will complete word finding task (i.e. Creating subject, verb, object pairs in VNEST protocol) with 90% acc min A to improve word fluency.  Progressing/ Not Met 8/20/2019   Not formally addressed      7. Pt will write the name for an item pictured on 3/4 trials. Pt will participate in ACRT treatment immediately after incorrect productions  Progressing/ Not Met 8/20/2019   Not formally addressed          Patient Education/Response:   Discussed patient progress and upcoming discharge from therapy. Discussed limited progress over last reporting period and activities to do at home (I.e. [employer] application that they purchased but have not been using). Pt + wife verbalized understanding of all discussed.       Assessment:   Torito is progressing well towards his goals. Continued decreased accuracy following commands, reading, and labeling items today..     Pt prognosis is Fair. Pt will continue to benefit from skilled outpatient speech and language therapy to address the deficits listed in the problem list on initial evaluation, provide pt/family education and to maximize pt's level of independence in the home and community environment.   Barriers to Therapy: sleeping pattern, possible transportation  Pt's spiritual, cultural and educational needs considered and pt agreeable to plan of care and goals.    Plan:   Discharge planned next session with goal to provide activities in home environment..       SERINA Mott, CCC-SLP  Speech Language Pathologist   8/20/2019

## 2019-08-20 NOTE — PROGRESS NOTES
"  Physical Therapy Daily Treatment Note     [id number]    Therapy Diagnosis:   Encounter Diagnoses   Name Primary?    Weakness of right leg     Impaired functional mobility, balance, gait, and endurance      Physician: Ian Feliciano MD    Visit Date: 8/20/2019    Physician Orders: PT Eval and Treat   Medical Diagnosis from Referral: Late effect of stroke  Evaluation Date: 1/28/2019  Authorization Period Expiration: 06/06/19-12/31/19  Plan of Care Expiration:08/20/19 to 10/01/19  Visit # / Visits authorized: 13/20 (Vist #36 of 2019) - KX     Time In: 13:02  Time Out: 13:45  Total Billable Time: 43 minutes    Precautions: Standard, Diabetes, Fall and right hemiplegia, hearing aid, safety awareness, cognitive deficits, speech deficits    Subjective     Pt reports: " Ok."   Wife reports: " I had to bring him into the ER over the weekend for stomach pains and acid reflux."   HEP Compliance: pt reports he is doing all of his exercises at home.  Response to previous treatment: no complaints  Functional change: improved stability with sit to stands    Pain: 0/10  Location: N/A      Objective     Torito received therapeutic exercises to develop strength, endurance, ROM, flexibility and core stabilization for 35 minutes including:     Lower Extremity Strength (performed with patient in sitting)  Right LE  Evaluation 03/19/19 05/14/19 06/11/19 07/05/19 08/15/19 Left LE  Evaluation 03/19/19 05/14/19 06/11/19 07/05/19 08/15/19   Hip Flexion: 4/5 4-/5 4-/5 4/5 4/5 4-/5 Hip Flexion: 5/5 5/5 5/5 5/5 5/5 5/5   Hip Extension:  4-/5 4-/5 4-/5 4/5 4/5 4/5 Hip Extension: 5/5 5/5 5/5 5/5 5/5 5/5   Hip Abduction: 3/5 4/5 4/5 4+/5 4+/5 4/5 Hip Abduction: 5/5 5/5 5/5 5/5 5/5 5/5   Hip Adduction: 3/5 4/5 4+/5 4+/5 5/5 4+/5 Hip Adduction 5/5 5/5 5/5 5/5 5/5 5/5   Knee Extension: 4/5 4/5 4+/5 4+/5 4+/5 4+/5 Knee Extension: 5/5 5/5 5/5 5/5 5/5 5/5   Knee Flexion: 4/5 4-/5 4-/5 4-/5 4-/5 4-/5 Knee " Flexion: 5/5 5/5 5/5 5/5 5/5 5/5   Ankle Dorsiflexion: 0/5 3+/5 3+/5 3+/5 3+/5 4-/5 Ankle Dorsiflexion: 5/5 5/5 5/5 5/5 5/5 5/5   Ankle Plantarflexion: 2-/5 3/5 3+/5 3+/5 4/-5 4-/5 Ankle Plantarflexion: 5/5 5/5 5/5 5/5 5/5 5/5       Evaluation 3/4/2019 03/19/19 05/14/19 06/11/19 07/05/19 08/15/19   30 second Chair Rise  (adults > 61 y/o) 11 completed with LUE, abigail walker, CGA 8 completed with LUE, no AD, SBA 9 completed with LUE, no AD, CBA at R knee, improved RLE use 10 completed with LUE, no AD, CGA at R knee, improved RLE use 15 completed with LUE, no AD, CGA at R knee, improved RLE use 8 completed with no UE support (LUE resting on L knee), CGA at hips and R knee 6 completed with no UE support (LUE resting on L knee) Min A at hips and CGA at R knee     X 8 min Sci Fit recumbent stepper BLE only for BLE endurance and strength and CV endurance, L2    3 x 10 reps standing RLE hip flexion with 1 UE support // bars,  1.5# cuff weight on RLE        Patient participated in gait training activities to normalize gait pattern for 8 minutes. The following activities were included:   Gait belt used for safety. Assistive device: SBQC      Evaluation 03/19/19 05/14/19 06/11/19 07/05/19 08/15/19   Timed Up and Go 34 sec c/ abigail walker c/ SBA 31 sec c/ SBQC c/ CGA to SBA 27 sec c/ SBQC and CGA 29 sec c/ SBQC and SBA 24 sec c/ SBQC and CGA to SBA 22 sec c/ SBQC and CGA to SBA   Self Selected Walking Speed 0.33 m/sec (6m/18s)  C/ abigail walker c/ SBA 0.35 m/sec (6m/17s)  c/ SBQC c/ SBA 0.40 m/sec (6m/15s)  c/ SBQC c/ CGA 0.46 m/sec (6m/13s)  c/ SBQC c/  0.50 m/sec (6m/12s)  c/ SBQC c/ 0.46 m/sec (6m/13s)  C/ SBQC c/ SBA        Home Exercises Provided and Patient Education Provided     Education provided:  Education provided to patient and his wife  - current progress with therapy  -POC moving forward with  2 follow up session trial of Neuro DAMARI techniques    Written Home Exercises Provided: Continue current HEP.   See EMR under  Media for exercises provided 2/5/2019.    Assessment   Pt tolerated POC reassessment fairly well today. Reassessment period 07/05/19 to 08/15/19. Patient demonstrates fair progress with therapy at this time. RLE strength scores remain relatively unchanged since prior assessment with slight declines actually noted with hip flexion, hip abduction, and hip adduction. 30 second chair rise test s/ UE support score also declined by 2 reps with increased support required from PT to complete stands safely. TUG score improved by 2 seconds since prior performance, although current score continues to place him at an increased fall risk for both household and community ambulation. SSWS score declined by 1 second with current score placing patient in a limited community ambulator category. At this time, patient's progress with therapy objective measurements has appeared to plateau. Quality of functional transfers and ambulation remains stalled as patient continues to favor LLE and LUE when completing sit <> stand transfers and ambulation with quad cane, demonstrating limited carryover of RLE incorporation with daily activities/movements. Therefore, plan to have patient follow up with neuro PT who specializes in Neuro-DAMARI techniques for 2 follow up sessions to see if quality if functional mobility and gait could be improved. If patient does not demonstrate improvements or good carry over with these techniques, then will plan to d/c. PT extensively discussed patient's current progress with therapy and treatment plan moving forward with patient and his wife today. Both patient and his wife verbalized good understanding of treatment plan to PT.       Pt prognosis is Good.     Pt will continue to benefit from skilled outpatient physical therapy to address the deficits listed in the problem list box on initial evaluation, provide pt/family education and to maximize pt's level of independence in the home and community environment.      Pt's spiritual, cultural and educational needs considered and pt agreeable to plan of care and goals.     Anticipated Barriers for therapy: hearing deficit, speech deficits, cognitive deficits, decreased safety awareness, fall risk, visual neglect, transportation assistance required    Goals:  Short Term Goals: 4 weeks   1. Pt to be (I) with established HEP MET 03/19/19  2. Pt to improve R hip flexion strength MMT score to at least 4+/5 for improved gait mechanics Progressing  3. Pt to improve R hip extension strength MMT score to at least 4/5 for improved gait mechanics MET 06/11/19  4. Pt to improve R hip AB and R hip AD strength MMT scores to at least 3+/5 for improved gait mechanics MET 03/04/19  5.  Pt to improve R knee flexion and extension strength MMT scores to at least 4+/5 for improved gait mechanics Ongoing  6. Pt to improve R ankle PF strength MMT score to at least 2/5 for improved gait mechanics MET 03/04/19  7. Pt to improve chair rise score to at least 5 times with no more than 1 UE support and S for improved endurance and safety with functional transfers MET 03/04/19  8. Pt to improve TUG score to at least 30 seconds for improved safety with household mobility MET 06/11/19  9. Pt to improve SSWS score to at least 0.40 m/sec for improved safety with community mobility. MET 05/14/19     Long Term Goals: 8 weeks   1. Pt to be (I) with advanced HEP Ongoing  2. Pt to improve R hip extension strength MMT score to at least 4+/5 for improved gait mechanics Ongoing  3. Pt to improve R hip AB and R hip AD strength MMT scores to at least 4-/5 for improved gait mechanics MET 06/11/19  4. Pt to improve R ankle PF strength MMT score to at least 2+/5 for improved gait mechanics MET 03/19/19  5. Pt to improve chair rise score to at least 5 times with no UE support and S for improved endurance and safety with functional transfers Progressing--CGA needed,  07/05/19  6. Pt to improve TUG score to at least 26  seconds for improved safety with household mobility Ongoing  7. Pt to improve SSWS score to at least 0.50 m/sec for improved safety with community mobility. Ongoing    Plan   PT to extend POC x 6 weeks.     Continue outpatient physical therapy 2x weekly under current established Plan of Care, 08/20/19 to 10/01/19, with treatment to include: pt education, HEP, therapeutic exercises, neuromuscular re-education/balance exercises, therapeutic activities, joint mobilizations, and modalities PRN, to work towards established goals. Pt may be seen by PTA to carry out plan of care.     Patient to follow up with neuro PT, Boris Razo for Neuro-DAMARI training for 2 sessions to assess if further functional gains can be achieved.     Hanh Brooke, PT

## 2019-08-20 NOTE — PLAN OF CARE
"  Physical Therapy Daily Treatment Note     [id number]    Therapy Diagnosis:   Encounter Diagnoses   Name Primary?    Weakness of right leg     Impaired functional mobility, balance, gait, and endurance      Physician: Ian Feliciano MD    Visit Date: 8/20/2019    Physician Orders: PT Eval and Treat   Medical Diagnosis from Referral: Late effect of stroke  Evaluation Date: 1/28/2019  Authorization Period Expiration: 06/06/19-12/31/19  Plan of Care Expiration:08/20/19 to 10/01/19  Visit # / Visits authorized: 13/20 (Vist #36 of 2019) - KX     Time In: 13:02  Time Out: 13:45  Total Billable Time: 43 minutes    Precautions: Standard, Diabetes, Fall and right hemiplegia, hearing aid, safety awareness, cognitive deficits, speech deficits    Subjective     Pt reports: " Ok."   Wife reports: " I had to bring him into the ER over the weekend for stomach pains and acid reflux."   HEP Compliance: pt reports he is doing all of his exercises at home.  Response to previous treatment: no complaints  Functional change: improved stability with sit to stands    Pain: 0/10  Location: N/A      Objective     Torito received therapeutic exercises to develop strength, endurance, ROM, flexibility and core stabilization for 35 minutes including:     Lower Extremity Strength (performed with patient in sitting)  Right LE  Evaluation 03/19/19 05/14/19 06/11/19 07/05/19 08/15/19 Left LE  Evaluation 03/19/19 05/14/19 06/11/19 07/05/19 08/15/19   Hip Flexion: 4/5 4-/5 4-/5 4/5 4/5 4-/5 Hip Flexion: 5/5 5/5 5/5 5/5 5/5 5/5   Hip Extension:  4-/5 4-/5 4-/5 4/5 4/5 4/5 Hip Extension: 5/5 5/5 5/5 5/5 5/5 5/5   Hip Abduction: 3/5 4/5 4/5 4+/5 4+/5 4/5 Hip Abduction: 5/5 5/5 5/5 5/5 5/5 5/5   Hip Adduction: 3/5 4/5 4+/5 4+/5 5/5 4+/5 Hip Adduction 5/5 5/5 5/5 5/5 5/5 5/5   Knee Extension: 4/5 4/5 4+/5 4+/5 4+/5 4+/5 Knee Extension: 5/5 5/5 5/5 5/5 5/5 5/5   Knee Flexion: 4/5 4-/5 4-/5 4-/5 4-/5 4-/5 Knee " Flexion: 5/5 5/5 5/5 5/5 5/5 5/5   Ankle Dorsiflexion: 0/5 3+/5 3+/5 3+/5 3+/5 4-/5 Ankle Dorsiflexion: 5/5 5/5 5/5 5/5 5/5 5/5   Ankle Plantarflexion: 2-/5 3/5 3+/5 3+/5 4/-5 4-/5 Ankle Plantarflexion: 5/5 5/5 5/5 5/5 5/5 5/5       Evaluation 3/4/2019 03/19/19 05/14/19 06/11/19 07/05/19 08/15/19   30 second Chair Rise  (adults > 59 y/o) 11 completed with LUE, abigail walker, CGA 8 completed with LUE, no AD, SBA 9 completed with LUE, no AD, CBA at R knee, improved RLE use 10 completed with LUE, no AD, CGA at R knee, improved RLE use 15 completed with LUE, no AD, CGA at R knee, improved RLE use 8 completed with no UE support (LUE resting on L knee), CGA at hips and R knee 6 completed with no UE support (LUE resting on L knee) Min A at hips and CGA at R knee     X 8 min Sci Fit recumbent stepper BLE only for BLE endurance and strength and CV endurance, L2    3 x 10 reps standing RLE hip flexion with 1 UE support // bars,  1.5# cuff weight on RLE        Patient participated in gait training activities to normalize gait pattern for 8 minutes. The following activities were included:   Gait belt used for safety. Assistive device: SBQC      Evaluation 03/19/19 05/14/19 06/11/19 07/05/19 08/15/19   Timed Up and Go 34 sec c/ abigail walker c/ SBA 31 sec c/ SBQC c/ CGA to SBA 27 sec c/ SBQC and CGA 29 sec c/ SBQC and SBA 24 sec c/ SBQC and CGA to SBA 22 sec c/ SBQC and CGA to SBA   Self Selected Walking Speed 0.33 m/sec (6m/18s)  C/ abigail walker c/ SBA 0.35 m/sec (6m/17s)  c/ SBQC c/ SBA 0.40 m/sec (6m/15s)  c/ SBQC c/ CGA 0.46 m/sec (6m/13s)  c/ SBQC c/  0.50 m/sec (6m/12s)  c/ SBQC c/ 0.46 m/sec (6m/13s)  C/ SBQC c/ SBA        Home Exercises Provided and Patient Education Provided     Education provided:  Education provided to patient and his wife  - current progress with therapy  -POC moving forward with  2 follow up session trial of Neuro DAMARI techniques    Written Home Exercises Provided: Continue current HEP.   See EMR under  Media for exercises provided 2/5/2019.    Assessment   Pt tolerated POC reassessment fairly well today. Reassessment period 07/05/19 to 08/15/19. Patient demonstrates fair progress with therapy at this time. RLE strength scores remain relatively unchanged since prior assessment with slight declines actually noted with hip flexion, hip abduction, and hip adduction. 30 second chair rise test s/ UE support score also declined by 2 reps with increased support required from PT to complete stands safely. TUG score improved by 2 seconds since prior performance, although current score continues to place him at an increased fall risk for both household and community ambulation. SSWS score declined by 1 second with current score placing patient in a limited community ambulator category. At this time, patient's progress with therapy objective measurements has appeared to plateau. Quality of functional transfers and ambulation remains stalled as patient continues to favor LLE and LUE when completing sit <> stand transfers and ambulation with quad cane, demonstrating limited carryover of RLE incorporation with daily activities/movements. Therefore, plan to have patient follow up with neuro PT who specializes in Neuro-DAMARI techniques for 2 follow up sessions to see if quality if functional mobility and gait could be improved. If patient does not demonstrate improvements or good carry over with these techniques, then will plan to d/c. PT extensively discussed patient's current progress with therapy and treatment plan moving forward with patient and his wife today. Both patient and his wife verbalized good understanding of treatment plan to PT.       Pt prognosis is Good.     Pt will continue to benefit from skilled outpatient physical therapy to address the deficits listed in the problem list box on initial evaluation, provide pt/family education and to maximize pt's level of independence in the home and community environment.      Pt's spiritual, cultural and educational needs considered and pt agreeable to plan of care and goals.     Anticipated Barriers for therapy: hearing deficit, speech deficits, cognitive deficits, decreased safety awareness, fall risk, visual neglect, transportation assistance required    Goals:  Short Term Goals: 4 weeks   1. Pt to be (I) with established HEP MET 03/19/19  2. Pt to improve R hip flexion strength MMT score to at least 4+/5 for improved gait mechanics Progressing  3. Pt to improve R hip extension strength MMT score to at least 4/5 for improved gait mechanics MET 06/11/19  4. Pt to improve R hip AB and R hip AD strength MMT scores to at least 3+/5 for improved gait mechanics MET 03/04/19  5.  Pt to improve R knee flexion and extension strength MMT scores to at least 4+/5 for improved gait mechanics Ongoing  6. Pt to improve R ankle PF strength MMT score to at least 2/5 for improved gait mechanics MET 03/04/19  7. Pt to improve chair rise score to at least 5 times with no more than 1 UE support and S for improved endurance and safety with functional transfers MET 03/04/19  8. Pt to improve TUG score to at least 30 seconds for improved safety with household mobility MET 06/11/19  9. Pt to improve SSWS score to at least 0.40 m/sec for improved safety with community mobility. MET 05/14/19     Long Term Goals: 8 weeks   1. Pt to be (I) with advanced HEP Ongoing  2. Pt to improve R hip extension strength MMT score to at least 4+/5 for improved gait mechanics Ongoing  3. Pt to improve R hip AB and R hip AD strength MMT scores to at least 4-/5 for improved gait mechanics MET 06/11/19  4. Pt to improve R ankle PF strength MMT score to at least 2+/5 for improved gait mechanics MET 03/19/19  5. Pt to improve chair rise score to at least 5 times with no UE support and S for improved endurance and safety with functional transfers Progressing--CGA needed,  07/05/19  6. Pt to improve TUG score to at least 26  seconds for improved safety with household mobility Ongoing  7. Pt to improve SSWS score to at least 0.50 m/sec for improved safety with community mobility. Ongoing    Plan   PT to extend POC x 6 weeks.     Continue outpatient physical therapy 2x weekly under current established Plan of Care, 08/20/19 to 10/01/19, with treatment to include: pt education, HEP, therapeutic exercises, neuromuscular re-education/balance exercises, therapeutic activities, joint mobilizations, and modalities PRN, to work towards established goals. Pt may be seen by PTA to carry out plan of care.     Patient to follow up with neuro PT, Boris Razo for Neuro-DAMARI training for 2 sessions to assess if further functional gains can be achieved.     Hanh Brooke, PT

## 2019-08-22 ENCOUNTER — TELEPHONE (OUTPATIENT)
Dept: REHABILITATION | Facility: HOSPITAL | Age: 59
End: 2019-08-22

## 2019-08-22 NOTE — TELEPHONE ENCOUNTER
Pt's wife requested call back from SLP. SLP called and pt's wife, Nereyda, asked SLP to confirm appointments with her. Nereyda verbalized understanding to all discussed.  SERINA Mott, CCC-SLP  Speech Language Pathologist

## 2019-08-29 ENCOUNTER — DOCUMENTATION ONLY (OUTPATIENT)
Dept: REHABILITATION | Facility: HOSPITAL | Age: 59
End: 2019-08-29

## 2019-09-11 ENCOUNTER — CLINICAL SUPPORT (OUTPATIENT)
Dept: REHABILITATION | Facility: HOSPITAL | Age: 59
End: 2019-09-11
Attending: PSYCHIATRY & NEUROLOGY
Payer: MEDICARE

## 2019-09-11 DIAGNOSIS — Z74.09 IMPAIRED FUNCTIONAL MOBILITY, BALANCE, GAIT, AND ENDURANCE: ICD-10-CM

## 2019-09-11 DIAGNOSIS — R29.898 WEAKNESS OF RIGHT LEG: ICD-10-CM

## 2019-09-11 PROCEDURE — 97112 NEUROMUSCULAR REEDUCATION: CPT | Mod: KX,PO

## 2019-09-11 NOTE — PROGRESS NOTES
"  Physical Therapy Daily Treatment Note     Name: Torito Harris  [facility] Number: [id number]    Therapy Diagnosis:   Encounter Diagnoses   Name Primary?    Weakness of right leg     Impaired functional mobility, balance, gait, and endurance      Physician: Ian Feliciano MD    Visit Date: 9/11/2019    Physician Orders: PT Eval and Treat   Medical Diagnosis from Referral: Late effect of stroke  Evaluation Date: 1/28/2019  Authorization Period Expiration: 06/06/19-12/31/19  Plan of Care Expiration:08/20/19 to 10/01/19  Visit # / Visits authorized: 14/20 (Vist #37 of 2019) - KX     Time In: 1300  Time Out: 1410  Total Billable Time: 50 minutes( PT stayed with pt an additional 20 minutes waiting for EMS to arrive)    Precautions: Standard, Diabetes, Fall and right hemiplegia, hearing aid, safety awareness, cognitive deficits, speech deficits    Subjective     Pt reports: " Ok."   Wife reports: " He said his R side doesn't feel right." PT unable to get more out of pt regarding this complaint.   HEP Compliance: pt reports he is doing all of his exercises at home.  Response to previous treatment: no complaints  Functional change: improved stability with sit to stands    Pain: 0/10  Location: N/A      Objective     Patient participated in gait training activities to normalize gait pattern for 5 minutes. The following activities were included:   Gait belt used for safety. Assistive device: QC  -Pt ambulates from lobby to gym ~ 170 feet.  PT provides assistance to R hand and arm in an attempt to prevent reflexive flexion of each joint.  Pt also provides verbal cues for more even distribution of weight and less trunk lean to L during trial. Pt demonstrates reciprocal pattern and requires additional cues for direction.        Patient participated in neuromuscular re-education activities to improve: Balance, Coordination, Kinesthetic, Sense, Proprioception and Posture for 40 minutes. The following activities were included: "     - PT applied GG wrist extension orthosis to R hand with pt seated on edge of mat  -PT attempted to have pt perform a gradual ramp-up to Neuro-Shawna progression # 7( for improved weight acceptance to R LE); PT sits at pt's R side on chair and assists pt with R weight shift at pelvis~ pt is able to place L foot on 4 inch block before him with mod/max A for balance and steadying. PT's facial expression and body language indicating reservation with the activity.  PT attempted once again with table placed at pt's L side for light L UE weight bearing( to ease pt's fear of falling) and pt was able to place L foot on block. However, pt then unable to step down and began leaning heavily on bedside table with L foream.  -PT then attempted a different approach: PT has pt perform sit<> stand trials from mat, min/modA to help pt maintain equal weight bearing to each LE~ pt performs roughly 10 trials in this fashion  -After a brief sitting rest period, PT has pt stand again and places bedside table on pt's R side as a cue for R lateral shifting of pelvis in standing. Pt performs this task 2 x 10 trials with mostly min A for balance and also for prevention of R knee hyperextension( which may cause PT to increase his assistance to min/mod A)  -PT then placed furniture slider to bottom of pt's L foot and secured with 's tape: pt assisted to standing with bedside table on R side and pt again cued for bumping hip against table, then he is asked to slowly wiggle L foot laterally by swiveling against furniture slider~ pt again demonstrated hesitancy to perform task and was able to complete only 2 trials of slight foot movement. Pt then indicated he did not feel good and was assisted to sit on mat.     Patient participated in dynamic functional therapeutic activities to improve functional performance for 5 minutes. Including:   -upon sitting on mat, PT noted pt's color to be more pale and observed and felt pt beginning to  perspire considerably  -PT attempted to obtain automatic BP with pt in sitting x 2 trials but was unable.  PT also tried once with manual cuff but was still unsuccessful.      The following occurred at or near end of pt's session( ~ 1345 to 1350).  -PT insisted pt lay on mat in supine position but pt initially refusing to do so.  Eventually, PT and wife convinced pt to lay down. PT again attempted to take pt's BP with manual cuff in supine but could not obtain reading. Another PT( Kelly Butcherquin) attempted to take BP as well but she was also unable to obtain.  PT then made the decision to call 911( wife in agreement). PT eventually was able to record a BP of 90/72 just prior to EMS arrival. PT remained with pt until EMS arrived. Pt was conscious throughout but was severely diaphoretic until he achieved supine position for several minutes. Once EMS arrived, PT gave paramedics relevant information and PT assisted pt to sit on mat with mod A( during attempt, pt making noise as if he was in pain and pointed to his R side). Pt was then able to stand pivot transfer to stretcher with min A. PT assisted pt to lift legs onto stretcher and paramedics used draw sheet to scoot pt to head of stretcher. Pt exited building around [id number].                 Home Exercises Provided and Patient Education Provided     Education provided:  Education provided to patient and his wife  - current progress with therapy  -POC moving forward with  2 follow up session trial of Neuro DAMARI techniques    Written Home Exercises Provided: Continue current HEP.   See EMR under Media for exercises provided 2/5/2019.    Assessment   Torito did not tolerate today's session well due to a number of reasons. First, pt did not appear open to some of the Neuro-DAMARI techniques pt attempted to perform with him today. Wife feels that pt generally is hesitant to perform new skills. PT may have attempted to progress pt a bit too quickly with regard to  performing a modified version of SLS by placing L foot on 4 inch block. However, this was to be the lead up to performance of Neuro-Shawna LE progression # 7, which involves repeated placement and removal of sound limb on objects of various heights. PT appeared very reluctant to perform this skill from the outset. Pt was also reluctant to perform wiggling of L foot laterally with furniture slider beneath foot. Pt was more willing to perform symmetrical sit<> stand transfers and lateral bumping of R hip against bedside table. It is possible that pt would have performed better at the start of the session if he had felt better. PT had no idea of what was to come as the session progressed. PT would like to have one more attempt at trying Neuro-Shawna interventions if pt and wife are willing to do so.    Pt prognosis is Good.     Pt will continue to benefit from skilled outpatient physical therapy to address the deficits listed in the problem list box on initial evaluation, provide pt/family education and to maximize pt's level of independence in the home and community environment.     Pt's spiritual, cultural and educational needs considered and pt agreeable to plan of care and goals.     Anticipated Barriers for therapy: hearing deficit, speech deficits, cognitive deficits, decreased safety awareness, fall risk, visual neglect, transportation assistance required    Goals:  Short Term Goals: 4 weeks   1. Pt to be (I) with established HEP MET 03/19/19  2. Pt to improve R hip flexion strength MMT score to at least 4+/5 for improved gait mechanics Progressing  3. Pt to improve R hip extension strength MMT score to at least 4/5 for improved gait mechanics MET 06/11/19  4. Pt to improve R hip AB and R hip AD strength MMT scores to at least 3+/5 for improved gait mechanics MET 03/04/19  5.  Pt to improve R knee flexion and extension strength MMT scores to at least 4+/5 for improved gait mechanics Ongoing  6. Pt to improve R  ankle PF strength MMT score to at least 2/5 for improved gait mechanics MET 03/04/19  7. Pt to improve chair rise score to at least 5 times with no more than 1 UE support and S for improved endurance and safety with functional transfers MET 03/04/19  8. Pt to improve TUG score to at least 30 seconds for improved safety with household mobility MET 06/11/19  9. Pt to improve SSWS score to at least 0.40 m/sec for improved safety with community mobility. MET 05/14/19     Long Term Goals: 8 weeks   1. Pt to be (I) with advanced HEP Ongoing  2. Pt to improve R hip extension strength MMT score to at least 4+/5 for improved gait mechanics Ongoing  3. Pt to improve R hip AB and R hip AD strength MMT scores to at least 4-/5 for improved gait mechanics MET 06/11/19  4. Pt to improve R ankle PF strength MMT score to at least 2+/5 for improved gait mechanics MET 03/19/19  5. Pt to improve chair rise score to at least 5 times with no UE support and S for improved endurance and safety with functional transfers Progressing--CGA needed,  07/05/19  6. Pt to improve TUG score to at least 26 seconds for improved safety with household mobility Ongoing  7. Pt to improve SSWS score to at least 0.50 m/sec for improved safety with community mobility. Ongoing    Plan     Continue to address RLE strength deficits with emphasis on hamstring and R ankle strengthening. Focus on sit <> stand transfers without UE support. Focus on safety with ambulation. Focus on trunk flexibility and improved posture.     Boris Razo, PT

## 2019-09-17 ENCOUNTER — CLINICAL SUPPORT (OUTPATIENT)
Dept: REHABILITATION | Facility: HOSPITAL | Age: 59
End: 2019-09-17
Attending: PSYCHIATRY & NEUROLOGY
Payer: MEDICARE

## 2019-09-17 DIAGNOSIS — R29.898 WEAKNESS OF RIGHT LEG: ICD-10-CM

## 2019-09-17 DIAGNOSIS — Z74.09 IMPAIRED FUNCTIONAL MOBILITY, BALANCE, GAIT, AND ENDURANCE: ICD-10-CM

## 2019-09-17 PROCEDURE — 97530 THERAPEUTIC ACTIVITIES: CPT | Mod: KX,PO

## 2019-09-17 NOTE — PLAN OF CARE
"  Physical Therapy Daily Treatment Note     Name: Torito Harris  [facility] Number: [id number]    Therapy Diagnosis:   Encounter Diagnoses   Name Primary?    Weakness of right leg     Impaired functional mobility, balance, gait, and endurance      Physician: Ian Feliciano MD    Visit Date: 9/17/2019    Physician Orders: PT Eval and Treat   Medical Diagnosis from Referral: Late effect of stroke  Evaluation Date: 1/28/2019  Authorization Period Expiration: 06/06/19-12/31/19  Plan of Care Expiration:08/20/19 to 10/01/19  Visit # / Visits authorized: 15/20 (Vist #38 of 2019) - KX     Time In: 11:15  Time Out: 12:00  Total Billable Time: 45 minutes    Precautions: Standard, Diabetes, Fall and right hemiplegia, hearing aid, safety awareness, cognitive deficits, speech deficits    Subjective     Pt reports: "OK"  Wife reports: that he is feeling better today. He had to go to the ED after last therapy session due to low BP. She is unsure of what caused drop in blood pressure.     HEP Compliance: Patient's wife reports that patient is only walking at home and has not been doing his LE strength exercises.  Response to previous treatment: no complaints  Functional change: improved stability with sit to stands    Pain: 0/10  Location: N/A      Objective     Patient participated in dynamic functional therapeutic activities to improve functional performance for 45 minutes. Including:     Extensive portion of therapy session spent discussing prior therapy session, purpose of SKILLED therapy intervention, discharge from therapy, and HEP. Patient and his wife seated in therapy gym with PT. Patient appears tearful when discussing prior session and PT plan. Patient's wife mainly engaged in conversation with PT.    PT started by asking both patient and his wife how they each felt that the prior therapy session had gone when PT Boris Razo had attempted to perform Neuro-DAMARI techniques with patient to promote improved weight " "bearing and use of RLE with functional transfers. Patient's wife reports that the session did not go well because the patient had gotten "worked up" since he was being asked to try new techniques. She feels that as a result of him getting worked up, his blood pressure dropped resulting in him going to the ED. PT instructed patient and his wife that PT had spoken with Boris Razo PT about the prior session as well, and Boris also felt that the session did not go well as patient was very resistant to all of patient's requests to promote weight bearing on RLE. Therefore, physical therapy team is not going to proceed with Neuro-DAMARI techniques at this time. Patient and his wife agreeable with this plan.     Therefore, since no new SKILLED therapy interventions are being implemented at this time and since patient demonstrated no significant functional change with objective measurements at last reassessment on 08/20/19, PT is discharging patient from outpatient physical therapy at this time. PT thoroughly discussed this point with patient and his wife. PT explained that in order to justify continuing skilled therapy, the interventions performed in the therapy sessions must be skilled, must be something that patient is unable to work on by himself/with caregiver, or must be something that the therapist is teaching/progressing. Currently, patient is only focusing on walking and LE strengthening which can be achieved at this time with an HEP. Therefore, PT recommends that patient focus on his LE strengthening HEP and his walking at home. If there is a functional change or current HEP needs to be progressed, then further therapy may be warranted in the future. Additionally, patient's attitude and motivation toward therapy has declined over the past 2 months which has made progressing functional activities and introduction of new techniques more difficult.     Patient's wife then asked if she could get a new order for " therapy next week and have patient return at that time. PT once again explained that in order for therapy team to establish a new plan of care with patient, noticeable functional change needs to be demonstrated which would warrant the need of skilled physical therapy. If these conditions are not met, then patient would not be appropriate for skilled PT intervention. Unlikely that there will be significant functional change in only 1 week time.  Patient's wife verbalized understanding.     PT then asked patient and his wife what LE exercises he was performing at home since HEP was provided prior and patient and his wife had been reporting compliance. Patient's wife reports that he has only been walking around the house and has not been performing any strengthening exercises. Therefore, PT re-issued HEP for LE strengthening. While PT was putting HEP together, PT offered if patient would like to perform recumbent stepper activity for CV endurance while PT printed his exercises. Patient appeared upset and declined.     PT verbally explained and demonstrated HEP exercises to patient and his wife. Patient's wife verbalized good understanding.     Pt's wife requests that d/c note be sent to referring MD so that he is up to date on patient's case.        Home Exercises Provided and Patient Education Provided     Education provided:  Purpose of skilled therapy intervention, d/c from therapy, HEP; see above.     Written Home Exercises Provided: HEP updated this date.   See EMR under Patient Instructions for exercises provided 09/17/19.    PHYSICAL THERAPY DISCHARGE SUMMARY     Status Towards Goals Met:  Patient made fair progress throughout course of therapy meeting 7/9 STGs and 3/7 LTGs. He demonstrates improved RLE gross strength compared to evaluation with most gains noted at right ankle. However, over last 2 reassessment periods, no significant changes in RLE strength noted with slight decrease in right hip flexion  strength noted. Chair rise score also improved throughout course of therapy with patient able to perform sit <> stands without UE support; however, he only performs sit <> stand transfers s/UE support when PT is present. He demonstrates no carryover of this skill throughout remainder of therapy sessions or outside of therapy sessions. TUG score improved by 12 seconds throughout therapy but current score continues to place patient and elevated fall risk with household ambulation. SSWS score also decreased ~5 seconds throughout therapy but current walking speed remains increased due to poor gait mechanics.       Goals:  Short Term Goals: 4 weeks   1. Pt to be (I) with established HEP MET 03/19/19  2. Pt to improve R hip flexion strength MMT score to at least 4+/5 for improved gait mechanics Not Met  3. Pt to improve R hip extension strength MMT score to at least 4/5 for improved gait mechanics MET 06/11/19  4. Pt to improve R hip AB and R hip AD strength MMT scores to at least 3+/5 for improved gait mechanics MET 03/04/19  5.  Pt to improve R knee flexion and extension strength MMT scores to at least 4+/5 for improved gait mechanics Not Met  6. Pt to improve R ankle PF strength MMT score to at least 2/5 for improved gait mechanics MET 03/04/19  7. Pt to improve chair rise score to at least 5 times with no more than 1 UE support and S for improved endurance and safety with functional transfers MET 03/04/19  8. Pt to improve TUG score to at least 30 seconds for improved safety with household mobility MET 06/11/19  9. Pt to improve SSWS score to at least 0.40 m/sec for improved safety with community mobility. MET 05/14/19     Long Term Goals: 8 weeks   1. Pt to be (I) with advanced HEP MET 09/17/19  2. Pt to improve R hip extension strength MMT score to at least 4+/5 for improved gait mechanics Not Met  3. Pt to improve R hip AB and R hip AD strength MMT scores to at least 4-/5 for improved gait mechanics MET  06/11/19  4. Pt to improve R ankle PF strength MMT score to at least 2+/5 for improved gait mechanics MET 03/19/19  5. Pt to improve chair rise score to at least 5 times with no UE support and S for improved endurance and safety with functional transfers Not Met  6. Pt to improve TUG score to at least 26 seconds for improved safety with household mobility Not Met  7. Pt to improve SSWS score to at least 0.50 m/sec for improved safety with community mobility. Not Met    Goals Not achieved and why:  Remaining goals for right hip flexion and right hip extensor strength were not met likely due to poor compliance with strengthening portion of HEP which was finally highlighted this date with strong questioning from PT. Prior to this time, patient reports that he had been performing his leg exercises at home. TUG and SSWS score goals not met due to remaining gait mechanics that are a result of patient mainly favoring LLE and LUE support throughout ambulation, creating compensatory and inefficient gait pattern. Patient demonstrates no significant change in objective measures on prior formal reassessment. Quality of functional transfers and ambulation remains stalled as patient continues to favor LLE and LUE when completing sit <> stand transfers and ambulation with quad cane, demonstrating limited carryover of RLE incorporation with daily activities/movements despite strong encouragement from PT to do so. At that time of last reassessment, PT discussed plateau with therapy with patient and his wife and offered 2 sessions to try Neuro-DAMARI techniques with Neuro-DAMARI certified therapist to attempt to promote improved utilization of RLE in functional activities. Patient cancelled 1 scheduled session due to a death in the family but attended 2nd follow up session. Per PT Boris Razo, who performed Neuro-DAMARI follow up session, patient did not tolerate exercises well, was resistant to PT's requests to attempt different  weight bearing positions, which resulted in BP drop. Patient and his wife also report that this session did not go well do to patient not wanting to try new techniques. Therefore, patient can continue RLE strengthening with HEP. HEP updated and reviewed with patient and his wife. Patient could potentially benefit from skilled PT intervention at a future time if functional change occurs with current condition. However, since strength and ambulation gains have currently reached a plateau, and patient is resistant to attempting Neuro-DAMARI, skilled intervention is not warranted at this time. These points were discussed on multiple occasions extensively with patient and his wife. Although patient and his wife verbalized good understanding of these points to PT, they both appear somewhat resistant to education provided.      Discharge reason : Patient has maximized benefit from PT at this time    PLAN   This patient is discharged from Outpatient Physical Therapy Services.     Hanh Brooke, PT  09/17/2019

## 2019-09-17 NOTE — PROGRESS NOTES
"  Physical Therapy Daily Treatment Note     Name: Torito Harris  [facility] Number: [id number]    Therapy Diagnosis:   Encounter Diagnoses   Name Primary?    Weakness of right leg     Impaired functional mobility, balance, gait, and endurance      Physician: Ian Feliciano MD    Visit Date: 9/17/2019    Physician Orders: PT Eval and Treat   Medical Diagnosis from Referral: Late effect of stroke  Evaluation Date: 1/28/2019  Authorization Period Expiration: 06/06/19-12/31/19  Plan of Care Expiration:08/20/19 to 10/01/19  Visit # / Visits authorized: 15/20 (Vist #38 of 2019) - KX     Time In: 11:15  Time Out: 12:00  Total Billable Time: 45 minutes    Precautions: Standard, Diabetes, Fall and right hemiplegia, hearing aid, safety awareness, cognitive deficits, speech deficits    Subjective     Pt reports: "OK"  Wife reports: that he is feeling better today. He had to go to the ED after last therapy session due to low BP. She is unsure of what caused drop in blood pressure.     HEP Compliance: Patient's wife reports that patient is only walking at home and has not been doing his LE strength exercises.  Response to previous treatment: no complaints  Functional change: improved stability with sit to stands    Pain: 0/10  Location: N/A      Objective     Patient participated in dynamic functional therapeutic activities to improve functional performance for 45 minutes. Including:     Extensive portion of therapy session spent discussing prior therapy session, purpose of SKILLED therapy intervention, discharge from therapy, and HEP. Patient and his wife seated in therapy gym with PT. Patient appears tearful when discussing prior session and PT plan. Patient's wife mainly engaged in conversation with PT.    PT started by asking both patient and his wife how they each felt that the prior therapy session had gone when PT Boris Razo had attempted to perform Neuro-DAMARI techniques with patient to promote improved weight " "bearing and use of RLE with functional transfers. Patient's wife reports that the session did not go well because the patient had gotten "worked up" since he was being asked to try new techniques. She feels that as a result of him getting worked up, his blood pressure dropped resulting in him going to the ED. PT instructed patient and his wife that PT had spoken with Boris Razo PT about the prior session as well, and Boris also felt that the session did not go well as patient was very resistant to all of patient's requests to promote weight bearing on RLE. Therefore, physical therapy team is not going to proceed with Neuro-DAMARI techniques at this time. Patient and his wife agreeable with this plan.     Therefore, since no new SKILLED therapy interventions are being implemented at this time and since patient demonstrated no significant functional change with objective measurements at last reassessment on 08/20/19, PT is discharging patient from outpatient physical therapy at this time. PT thoroughly discussed this point with patient and his wife. PT explained that in order to justify continuing skilled therapy, the interventions performed in the therapy sessions must be skilled, must be something that patient is unable to work on by himself/with caregiver, or must be something that the therapist is teaching/progressing. Currently, patient is only focusing on walking and LE strengthening which can be achieved at this time with an HEP. Therefore, PT recommends that patient focus on his LE strengthening HEP and his walking at home. If there is a functional change or current HEP needs to be progressed, then further therapy may be warranted in the future. Additionally, patient's attitude and motivation toward therapy has declined over the past 2 months which has made progressing functional activities and introduction of new techniques more difficult.     Patient's wife then asked if she could get a new order for " therapy next week and have patient return at that time. PT once again explained that in order for therapy team to establish a new plan of care with patient, noticeable functional change needs to be demonstrated which would warrant the need of skilled physical therapy. If these conditions are not met, then patient would not be appropriate for skilled PT intervention. Unlikely that there will be significant functional change in only 1 week time.  Patient's wife verbalized understanding.     PT then asked patient and his wife what LE exercises he was performing at home since HEP was provided prior and patient and his wife had been reporting compliance. Patient's wife reports that he has only been walking around the house and has not been performing any strengthening exercises. Therefore, PT re-issued HEP for LE strengthening. While PT was putting HEP together, PT offered if patient would like to perform recumbent stepper activity for CV endurance while PT printed his exercises. Patient appeared upset and declined.     PT verbally explained and demonstrated HEP exercises to patient and his wife. Patient's wife verbalized good understanding.     Pt's wife requests that d/c note be sent to referring MD so that he is up to date on patient's case.        Home Exercises Provided and Patient Education Provided     Education provided:  Purpose of skilled therapy intervention, d/c from therapy, HEP; see above.     Written Home Exercises Provided: HEP updated this date.   See EMR under Patient Instructions for exercises provided 09/17/19.    PHYSICAL THERAPY DISCHARGE SUMMARY     Status Towards Goals Met:  Patient made fair progress throughout course of therapy meeting 7/9 STGs and 3/7 LTGs. He demonstrates improved RLE gross strength compared to evaluation with most gains noted at right ankle. However, over last 2 reassessment periods, no significant changes in RLE strength noted with slight decrease in right hip flexion  strength noted. Chair rise score also improved throughout course of therapy with patient able to perform sit <> stands without UE support; however, he only performs sit <> stand transfers s/UE support when PT is present. He demonstrates no carryover of this skill throughout remainder of therapy sessions or outside of therapy sessions. TUG score improved by 12 seconds throughout therapy but current score continues to place patient and elevated fall risk with household ambulation. SSWS score also decreased ~5 seconds throughout therapy but current walking speed remains increased due to poor gait mechanics.       Goals:  Short Term Goals: 4 weeks   1. Pt to be (I) with established HEP MET 03/19/19  2. Pt to improve R hip flexion strength MMT score to at least 4+/5 for improved gait mechanics Not Met  3. Pt to improve R hip extension strength MMT score to at least 4/5 for improved gait mechanics MET 06/11/19  4. Pt to improve R hip AB and R hip AD strength MMT scores to at least 3+/5 for improved gait mechanics MET 03/04/19  5.  Pt to improve R knee flexion and extension strength MMT scores to at least 4+/5 for improved gait mechanics Not Met  6. Pt to improve R ankle PF strength MMT score to at least 2/5 for improved gait mechanics MET 03/04/19  7. Pt to improve chair rise score to at least 5 times with no more than 1 UE support and S for improved endurance and safety with functional transfers MET 03/04/19  8. Pt to improve TUG score to at least 30 seconds for improved safety with household mobility MET 06/11/19  9. Pt to improve SSWS score to at least 0.40 m/sec for improved safety with community mobility. MET 05/14/19     Long Term Goals: 8 weeks   1. Pt to be (I) with advanced HEP MET 09/17/19  2. Pt to improve R hip extension strength MMT score to at least 4+/5 for improved gait mechanics Not Met  3. Pt to improve R hip AB and R hip AD strength MMT scores to at least 4-/5 for improved gait mechanics MET  06/11/19  4. Pt to improve R ankle PF strength MMT score to at least 2+/5 for improved gait mechanics MET 03/19/19  5. Pt to improve chair rise score to at least 5 times with no UE support and S for improved endurance and safety with functional transfers Not Met  6. Pt to improve TUG score to at least 26 seconds for improved safety with household mobility Not Met  7. Pt to improve SSWS score to at least 0.50 m/sec for improved safety with community mobility. Not Met    Goals Not achieved and why:  Remaining goals for right hip flexion and right hip extensor strength were not met likely due to poor compliance with strengthening portion of HEP which was finally highlighted this date with strong questioning from PT. Prior to this time, patient reports that he had been performing his leg exercises at home. TUG and SSWS score goals not met due to remaining gait mechanics that are a result of patient mainly favoring LLE and LUE support throughout ambulation, creating compensatory and inefficient gait pattern. Patient demonstrates no significant change in objective measures on prior formal reassessment. Quality of functional transfers and ambulation remains stalled as patient continues to favor LLE and LUE when completing sit <> stand transfers and ambulation with quad cane, demonstrating limited carryover of RLE incorporation with daily activities/movements despite strong encouragement from PT to do so. At that time of last reassessment, PT discussed plateau with therapy with patient and his wife and offered 2 sessions to try Neuro-DAMARI techniques with Neuro-DAMARI certified therapist to attempt to promote improved utilization of RLE in functional activities. Patient cancelled 1 scheduled session due to a death in the family but attended 2nd follow up session. Per PT Boris Razo, who performed Neuro-DAMARI follow up session, patient did not tolerate exercises well, was resistant to PT's requests to attempt different  weight bearing positions, which resulted in BP drop. Patient and his wife also report that this session did not go well do to patient not wanting to try new techniques. Therefore, patient can continue RLE strengthening with HEP. HEP updated and reviewed with patient and his wife. Patient could potentially benefit from skilled PT intervention at a future time if functional change occurs with current condition. However, since strength and ambulation gains have currently reached a plateau, and patient is resistant to attempting Neuro-DAMARI, skilled intervention is not warranted at this time. These points were discussed on multiple occasions extensively with patient and his wife. Although patient and his wife verbalized good understanding of these points to PT, they both appear somewhat resistant to education provided.      Discharge reason : Patient has maximized benefit from PT at this time    PLAN   This patient is discharged from Outpatient Physical Therapy Services.     Hanh Brooke, PT  09/17/2019

## 2019-09-24 ENCOUNTER — DOCUMENTATION ONLY (OUTPATIENT)
Dept: REHABILITATION | Facility: HOSPITAL | Age: 59
End: 2019-09-24

## 2019-09-24 DIAGNOSIS — R47.01 BROCA'S APHASIA: ICD-10-CM

## 2019-09-24 NOTE — PROGRESS NOTES
Outpatient Therapy Discharge Summary   Discharge Date: 9/24/2019   Name: Torito Harris  [facility] Number: [id number]  Therapy Diagnosis:   Encounter Diagnosis   Name Primary?    Broca's aphasia      Physician: Ian Feliciano MD  Physician Orders: ST evaluate and treat  Medical Diagnosis: I69.30 (ICD-10-CM) - Late effect of stroke     Visit #/Visits authorized: 13/20 (29 prior to this auth) KX   Date of Evaluation:  1/28/19  Insurance Authorization Period: 6/11/19 to 12/31/19  Plan of Care Expiration Date:  1/28/2019 to 3/25/19; 3/28/19 to 5/24/19; 5/23/19-7/19/19  Extended POC: 7/16/19 to 8/30/19  Cancelled Visits:  1  No Show Visits:  11    Assessment    Assessment of Current Status: pt did not show for planned discharge visit.  At the time of the last visit, continued decreased comprehension of 3-4 unit information, decreased verbal expression including decreased verbal fluency and confrontational naming, impaired reading comprehension, and impaired written expression persisted. Little to no change over last reporting period with no reported carryover to home environment. Pt did not complete home program as recommended.     Goals:   Short Term Goals: (4 weeks) Current Progress:   1. Pt will name common objects with 90% Carmen. Goal Not Met / Discontinue     2. Pt will generate a response to basic sentence completions with 80% Carmen  Goal Not Met / Discontinue     3.  pt will complete reading comprehension tasks at the sentence level with 90% acc indly. Goal Not Met / Discontinue     4. pt will follow 3 unit body part commands with 90% acc indly.  Goal Not Met / Discontinue     5.  pt will follow 3 unit commands with visual stimuli with 90% acc indly to improve auditory comprehension.  Goal Not Met / Discontinue     6. Pt will complete word finding task (i.e. Creating subject, verb, object pairs in VNEST protocol) with 90% acc min A to improve word fluency. Goal Not Met / Discontinue     7. Pt will write the name  for an item pictured on 3/4 trials. Pt will participate in ACRT treatment immediately after incorrect productions Goal Not Met / Discontinue       Goals Previously Met:  - Pt will complete R/L body part discrimination tasks with 70% Carmen. Goal Met 2/7/19/ Discontinue   - Pt will repeat  3-5 word phrases with 80% Carmen. Goal Met 2/21/19 / Discontinue  - assess reading and writing skills Goal Met 2/26/19   - pt will follow 2 unit body part commands with 90% acc indly to improve auditory comprehension. Goal Met 3/4/19 / Discontinue  - Patient will match phrases to pictures to increase reading comprehension skills with 90% accuracy given min A. Goal Met 3/4/19 / Discontinue  - Patient will write grapheme to corresponding phoneme with 90% acc indly. Goal Not Met / Discontinue    - Patient will write name and address to increase written expression skills with 90% accuracy given verbal presentation of letters Goal Not Met / Discontinue    - pt will follow 2 unit commands with visual stimuli with 90% acc indly to improve auditory comprehension. Goal Met 5/16/19 / Discontinue   - Pt will name 5 concrete category items with Carmen. Goal Not Met / Discontinue      Long Term Goals:  1. Pt will comprehend communication related to basic medical and social needs and utilize compensatory strategies to maintain safety and participate socially in functional living environment.   Goal Not Met / Discontinue   2. The patient will develop functional, cognitive-linguistic based skills and utilize compensatory strategies to communicate wants and needs effectively to different conversation partners, maintain safety and participate socially in functional living environment Goal Not Met / Discontinue    3. Pt will develop functional reading and writing skills and utilize compensatory strategies to maintain safety during ADL's and read and understand everyday adult material independently. Goal Not Met / Discontinue      CM NOMS (National  Outcome Measure System)  Spoken Language Expression  LEVEL 4: The individual is successfully able to initiate communication using spoken language in simple, structured conversations in routine daily activities with familiar communication partners. The individual usually requires moderate cueing, but is able to demonstrate use of simple sentences (i.e., semantics, syntax, and morphology) and rarely uses complex sentences/messages.     Spoken Language Comprehension  LEVEL 4: The individual consistently responds accurately to simple yes/no questions andoccasionally follows simple directions without cues. Moderate contextual support isusually needed to understand complex sentences/messages. The individual is able tounderstand limited conversations about routine daily activities with familiarcommunication partners.        Discharge reason: Patient has reached the maximum rehab potential for the present time    Plan   This patient is discharged from Speech Therapy    [place] SERINA Panchal, CCC-SLP   9/24/2019

## 2019-12-27 ENCOUNTER — DOCUMENTATION ONLY (OUTPATIENT)
Dept: REHABILITATION | Facility: HOSPITAL | Age: 59
End: 2019-12-27

## 2019-12-28 NOTE — PROGRESS NOTES
Outpatient Occupational Therapy Discharge Summary      Name: Torito Harris  Referring Physician: No ref. provider found   Initial visit: 1/28/19  Date of Last visit: 8/13/19  Total Visits Received: 40  Missed Visits: 2      Goals Achieved / Not achieved and why:     Short Term Goals: 3 weeks   ROM/Strength to perform the ADL's and functional activities listed below, - in progress  Pt will improve functional  strength needed for tasks to 20# in R hand. - in progress   Pt will improve AROM for shoulder flexion needed for functional tasks to 60* in R UE. - MET 5/14/19  UE dressing will improve to Min A -MET  LB dressing will improve to Mod A  -MET , gets help for fastening   Toileting will improve to Min A incorporating R hand for task  -MET  Pt will be Independent with HEP to improve ROM and FM skills. - MET 6/11/19      Long Term Goals: 6 weeks   ROM/Strength to perform the ADL's and functional activities listed below - in progress   Pt will improve functional  strength needed for tasks to 30# in R hand. - in progress   Pt will improve AROM for shoulder flexion needed for functional tasks to 80* in R UE. - MET 4/14/19  UE dressing will improve to Supervision  SBA  LB dressing will improve to Min A help for fasteners only.   Toileting will improve to Supervision incorporating R hand for task Mod I using L hand  Pt will complete clinical testing for skills needed for driving - not met; patient reports he is driving and has received MD clearance   G code self care CK - in progress     Discharge reason:   Discharged by patient  Pt has not rescheduled further follow up sessions.    Plan:    This patient is discharged from Occupational Therapy Services.     TIAGO Carlisle

## 2020-02-06 ENCOUNTER — TELEPHONE (OUTPATIENT)
Dept: REHABILITATION | Facility: HOSPITAL | Age: 60
End: 2020-02-06

## 2020-02-06 NOTE — TELEPHONE ENCOUNTER
Left VM to see if pt can come to speech therapy evaluation appointment sooner than next Thursday. Available times are this afternoon, tomorrow morning, Monday morning or afternoon.     CATIE Tran., CCC-SLP, [facility]  Speech-Language Pathologist  2/6/2020

## 2020-02-10 ENCOUNTER — CLINICAL SUPPORT (OUTPATIENT)
Dept: REHABILITATION | Facility: HOSPITAL | Age: 60
End: 2020-02-10
Payer: MEDICARE

## 2020-02-10 DIAGNOSIS — R47.01 BROCA'S APHASIA: ICD-10-CM

## 2020-02-10 PROCEDURE — 92523 SPEECH SOUND LANG COMPREHEN: CPT | Mod: PO

## 2020-02-10 NOTE — PLAN OF CARE
"Outpatient Neurological Rehabilitation  Speech and Language Therapy Evaluation    Date: 2/10/2020     Name: Torito Harris   MRN: [id number]    Therapy Diagnosis:   Encounter Diagnosis   Name Primary?    Broca's aphasia       Physician: Ian Feliciano MD  Physician Orders:[id number] - Ambulatory Referral to Speech Therapy (Order #[id number])  Medical Diagnosis:  I69.30 (ICD-10-CM) - Late effect of stroke    Visit # / Visits Authorized:  1/ 20  Date of Evaluation:  2/10/2020   Insurance Authorization Period: 02/09/2020- 12/31/2020  Plan of Care Certification:    2/10/2020 to 04/10/2020     Time In: 1315  Time Out: 1400  Procedure Min.   Speech Language Evaluation   45     Precautions:Standard and Fall  Subjective   Date of Onset: 12/26/17  History of Current Condition: Pt has a history of stroke with resulting receptive and expressive communication deficits and motor deficits.   Past Medical History: Torito Harris  has a past medical history of Coronary artery disease, Diabetes mellitus, and Hypertension.  Torito Harris  has a past surgical history that includes Coronary angioplasty with stent.  Medical Hx and Allergies: Torito currently has no medications in their medication list. Review of patient's allergies indicates:  No Known Allergies  Prior Therapy: Pt received previous outpatient speech therapy at [facility] as well as home health speech therapy services   Social History:  Lives with his wife and daughter   Prior Level of Function: Independent   Current Level of Function: Requires significant assistance from wife for ADLs.   Pain:  Pain was not rated on a numerical scale, however, pt did not exhibit outward signs of pain   Pain Location / Description: n/a  Nutrition: Regular diet with thin liquids  Patient's Therapy Goals: "speak"  Objective   Formal Assessment:    Western Aphasia Battery - Revised (WAB-R) was administered to evaluate the patient's receptive and expressive " "language function.The purpose stated in the manual for the WAB-R is to determine the presence, severity, and type of aphasia; measure the patient's level of performance to provide a baseline for detecting change over time; provide a comprehensive assessment of the patients language strengths and deficits in order to guide treatment and management; infer the location and etiology of the lesion causing the aphasia.  The following results were revealed:     Spontaneous Speech Score: 3 / 20  Auditory Comprehension Score: 6.2 / 10  Repetition Score:   5.4 /10  Naming and Word Finding Score: TBD/ 10  Aphasia Quotient (AQ):   TBD / 100  Aphasia Classification: TBD    Subtest Results:   Information Content: 2 / 10  Fluency, Grammatical Competence, and Paraphasias: 1 /10  Yes / No Questions: 48 / 60  Auditory Word Recognition: 51 / 60  Sequential Commands: 25 / 80  Repetition: 54 /100  Object Naming: TBD /60  Word Fluency: TBD / 20  Sentence Completion: TBD/10  Responsive Speech: TBD / 10    Description:  Pt presents with deficits in auditory comprehension (answering yes/no questions, following sequential commands), spontaneous speech generation, and repetition of phrases and sentences. Speech output was recurrent and brief with responses often being "I don't know."  Formal assessment will be completed next session in order to determine differential diagnosis and overall severity level of aphasia.     [facility] National Outcome Measures System (NOMS):   Spoken Language Expression  LEVEL 3 The communication partner must assume responsibility for structuring thecommunication exchange, and with consistent and moderate cueing, the individual canproduce words and phrases that are appropriate and meaningful in context.      Spoken Language Comprehension  LEVEL 3: The individual usually responds accurately to simple yes/no questions. The individual is able to follow simple directions out of context, although moderate cueing is " consistently needed. Accurate comprehension of more complex directions/messages is infrequent.      Treatment   Treatment Time In: n/a  Treatment Time Out: n/a  Total Treatment Time: n/a  no treatment performed 2/2 time to complete evaluation.    Education: {Plan of Care and role of SLP in care were discussed with pt. Patient expressed understanding.     Home Program: Not yet established.     Assessment     Torito presents to [facility] s/p medical diagnosis of late effect of stroke. Demonstrates impairments including limitations as described in the problem list.He presents with aphasia c/b deficits in auditory comprehension (answering yes/no questions, following sequential commands), spontaneous speech generation, and repetition of phrases and sentences. Formal assessment will be completed next session in order to determine differential diagnosis and overall severity level of aphasia. Positive prognostic factors include family support. Negative prognostic factors include patient motivation and severity of deficits. .Barriers to therapy include transportation. Patient will benefit from skilled, outpatient neurological rehabilitation speech therapy.    Rehab Potential: fair  Pt's spiritual, cultural and educational needs considered and pt agreeable to plan of care and goals.    Short Term Goals (4 weeks):     1. Pt will complete the Western Aphasia Battery test.   2. Pt will generate a response to basic sentence completions with 80% acc with min A.  3. Pt will repeat 3-5 word phrase/sentences with 80% acc with min A  4. Pt will follow simple 3 unit commands with 80% acc ind'ly.     Long Term Goals (8 weeks):   1. Pt will develop functional linguistic based skills and utilize compensatory strategies to communicate wants and needs effectively to different conversational partners, maintain safety, and participate socially in functional living environment.    2. Pt will comprehend communication  related to basic medical and social needs and utilize compensatory strategies to maintain safety and to participate socially in functional living environment.      Plan     Plan of Care Certification Period: 2/10/2020  to 4/10/2020    Recommended Treatment Plan:  Patient will participate in the [facility] program for speech therapy 2x  times per week to address his  Communication deficits, to educate patient and their family, and to participate in a home exercise program.    Therapist's Name:   JANICE Altamirano   [facility]  Clinician     I certify that I was present in the room directing the student in service delivery and guiding them using my skilled judgment. As the co-signing therapist I have reviewed the students documentation and am responsible for the treatment, assessment, and plan.     SERINA Farnsworth, CCC-SLP   2/10/2020     Physician Name: _______________________________    Physician Signature: ____________________________

## 2020-02-10 NOTE — PROGRESS NOTES
See initial evaluation in POC section.       SCOTT Altamirano.   [facility]  Clinician    I certify that I was present in the room directing the student in service delivery and guiding them using my skilled judgment. As the co-signing therapist I have reviewed the students documentation and am responsible for the treatment, assessment, and plan.     CATIE Tran., CCC-SLP, CBIS  Speech-Language Pathologist  2/10/2020

## 2020-02-13 ENCOUNTER — CLINICAL SUPPORT (OUTPATIENT)
Dept: REHABILITATION | Facility: HOSPITAL | Age: 60
End: 2020-02-13
Attending: PSYCHIATRY & NEUROLOGY
Payer: MEDICARE

## 2020-02-13 DIAGNOSIS — R47.01 BROCA'S APHASIA: ICD-10-CM

## 2020-02-13 DIAGNOSIS — Z74.09 IMPAIRED FUNCTIONAL MOBILITY, BALANCE, GAIT, AND ENDURANCE: Primary | ICD-10-CM

## 2020-02-13 DIAGNOSIS — R29.898 WEAKNESS OF RIGHT LEG: ICD-10-CM

## 2020-02-13 PROCEDURE — 92507 TX SP LANG VOICE COMM INDIV: CPT | Mod: PO

## 2020-02-13 PROCEDURE — 97161 PT EVAL LOW COMPLEX 20 MIN: CPT | Mod: PO

## 2020-02-13 NOTE — PROGRESS NOTES
"Outpatient Neurological Rehabilitation   Speech and Language Therapy Daily Note  Date:  2/13/2020     Name: Torito Harris   MRN: [id number]   Therapy Diagnosis:   Encounter Diagnosis   Name Primary?    Broca's aphasia    Physician: Ian Feliciano MD  Physician Orders:[id number] - Ambulatory Referral to Speech Therapy (Order #[id number])  Medical Diagnosis:  I69.30 (ICD-10-CM) - Late effect of stroke     Visit # / Visits Authorized:  1/ 20  Date of Evaluation:  2/10/2020   Insurance Authorization Period: 02/09/2020- 12/31/2020  Plan of Care Certification:    2/10/2020 to 04/10/2020  Extended POC:  n/a  Progress Note: 3/10/20   Visits Cancelled:   Visits No Show:     Time In:  1450  Time Out:  1530  Total Billable Time: 40     Precautions: Standard, Fall and aphasia  Subjective:   Pt reports: "I can't talk." His wife reported that he has not been doing the language activities that were recommended last time in therapy.    He was not compliant to home exercise program.   Response to previous treatment: n/a   Pain Scale:  0/10 on VAS currently.   Pain Location: n/a  Objective:     UNTIMED  Procedure Min.   Speech-Language Therapy  40   Total Timed Units: 0  Total Untimed Units: 3  Charges Billed/# of units: 3    Short Term Goals: (4 weeks) Current Progress:   1. Pt will complete the Western Aphasia Battery test.     Met 2/13/2020   Met see below    2. Pt will generate a response to basic sentence completions with 80% acc with min A.    Progressing/ Not Met 2/13/2020   See WAB   3. Pt will repeat 3-5 word phrase/sentences with 80% acc with min A    Progressing/ Not Met 2/13/2020   See WAB   4. Pt will follow simple 3 unit commands with 80% acc ind'ly.    Progressing/ Not Met 2/13/2020   See WAB   New goal 2/13/2020  5. Pt will participate in reading and writing assessment.     Progressing/ Not Met 2/13/2020   WAB reading subtest was initiated but will be completed at next visit.    New goal 2/13/2020  6. Pt will complete " "word finding task (i.e. Creating subject, verb, object pairs in VNEST protocol) with 90% acc min A to improve word fluency.  Progressing/ Not Met 2020         Western Aphasia Battery - Revised (WAB-R) was administered to evaluate the patient's receptive and expressive language function.The purpose stated in the manual for the WAB-R is to determine the presence, severity, and type of aphasia; measure the patient's level of performance to provide a baseline for detecting change over time; provide a comprehensive assessment of the patients language strengths and deficits in order to guide treatment and management; infer the location and etiology of the lesion causing the aphasia.  The following results were revealed:      Spontaneous Speech Score:           3 / 20  Auditory Comprehension Score:    6.2 / 10  Repetition Score:                              5.4 /10  Naming and Word Finding Score:   3.5 10  Aphasia Quotient (AQ):                    36.2 / 100  Aphasia Classification: Severe Broca's Aphasia     Subtest Results:   Information Content: 2 / 10  Fluency, Grammatical Competence, and Paraphasias: 1 /10  Yes / No Questions: 48 / 60  Auditory Word Recognition: 51  60  Sequential Commands:  80  Repetition: 54 /100  Object Namin  Word Fluency:   Sentence Completion: 6/10  Responsive Speech: 4 / 10     Description: Object Naming, Word Fluency, Sentence Completion, and Responsive Speech subtests of the WAB were administered this session.  Overall pt presents with deficits in auditory comprehension (answering moderate to complex yes/no questions, following moderate to complex sequential commands), spontaneous speech generation, repetition of phrases and sentences, word fluency, and responsive speech tasks. Expressive speech consists of mostly recurrent and brief responses such as "I don't know" or "I can't say it."  Results indicated diagnosis of severe Broca's aphasia.     Western Aphasia " Battery - Revised (WAB-R) - Supplemental was initiated to evaluate the patient's reading and writing function. The reading subtests Comprehension of Sentences and Reading Commands were administered. The pt scored a 22/40 on the Comprehension of Sentences subtest and a 1/20 on the Reading Commands subtest indicating some reading comprehension deficits. Plan to complete supplemental reading and writing portions next session.    Patient Education/Response:   Pt and his wife were educated on POC and importance of consistently complying to home program. Pt and wife were also educated on potential future use of AAC/SGD device.     Written Home Exercises Provided: Patient instructed to cont prior HEP ([employer] [facility] on iPad).  Exercises were reviewed and Torito was able to demonstrate them prior to the end of the session.  Torito demonstrated fair  understanding of the education provided.   Assessment:   Torito is progressing well towards his goals.The WAB-R was completed this session with results indicating that Torito presents with severe Broca's aphasia c/b deficits in spontaneous speech, auditory comprehension, repetition, and naming and word finding. Supplemental reading portion of the WAB was initiated this session to determine if reading is a facilitative context for communication for the pt. Plan to complete supplemental reading and writing portions next session. Current goals remain appropriate. Goals to be updated as necessary.     Pt prognosis is Fair. Pt will continue to benefit from skilled outpatient speech and language therapy to address the deficits listed in the problem list on initial evaluation, provide pt/family education and to maximize pt's level of independence in the home and community environment.   Medical necessity is demonstrated by the following IMPAIRMENTS:  Patient with few true words in all situations severely limiting functional communication with both familiar and unfamiliar communication  partners.  Barriers to Therapy: Transportation, timeframe from onset     Pt's spiritual, cultural and educational needs considered and pt agreeable to plan of care and goals.  Plan:   Continue POC with focus on receptive and expressive language including spontaneous speech, auditory comprehension, repetition, and word finding.     SCOTT Altamirano.   [facility]  Clinician     I certify that I was present in the room directing the student in service delivery and guiding them using my skilled judgment. As the co-signing therapist I have reviewed the students documentation and am responsible for the treatment, assessment, and plan.     SERINA Farnsworth, CCC-SLP   2/13/2020

## 2020-02-14 NOTE — PLAN OF CARE
[facility]  Physical Therapy Initial Evaluation    Name: Torito Harris  Clinic Number: [id number]    Therapy Diagnosis:   Encounter Diagnoses   Name Primary?    Impaired functional mobility, balance, gait, and endurance Yes    Weakness of right leg      Physician: Ian Feliciano MD    Physician Orders: PT Eval and Treat   Medical Diagnosis from Referral: I69.30 (ICD-10-CM) - Late effect of stroke  Evaluation Date: 2/13/2020  Authorization Period Expiration: 1/9/20 to 6/1/20  Plan of Care Expiration: 2/13/20 to 3/26/20  Visit # / Visits authorized: 1/ 20    Time In: 1355  Time Out: 1445  Total Billable Time: 50  minutes    Precautions: Standard, Fall and speech deficits, cognitive deficits, hearing impaired      Subjective     History of Present Illness: Torito is a 59 y.o. male that presents to [facility] secondary to   The following history is taken from pt's PT evaluation performed on 1/28/19:  Date of onset: ~1 year ago (December 2017)  History of current condition - Torito's wife present to assist with history. Patient and his wife report that he suffered a CVA ~ 1 year ago. He has been participating in  PT, OT, and SLP since that time, but was recently discharged. He currently requires (A) with toileting, bathing, dressing; (S) for stair negotiation; S to Mod I for functional transfers; S to Mod I for ambulation with abigail walker; S for driving short distances. Pt was active and completely (I) prior to his CVVA. He would like to continue to address remaining mobility deficits to continue to improve his (I) and safety with functional mobility.     Pt attended sessions from 1/28/19 to 9/17/19. Pt was discharged from OPPT due to lack of continued progress and resistance to attempting new skills which include promoting weight bearing to R LE.      Falls in the past year: none  Prior Therapy: Pt has participated in OPPT/OT and  here at [facility] in  "2019  Nutrition:  Normal  Social History/Support systems:  lives with his wife and 16 y/o daughter; SSH, 7 steps total to enter home (steps outside, RHR; 2 steps to step down once inside the home); pt able to perform steps with railing~ wife reports pt ascended the steps when he came to a recent speech therapy session  Place of Residence (Steps/Adaptations/Levels): see above  Previous functional status includes: pt ambulatory with QC at time of his discharge from PT in September, 2019  Current functional status: Pt continues to use QC for ambulation; wife reports pt sometimes uses platform walker around home but not often; wife only assists pt minimally with bathing and fastening hooks with dressing    Exercise routine prior to onset : wife reports pt sometimes does exercises issued to him by prior therapist( Hanh Brooke DPT)  DME owned: hospital bed, abigail walker, w/c, TTB, QC, platform walker, [place] wrist extension orthosis for R hand( wife reports pt doesn't use much)  Work/Job description includes:  was working as a  prior to CVA; not currently working since CVA    Pt stated goals: " this" ~ pt points to R arm  Family present/states: "strengthen R leg"    Pain:   Current 0/10  Location: N/A       Past Medical History and Allergies  Torito Harris  has a past medical history of Coronary artery disease, Diabetes mellitus, and Hypertension.    Torito Harris  has a past surgical history that includes Coronary angioplasty with stent.    Torito currently has no medications in their medication list.    Review of patient's allergies indicates:  No Known Allergies     Imaging, nothing on file    Objective     BP taken at start of session with L UE: 129/76      - Follows commands: at least 75 % of time but needs gestures and cues   - Speech: Pt is expressively aphasic and likely has receptive deficits as well. Pt sometimes responds to commands/questions with, " I can't hear you."    Mental status: " alert, oriented to person, place, and time  Appearance: Casually dressed and Well groomed  Behavior:  calm, cooperative and adequate rapport can be established  Attention Span and Concentration:  Normal    Dominant hand:  right     Posture Alignment in sitting:   Head: mildly forward   Scapulae: R scapula situated inferior to L; B scapulae abducted   Trunk: slouched posture with R lateral trunk flexion  Pelvis: posterior   Legs: WFL     Posture Alignment in standing:   Head: similar to sitting posture   Scapulae: similar to sitting posture   Trunk: shortened on R side   Pelvis: posterior   Legs: B knee flexion with increased weight bearing to L LE     Sensation: Light Touch: Intact to L UE and LE; appears decreased to R UE and LE based on pt's responses         Tone: 1-  Slight increase in muscle tone, manifested by a catch and release or by minimal resistance at the end of the range of motino when the affected part(s) is moved in flexion or extension  Limbs/muscles affected: R UE affected more than R LE~ particularly hand, wrist and elbow    Visual/Auditory: denies changes; wife reports pt has reading glasses  Tracking:NT  Saccades: NT  Acuity:NT  R/L discrimination: pt unable to verbalize R from L sides, though this may be more of a language deficit  Visual field: Impaired: R    Coordination:   - fine motor: NT  - UE coordination: NT   - LE coordination:  NT    ROM:   UPPER EXTREMITY--AROM/PROM  (R) UE: WFL with mild assistance from PT to reach end range of shoulder flexion  (L) UE: WNL           RANGE OF MOTION--LOWER EXTREMITIES  (R) LE Hip: limited with respect to hip IR   Knee: WFL   Ankle: WFL    (L) LE: Hip: WFL   Knee: WFL   Ankle: WFL    Strength: manual muscle test grades below   Upper Extremity Strength  L UE is grossly 5/5; R UE is grossly 3(-)/5 shoulder flexion, 3/5 wrist/elbow flex/ext and forearm supination/pronation    Lower Extremity Strength  Right LE  Left LE    Hip Flexion: 4(-)/5 Hip Flexion:  5/5   Hip Extension:  3+/5 Hip Extension: 4/5   Hip Abduction: 4-/5 Hip Abduction: 4+/5   Hip Adduction: 4-/5 Hip Adduction 5/5   Knee Extension: 4/5 Knee Extension: 5/5   Knee Flexion: 2-/5 Knee Flexion: 5/5   Ankle Dorsiflexion: 3-/5 Ankle Dorsiflexion: 5/5   Ankle Plantarflexion: 4/5 Ankle Plantarflexion: 5/5     Abdominal Strength: 3/5    Flexibility: moderate tightness noted to R hamstrings; L hamstrings are WFL     Evaluation   Single Limb Stance R LE NT  (<10 sec = HIGH FALL RISK)   Single Limb Stance L LE NT  (<10 sec = HIGH FALL RISK)   30 second Chair Rise 11.5 completed with L arm; pt places more weight through L LE   5 times sit-stand NT     Postural control:  MCTSIB:  1. Eyes Open/feet together/Firm: 29 seconds, F  2. Eyes Closed/feet together/Firm: 22 seconds, F  3. Eyes Open/feet together/Foam: 22 seconds, F  4. Eyes Closed/feet together/Foam: pt unable to perform    Gait Assessment:   - AD used: QC  - Assistance: S/mod I  - Distance: 170 feet from lobby to gym and vice-versa    GAIT DEVIATIONS:  Torito displays the following deviations with ambulation: decreased marcelo, decreased step length, decreased stride length, B knee flexion in stance phase, decreased R heel strike at initial contact, L lateral trunk shift with increased weight through L LE, R UE tends to adduct with R wrist and hand flexion and forearm pronation.    Impairments contributing to deviations: impaired motor control, impaired tone, decreased posture, decreased R LE strength, decreased balance    Endurance Deficit: mild based on today's evaluation     Evaluation   Timed Up and Go 26  sec with QC   Self Selected Walking Speed 0.4 m/sec (6m/15s)   Fast Walking Speed NT           Functional Mobility (Bed mobility, transfers)  Bed mobility: Mod I  Supine to sit: Mod I  Sit to supine: Mod I  Transfers to bed: Mod I  Transfers to toilet: Mod I  Sit to stand:  Mod I  Stand pivot:  Mod I  Car transfers: Mod I  Wheelchair mobility:  I    ADL's:  Feeding: I  Grooming: mod I to I  Hygiene: mod I to I  UB Dressing: Mod I  LB Dressing: Min A~ mostly for hooks   Toileting: Mod I  Bathing: Min A        CMS Impairment/Limitation/Restriction for FOTO Stroke Lower Extremity Survey    Therapist reviewed FOTO scores for Torito Harris on 2/13/2020.   FOTO documents entered into "[employer] - see Media section.    Limitation Score: 59%  Category: Mobility         TREATMENT     Home Exercises and Patient Education Provided    Education provided:   Pt and wife educated regarding evaluation findings and potential plan of care moving forward.        Assessment   This is a 59 y.o. male referred to outpatient physical therapy and presents with a medical diagnosis of I69.30 (ICD-10-CM) - Late effect of stroke .  Patient presents with R UE and LE hemiparesis, impaired tone, decreased posture, expressive( and likely receptive) language deficits, decreased standing balance, cognitive deficits and mildly impaired endurance. With respect to tests and measures, pt did perform better on his TUG and SSWS scores when compared to his evaluation in January of 2019. Still, pt's TUG score of 26 seconds still places him at high risk for falls. Pt's SSWS of 0.4 m/sec places him in the 60-79% limited category with respect to other independent community ambulators. Pt's 30 second chair rise of 11.5 repetitions is below the norm for a male in pt's age group. Finally, pt was able to participate in 3 of 4 conditions of the MCTSIB, though he did not pass any of these. PT is encouraged by the functional strength gains pt seems to have experienced to R UE and LE since his previous evaluation last year. However, PT realizes that pt is now 3 years post stroke, and functional gains will likely come very slowly. PT is hopeful that pt will be willing to try new therapy tasks, including some Neuro-Shawna techniques. This was attempted once before with poor results.  PT is warranted to assist pt to  achieve a more functional walking pattern, improve functional use/strength of R LE and to improve activity tolerance so pt can fully participate in his desired activities. PT is also hoping to reduce fall risk via therapy interventions.    Pt prognosis is Fair.   Pt will benefit from skilled outpatient Physical Therapy to address the deficits stated above and in the chart below, provide pt/family education, and to maximize pt's level of independence.     Plan of care discussed with patient: Yes  Pt's spiritual, cultural and educational needs considered and patient is agreeable to the plan of care and goals as stated below:     Anticipated Barriers for therapy: pt does not drive, is hearing impaired as well as language impaired    PT/PTA met face to face to discuss pt's treatment plan and established goals. Pt will be seen by physical therapy every 6th visit and minimally once per month.       History  Co-morbidities and personal factors that may impact the plan of care Examination  Body Structures and Functions, activity limitations and participation restrictions that may impact the plan of care    Clinical Presentation   Co-morbidities:   CAD, coping style/mechanism, history of CVA, HTN, level of undertstanding of current condition and transportation assistance required        Personal Factors:   coping style  attitudes Body Regions:   lower extremities  upper extremities  trunk    Body Systems:    gross symmetry  ROM  strength  gross coordinated movement  balance  gait  transfers  transitions  motor control  motor learning            Participation Restrictions:   Pt does not drive; hearing impaired, language impaired     Activity limitations:   Learning and applying knowledge  listening  solving problems    General Tasks and Commands  undertaking multiple tasks    Communication  communicating with/receiving spoken language    Mobility  lifting and carrying objects  fine hand use (grasping/picking  up)  walking  using transportation (bus, train, plane, car)  driving (bike, car, motorcycle)    Self care  dressing  looking after one's health    Domestic Life  shopping  cooking  doing house work (cleaning house, washing dishes, laundry)  assisting others    Interactions/Relationships  complex interpersonal interactions    Life Areas  employment    Community and Social Life  community life  recreation and leisure         stable and uncomplicated                      low   high  high Decision Making/ Complexity Score:  low         Pt's spiritual, cultural and educational needs considered and pt agreeable to plan of care and goals as stated below:     GOALS:   Short term goals: 4 weeks, pt agrees to goals set.  1. Pt to be issued updated LE HEP and to report at least partial compliance  2. Pt to improve his TUG score to 24 seconds with use of QC for improved household ambulation with decreased fall risk  3. Pt to increase his 30 second chair rise to 12.5 repetitions to demonstrate improved functional LE strength and endurance  4. Pt to improve his SSWS to 0.43 m/sec with use of QC for improved community ambulation with decreased fall risk  5. Pt to pass condition # 1 of the MCTSIB to demonstrate improved static standing ability  6. Pt to improve R LE MMT score for hip flexion to 4/5 for improved gait mechanics  7. Pt to improve R LE MMT score for hip extension to 4/5 for improved ability to perform symmetrical sit<> stand transfers    Long term goals: 6 weeks, pt agrees to goals set  8. Pt to be compliant with finalized LE HEP  9. Pt to improve his TUG score to 23 seconds with use of QC for improved household ambulation with decreased fall risk  10. Pt to increase his 30 second chair rise to 13.5 repetitions to demonstrate improved functional LE strength and endurance  11. Pt to improve his SSWS to 0.46 m/sec with use of QC for improved community ambulation with decreased fall risk   12. Pt to increase his scores on  conditions # 2 and 3 of the MCTSIB to 25 seconds or > to demonstrate improved static standing balance with low/eliminated   vision and when standing on unstable surfaces   13. Pt to improve R LE MMT score for knee flexion to 2+/5 for improved swing phase of gait   14. Pt to improve R LE MMT score for ankle dorsiflexion to 3+/5 for improved heel strike during gait    Plan   Outpatient physical therapy 2 times weekly for 6 weeks( until 3/26/20) to include: Pt Education, HEP, therapeutic exercises, neuromuscular re-education, therapeutic activities, manual therapy, joint mobilizations, aquatic therapy and modalities PRN to achieve established goals. Pt may be seen by PTA as part of the rehabilitation team.       Boris Razo, PT  02/13/2020

## 2020-02-17 ENCOUNTER — CLINICAL SUPPORT (OUTPATIENT)
Dept: REHABILITATION | Facility: HOSPITAL | Age: 60
End: 2020-02-17
Payer: MEDICARE

## 2020-02-17 DIAGNOSIS — Z74.09 IMPAIRED FUNCTIONAL MOBILITY, BALANCE, GAIT, AND ENDURANCE: ICD-10-CM

## 2020-02-17 DIAGNOSIS — R47.01 BROCA'S APHASIA: ICD-10-CM

## 2020-02-17 DIAGNOSIS — R29.898 WEAKNESS OF RIGHT LEG: ICD-10-CM

## 2020-02-17 PROCEDURE — 97110 THERAPEUTIC EXERCISES: CPT | Mod: PO,CQ,59

## 2020-02-17 PROCEDURE — 97112 NEUROMUSCULAR REEDUCATION: CPT | Mod: PO,CQ

## 2020-02-17 PROCEDURE — 92507 TX SP LANG VOICE COMM INDIV: CPT | Mod: PO

## 2020-02-17 NOTE — PATIENT INSTRUCTIONS
Hip Flexion / Knee Extension: Straight-Leg Raise (Eccentric)        Lie on back. Lift leg with knee straight. Slowly lower leg for 3-5 seconds.  __10_ reps per set, _2__ sets per day, ___ days per week. Lower like elevator, stopping at each floor.     Copyright © [employer]. All rights reserved.     Bridge        Lie back, legs bent. Inhale, pressing hips up. Keeping ribs in, lengthen lower back. Exhale, rolling down along spine from top.  Repeat _20___ times.      [email or web address]     Copyright © [employer]. All rights reserved.   KNEE: Extension, Long Arc Quads - Sitting        Raise leg until knee is straight.  __30_ reps per set,    Copyright © [employer]. All rights reserved.   Knee Raise        Lift knee and then lower it. Repeat with other knee.  Repeat __30__ times.      [email or web address]     Copyright © [employer]. All rights reserved.   Adduction: Hip - Knees Together (Sitting)        Sit with towel roll between knees. Push knees together. Hold for _3-5__ seconds.  Repeat _30__ times.     Copyright © [employer]. All rights reserved.   ABDUCTION: Sitting (Active)        Sit with feet flat. Lift right leg slightly and draw it out to side.   Complete _3__ sets of _10__ repetitions.     Copyright © [employer]. All rights reserved.   Ankle Bend: Dorsiflexion / Plantar Flexion, Sitting        Sit with feet on floor. Point toes up, keeping both heels on floor. Then press toes to floor raising heels. Hold each position _3-5__ seconds.  Repeat _30__ times per session.     Copyright © [employer]. All rights reserved.

## 2020-02-17 NOTE — PROGRESS NOTES
"  Physical Therapy Daily Treatment Note     [id number]    Therapy Diagnosis: No diagnosis found.  Physician: Ian Feliciano MD    Visit Date: 2/17/2020    Physician Orders: PT Eval and Treat   Medical Diagnosis from Referral: I69.30 (ICD-10-CM) - Late effect of stroke  Evaluation Date: 2/13/2020  Authorization Period Expiration: 1/9/20 to 6/1/20  Plan of Care Expiration: 2/13/20 to 3/26/20  Visit # / Visits authorized: 2/ 20    Time In: 11:45  Time Out: 12;30  Total Billable Time: 45 minutes    Precautions: Standard, Fall and speech deficits, cognitive deficits, hearing impaired    Subjective     Pt reports: " Ok"  He will be given an HEP today   Response to previous treatment: first appointment following initial evaluation  Functional change: ongoing    Pain: 0/10  Location: N/A     Objective     Torito received therapeutic exercises to develop strength, endurance, ROM, flexibility, posture and core stabilization for 35 minutes including:  X 8 min on Sci Fit recumbent stepper.  B UE/B LE on level 2.0  Seated EOM:   3 x 10 reps of R LE LAQ, 3 sec hold   3 x 10 reps of R LE hip felxion   3 x 10 reps of B LE hip adduction squeeze, 3 sec hold   3 x 10 reps of B LE hip abduction   3 x 10 reps of B LE DF/PF    Torito participated in neuromuscular re-education activities to improve: Balance, Coordination, Kinesthetic, Sense, Proprioception and Posture for 10 minutes. The following activities were included:  PTA performs tone inhibition to R hand and wrist with use of firm surface  PTA performs Neuro-Shawna R wrist stretches of forearm supination/pronation and carpal roll along with forearm over wrist  -PTA applies [place] wrist extension orthosis to R UE;  pt wears throughout remainder of PT session;     Torito participated in dynamic functional therapeutic activities to improve functional performance for 0  minutes, including:      Torito participated in gait training to improve " functional mobility and safety for 0  minutes, including:        Home Exercises Provided and Patient Education Provided     Education provided:   HEP    Written Home Exercises Provided: yes.  Exercises were reviewed and Torito was able to demonstrate them prior to the end of the session.  Torito demonstrated good  understanding of the education provided.     See EMR under Patient Instructions for exercises provided 2/17/2020.    Assessment     Torito tolerated his first tx session following initial evaluation fairly well. Pt began cardiovascular endurance on the stepper and was educated on seated HEP.  Pt demonstrated understanding of all exercises during tx session.  Pt was not agreeable to supine therex, only seated during tx session.  Cont with plan of care.    Torito is progressing well towards his goals.   Pt prognosis is Fair.     Pt will continue to benefit from skilled outpatient physical therapy to address the deficits listed in the problem list box on initial evaluation, provide pt/family education and to maximize pt's level of independence in the home and community environment.     Pt's spiritual, cultural and educational needs considered and pt agreeable to plan of care and goals.     Anticipated barriers to physical therapy: pt does not drive, is hearing impaired as well as language impaired    GOALS:   Short term goals: 4 weeks, pt agrees to goals set.  1. Pt to be issued updated LE HEP and to report at least partial compliance  2. Pt to improve his TUG score to 24 seconds with use of QC for improved household ambulation with decreased fall risk  3. Pt to increase his 30 second chair rise to 12.5 repetitions to demonstrate improved functional LE strength and endurance  4. Pt to improve his SSWS to 0.43 m/sec with use of QC for improved community ambulation with decreased fall risk  5. Pt to pass condition # 1 of the MCTSIB to demonstrate improved static standing ability  6. Pt to improve R LE MMT score  for hip flexion to 4/5 for improved gait mechanics  7. Pt to improve R LE MMT score for hip extension to 4/5 for improved ability to perform symmetrical sit<> stand transfers     Long term goals: 6 weeks, pt agrees to goals set  8. Pt to be compliant with finalized LE HEP  9. Pt to improve his TUG score to 23 seconds with use of QC for improved household ambulation with decreased fall risk  10. Pt to increase his 30 second chair rise to 13.5 repetitions to demonstrate improved functional LE strength and endurance  11. Pt to improve his SSWS to 0.46 m/sec with use of QC for improved community ambulation with decreased fall risk              12.       Pt to increase his scores on conditions # 2 and 3 of the MCTSIB to 25 seconds or > to demonstrate improved static standing balance with low/eliminated                       vision and when standing on unstable surfaces              13.       Pt to improve R LE MMT score for knee flexion to 2+/5 for improved swing phase of gait              14.       Pt to improve R LE MMT score for ankle dorsiflexion to 3+/5 for improved heel strike during gait    Plan     Cont with endurance, strengthening, stretching, weight bearing and balance activities    Lucy Vanegas, PTA

## 2020-02-17 NOTE — PROGRESS NOTES
"Outpatient Neurological Rehabilitation   Speech and Language Therapy Daily Note  Date:  2/17/2020     Name: Torito Harris   MRN: [id number]   Therapy Diagnosis:   Encounter Diagnosis   Name Primary?    Broca's aphasia    Physician: Ian Feliciano MD  Physician Orders:[id number] - Ambulatory Referral to Speech Therapy (Order #[id number])  Medical Diagnosis:  I69.30 (ICD-10-CM) - Late effect of stroke     Visit # / Visits Authorized:  2/ 20  Date of Evaluation:  2/10/2020   Insurance Authorization Period: 02/09/2020- 12/31/2020  Plan of Care Certification:    2/10/2020 to 04/10/2020  Extended POC:  n/a  Progress Note: 3/10/20   Visits Cancelled:   Visits No Show:     Time In:  1235  Time Out:  1315  Total Billable Time: 40     Precautions: Standard, Fall and aphasia  Subjective:   Pt reports: " I don't know" as he points to his mouth   He was not compliant to home exercise program.   Response to previous treatment: n/a   Pain Scale:  0/10 on VAS currently.   Pain Location: n/a  Objective:     UNTIMED  Procedure Min.   Speech-Language Therapy  40   Total Timed Units: 0  Total Untimed Units: 3  Charges Billed/# of units: 3    Short Term Goals: (4 weeks) Current Progress:   1. Pt will complete the Western Aphasia Battery test.     Met 2/17/2020   Met see below    2. Pt will generate a response to basic sentence completions with 80% acc with min A.    Progressing/ Not Met 2/17/2020   Auto phrases: 70% ind'ly; 80% with a repetition    Auto sentences: 80% ind'ly    Met x1   3. Pt will repeat 3-5 word phrase/sentences with 80% acc with min A    Progressing/ Not Met 2/17/2020   Functional phrases: 80% ind'ly; 93% with a single repetition    Met x1   4. Pt will follow simple 3 unit commands with 80% acc ind'ly.    Progressing/ Not Met 2/17/2020   40% acc ind'ly; 60% with a repetition; 100% with a model.    Better accuracy with objects (ie: point to door, then table) vs body parts (point to chin then to me).   New goal " "2/17/2020  5. Pt will participate in reading and writing assessment.     Progressing/ Not Met 2/17/2020   Not formally addressed      New goal 2/13/2020  6. Pt will complete word finding task (i.e. Creating subject, verb, object pairs in VNEST protocol) with 90% acc min A to improve word fluency.  Progressing/ Not Met 2/17/2020   Not formally addressed         OTHER:   Utilized comprehension listening task on tactus therapy - Nouns: 83% acc; Verbs: 70% acc (wife present, demonstrated how to use application, change level of difficulty and parts of speech to target. Even with volume all the way up, patient often cannot hear stimuli as he is [place]).   Patient Education/Response:   Continued education on POC and importance of consistently complying to home program.     Written Home Exercises Provided: Patient instructed to cont prior HEP ([facility] Therapy on iPad).  Exercises were reviewed and Torito was able to demonstrate them prior to the end of the session.  Torito demonstrated fair  understanding of the education provided. Torito's wife demonstrated good understanding of education and homework provided.  Assessment:   Torito is progressing well towards his goals. At times its hard to determine what is comprehension vs difficulty with hearing during tasks. Pt often stated "I can't" or "I don't know" but with cues and repetition, accuracy increases. Current goals remain appropriate. Goals to be updated as necessary.     Pt prognosis is Fair. Pt will continue to benefit from skilled outpatient speech and language therapy to address the deficits listed in the problem list on initial evaluation, provide pt/family education and to maximize pt's level of independence in the home and community environment.   Medical necessity is demonstrated by the following IMPAIRMENTS:  Patient with few true words in all situations severely limiting functional communication with both familiar and unfamiliar communication partners.  Barriers " to Therapy: Transportation, timeframe from onset     Pt's spiritual, cultural and educational needs considered and pt agreeable to plan of care and goals.  Plan:   Continue POC with focus on receptive and expressive language including spontaneous speech, auditory comprehension, repetition, and word finding. Complete writing portion of WAB.     Farrah Atkinson CCC-SLP   2/17/2020

## 2020-02-20 ENCOUNTER — CLINICAL SUPPORT (OUTPATIENT)
Dept: REHABILITATION | Facility: HOSPITAL | Age: 60
End: 2020-02-20
Payer: MEDICARE

## 2020-02-20 DIAGNOSIS — R29.898 WEAKNESS OF RIGHT LEG: ICD-10-CM

## 2020-02-20 DIAGNOSIS — R47.01 BROCA'S APHASIA: ICD-10-CM

## 2020-02-20 DIAGNOSIS — Z74.09 IMPAIRED FUNCTIONAL MOBILITY, BALANCE, GAIT, AND ENDURANCE: ICD-10-CM

## 2020-02-20 PROCEDURE — 92507 TX SP LANG VOICE COMM INDIV: CPT | Mod: PO

## 2020-02-20 PROCEDURE — 97116 GAIT TRAINING THERAPY: CPT | Mod: PO,CQ

## 2020-02-20 PROCEDURE — 97110 THERAPEUTIC EXERCISES: CPT | Mod: PO,CQ,59

## 2020-02-20 NOTE — PROGRESS NOTES
"  Physical Therapy Daily Treatment Note     Name: [id number]    Therapy Diagnosis:   Encounter Diagnoses   Name Primary?    Impaired functional mobility, balance, gait, and endurance     Weakness of right leg      Physician: Ian Feliciano MD    Visit Date: 2/20/2020    Physician Orders: PT Eval and Treat   Medical Diagnosis from Referral: I69.30 (ICD-10-CM) - Late effect of stroke  Evaluation Date: 2/13/2020  Authorization Period Expiration: 1/9/20 to 6/1/20  Plan of Care Expiration: 2/13/20 to 3/26/20  Visit # / Visits authorized: 3/ 20    Time In: 14:00  Time Out: 14:45  Total Billable Time: 45 minutes    Precautions: Standard, Fall and speech deficits, cognitive deficits, hearing impaired    Subjective     Pt reports: " Fine"  He reports compliance with HEP  Response to previous treatment:   Functional change: ongoing    Pain: 0/10  Location: N/A     Objective     Torito received therapeutic exercises to develop strength, endurance, ROM, flexibility, posture and core stabilization for 37 minutes including:  X 10 min on [employer] Fit recumbent stepper, [place] sprints couse .  B UE/B LE on level 2.0    Supine:   2 x 30 sec of manual stretches including:   HS stretches on R LE   R HC stretches    R piriformis stretch  2 x 10 reps of R LE SAQ over bolster with 3 sec hold.  3 x 10 reps of R LE DF with OTB  3 x 10 reps of R LE IR with rubber bands        Seated EOM:   2 x 10 reps of R LE HS curls with OTB    Torito participated in neuromuscular re-education activities to improve: Balance, Coordination, Kinesthetic, Sense, Proprioception and Posture for 0 minutes. The following activities were included:      Torito participated in dynamic functional therapeutic activities to improve functional performance for 0  minutes, including:      Torito participated in gait training to improve functional mobility and safety for 8  minutes, including:    Pt ambulated in/out of gym with LBQC and SBA.  " VC's for heel strike.     Home Exercises Provided and Patient Education Provided     Education provided:   HEP    Written Home Exercises Provided: yes.  Exercises were reviewed and Torito was able to demonstrate them prior to the end of the session.  Torito demonstrated good  understanding of the education provided.     See EMR under Patient Instructions for exercises provided 2/17/2020.    Assessment     Torito tolerated  tx session fairly well and did not have any complaints.  Pt with increased time and resistance on the stepper and was agreeable to supine therex and stretching.  Pt reports compliance with HEP.  Cont with plan of care.   Torito is progressing well towards his goals.   Pt prognosis is Fair.     Pt will continue to benefit from skilled outpatient physical therapy to address the deficits listed in the problem list box on initial evaluation, provide pt/family education and to maximize pt's level of independence in the home and community environment.     Pt's spiritual, cultural and educational needs considered and pt agreeable to plan of care and goals.     Anticipated barriers to physical therapy: pt does not drive, is hearing impaired as well as language impaired    GOALS:   Short term goals: 4 weeks, pt agrees to goals set.  1. Pt to be issued updated LE HEP and to report at least partial compliance  2. Pt to improve his TUG score to 24 seconds with use of QC for improved household ambulation with decreased fall risk  3. Pt to increase his 30 second chair rise to 12.5 repetitions to demonstrate improved functional LE strength and endurance  4. Pt to improve his SSWS to 0.43 m/sec with use of QC for improved community ambulation with decreased fall risk  5. Pt to pass condition # 1 of the MCTSIB to demonstrate improved static standing ability  6. Pt to improve R LE MMT score for hip flexion to 4/5 for improved gait mechanics  7. Pt to improve R LE MMT score for hip extension to 4/5 for improved  ability to perform symmetrical sit<> stand transfers     Long term goals: 6 weeks, pt agrees to goals set  8. Pt to be compliant with finalized LE HEP  9. Pt to improve his TUG score to 23 seconds with use of QC for improved household ambulation with decreased fall risk  10. Pt to increase his 30 second chair rise to 13.5 repetitions to demonstrate improved functional LE strength and endurance  11. Pt to improve his SSWS to 0.46 m/sec with use of QC for improved community ambulation with decreased fall risk              12.       Pt to increase his scores on conditions # 2 and 3 of the MCTSIB to 25 seconds or > to demonstrate improved static standing balance with low/eliminated                       vision and when standing on unstable surfaces              13.       Pt to improve R LE MMT score for knee flexion to 2+/5 for improved swing phase of gait              14.       Pt to improve R LE MMT score for ankle dorsiflexion to 3+/5 for improved heel strike during gait    Plan     Cont with endurance, strengthening, stretching, weight bearing and balance activities    Lucy Vanegas, PTA

## 2020-02-20 NOTE — PROGRESS NOTES
"Outpatient Neurological Rehabilitation   Speech and Language Therapy Daily Note  Date:  2/20/2020     Name: Torito Harris   MRN: [id number]   Therapy Diagnosis:   Encounter Diagnosis   Name Primary?    Broca's aphasia    Physician: Ian Feliciano MD  Physician Orders:[id number] - Ambulatory Referral to Speech Therapy (Order #[id number])  Medical Diagnosis:  I69.30 (ICD-10-CM) - Late effect of stroke     Visit # / Visits Authorized:  3/ 20  Date of Evaluation:  2/10/2020   Insurance Authorization Period: 02/09/2020- 12/31/2020  Plan of Care Certification:    2/10/2020 to 04/10/2020  Extended POC:  n/a  Progress Note: 3/10/20   Visits Cancelled:   Visits No Show:     Time In:  1445  Time Out:  1530  Total Billable Time: 45     Precautions: Standard, Fall and aphasia  Subjective:   Pt reports: "good"  Wife was present during the session.   He was compliant to home exercise program.   Response to previous treatment: n/a   Pain Scale:  0/10 on VAS currently.   Pain Location: n/a  Objective:     UNTIMED  Procedure Min.   Speech-Language Therapy  45   Total Timed Units: 0  Total Untimed Units: 3  Charges Billed/# of units: 3    Short Term Goals: (4 weeks) Current Progress:   1. Pt will complete the Western Aphasia Battery test.     Met 2/20/2020   Met see below    2. Pt will generate a response to basic sentence completions with 80% acc with min A.    Progressing/ Not Met 2/20/2020   Not formally addressed       Met x1   3. Pt will repeat 3-5 word phrase/sentences with 80% acc with min A  Progressing/ Not Met 2/20/2020   Not formally addressed   Met x1   4. Pt will follow simple 3 unit commands with 80% acc ind'ly.    Progressing/ Not Met 2/20/2020   Not formally addressed    New goal 2/20/2020  5. Pt will participate in reading and writing assessment.     Progressing/ Not Met 2/20/2020   Informally assessed using the Tactus  Therapy program: Pt matched words to pictures (fo3 to 6) with 100% acc ind'ly.     He spelled " "words using the [facility] spell words with 100% acc after given a model of the word.      Pt wrote his first and last name and his wife's name w/ min cues for spelling some of the letters.    New goal 2/13/2020  6. Pt will complete word finding task (i.e. Creating subject, verb, object pairs in VNEST protocol) with 90% acc min A to improve word fluency.  Progressing/ Not Met 2/20/2020   Not formally addressed         OTHER:   Tactus Therapy - Pt id spoken words with 100% acc ind'ly and named pictures with 57% acc ind'ly , 100% acc given phonemic cues. Pt id words in fo2 and 3 with 90% acc ind'ly each.   Patient Education/Response:   Continued education on POC and importance of consistently complying to home program.     Written Home Exercises Provided: Patient instructed to cont prior HEP ([employer] Therapy on iPad).  Exercises were reviewed and Torito was able to demonstrate them prior to the end of the session.  Torito demonstrated fair  understanding of the education provided. Torito's wife demonstrated good understanding of education and homework provided.  Assessment:   Torito is progressing well towards his goals. Reading and writing informally assessed and found to be WFL for at least simple levels. Will assess further.. Pt often stated "I can't" or "I don't know" as first response for everything but with cues and repetition, accuracy increases. Current goals remain appropriate. Goals to be updated as necessary.     Pt prognosis is Fair. Pt will continue to benefit from skilled outpatient speech and language therapy to address the deficits listed in the problem list on initial evaluation, provide pt/family education and to maximize pt's level of independence in the home and community environment.   Medical necessity is demonstrated by the following IMPAIRMENTS:  Patient with few true words in all situations severely limiting functional communication with both familiar and unfamiliar communication " partners.  Barriers to Therapy: Transportation, timeframe from onset     Pt's spiritual, cultural and educational needs considered and pt agreeable to plan of care and goals.  Plan:   Continue POC with focus on receptive and expressive language including spontaneous speech, auditory comprehension, repetition, and word finding. Complete writing portion of WAB.     SERINA Farnsworth, CCC-SLP   2/20/2020

## 2020-02-24 ENCOUNTER — CLINICAL SUPPORT (OUTPATIENT)
Dept: REHABILITATION | Facility: HOSPITAL | Age: 60
End: 2020-02-24
Attending: PSYCHIATRY & NEUROLOGY
Payer: MEDICARE

## 2020-02-24 ENCOUNTER — CLINICAL SUPPORT (OUTPATIENT)
Dept: REHABILITATION | Facility: HOSPITAL | Age: 60
End: 2020-02-24
Payer: MEDICARE

## 2020-02-24 DIAGNOSIS — R29.898 WEAKNESS OF RIGHT LEG: ICD-10-CM

## 2020-02-24 DIAGNOSIS — Z74.09 IMPAIRED MOBILITY AND ACTIVITIES OF DAILY LIVING: Primary | ICD-10-CM

## 2020-02-24 DIAGNOSIS — R29.898 WEAKNESS OF RIGHT UPPER EXTREMITY: ICD-10-CM

## 2020-02-24 DIAGNOSIS — Z78.9 IMPAIRED MOBILITY AND ACTIVITIES OF DAILY LIVING: Primary | ICD-10-CM

## 2020-02-24 DIAGNOSIS — R47.01 BROCA'S APHASIA: ICD-10-CM

## 2020-02-24 DIAGNOSIS — Z74.09 IMPAIRED FUNCTIONAL MOBILITY, BALANCE, GAIT, AND ENDURANCE: ICD-10-CM

## 2020-02-24 PROCEDURE — 97110 THERAPEUTIC EXERCISES: CPT | Mod: PO,CQ,59

## 2020-02-24 PROCEDURE — 97530 THERAPEUTIC ACTIVITIES: CPT | Mod: PO | Performed by: OPTOMETRIST

## 2020-02-24 PROCEDURE — 97166 OT EVAL MOD COMPLEX 45 MIN: CPT | Mod: PO | Performed by: OPTOMETRIST

## 2020-02-24 PROCEDURE — 92507 TX SP LANG VOICE COMM INDIV: CPT | Mod: PO

## 2020-02-24 NOTE — PROGRESS NOTES
[facility] Occupational Therapy  Initial Neurological Evaluation     Date: 2/24/2020  Patient: Torito Harris  Chart Number: [id number]    Therapy Diagnosis:   Encounter Diagnoses   Name Primary?    Impaired mobility and activities of daily living Yes    Weakness of right upper extremity      Physician: Ian Feliciano MD    Physician Orders: Evaluate and treat  Medical Diagnosis:  Late effect of stroke  Evaluation Date: 2/24/2020  Plan of Care Expiration Period: 4/29/2020  Insurance Authorization period Expiration: 12/31/2020  Date of Return to MD: Unknown at this time  Visit # / Visits Authorized: 1 / Future visits pending       Time In: 1530  Time Out: 1615  Total Billable (one on one) Time: 45 minutes    Precautions: Standard    Subjective     History of Current Condition: Pt suffered a L CVA 12/6/2017 and was treated at [facility] for Acute care and InPt Rehab. Since discharge home in February of 2018 he has had Home Health and then OutPt therapies at [facility] in 2019.      Involved Side: Right  Dominant Side: Right  Date of Onset: 12/6/2017  Surgical Procedure: n/a  Imaging: unknown  Previous Therapy: PT, OT, Speech for InPt. HH and OP     Patient's Goals for Therapy: Pt states that he would like to improve use of his R UE for tasks.      Pain:  Pain Related Behaviors Observed: no   Functional Pain Scale Rating 0-10:   0/10 on average  n/a/10 at best  4/10 at worst  Location: R shoulder during PROM  Description: Aching     Occupation:  Pt worked as a body and fender repairman   Working presently: on disability As Pt has not worked since 12/17.         Functional Limitations/Social History:     Prior Level of Function: Prior to CVA in 12/17 he Independent with all ADLs and IADLs and working full time  Current Level of Function: Able to ambulate Mod I with LBQC and can dress self . Performs grooming and feeding tasks with non-dominant hand      Home/Living environment : lives with  their spouse  Home Access: One story home with 5 ROBERT with railing of L   DME: transfer tub bench, wheelchair and hospital bed and abigail walker                Leisure: Fishing, boating as Pt owns 2 boats but has not used them since his stroke     Driving: Pt has returned to driving short distances using his L foot to operate pedals. He was told that this is not the accepted way to drive using his L foot to crossover the pedals.     Past Medical History/Physical Systems Review:     Past Medical History:  Torito Harris  has a past medical history of Coronary artery disease, Diabetes mellitus, and Hypertension.    Past Surgical History:  Torito Harris  has a past surgical history that includes Coronary angioplasty with stent.    Current Medications:  Torito currently has no medications in their medication list.    Allergies:  Review of patient's allergies indicates:  No Known Allergies       Objective     Cognitive Exam:  Oriented: Person, Place, Time and Situation  Behaviors: normal, cooperative  Follows Commands/attention: Follows two-step commands  Communication: expressive aphasia  Memory: No Deficits noted as determined by 3 word recall after 1 minute and 3 minutes  Safety awareness/insight to disability: aware of diagnosis, treatment, and prognosis  Coping skills/emotional control: Appropriate to situation    Visual/Perceptual:  Tracking: intact  Saccades: intact  Acuity: intact for distance but wears reading glasses  R/L discrimination: intact, but delayed   Visual field: intact  Motor Planning Praxis: intact  Comments: Delayed due to aphasia    Physical Exam:  Postural examination/scapula alignment: Rounded shoulder  Joint integrity: Firm end feeling  Skin integrity: Warm, dry  Edema: None noted     Joint Evaluation  AROM  1/28/2019 AROM  1/28/2019 PROM   1/28/2019     Left Right Right   Shoulder flex 0-180 WNL  throughout 110 145   Shoulder Abd 0-180   90 170   Shoulder ER 0-90   50 90   Shoulder IR 0-90    40 80   Shoulder Extension 0-80   50 80   Elbow flex/ext 0-150    0-150      Fist: loose  Strength 2/24/2020 2/24/2020   **within available ROM** Left Right   Shoulder flex 4+/5 2+/5   Shoulder abd 4+/5 2+/5   Shoulder ER 4/5 2+/5   Shoulder IR 4/5 2+/5   Shoulder Extension 4+/5 2+/5   Elbow flex 4+/5 3-/5   Elbow ext 4+/5 3-/5   Wrist flex 4+/5 2+/5   Wrist ext 4+/5 2+/5         Strength: (CLAUDE Dynamometer in lbs.) Average 3 trials, Position II:     2/24/2020 2/24/2020    Left Right   Rung II 80# 16#         Fine Motor Coordination: 9 Hole Peg Test  Unable to perform      Gross motor coordination:   - MORRIS (Rapid Alternating Movements): Able to perform but slower on R   - Finger to Nose (5 times): Slower and inaccurate on R   - Finger Flicks (coordination moving from digit flexion to digit extension): Unable on R     Tone:  Modified Elmer Scale:   0 - No increase in muscle tone    Sensation:    Light touch: right impaired  Sharp/Dull: right impaired  Kinesthesia: right impaired  Proprioception: right impaired  Temperature: right impaired    Balance:   Static Sit - GOOD: Takes MODERATE challenges from all directions  Dynamic sit- GOOD: Takes MODERATE challenges from all directions  Static Stand - FAIR+: Able to take MINIMAL challenges from all directions  Dynamic stand - FAIR+: Able to take MINIMAL challenges from all directions    Endurance Deficit: none                    Functional Status      Functional Mobility:  Bed mobility: Mod I  Roll to left: Mod I  Roll to right: Mod I  Supine to sit: Mod I  Sit to supine: Mod I  Transfers to bed: Mod I  Transfers to toilet: Mod I  Car transfers: Mod I  Wheelchair mobility: Mod I    ADL's:  Feeding: Mod I as Pt feeds with his L hand   Grooming: Mod I using L non- dominant hand  Hygiene: Mod I  UB Dressing: Mod I  LB Dressing: Mod I  Toileting: Mod I  Bathing: Mod I    IADL's:  Homecare: Mod A  Cooking: D  Laundry: D  Yard work: D  Use of telephone: Mod I but  limited by expressive aphasia   Money management: Mod A  Medication management: Mod I  Handwriting:Mod A using non - dominant hand      CMS Impairment/Limitation/Restriction for FOTO Stroke Upper Extremity Survey  Status Limitation G-Code CMS Severity Modifier  Intake 59% 41% Current Status CK - At least 40 percent but less than 60 percent  Predicted 63% 37% Goal Status+ CJ - At least 20 percent but less than 40 percent  D/C Status CK **only report if this is a one time visit  +Based on FOTO predicted change score         Treatment     Treatment Time In: 1605  Treatment Time Out: 1615  Total Treatment time separate from Evaluation time:10 min     Torito participated in dynamic functional therapeutic activities to improve functional performance for 10 minutes, including:  - AAROM and PROM to L UE with emphasis on long stretch at end ranges. Pt was encouraged to practice more opening of his R hand and perform less gripping and flexion as to not interfere with his ability to open his hand.     Home Exercises and Patient Education Provided    Education provided:   -role of OT, goals for OT, scheduling/cancellations, insurance limitations with patient.  -Additional Education provided:     Assessment     Torito Harris is a 59 y.o. male referred to outpatient occupational therapy and presents with a medical diagnosis of L CVA, resulting in decreased range of motion, decreased muscle strength, impaired function and decreased work ability and demonstrates limitations as described in the chart below. Following medical record review it is determined that pt will benefit from occupational therapy services in order to maximize pain free and/or functional use of right UE.    Pt prognosis is Good due to  Good family support.  Pt will benefit from skilled outpatient Occupational Therapy to address the deficits stated above and in the chart below, provide pt/family education, and to maximize pt's level of independence.     Plan  of care discussed with patient: Yes  Pt's spiritual, cultural and educational needs considered and patient is agreeable to the plan of care and goals as stated below:     Anticipated Barriers for therapy: Transportation at times.     Medical Necessity is demonstrated by the following  Profile and History Assessment of Occupational Performance Level of Clinical Decision Making Complexity Score   Occupational Profile:   Torito Harris is a 59 y.o. male who lives with their spouse and is currently disability. Torito Harris has difficulty with  Homemaking  housework/household chores and medication management  affecting his/her daily functional abilities. His/her main goal for therapy is to improve the use of his R dominant UE.     Co morbidities:   history of CVA    Medical and Therapy History Review:   Brief               Performance Deficits    Physical:  Muscle Endurance  Control of Voluntary Movement   Strength  Gross Motor Coordination  Fine Motor Coordination  Proprioception Functions  Tactile Functions    Cognitive:  Communication    Psychosocial:    No Deficits     Clinical Decision Making:  moderate    Assessment Process:  Problem-Focused Assessments    Modification/Need for Assistance:  Minimal-Moderate Modifications/Assistance    Intervention Selection:  Limited Treatment Options       moderate  Based on PMHX, co morbidities , data from assessments and functional level of assistance required with task and clinical presentation directly impacting function.       The following goals were discussed with the patient and patient is in agreement with them as to be addressed in the treatment plan.     Goals:  Short Term Goals: 3 weeks   ROM/Strength to perform the ADL's and functional activities listed below,   Pt will improve functional  strength needed for tasks to 20# in R hand.  Pt will improve AROM for shoulder flexion needed for functional tasks to 155* in R UE.  Simple meal prep will improve to  Min A   Pt will be Independent with HEP to improve ROM and FM skills.      Long Term Goals: 6 weeks   ROM/Strength to perform the ADL's and functional activities listed below  Pt will improve functional  strength needed for tasks to 24# in R hand.  Pt will improve AROM for shoulder flexion needed for functional tasks to 165* in R UE.   Simple meal prep will improve to Mod I   G code self care CJ      Plan   Certification Period/Plan of care expiration: 2/24/2020 to 4/29/2020.    Outpatient Occupational Therapy 2 times weekly for 6 weeks to include the following interventions: Neuromuscular Re-ed, Patient Education and Therapeutic Activites.    TIAGO Carlisle      I certify the need for these services furnished under this plan of treatment and while under my care.  ____________________________________ Physician/Referring Practitioner   Date of Signature

## 2020-02-24 NOTE — PROGRESS NOTES
"  Physical Therapy Daily Treatment Note     [id number]    Therapy Diagnosis:   Encounter Diagnoses   Name Primary?    Impaired functional mobility, balance, gait, and endurance     Weakness of right leg      Physician: Ian Feliciano MD    Visit Date: 2/24/2020    Physician Orders: PT Eval and Treat   Medical Diagnosis from Referral: I69.30 (ICD-10-CM) - Late effect of stroke  Evaluation Date: 2/13/2020  Authorization Period Expiration: 1/9/20 to 6/1/20  Plan of Care Expiration: 2/13/20 to 3/26/20  Visit # / Visits authorized: 4/ 20    Time In: 16:15  Time Out: 17:00  Total Billable Time: 45 minutes    Precautions: Standard, Fall and speech deficits, cognitive deficits, hearing impaired    Subjective     Pt reports: " feeling fine"  He reports compliance with HEP  Response to previous treatment:   Functional change: ongoing    Pain: 0/10  Location: N/A     Objective     Torito received therapeutic exercises to develop strength, endurance, ROM, flexibility, posture and core stabilization for 45 minutes including:    X 10 min on Sci Fit recumbent stepper, [place] sprints couse .  B UE/B LE on level 2.0    Supine:   2 x 30 sec of manual stretches including:   HS stretches on R LE   R HC stretches    R piriformis stretch  3 x 10 reps of R LE SAQ over bolster with 3 sec hold.  3 x 10 reps of R LE DF-- AAROM  3 x 10 reps of R LE IR -- AAROM      Seated EOM:   2 x 10 reps of R LE HS curls with OTB   Sit<>stand x 10 with RLE blocked to decrease hip IR    Step ups on stair with LLE x 20 reps 1UE support    // bars:   Toe and heel raises x 10 reps each. BUE support   Lateral steps x 3 laps with CGA and 1UE support      Torito participated in neuromuscular re-education activities to improve: Balance, Coordination, Kinesthetic, Sense, Proprioception and Posture for 0 minutes. The following activities were included:      Torito participated in dynamic functional therapeutic activities to " improve functional performance for 0  minutes, including:      Torito participated in gait training to improve functional mobility and safety for 0  minutes, including:        Home Exercises Provided and Patient Education Provided     Education provided:   HEP    Written Home Exercises Provided: yes.  Exercises were reviewed and Torito was able to demonstrate them prior to the end of the session.  Torito demonstrated good  understanding of the education provided.     See EMR under Patient Instructions for exercises provided 2/17/2020.    Assessment     Torito tolerated tx session fairly well. Pt willing to perform different exercises this session and wants to use the stairs instead of the elevator next tx session. Pt educated on importance of stretching at home as well as in therapy. Pt core and RLE weakness noted during sit<>stand, transfers, and gait. Pt reports compliance with HEP.  Cont with plan of care.     Torito is progressing well towards his goals.   Pt prognosis is Fair.     Pt will continue to benefit from skilled outpatient physical therapy to address the deficits listed in the problem list box on initial evaluation, provide pt/family education and to maximize pt's level of independence in the home and community environment.     Pt's spiritual, cultural and educational needs considered and pt agreeable to plan of care and goals.     Anticipated barriers to physical therapy: pt does not drive, is hearing impaired as well as language impaired    GOALS:   Short term goals: 4 weeks, pt agrees to goals set.  1. Pt to be issued updated LE HEP and to report at least partial compliance  2. Pt to improve his TUG score to 24 seconds with use of QC for improved household ambulation with decreased fall risk  3. Pt to increase his 30 second chair rise to 12.5 repetitions to demonstrate improved functional LE strength and endurance  4. Pt to improve his SSWS to 0.43 m/sec with use of QC for improved community  ambulation with decreased fall risk  5. Pt to pass condition # 1 of the MCTSIB to demonstrate improved static standing ability  6. Pt to improve R LE MMT score for hip flexion to 4/5 for improved gait mechanics  7. Pt to improve R LE MMT score for hip extension to 4/5 for improved ability to perform symmetrical sit<> stand transfers     Long term goals: 6 weeks, pt agrees to goals set  8. Pt to be compliant with finalized LE HEP  9. Pt to improve his TUG score to 23 seconds with use of QC for improved household ambulation with decreased fall risk  10. Pt to increase his 30 second chair rise to 13.5 repetitions to demonstrate improved functional LE strength and endurance  11. Pt to improve his SSWS to 0.46 m/sec with use of QC for improved community ambulation with decreased fall risk              12.       Pt to increase his scores on conditions # 2 and 3 of the MCTSIB to 25 seconds or > to demonstrate improved static standing balance with low/eliminated                       vision and when standing on unstable surfaces              13.       Pt to improve R LE MMT score for knee flexion to 2+/5 for improved swing phase of gait              14.       Pt to improve R LE MMT score for ankle dorsiflexion to 3+/5 for improved heel strike during gait    Plan     Cont with endurance, strengthening, stretching, weight bearing and balance activities. Continue to increase weight shifting on RLE with hip involvement and core strengthening.     Dasha Wilkins, PTA

## 2020-02-24 NOTE — PROGRESS NOTES
"Outpatient Neurological Rehabilitation   Speech and Language Therapy Daily Note  Date:  2/24/2020     Name: Torito Harris   MRN: [id number]   Therapy Diagnosis:   No diagnosis found.Physician: Ian Feliciano MD  Physician Orders:[id number] - Ambulatory Referral to Speech Therapy (Order #[id number])  Medical Diagnosis:  I69.30 (ICD-10-CM) - Late effect of stroke     Visit # / Visits Authorized:  ***3/ 20  Date of Evaluation:  2/10/2020   Insurance Authorization Period: 02/09/2020- 12/31/2020  Plan of Care Certification:    2/10/2020 to 04/10/2020  Extended POC:  n/a  Progress Note: 3/10/20   Visits Cancelled:   Visits No Show:     Time In:  1445  Time Out:  1530  Total Billable Time: 45     Precautions: Standard, Fall and aphasia  Subjective:   Pt reports: "good"  Wife was present during the session.   He was compliant to home exercise program.   Response to previous treatment: n/a   Pain Scale:  0/10 on VAS currently.   Pain Location: n/a  Objective:     UNTIMED  Procedure Min.   Speech-Language Therapy  45   Total Timed Units: 0  Total Untimed Units: 3  Charges Billed/# of units: 3    Short Term Goals: (4 weeks) Current Progress:   1. Pt will complete the Western Aphasia Battery test.     Met 2/24/2020   Met see below    2. Pt will generate a response to basic sentence completions with 80% acc with min A.    Progressing/ Not Met 2/24/2020   Not formally addressed       Met x1   3. Pt will repeat 3-5 word phrase/sentences with 80% acc with min A  Progressing/ Not Met 2/24/2020   Not formally addressed   Met x1   4. Pt will follow simple 3 unit commands with 80% acc ind'ly.    Progressing/ Not Met 2/24/2020   Not formally addressed    New goal 2/24/2020  5. Pt will participate in reading and writing assessment.     Progressing/ Not Met 2/24/2020   Informally assessed using the [facility]  Therapy program: Pt matched words to pictures (fo3 to 6) with 100% acc ind'ly.     He spelled words using the [facility] Therapy spell " "words with 100% acc after given a model of the word.      Pt wrote his first and last name and his wife's name w/ min cues for spelling some of the letters.    New goal 2/13/2020  6. Pt will complete word finding task (i.e. Creating subject, verb, object pairs in VNEST protocol) with 90% acc min A to improve word fluency.  Progressing/ Not Met 2/24/2020   Not formally addressed         OTHER:   [facility] Therapy - Pt id spoken words with 100% acc ind'ly and named pictures with 57% acc ind'ly , 100% acc given phonemic cues. Pt id words in fo2 and 3 with 90% acc ind'ly each.   Patient Education/Response:   Continued education on POC and importance of consistently complying to home program.     Written Home Exercises Provided: Patient instructed to cont prior HEP ([facility] Therapy on iPad).  Exercises were reviewed and Torito was able to demonstrate them prior to the end of the session.  Torito demonstrated fair  understanding of the education provided. Torito's wife demonstrated good understanding of education and homework provided.  Assessment:   Torito is progressing well towards his goals. Reading and writing informally assessed and found to be WFL for at least simple levels. Will assess further.. Pt often stated "I can't" or "I don't know" as first response for everything but with cues and repetition, accuracy increases. Current goals remain appropriate. Goals to be updated as necessary.     Pt prognosis is Fair. Pt will continue to benefit from skilled outpatient speech and language therapy to address the deficits listed in the problem list on initial evaluation, provide pt/family education and to maximize pt's level of independence in the home and community environment.   Medical necessity is demonstrated by the following IMPAIRMENTS:  Patient with few true words in all situations severely limiting functional communication with both familiar and unfamiliar communication partners.  Barriers to Therapy: Transportation, " timeframe from onset     Pt's spiritual, cultural and educational needs considered and pt agreeable to plan of care and goals.  Plan:   Continue POC with focus on receptive and expressive language including spontaneous speech, auditory comprehension, repetition, and word finding. Complete writing portion of WAB.     Keena Carr, ERICKA   2/24/2020

## 2020-02-24 NOTE — PROGRESS NOTES
"Outpatient Neurological Rehabilitation   Speech and Language Therapy Daily Note  Date:  2/24/2020     Name: Torito Harris   MRN: [id number]   Therapy Diagnosis:   Encounter Diagnosis   Name Primary?    Broca's aphasia    Physician: Ian Feliciano MD  Physician Orders:[id number] - Ambulatory Referral to Speech Therapy (Order #[id number])  Medical Diagnosis:  I69.30 (ICD-10-CM) - Late effect of stroke     Visit # / Visits Authorized:  4/ 20  Date of Evaluation:  2/10/2020   Insurance Authorization Period: 02/09/2020- 12/31/2020  Plan of Care Certification:    2/10/2020 to 04/10/2020  Extended POC:  n/a  Progress Note: 3/10/20   Visits Cancelled:   Visits No Show:     Time In:  1405  Time Out:  1445  Total Billable Time: 40    Precautions: Standard, Fall and aphasia  Subjective:   Pt reports: "I can this." Pt demonstrated how he drives.   He was compliant to home exercise program.   Response to previous treatment: n/a   Pain Scale:  0/10 on VAS currently.   Pain Location: n/a  Objective:     UNTIMED  Procedure Min.   Speech-Language Therapy  40   Total Timed Units: 0  Total Untimed Units: 3  Charges Billed/# of units: 3    Short Term Goals: (4 weeks) Current Progress:   1. Pt will complete the Western Aphasia Battery test.     Met 2/24/2020   Met see below    2. Pt will generate a response to basic sentence completions with 80% acc with min A.  Progressing/ Not Met 2/24/2020   90% acc ind'ly for automatic sentences.  Met x 2   3. Pt will repeat 3-5 word phrase/sentences with 80% acc with min A  Progressing/ Not Met 2/24/2020   Not formally addressed  Met x1   4. Pt will follow simple 3 unit commands with 80% acc ind'ly.    Progressing/ Not Met 2/24/2020   40% acc ind'ly, 90% acc given cues   New goal 2/24/2020  5. Pt will participate in reading and writing assessment.     Progressing/ Not Met 2/24/2020   Not formally addressed       New goal 2/13/2020  6. Pt will complete word finding task (i.e. Creating subject, verb, " "object pairs in VNEST protocol) with 90% acc min A to improve word fluency.  Progressing/ Not Met 2/24/2020   Not formally addressed       Patient Education/Response:   Continued education on purpose of therapy tasks. He perseverates on "I can't say the words."    Written Home Exercises Provided: Patient instructed to cont prior HEP ([facility] on iPad).  Exercises were reviewed and Torito was able to demonstrate them prior to the end of the session.  Torito demonstrated fair  understanding of the education provided. Torito's wife demonstrated good understanding of education and homework provided.  Assessment:   Torito is progressing well towards his goals. Increased accuracy for sentence completions (automatic). Pt still perseverates on what he can not do. Word finding difficulty still present with halting speech.  Current goals remain appropriate. Goals to be updated as necessary.     Pt prognosis is Fair. Pt will continue to benefit from skilled outpatient speech and language therapy to address the deficits listed in the problem list on initial evaluation, provide pt/family education and to maximize pt's level of independence in the home and community environment.   Medical necessity is demonstrated by the following IMPAIRMENTS:  Patient with few true words in all situations severely limiting functional communication with both familiar and unfamiliar communication partners.  Barriers to Therapy: Transportation, timeframe from onset     Pt's spiritual, cultural and educational needs considered and pt agreeable to plan of care and goals.  Plan:   Continue POC with focus on receptive and expressive language including spontaneous speech, auditory comprehension, repetition, and word finding. Complete writing portion of WAB.     SERINA Farnsworth, CCC-SLP   2/24/2020     "

## 2020-02-25 NOTE — PLAN OF CARE
[facility] Occupational Therapy  Initial Neurological Evaluation     Date: 2/24/2020  Patient: Torito Harris  Chart Number: [id number]    Therapy Diagnosis:   Encounter Diagnoses   Name Primary?    Impaired mobility and activities of daily living Yes    Weakness of right upper extremity      Physician: Ian Feliciano MD    Physician Orders: Evaluate and treat  Medical Diagnosis:  Late effect of stroke  Evaluation Date: 2/24/2020  Plan of Care Expiration Period: 4/29/2020  Insurance Authorization period Expiration: 12/31/2020  Date of Return to MD: Unknown at this time  Visit # / Visits Authorized: 1 / Future visits pending       Time In: 1530  Time Out: 1615  Total Billable (one on one) Time: 45 minutes    Precautions: Standard    Subjective     History of Current Condition: Pt suffered a L CVA 12/6/2017 and was treated at [facility] for Acute care and InPt Rehab. Since discharge home in February of 2018 he has had Home Health and then OutPt therapies at [facility] in 2019.      Involved Side: Right  Dominant Side: Right  Date of Onset: 12/6/2017  Surgical Procedure: n/a  Imaging: unknown  Previous Therapy: PT, OT, Speech for InPt. HH and OP     Patient's Goals for Therapy: Pt states that he would like to improve use of his R UE for tasks.      Pain:  Pain Related Behaviors Observed: no   Functional Pain Scale Rating 0-10:   0/10 on average  n/a/10 at best  4/10 at worst  Location: R shoulder during PROM  Description: Aching     Occupation:  Pt worked as a body and fender repairman   Working presently: on disability As Pt has not worked since 12/17.         Functional Limitations/Social History:     Prior Level of Function: Prior to CVA in 12/17 he Independent with all ADLs and IADLs and working full time  Current Level of Function: Able to ambulate Mod I with LBQC and can dress self . Performs grooming and feeding tasks with non-dominant hand      Home/Living environment : lives with  their spouse  Home Access: One story home with 5 ROBERT with railing of L   DME: transfer tub bench, wheelchair and hospital bed and abigail walker                Leisure: Fishing, boating as Pt owns 2 boats but has not used them since his stroke     Driving: Pt has returned to driving short distances using his L foot to operate pedals. He was told that this is not the accepted way to drive using his L foot to crossover the pedals.     Past Medical History/Physical Systems Review:     Past Medical History:  Torito Harris  has a past medical history of Coronary artery disease, Diabetes mellitus, and Hypertension.    Past Surgical History:  Torito Harris  has a past surgical history that includes Coronary angioplasty with stent.    Current Medications:  Torito currently has no medications in their medication list.    Allergies:  Review of patient's allergies indicates:  No Known Allergies       Objective     Cognitive Exam:  Oriented: Person, Place, Time and Situation  Behaviors: normal, cooperative  Follows Commands/attention: Follows two-step commands  Communication: expressive aphasia  Memory: No Deficits noted as determined by 3 word recall after 1 minute and 3 minutes  Safety awareness/insight to disability: aware of diagnosis, treatment, and prognosis  Coping skills/emotional control: Appropriate to situation    Visual/Perceptual:  Tracking: intact  Saccades: intact  Acuity: intact for distance but wears reading glasses  R/L discrimination: intact, but delayed   Visual field: intact  Motor Planning Praxis: intact  Comments: Delayed due to aphasia    Physical Exam:  Postural examination/scapula alignment: Rounded shoulder  Joint integrity: Firm end feeling  Skin integrity: Warm, dry  Edema: None noted     Joint Evaluation  AROM  1/28/2019 AROM  1/28/2019 PROM   1/28/2019     Left Right Right   Shoulder flex 0-180 WNL  throughout 110 145   Shoulder Abd 0-180   90 170   Shoulder ER 0-90   50 90   Shoulder IR 0-90    40 80   Shoulder Extension 0-80   50 80   Elbow flex/ext 0-150    0-150      Fist: loose  Strength 2/24/2020 2/24/2020   **within available ROM** Left Right   Shoulder flex 4+/5 2+/5   Shoulder abd 4+/5 2+/5   Shoulder ER 4/5 2+/5   Shoulder IR 4/5 2+/5   Shoulder Extension 4+/5 2+/5   Elbow flex 4+/5 3-/5   Elbow ext 4+/5 3-/5   Wrist flex 4+/5 2+/5   Wrist ext 4+/5 2+/5         Strength: (CLAUDE Dynamometer in lbs.) Average 3 trials, Position II:     2/24/2020 2/24/2020    Left Right   Rung II 80# 16#         Fine Motor Coordination: 9 Hole Peg Test  Unable to perform      Gross motor coordination:   - MORRIS (Rapid Alternating Movements): Able to perform but slower on R   - Finger to Nose (5 times): Slower and inaccurate on R   - Finger Flicks (coordination moving from digit flexion to digit extension): Unable on R     Tone:  Modified Elmer Scale:   0 - No increase in muscle tone    Sensation:    Light touch: right impaired  Sharp/Dull: right impaired  Kinesthesia: right impaired  Proprioception: right impaired  Temperature: right impaired    Balance:   Static Sit - GOOD: Takes MODERATE challenges from all directions  Dynamic sit- GOOD: Takes MODERATE challenges from all directions  Static Stand - FAIR+: Able to take MINIMAL challenges from all directions  Dynamic stand - FAIR+: Able to take MINIMAL challenges from all directions    Endurance Deficit: none                    Functional Status      Functional Mobility:  Bed mobility: Mod I  Roll to left: Mod I  Roll to right: Mod I  Supine to sit: Mod I  Sit to supine: Mod I  Transfers to bed: Mod I  Transfers to toilet: Mod I  Car transfers: Mod I  Wheelchair mobility: Mod I    ADL's:  Feeding: Mod I as Pt feeds with his L hand   Grooming: Mod I using L non- dominant hand  Hygiene: Mod I  UB Dressing: Mod I  LB Dressing: Mod I  Toileting: Mod I  Bathing: Mod I    IADL's:  Homecare: Mod A  Cooking: D  Laundry: D  Yard work: D  Use of telephone: Mod I but  limited by expressive aphasia   Money management: Mod A  Medication management: Mod I  Handwriting:Mod A using non - dominant hand      CMS Impairment/Limitation/Restriction for FOTO Stroke Upper Extremity Survey  Status Limitation G-Code CMS Severity Modifier  Intake 59% 41% Current Status CK - At least 40 percent but less than 60 percent  Predicted 63% 37% Goal Status+ CJ - At least 20 percent but less than 40 percent  D/C Status CK **only report if this is a one time visit  +Based on FOTO predicted change score         Treatment     Treatment Time In: 1605  Treatment Time Out: 1615  Total Treatment time separate from Evaluation time:10 min     Torito participated in dynamic functional therapeutic activities to improve functional performance for 10 minutes, including:  - AAROM and PROM to L UE with emphasis on long stretch at end ranges. Pt was encouraged to practice more opening of his R hand and perform less gripping and flexion as to not interfere with his ability to open his hand.     Home Exercises and Patient Education Provided    Education provided:   -role of OT, goals for OT, scheduling/cancellations, insurance limitations with patient.  -Additional Education provided:     Assessment     Torito Harris is a 59 y.o. male referred to outpatient occupational therapy and presents with a medical diagnosis of L CVA, resulting in decreased range of motion, decreased muscle strength, impaired function and decreased work ability and demonstrates limitations as described in the chart below. Following medical record review it is determined that pt will benefit from occupational therapy services in order to maximize pain free and/or functional use of right UE.    Pt prognosis is Good due to  Good family support.  Pt will benefit from skilled outpatient Occupational Therapy to address the deficits stated above and in the chart below, provide pt/family education, and to maximize pt's level of independence.     Plan  of care discussed with patient: Yes  Pt's spiritual, cultural and educational needs considered and patient is agreeable to the plan of care and goals as stated below:     Anticipated Barriers for therapy: Transportation at times.     Medical Necessity is demonstrated by the following  Profile and History Assessment of Occupational Performance Level of Clinical Decision Making Complexity Score   Occupational Profile:   Torito Harris is a 59 y.o. male who lives with their spouse and is currently disability. Torito Harris has difficulty with  Homemaking  housework/household chores and medication management  affecting his/her daily functional abilities. His/her main goal for therapy is to improve the use of his R dominant UE.     Co morbidities:   history of CVA    Medical and Therapy History Review:   Brief               Performance Deficits    Physical:  Muscle Endurance  Control of Voluntary Movement   Strength  Gross Motor Coordination  Fine Motor Coordination  Proprioception Functions  Tactile Functions    Cognitive:  Communication    Psychosocial:    No Deficits     Clinical Decision Making:  moderate    Assessment Process:  Problem-Focused Assessments    Modification/Need for Assistance:  Minimal-Moderate Modifications/Assistance    Intervention Selection:  Limited Treatment Options       moderate  Based on PMHX, co morbidities , data from assessments and functional level of assistance required with task and clinical presentation directly impacting function.       The following goals were discussed with the patient and patient is in agreement with them as to be addressed in the treatment plan.     Goals:  Short Term Goals: 3 weeks   ROM/Strength to perform the ADL's and functional activities listed below,   Pt will improve functional  strength needed for tasks to 20# in R hand.  Pt will improve AROM for shoulder flexion needed for functional tasks to 155* in R UE.  Simple meal prep will improve to  Min A   Pt will be Independent with HEP to improve ROM and FM skills.      Long Term Goals: 6 weeks   ROM/Strength to perform the ADL's and functional activities listed below  Pt will improve functional  strength needed for tasks to 24# in R hand.  Pt will improve AROM for shoulder flexion needed for functional tasks to 165* in R UE.   Simple meal prep will improve to Mod I   G code self care CJ      Plan   Certification Period/Plan of care expiration: 2/24/2020 to 4/29/2020.    Outpatient Occupational Therapy 2 times weekly for 6 weeks to include the following interventions: Neuromuscular Re-ed, Patient Education and Therapeutic Activites.    TIAGO Carlisle      I certify the need for these services furnished under this plan of treatment and while under my care.  ____________________________________ Physician/Referring Practitioner   Date of Signature

## 2020-03-02 ENCOUNTER — CLINICAL SUPPORT (OUTPATIENT)
Dept: REHABILITATION | Facility: HOSPITAL | Age: 60
End: 2020-03-02
Payer: MEDICARE

## 2020-03-02 DIAGNOSIS — R47.01 BROCA'S APHASIA: ICD-10-CM

## 2020-03-02 DIAGNOSIS — R29.898 WEAKNESS OF RIGHT LEG: ICD-10-CM

## 2020-03-02 DIAGNOSIS — Z74.09 IMPAIRED MOBILITY AND ACTIVITIES OF DAILY LIVING: Primary | ICD-10-CM

## 2020-03-02 DIAGNOSIS — Z74.09 IMPAIRED FUNCTIONAL MOBILITY, BALANCE, GAIT, AND ENDURANCE: ICD-10-CM

## 2020-03-02 DIAGNOSIS — R29.898 WEAKNESS OF RIGHT UPPER EXTREMITY: ICD-10-CM

## 2020-03-02 DIAGNOSIS — Z78.9 IMPAIRED MOBILITY AND ACTIVITIES OF DAILY LIVING: Primary | ICD-10-CM

## 2020-03-02 PROCEDURE — 92507 TX SP LANG VOICE COMM INDIV: CPT | Mod: PO

## 2020-03-02 PROCEDURE — 97112 NEUROMUSCULAR REEDUCATION: CPT | Mod: PO,59

## 2020-03-02 PROCEDURE — 97530 THERAPEUTIC ACTIVITIES: CPT | Mod: PO,59 | Performed by: OPTOMETRIST

## 2020-03-02 NOTE — PROGRESS NOTES
"Outpatient Neurological Rehabilitation   Speech and Language Therapy Daily Note  Date:  3/2/2020     Name: Torito Harris   MRN: [id number]   Therapy Diagnosis:   Encounter Diagnosis   Name Primary?    Broca's aphasia    Physician: Ian Feliciano MD  Physician Orders:[id number] - Ambulatory Referral to Speech Therapy (Order #[id number])  Medical Diagnosis:  I69.30 (ICD-10-CM) - Late effect of stroke     Visit # / Visits Authorized:  5/ 20  Date of Evaluation:  2/10/2020   Insurance Authorization Period: 02/09/2020- 12/31/2020  Plan of Care Certification:    2/10/2020 to 04/10/2020  Extended POC:  n/a  Progress Note: 3/10/20   Visits Cancelled:   Visits No Show:     Time In:  1405  Time Out:  1445  Total Billable Time: 40    Precautions: Standard, Fall and aphasia  Subjective:   Pt reports: They have been using the iPad activities "a little"   He was compliant to home exercise program although it appears limited based on report   Response to previous treatment: n/a   Pain Scale:  0/10 on VAS currently.   Pain Location: n/a  Objective:     UNTIMED  Procedure Min.   Speech-Language Therapy  40   Total Timed Units: 0  Total Untimed Units: 3  Charges Billed/# of units: 3    Short Term Goals: (4 weeks) Current Progress:   1. Pt will complete the Western Aphasia Battery test.     Met 3/2/2020   Met/Discontinue    2. Pt will generate a response to basic sentence completions with 80% acc with min A.  Progressing/ Not Met 3/2/2020   55% acc ind'ly for sentences with common objects/nouns    Met x 2   3. Pt will repeat 3-5 word phrase/sentences with 80% acc with min A  Progressing/ Not Met 3/2/2020   66% acc ind'ly; 93% with modA (repetitions, visuals)    Met x1   4. Pt will follow simple 3 unit commands with 80% acc ind'ly.    Progressing/ Not Met 3/2/2020   Not formally addressed      New goal 3/2/2020  5. Pt will participate in reading and writing assessment.     Progressing/ Not Met 3/2/2020   Not formally addressed     " "  New goal 2/13/2020  6. Pt will complete word finding task (i.e. Creating subject, verb, object pairs in VNEST protocol) with 90% acc min A to improve word fluency.  Progressing/ Not Met 3/2/2020   Verb Network Strength Training (VNEST) was introduced today. VNEST focuses on verbs, encouraging people to think of people who perform actions (agents) and the objects or the actions that are performed on (patients). The idea is that by focusing on verbs, which require connection to nouns, you can strengthen all the words in the mental network around the verb. In this therapy technique, there are 6 steps each verb is taken through.   Step 1: Pt provided 0/3  subjects and objects in verb triads IND'ly, 1/3  given MAX cues.   Step 2: Pt read triads of words aloud with 0 % acc .  Step 3: pt expanded where with 0% acc indly, 0% acc given max cues, when with 0% acc, 0% acc given max cues,  Step 4: pt identified whether sentences presented auditorily were correct or incorrect with 83% acc IND'ly.   Step 5: pt recalled the target verb with 0% acc IND'ly (0/1 ).  Step 6: not addressed    *Task may not be appropriate for patient, extreme difficulty with generating any responses. Pt continually stated "I can't" and did not appear to understand the concept of the task despite multiple explanations.        Patient Education/Response:   Continued education on purpose of therapy tasks. He continues to perseverate on "I can't say the words" each session.     Written Home Exercises Provided: Patient instructed to cont prior HEP ([facility] on iPad).  Exercises were reviewed and Torito was able to demonstrate them prior to the end of the session.  Torito demonstrated fair  understanding of the education provided. Torito's wife demonstrated good understanding of education and homework provided.  Assessment:   Torito is progressing well towards his goals. Pt still perseverates on what he can not do rather than give himself time to " generate a response. Word finding difficulty still present with halting speech. Despite max cues, pt did not have success with VNest task.  Current goals remain appropriate. Goals to be updated as necessary.     Pt prognosis is Fair. Pt will continue to benefit from skilled outpatient speech and language therapy to address the deficits listed in the problem list on initial evaluation, provide pt/family education and to maximize pt's level of independence in the home and community environment.   Medical necessity is demonstrated by the following IMPAIRMENTS:  Patient with few true words in all situations severely limiting functional communication with both familiar and unfamiliar communication partners.  Barriers to Therapy: Transportation, timeframe from onset     Pt's spiritual, cultural and educational needs considered and pt agreeable to plan of care and goals.  Plan:   Continue POC with focus on receptive and expressive language including spontaneous speech, auditory comprehension, repetition, and word finding. Complete writing portion of WAB. Consider discharge of VNest goal if cues do not contribute to successful participation with task.     Farrah Atkinson CCC-SLP   3/2/2020

## 2020-03-02 NOTE — PROGRESS NOTES
"  Physical Therapy Daily Treatment Note     [id number]    Therapy Diagnosis:   Encounter Diagnoses   Name Primary?    Impaired functional mobility, balance, gait, and endurance     Weakness of right leg      Physician: Ian Feliciano MD    Visit Date: 3/2/2020    Physician Orders: PT Eval and Treat   Medical Diagnosis from Referral: I69.30 (ICD-10-CM) - Late effect of stroke  Evaluation Date: 2/13/2020  Authorization Period Expiration: 1/9/20 to 6/1/20  Plan of Care Expiration: 2/13/20 to 3/26/20  Visit # / Visits authorized: 5/ 20    Time In: 1446  Time Out: 1531   Total Billable Time: 45  minutes    Precautions: Standard, Fall and speech deficits, cognitive deficits, hearing impaired    Subjective     Pt reports: no complaints.  Pt then points to his R UE and LE and states, " this."  He reports compliance with HEP  Response to previous treatment:   Functional change: ongoing    Pain: 0/10  Location: N/A     Objective       Torito participated in neuromuscular re-education activities to improve: Balance, Coordination, Kinesthetic, Sense, Proprioception and Posture for 45 minutes. The following activities were included:    Sitting:  PT performs tone inhibition to R hand and wrist with use of firm surface  PT performs Neuro-Shawna R wrist stretches of forearm supination/pronation and carpal roll along with forearm over wrist  -PT applies [place] wrist extension orthosis to R UE;  pt wears throughout remainder of PT session  -1 x 10 R UE AA shoulder flex/ext with elbow in extension and PT guiding arm into abduction and slight ER    Transitions:  -1 x 10 sit<> stand trials from high/low mat with feet on firm ground, min A and attempts to weight bear symmetrically through LEs, no use of L UE during task, PT supports R UE  -1 x 10 sit<> stand trials from high/low mat with feet resting on foam fitter, min A and attempts to weight bear symmetrically through LEs, no use of L UE " "during task, PT supports R UE  -1 x 10 L LE step ups/downs on 2" step with no L UE support, PT supports R UE, mod A for R knee stability and cues for proper weight shift to R LE      Supine:  -2 x 10 R LE hip and knee flex/ext with resistance provided during extension, to simulate Neuro-Shawna LE progression # 1  -2 x 10 R LE hip IR/ER with use of flagpole( held to foot with ace bandage and 's tape) as visual cue and assist to stabilize R heel on cutting board with non-skid material~ this activity to simulate Neuro-Shawna LE progression # 1 as well      Quadruped and Standing:  -Pt performs 1 x quadruped crawl across mat with mod A for management of R UE/LE~ pt lowers down on other side of mat in a backward fashion with R foot reaching the floor first~ PT removes wrist extension strap on [place] orthosis in an attempt to have pt weight bear through entire hand  -x 2 trials of side stepping around perimeter of high/low mat with finger support from L UE and PT supports R hand~ CGA to perform task with frequent cues for improved R LE weight bearing and increased knee extension          Home Exercises Provided and Patient Education Provided     Education provided:   HEP    Written Home Exercises Provided: yes.  Exercises were reviewed and Torito was able to demonstrate them prior to the end of the session.  Torito demonstrated good  understanding of the education provided.     See EMR under Patient Instructions for exercises provided 2/17/2020.    Assessment     Torito tolerated today's session very well, which was very Neuro-Shawna based. PT provided various tasks which require symmetrical LE weight bearing or improved weight shift and weight bearing to R LE. Pt is clearly limited here, though his ability to perform step-ups on and off 2 " step with L LE is encouraging. Pt did complete one trial of quadruped crawling across mat, though increased flexor tone at wrist complicated pt's performance. PT was " satisfied with supine Neuro-Shawna activities to spark increased motor output from R LE. Pt also did relatively well with side-stepping along perimeter of high/low mat. PT would like to consider some gait training in future sessions with use of RW and paddle/bracket attachment for R hand. PT hopeful that this may help pt bear more weight through R LE during ambulation. Pt remains limited by decreased standing balance, R UE and LE hemiparesis, decreased hearing, decreased cognition and language skills and decreased endurance. Pt is appropriate to continue addressing above limitations via OPPT. Cont with plan of care.     Torito is progressing well towards his goals.   Pt prognosis is Fair.     Pt will continue to benefit from skilled outpatient physical therapy to address the deficits listed in the problem list box on initial evaluation, provide pt/family education and to maximize pt's level of independence in the home and community environment.     Pt's spiritual, cultural and educational needs considered and pt agreeable to plan of care and goals.     Anticipated barriers to physical therapy: pt does not drive, is hearing impaired as well as language impaired    GOALS:   Short term goals: 4 weeks, pt agrees to goals set.  1. Pt to be issued updated LE HEP and to report at least partial compliance~ in progress  2. Pt to improve his TUG score to 24 seconds with use of QC for improved household ambulation with decreased fall risk~in progress  3. Pt to increase his 30 second chair rise to 12.5 repetitions to demonstrate improved functional LE strength and endurance~in progress  4. Pt to improve his SSWS to 0.43 m/sec with use of QC for improved community ambulation with decreased fall risk~in progress  5. Pt to pass condition # 1 of the MCTSIB to demonstrate improved static standing ability~in progress  6. Pt to improve R LE MMT score for hip flexion to 4/5 for improved gait mechanics~in progress  7. Pt to improve R LE  MMT score for hip extension to 4/5 for improved ability to perform symmetrical sit<> stand transfers~in progress     Long term goals: 6 weeks, pt agrees to goals set  8. Pt to be compliant with finalized LE HEP~ongoing  9. Pt to improve his TUG score to 23 seconds with use of QC for improved household ambulation with decreased fall risk~ongoing  10. Pt to increase his 30 second chair rise to 13.5 repetitions to demonstrate improved functional LE strength and endurance~ongoing  11. Pt to improve his SSWS to 0.46 m/sec with use of QC for improved community ambulation with decreased fall risk~ongoing              12.       Pt to increase his scores on conditions # 2 and 3 of the MCTSIB to 25 seconds or > to demonstrate improved static standing balance with    low/eliminated vision and when standing on unstable surfaces~ongoing              13.       Pt to improve R LE MMT score for knee flexion to 2+/5 for improved swing phase of gait~ongoing              14.       Pt to improve R LE MMT score for ankle dorsiflexion to 3+/5 for improved heel strike during gait~ongoing    Plan     Cont with endurance, strengthening, stretching, weight bearing and balance activities. Continue to increase weight shifting on R LE with hip involvement and core strengthening. Use of Neuro-Shawna techniques to encourage more functional use with R UE and LE and tolerance for SLS on R LE.    Boris Razo, PT

## 2020-03-03 NOTE — PROGRESS NOTES
"  Physical Therapy Daily Treatment Note     [id number]    Therapy Diagnosis:   Encounter Diagnoses   Name Primary?    Impaired functional mobility, balance, gait, and endurance     Weakness of right leg      Physician: Ian Feliciano MD    Visit Date: 3/4/2020    Physician Orders: PT Eval and Treat   Medical Diagnosis from Referral: I69.30 (ICD-10-CM) - Late effect of stroke  Evaluation Date: 2/13/2020  Authorization Period Expiration: 1/9/20 to 6/1/20  Plan of Care Expiration: 2/13/20 to 3/26/20  Visit # / Visits authorized: 6/ 20    Time In: 1400   Time Out: 1445    Total Billable Time: 45  minutes    Precautions: Standard, Fall and speech deficits, cognitive deficits, hearing impaired    Subjective     Pt reports: no complaints.  " Good."  He reports compliance with HEP  Response to previous treatment:   Functional change: ongoing    Pain: 0/10  Location: N/A     Objective     Pt found seated on edge of mat after working with OTXavier Ugarte participated in neuromuscular re-education activities to improve: Balance, Coordination, Kinesthetic, Sense, Proprioception and Posture for 35 minutes. The following activities were included:    Sitting:  PT performs tone inhibition to R hand and wrist with use of firm surface  PT performs Neuro-Shawna R wrist stretches of forearm supination/pronation and forearm over wrist  -PT applies Neuro-Shawna paddle to R hand;  pt wears throughout remainder of PT session  -passive R wrist stretch to inhibit flexor tone  -1 x 10 R UE AA shoulder flex/ext with elbow in extension and PT guiding arm into abduction and slight ER~ pt with improved form and decreased R shoulder and trunk compensatory movements when compared to previous session  -3 x 10 R ankle rocker board activity to simulate Neuro-Shawna LE progression # 1~ PT places 5 # ankle weight to front of board and pt works on concentric and eccentric activation of " "dorsiflexors      Transitions:  -1 x 10 sit<> stand trials from high/low mat with feet resting on foam fitter, min A and attempts to weight bear symmetrically through LEs, no use of L UE during task, PT supports R UE  -1 x 2 L LE step ups/downs on 4" step with L UE support( walker and SPT's hand), PT supports R UE, max A for R knee stability and for proper weight shift to R LE~ pt demonstrating mild anxiety and irritation with this task( similar to a previous session when PT attempted the same task with a block of similar height)      Supine:  -3 x 10 R LE hip and knee flex/ext with resistance provided during extension, to simulate Neuro-Shawna LE progression # 1        Patient participated in gait training activities to normalize gait pattern for 10 minutes. The following activities were included:     Assistive device: RW with R hand paddle/bracket attached to walker    -Pt ambulates 2 trials of approximately 156 feet, min A with PT providing downward pressure through R hand to keep walker stable, assist to help steer walker and frequent cues for improved weight shift and weight bearing to R LE. Pt takes one standing rest break between the 2 trials. Pt demonstrates similar pattern as with his QC, with L lateral trunk shift, B knee flexion in stance, occasional narrow THU. Pt does, however, maintain a reciprocal pattern throughout.      Home Exercises Provided and Patient Education Provided     Education provided:   HEP    Written Home Exercises Provided: yes.  Exercises were reviewed and Torito was able to demonstrate them prior to the end of the session.  Torito demonstrated good  understanding of the education provided.     See EMR under Patient Instructions for exercises provided 2/17/2020.    Assessment     Torito tolerated today's session fairly well for the most part, which again was very Neuro-Shawna based. Pt's R hand did not require as much tone inhibition as previous session due to his working with OT just " "prior to PT session. PT used paddle and tape to manage R hand and wrist the remainder of session, with the ultimate goal of placing R hand on paddle/bracket support attached to walker. Pt ambulated about as expected, with continued L trunk shift and decreased weight bearing to R LE. While this is clearly a habitual pattern, PT also feels pt may not be fully aware of his R LE in space or realize how he is actually loading this extremity. PT feels pt may improve here though with repeated exposure to ambulation under this condition. Pt did well with passive stretching of R wrist and seemed to be better able to isolate shoulder flex/ext with elbow flexed this session. Attempts to step up/down on 4 " block with L LE did not go well; this was reminiscent of a previous session where PT pushed pt a bit too hard and pt became hypotensive. PT will have to progress here with care. Pt remains limited by decreased standing balance, R UE and LE hemiparesis, decreased hearing, decreased cognition and language skills and decreased endurance. Pt is appropriate to continue addressing above limitations via OPPT. Cont with plan of care.     Torito is progressing well towards his goals.   Pt prognosis is Fair.     Pt will continue to benefit from skilled outpatient physical therapy to address the deficits listed in the problem list box on initial evaluation, provide pt/family education and to maximize pt's level of independence in the home and community environment.     Pt's spiritual, cultural and educational needs considered and pt agreeable to plan of care and goals.     Anticipated barriers to physical therapy: pt does not drive, is hearing impaired as well as language impaired    GOALS:   Short term goals: 4 weeks, pt agrees to goals set.  1. Pt to be issued updated LE HEP and to report at least partial compliance~ in progress  2. Pt to improve his TUG score to 24 seconds with use of QC for improved household ambulation with " decreased fall risk~in progress  3. Pt to increase his 30 second chair rise to 12.5 repetitions to demonstrate improved functional LE strength and endurance~in progress  4. Pt to improve his SSWS to 0.43 m/sec with use of QC for improved community ambulation with decreased fall risk~in progress  5. Pt to pass condition # 1 of the MCTSIB to demonstrate improved static standing ability~in progress  6. Pt to improve R LE MMT score for hip flexion to 4/5 for improved gait mechanics~in progress  7. Pt to improve R LE MMT score for hip extension to 4/5 for improved ability to perform symmetrical sit<> stand transfers~in progress     Long term goals: 6 weeks, pt agrees to goals set  8. Pt to be compliant with finalized LE HEP~ongoing  9. Pt to improve his TUG score to 23 seconds with use of QC for improved household ambulation with decreased fall risk~ongoing  10. Pt to increase his 30 second chair rise to 13.5 repetitions to demonstrate improved functional LE strength and endurance~ongoing  11. Pt to improve his SSWS to 0.46 m/sec with use of QC for improved community ambulation with decreased fall risk~ongoing              12.       Pt to increase his scores on conditions # 2 and 3 of the MCTSIB to 25 seconds or > to demonstrate improved static standing balance with    low/eliminated vision and when standing on unstable surfaces~ongoing              13.       Pt to improve R LE MMT score for knee flexion to 2+/5 for improved swing phase of gait~ongoing              14.       Pt to improve R LE MMT score for ankle dorsiflexion to 3+/5 for improved heel strike during gait~ongoing    Plan     Cont with endurance, strengthening, stretching, weight bearing and balance activities. Continue to increase weight shifting on R LE with hip involvement and core strengthening. Use of Neuro-Shawna techniques to encourage more functional use with R UE and LE and tolerance for SLS on R LE. Continue gait training with RW and use of  Neuro-Shawna paddle/bracket attachment for R hand.    Boris Razo, PT

## 2020-03-03 NOTE — PROGRESS NOTES
"Outpatient Neurological Rehabilitation   Speech and Language Therapy Daily Note  Date:  3/4/2020     Name: Torito Harris   MRN: [id number]   Therapy Diagnosis:   Encounter Diagnosis   Name Primary?    Broca's aphasia    Physician: Ian Feliciano MD  Physician Orders:[id number] - Ambulatory Referral to Speech Therapy (Order #[id number])  Medical Diagnosis:  I69.30 (ICD-10-CM) - Late effect of stroke     Visit # / Visits Authorized:  6/ 20  Date of Evaluation:  2/10/2020   Insurance Authorization Period: 02/09/2020- 12/31/2020  Plan of Care Certification:    2/10/2020 to 04/10/2020  Extended POC:  n/a  Progress Note: 3/10/20   Visits Cancelled:   Visits No Show:     Time In:  1230  Time Out:  1315  Total Billable Time: 45    Precautions: Standard, Fall and aphasia  Subjective:   Pt reports: "Alright" when asked what he has been up to this morning. With maxA, pt verbalized what time he woke up and what he had for breakfast.   He was compliant to home exercise program although it appears limited based on report. "a little" when asked if iPad tasks are being completed at home.   Response to previous treatment: n/a   Pain Scale:  0/10 on VAS currently.   Pain Location: n/a  Objective:     UNTIMED  Procedure Min.   Speech-Language Therapy  45   Total Timed Units: 0  Total Untimed Units: 3  Charges Billed/# of units: 3    Short Term Goals: (4 weeks) Current Progress:   1. Pt will complete the Western Aphasia Battery test.     Met 3/4/2020   Met/Discontinue    2. Pt will generate a response to basic sentence completions with 80% acc with min A.  Progressing/ Not Met 3/4/2020   55% acc ind'ly for sentences with common objects/nouns; 70% given a semantic cue.    Met x 2   3. Pt will repeat 3-5 word phrase/sentences with 80% acc with min A  Progressing/ Not Met 3/4/2020   Conversational phrases (ie: How are you, What time is it, I need your help): 60% acc ind'ly; up to 90% with repetition and a visual model.    Statement " "phrases (ie: It will cost a dollar)- 30% acc ind'ly; up to 90% with repetitions and a visual model.    Met x1   4. Pt will follow simple 3 unit commands with 80% acc ind'ly.    Progressing/ Not Met 3/4/2020   30% ind'ly; 50% with repetition; 90% with a model   New goal 3/4/2020  5. Pt will participate in reading and writing assessment.     Progressing/ Not Met 3/4/2020   Not formally addressed       New goal 2/13/2020  6. Pt will complete word finding task (i.e. Creating subject, verb, object pairs in VNEST protocol) with 90% acc min A to improve word fluency.  Progressing/ Not Met 3/4/2020   Not formally addressed today due to significant difficulty with task in previous session.           Probed Semantic Feature Analysis today using six questions to describe a target item. Pt completed task for one target item "flashlight". Pt generated description to 2/6 questions ind'ly; 6/6 with maxA.       Patient Education/Response:   Continued education on purpose of therapy tasks. He continues to perseverate on "I can't say the words" each session.     Written Home Exercises Provided: Patient instructed to cont prior HEP ([employer] on iPad).  Exercises were reviewed and Torito was able to demonstrate them prior to the end of the session.  Torito demonstrated fair  understanding of the education provided. Torito's wife demonstrated good understanding of education and homework provided.  Assessment:   Torito is progressing well towards his goals. Pt still perseverates on what he can not do rather than give himself time to generate a response. Word finding difficulty still present with halting speech. Semantic feature analysis may be more useful for patient than VNest. Current goals remain appropriate. Goals to be updated as necessary.     Pt prognosis is Fair. Pt will continue to benefit from skilled outpatient speech and language therapy to address the deficits listed in the problem list on initial evaluation, provide " pt/family education and to maximize pt's level of independence in the home and community environment.   Medical necessity is demonstrated by the following IMPAIRMENTS:  Patient with few true words in all situations severely limiting functional communication with both familiar and unfamiliar communication partners.  Barriers to Therapy: Transportation, timeframe from onset     Pt's spiritual, cultural and educational needs considered and pt agreeable to plan of care and goals.  Plan:   Continue POC with focus on receptive and expressive language including spontaneous speech, auditory comprehension, repetition, and word finding. Complete writing portion of WAB. Consider discharge of VNest goal if cues do not contribute to successful participation with task.     Farrah Atkinson CCC-SLP   3/4/2020

## 2020-03-03 NOTE — PROGRESS NOTES
Occupational Therapy Daily Treatment Note     Date: 3/2/2020  Name: Torito Harris  [facility] Number: [id number]    Therapy Diagnosis:   Encounter Diagnoses   Name Primary?    Impaired mobility and activities of daily living Yes    Weakness of right upper extremity      Physician: Ian Feliciano MD    Physician Orders: Evaluate and treat  Medical Diagnosis:  Late effect of stroke  Evaluation Date: 2/24/2020  Plan of Care Expiration Period: 4/29/2020  Insurance Authorization period Expiration: 12/31/2020  Date of Return to MD: Unknown at this time  Visit # / Visits Authorized: 2 / 9    Time In: 1530  Time Out: 1615  Total Billable Time: 45 minutes    Precautions:  Standard     Subjective     Pt reports: He was eager to work on moving his R UE.  Response to previous treatment: First treatment after eval.  Functional change: None noted at this time.     Pain: Pt unable to quantify  Location: R shoulder    Objective     Pt had completed PT session and was seated at EOM.     Torito participated in dynamic functional therapeutic activities to improve functional performance for 45  minutes, including:  - Mobilization of palm of R hand for stretching and metacarpal flexion and extension    - General wrist stretches including 1. Scaphoid on radius 2. Increasing mobility of metacarpals 3. Carpal rolls 4. Increasing mobility towards radial deviation 5. Increasing mobility of wrist towards extension 6. Increasing mobility of wrist towards supination/pronation  - R hand placed on hard surface on mat next to Pt with digits and wrist in extension as Pt was given cues to lean forward to promote increased extension on their own  - Application of GG paddle splint   - Assisted elbow flexion and extension with long stretch at end ranges  - Scapular mobilization for elevation, depression, adduction and protraction where AAROM was performed to R side.  - Assisted shoulder flexion from sitting at EOM combined with reaching for targets  in various planes  From supine:   - Assisted shoulder flexion combined with scapular mobilization but shoulder flexion was limited to ~ 140* due to pain and tightness  - AAROM to flexion and abduction while assist from therapist to keep elbow in extension.   - With R UE extended toward ceiling, Pt was instructed to move his R UE in directions as directed by therapist.  Pt returned to sitting at EOM:  - GG paddle splint removed  - Pt performed simple grasp and release activities with his R hand with small soft objects for a bilateral task of switching the objects from his R hand to his L and then back. He was issued a small towel roll for him to practice at home.     Pt ambulated to waiting area with his [facility] and was met by his wife.      Home Exercises and Education Provided     Education provided:   - Simple grasp and release activities with his R hand  - Progress towards goals        Assessment     Torito is very eager to participate and improve the use of his R hand and UE. His mild communication deficits may limit his full understanding of instructions but he does well overall with following commands and participation.     Torito is progressing well towards his goals and there are no updates to goals at this time. Pt prognosis is Good.     Pt will continue to benefit from skilled outpatient occupational therapy to address the deficits listed in the problem list on initial evaluation provide pt/family education and to maximize pt's level of independence in the home and community environment.     Anticipated barriers to occupational therapy: None noted at this time.     Pt's spiritual, cultural and educational needs considered and pt agreeable to plan of care and goals.    Goals:  Short Term Goals: 3 weeks   ROM/Strength to perform the ADL's and functional activities listed below,   Pt will improve functional  strength needed for tasks to 20# in R hand.  Pt will improve AROM for shoulder flexion needed for  functional tasks to 155* in R UE.  Simple meal prep will improve to Min A   Pt will be Independent with HEP to improve ROM and FM skills.      Long Term Goals: 6 weeks   ROM/Strength to perform the ADL's and functional activities listed below  Pt will improve functional  strength needed for tasks to 24# in R hand.  Pt will improve AROM for shoulder flexion needed for functional tasks to 165* in R UE.   Simple meal prep will improve to Mod I   G code self care CJ    Plan     Certification Period/Plan of care expiration: 2/24/2020 to 4/29/2020.    Outpatient Occupational Therapy 2 times weekly for 6 weeks to include the following interventions: Neuromuscular Re-ed, Patient Education and Therapeutic Activities.      TIAGO Carlisle

## 2020-03-04 ENCOUNTER — CLINICAL SUPPORT (OUTPATIENT)
Dept: REHABILITATION | Facility: HOSPITAL | Age: 60
End: 2020-03-04
Payer: MEDICARE

## 2020-03-04 DIAGNOSIS — R29.898 WEAKNESS OF RIGHT LEG: ICD-10-CM

## 2020-03-04 DIAGNOSIS — R47.01 BROCA'S APHASIA: ICD-10-CM

## 2020-03-04 DIAGNOSIS — R29.898 WEAKNESS OF RIGHT UPPER EXTREMITY: ICD-10-CM

## 2020-03-04 DIAGNOSIS — Z74.09 IMPAIRED FUNCTIONAL MOBILITY, BALANCE, GAIT, AND ENDURANCE: ICD-10-CM

## 2020-03-04 DIAGNOSIS — Z74.09 IMPAIRED MOBILITY AND ACTIVITIES OF DAILY LIVING: Primary | ICD-10-CM

## 2020-03-04 DIAGNOSIS — Z78.9 IMPAIRED MOBILITY AND ACTIVITIES OF DAILY LIVING: Primary | ICD-10-CM

## 2020-03-04 PROCEDURE — 97530 THERAPEUTIC ACTIVITIES: CPT | Mod: PO,59 | Performed by: OPTOMETRIST

## 2020-03-04 PROCEDURE — 97112 NEUROMUSCULAR REEDUCATION: CPT | Mod: PO,59

## 2020-03-04 PROCEDURE — 92507 TX SP LANG VOICE COMM INDIV: CPT | Mod: PO

## 2020-03-04 PROCEDURE — 97116 GAIT TRAINING THERAPY: CPT | Mod: PO

## 2020-03-05 NOTE — PROGRESS NOTES
Occupational Therapy Daily Treatment Note     Date: 3/4/2020  Name: Torito Harris  [facility] Number: [id number]    Therapy Diagnosis:   Encounter Diagnoses   Name Primary?    Impaired mobility and activities of daily living Yes    Weakness of right upper extremity      Physician: Ian Feliciano MD    Physician Orders: Evaluate and treat  Medical Diagnosis:  Late effect of stroke  Evaluation Date: 2/24/2020  Plan of Care Expiration Period: 4/29/2020  Insurance Authorization period Expiration: 12/31/2020  Date of Return to MD: Unknown at this time  Visit # / Visits Authorized: 3 / 9    Time In: 1315  Time Out: 1400  Total Billable Time: 45 minutes    Precautions:  Standard     Subjective     Pt reports: Was initially concerned with laying down with only one pillow but was able to tolerate it as well as the stretch at his shoulders.   Response to previous treatment: First treatment after eval.  Functional change: None noted at this time.     Pain: Pt unable to quantify  Location: R shoulder    Objective     Pt had completed Speech session ambulated to and was seated at EOM.     Torito participated in dynamic functional therapeutic activities to improve functional performance for 45  minutes, including:  - Mobilization of palm of R hand for stretching and metacarpal flexion and extension    - General wrist stretches including 1. Scaphoid on radius 2. Increasing mobility of metacarpals 3. Carpal rolls 4. Increasing mobility towards radial deviation 5. Increasing mobility of wrist towards extension 6. Increasing mobility of wrist towards supination/pronation  - R hand placed on hard surface on mat next to Pt with digits and wrist in extension as Pt was given cues to lean forward to promote increased extension on their own   - Assisted elbow flexion and extension with long stretch at end ranges  - Scapular mobilization for elevation, depression, adduction and protraction where AAROM was performed to R side.  -  Assisted shoulder flexion from sitting at EOM combined with reaching for targets in various planes  From supine:   - Assisted shoulder flexion combined with scapular mobilization but shoulder flexion improve to WFL  - AAROM to flexion and abduction while assist from therapist to keep elbow in extension.   - With R UE extended toward ceiling, Pt was instructed to move his R UE in directions as directed by therapist.  Pt returned to sitting at EOM:  - Pt performed simple grasp and release activities with his R hand with small soft objects for a bilateral task of switching the objects from his R hand to his L and then for placement of small pipe sleeves on a post in front of him with his R hand with cues and assist as needed for the task.     Pt remained sitting at EOM for PT session to follow.       Home Exercises and Education Provided     Education provided:   - Simple grasp and release activities with his R hand  - Progress towards goals        Assessment     Torito is very eager to participate and improve the use of his R hand and UE. He worked very hard in this session and was trusting of his therapist in trying new positions and techniques.     Torito is progressing well towards his goals and there are no updates to goals at this time. Pt prognosis is Good.     Pt will continue to benefit from skilled outpatient occupational therapy to address the deficits listed in the problem list on initial evaluation provide pt/family education and to maximize pt's level of independence in the home and community environment.     Anticipated barriers to occupational therapy: None noted at this time.     Pt's spiritual, cultural and educational needs considered and pt agreeable to plan of care and goals.    Goals:  Short Term Goals: 3 weeks   ROM/Strength to perform the ADL's and functional activities listed below,   Pt will improve functional  strength needed for tasks to 20# in R hand.  Pt will improve AROM for shoulder  flexion needed for functional tasks to 155* in R UE.  Simple meal prep will improve to Min A   Pt will be Independent with HEP to improve ROM and FM skills.      Long Term Goals: 6 weeks   ROM/Strength to perform the ADL's and functional activities listed below  Pt will improve functional  strength needed for tasks to 24# in R hand.  Pt will improve AROM for shoulder flexion needed for functional tasks to 165* in R UE.   Simple meal prep will improve to Mod I   G code self care CJ    Plan     Certification Period/Plan of care expiration: 2/24/2020 to 4/29/2020.    Outpatient Occupational Therapy 2 times weekly for 6 weeks to include the following interventions: Neuromuscular Re-ed, Patient Education and Therapeutic Activities.      TIAGO Carlisle

## 2020-03-09 ENCOUNTER — CLINICAL SUPPORT (OUTPATIENT)
Dept: REHABILITATION | Facility: HOSPITAL | Age: 60
End: 2020-03-09
Payer: MEDICARE

## 2020-03-09 DIAGNOSIS — R47.01 BROCA'S APHASIA: ICD-10-CM

## 2020-03-09 DIAGNOSIS — Z78.9 IMPAIRED MOBILITY AND ACTIVITIES OF DAILY LIVING: Primary | ICD-10-CM

## 2020-03-09 DIAGNOSIS — Z74.09 IMPAIRED FUNCTIONAL MOBILITY, BALANCE, GAIT, AND ENDURANCE: ICD-10-CM

## 2020-03-09 DIAGNOSIS — R29.898 WEAKNESS OF RIGHT LEG: ICD-10-CM

## 2020-03-09 DIAGNOSIS — Z74.09 IMPAIRED MOBILITY AND ACTIVITIES OF DAILY LIVING: Primary | ICD-10-CM

## 2020-03-09 DIAGNOSIS — R29.898 WEAKNESS OF RIGHT UPPER EXTREMITY: ICD-10-CM

## 2020-03-09 PROCEDURE — 97110 THERAPEUTIC EXERCISES: CPT | Mod: PO,59

## 2020-03-09 PROCEDURE — 97116 GAIT TRAINING THERAPY: CPT | Mod: PO

## 2020-03-09 PROCEDURE — 97112 NEUROMUSCULAR REEDUCATION: CPT | Mod: PO,59

## 2020-03-09 PROCEDURE — 92507 TX SP LANG VOICE COMM INDIV: CPT | Mod: PO

## 2020-03-09 PROCEDURE — 97530 THERAPEUTIC ACTIVITIES: CPT | Mod: PO,59 | Performed by: OPTOMETRIST

## 2020-03-09 NOTE — PROGRESS NOTES
"Outpatient Neurological Rehabilitation   Speech and Language Therapy Daily Note  Date:  3/9/2020     Name: Torito Harris   MRN: [id number]   Therapy Diagnosis:   Encounter Diagnosis   Name Primary?    Broca's aphasia    Physician: Ian Feliciano MD  Physician Orders:[id number] - Ambulatory Referral to Speech Therapy (Order #[id number])  Medical Diagnosis:  I69.30 (ICD-10-CM) - Late effect of stroke     Visit # / Visits Authorized:  7/ 20  Date of Evaluation:  2/10/2020   Insurance Authorization Period: 02/09/2020- 12/31/2020  Plan of Care Certification:    2/10/2020 to 04/10/2020  Extended POC:  n/a  Progress Note: 3/10/20   Visits Cancelled:   Visits No Show:     Time In:  1400  Time Out:  1450  Total Billable Time: 50    Precautions: Standard, Fall and aphasia  Subjective:   Pt reports: "Alright" when asked how he is doing.   He was inconsistently compliant to home exercise program.   Response to previous treatment: n/a   Pain Scale:  0/10 on VAS currently.   Pain Location: n/a  Objective:     UNTIMED  Procedure Min.   Speech-Language Therapy  50   Total Timed Units: 0  Total Untimed Units: 3  Charges Billed/# of units: 3    Short Term Goals: (4 weeks) Current Progress:   1. Pt will complete the Western Aphasia Battery test.     Met 3/9/2020   Met/Discontinue    2. Pt will generate a response to basic sentence completions with 80% acc with min A.  Progressing/ Not Met 3/9/2020   Not formally addressed this session.    Met x 2   3. Pt will repeat 3-5 word phrase/sentences with 80% acc with min A  Progressing/ Not Met 3/9/2020   Not formally addressed this session.    Met x1   4. Pt will follow simple 3 unit commands with 80% acc ind'ly.    Progressing/ Not Met 3/9/2020   Not formally addressed this session.   New goal 3/9/2020  5. Pt will participate in reading and writing assessment.     Progressing/ Not Met 3/9/2020   Not formally addressed this session.      New goal 2/13/2020  6. Pt will complete word finding " task (i.e. Creating subject, verb, object pairs in VNEST protocol) with 90% acc min A to improve word fluency.  Progressing/ Not Met 3/9/2020   Not formally addressed this session.        New Goal 3/9/20   7. Pt will participate in MCST-A multimodal communication screening.    Initiated. See MCST-A below.      The Multi-Modal Communication Screening Task for Persons with Aphasia (MCST-A) was given to assess the ability to use alternative or augmentative modalities to communicate via alternative picture symbols. Descriptions of cues provided: rep-repetition, exp-expansion, fb-message feedback, open-clinician opens the book to get started, page-clinician locates specific page, narrow-narrow field of choices, dva-direct client's visual attention, differ-instructions to use a different modality, conf-confiming behavior, and mod-providing a model of cues.     Subtest/Skill                                Total Successful Attempts                   Response Type                           Cues Required  1-Symbol messages  to request basic needs                                   6/6                                                Pointing                                         rep     Combining 2-3 symbols                                 3/5                                                 Pointing                                              rep, exp, fb, open, page, narrow, model      Categorizing                                                  4/4                                                 Pointing                                                rep      Using environmentally stored   phrases in specific context                            5/6                                                Pointing                                                narrow                   Story telling using a   descriptive scene sequence                         1/6                                                  Verbal only               "                             Rep, exp, narrow       Story retelling using a   descriptive scene sequence                         TBD/6                                                 TBD                                                 TBD     Telling about a location   from a map                                                   TBD/4                                                  TBD                                                  TBD     Spelling                                                        TBD/4                                                   TBD                                                  TBD    Description:   Torito exhibited emerging proficiencies with use of pictures, symbols, and printed words to communicate his wants, needs, and emotions. Torito was able to navigate through multiple pages to find and pont to single images and combine images to formulate a statement or question with cueing. Pt was observed accurately repeating the words after pointing to the pictures. Torito exhibited difficulties with the story telling task in which he was to describe scenes using multiple modalities (I.e., verbal expression, gestures, spelling). Pt perseverated on the verbal response "a wedding" to describe each of the pictures and attempted to continue pointing for each of the tasks. The pt was not able to spell names of items using a low tech letter board. He perseveratively responded "I can't do it" when asked to do so. The pt smiled each time he was able to effectively communicate using symbols. Overall, the pt showed emerging potential to use alternative means of communication to make his wants and needs know. Screening administration to be completed next session.     Patient Education/Response:   Pt and wife educated on AAC/SGD and reason for MCST-A screening. They verbalized understanding.     Written Home Exercises Provided: Patient instructed to cont prior HEP ([employer] on " iPad).  Exercises were reviewed and Torito was able to demonstrate them prior to the end of the session.  Torito demonstrated fair  understanding of the education provided. Torito's wife demonstrated good understanding of education and homework provided.  Assessment:   Torito is progressing well towards his goals. MCST-A screening initiated. Pt showed emerging proficiencies with using pictures, symbols, and printed words  to communicate his wants and needs. Pt appeared pleased each time he was able to successfully use symbols to communicate. Screening administration to be completed next session. Goals to be updated as necessary.     Pt prognosis is Fair. Pt will continue to benefit from skilled outpatient speech and language therapy to address the deficits listed in the problem list on initial evaluation, provide pt/family education and to maximize pt's level of independence in the home and community environment.   Medical necessity is demonstrated by the following IMPAIRMENTS:  Patient with few true words in all situations severely limiting functional communication with both familiar and unfamiliar communication partners.  Barriers to Therapy: Transportation, timeframe from onset     Pt's spiritual, cultural and educational needs considered and pt agreeable to plan of care and goals.  Plan:   Continue POC with focus on receptive and expressive language through alternative and augmentative communication.    Keena Carr, ERICKA   3/9/2020

## 2020-03-09 NOTE — PROGRESS NOTES
"  Physical Therapy Daily Treatment Note     [id number]    Therapy Diagnosis:   Encounter Diagnoses   Name Primary?    Impaired functional mobility, balance, gait, and endurance     Weakness of right leg      Physician: Ian Feliciano MD    Visit Date: 3/9/2020    Physician Orders: PT Eval and Treat   Medical Diagnosis from Referral: I69.30 (ICD-10-CM) - Late effect of stroke  Evaluation Date: 2/13/2020  Authorization Period Expiration: 1/9/20 to 6/1/20  Plan of Care Expiration: 2/13/20 to 3/26/20  Visit # / Visits authorized: 7/ 20    Time In: 1452  Time Out: 1537  Total Billable Time: 45  minutes    Precautions: Standard, Fall and speech deficits, cognitive deficits, hearing impaired    Subjective     Pt reports: no complaints.  " Good."  He reports compliance with HEP  Response to previous treatment:   Functional change: ongoing    Pain: 0/10  Location: N/A     Objective     Pt found walking out of ST with QC and towards neuro gym.   Pt late to PT because ST ran over, but SPT able to accomodate for lost time.      Torito participated in neuromuscular re-education activities to improve: Balance, Coordination, Kinesthetic, Sense, Proprioception and Posture for 10 minutes. The following activities were included:    Sitting:  SPT performs minimal tone inhibition to R hand and wrist with use of firm surface  SPT performs Neuro-Shawna R wrist stretch of forearm over wrist  SPT applies plata gate to R hand;  pt wears throughout much of PT session~ removed to perform gait training in parallel bars and stair training          Torito received therapeutic exercises to develop strength, endurance, ROM, flexibility and core stabilization for 20 minutes including:     Sitting:    2 x 10 R LE Ankle Dorsi Flexor Concentric and Eccentric training against manual resistance from SPT.   2 x 12 R LE Hamstring Concentric and Eccentric training against manual resistance from SPT.     Supine:  -2 " "x 15 R LE hip and knee flex/ext with resistance provided during extension  -2 x 10 Bridges with 3" hold at apex of movement  -1 x 10 Supine Clamshells with B LE concurrently against manual resistance.  -2 x 10 Supine Clamshells (1 set of R LE only & 1 set of L LE only) against manual resistance.       Patient participated in gait training activities to normalize gait pattern for 15 minutes. The following activities were included:     Assistive device: parallel bars    Parallel Bars  X 5 laps forward walking; CGA/Min A provided by SPT for promoting use of R UE on bar and cueing pt to keep body centered  X 5 laps backwards walking; CGA/Min A provided by SPT for promoting use of R UE on bar and cueing pt to keep body centered; pt also cued to increase step length with R LE.     Stairs  Pt ascends and descends 4 six inch steps 8x's (32 steps); CGA/Min A provided by SPT for promoting use of R UE on railing and cueing pt to keep body centered. Pt exhibited step through pattern.     Home Exercises Provided and Patient Education Provided     Education provided:   HEP    Written Home Exercises Provided: yes.  Exercises were reviewed and Torito was able to demonstrate them prior to the end of the session.  Torito demonstrated good  understanding of the education provided.     See EMR under Patient Instructions for exercises provided 2/17/2020.    Assessment     Torito tolerated today's session fairly well. Pt's R hand did not require as much tone inhibition due to his working with OT prior to PT session; SPT placed pt's hand in plata gate orthosis. Pt kept plata gate on for all mat exercises to help prevent unwanted flexion of hand and wrist, but it was doffed for gait training in parallel bars and at stairs. Gait training focused on maintaining neutral trunk at midline compared to his natural tendency for his trunk to lean heavily L during ambulation. Pt exhibited good understanding of the importance of maintaining neutral " trunk positioning at midline and was able to self correct when VC was provided to correct posture. Pt exhibited decreased R LE step length with backwards walking on parallel bars, which improved with increased repetitions of the activity. SPT used wash cloth under pt's R hand to slide on rails/bars during gait training, which was beneficial to promote use of R UE and centered alignment throughout gait training. Pt remains limited by decreased standing balance, R UE and LE hemiparesis, decreased hearing, decreased cognition and language skills and decreased endurance. Pt is appropriate to continue addressing above limitations via OPPT. Cont with plan of care.     Torito is progressing well towards his goals.   Pt prognosis is Fair.     Pt will continue to benefit from skilled outpatient physical therapy to address the deficits listed in the problem list box on initial evaluation, provide pt/family education and to maximize pt's level of independence in the home and community environment.     Pt's spiritual, cultural and educational needs considered and pt agreeable to plan of care and goals.     Anticipated barriers to physical therapy: pt does not drive, is hearing impaired as well as language impaired    GOALS:   Short term goals: 4 weeks, pt agrees to goals set.  1. Pt to be issued updated LE HEP and to report at least partial compliance~ in progress  2. Pt to improve his TUG score to 24 seconds with use of QC for improved household ambulation with decreased fall risk~in progress  3. Pt to increase his 30 second chair rise to 12.5 repetitions to demonstrate improved functional LE strength and endurance~in progress  4. Pt to improve his SSWS to 0.43 m/sec with use of QC for improved community ambulation with decreased fall risk~in progress  5. Pt to pass condition # 1 of the MCTSIB to demonstrate improved static standing ability~in progress  6. Pt to improve R LE MMT score for hip flexion to 4/5 for improved gait  mechanics~in progress  7. Pt to improve R LE MMT score for hip extension to 4/5 for improved ability to perform symmetrical sit<> stand transfers~in progress     Long term goals: 6 weeks, pt agrees to goals set  8. Pt to be compliant with finalized LE HEP~ongoing  9. Pt to improve his TUG score to 23 seconds with use of QC for improved household ambulation with decreased fall risk~ongoing  10. Pt to increase his 30 second chair rise to 13.5 repetitions to demonstrate improved functional LE strength and endurance~ongoing  11. Pt to improve his SSWS to 0.46 m/sec with use of QC for improved community ambulation with decreased fall risk~ongoing              12.       Pt to increase his scores on conditions # 2 and 3 of the MCTSIB to 25 seconds or > to demonstrate improved static standing balance with    low/eliminated vision and when standing on unstable surfaces~ongoing              13.       Pt to improve R LE MMT score for knee flexion to 2+/5 for improved swing phase of gait~ongoing              14.       Pt to improve R LE MMT score for ankle dorsiflexion to 3+/5 for improved heel strike during gait~ongoing    Plan     Cont with endurance, strengthening, stretching, weight bearing and balance activities. Continue to increase weight shifting on R LE with hip involvement and core strengthening. Use of Neuro-Shawna techniques to encourage more functional use with R UE and LE and tolerance for SLS on R LE. Continue gait training with RW and use of Neuro-Shawna paddle/bracket attachment for R hand. Continue with promoting midline trunk position in parallel bars and stair training.     Nate Haji, SPT    I certify that I was present in the room directing the student in service delivery and guiding them using my skilled judgment. As the co-signing therapist, I have reviewed the student's documentation and am responsible for the treatment, assessment and plan.    Boris Razo, PT   3/9/2020

## 2020-03-09 NOTE — PROGRESS NOTES
Occupational Therapy Daily Treatment Note     Date: 3/9/2020  Name: Torito Harris  [facility] Number: [id number]    Therapy Diagnosis:   Encounter Diagnoses   Name Primary?    Impaired mobility and activities of daily living Yes    Weakness of right upper extremity      Physician: Ian Feliciano MD    Physician Orders: Evaluate and treat  Medical Diagnosis:  Late effect of stroke  Evaluation Date: 2/24/2020  Plan of Care Expiration Period: 4/29/2020  Insurance Authorization period Expiration: 12/31/2020  Date of Return to MD: Unknown at this time  Visit # / Visits Authorized: 4 / 9    Time In: 1315  Time Out: 1400  Total Billable Time: 45 minutes    Precautions:  Standard     Subjective     Pt reports: He was able bring his arm higher over his head while in supine.   Response to previous treatment: Improved range at shoulder.  Functional change:  and release possible with simple objects.     Pain: No real pain, just tightness.  Location: R shoulder    Objective     Pt arrived to session, ambulated with SBQC and was seated at EOM.     Torito participated in dynamic functional therapeutic activities to improve functional performance for 45  minutes, including:  - Mobilization of palm of R hand for stretching and metacarpal flexion and extension    - General wrist stretches including 1. Scaphoid on radius 2. Increasing mobility of metacarpals 3. Carpal rolls 4. Increasing mobility towards radial deviation 5. Increasing mobility of wrist towards extension 6. Increasing mobility of wrist towards supination/pronation  - R hand placed on hard surface on mat next to Pt with digits and wrist in extension as Pt was given cues to lean forward to promote increased extension on their own   - Assisted elbow flexion and extension with long stretch at end ranges  - Scapular mobilization for elevation, depression, adduction and protraction where AAROM was performed to R side.  - Assisted shoulder flexion from sitting at EOM  combined with reaching for targets in various planes  From supine:   - Assisted shoulder flexion combined with scapular mobilization but shoulder flexion improve to WFL  - AAROM to flexion and abduction while assist from therapist to keep elbow in extension.   - With R UE extended toward ceiling, Pt was instructed to move his R UE in directions as directed by therapist.  Pt returned to sitting at EOM and then ambulated to counter for standing tasks:   - Pt performed simple grasp and release activities with his R hand for grasp and placement of pipe sleeves on a post with assist from therapist for wrist and shoulder movements as Pt was able to grasp and release on his own.     Pt ambulated to Speech session to follow.       Home Exercises and Education Provided     Education provided:   - Simple grasp and release activities with his R hand  - Progress towards goals        Assessment     Torito works very hard in all sessions and is happy with his increased range for shoulder flexion with little or no pain.      Torito is progressing well towards his goals and there are no updates to goals at this time. Pt prognosis is Good.     Pt will continue to benefit from skilled outpatient occupational therapy to address the deficits listed in the problem list on initial evaluation provide pt/family education and to maximize pt's level of independence in the home and community environment.     Anticipated barriers to occupational therapy: None noted at this time.     Pt's spiritual, cultural and educational needs considered and pt agreeable to plan of care and goals.    Goals:  Short Term Goals: 3 weeks   ROM/Strength to perform the ADL's and functional activities listed below,   Pt will improve functional  strength needed for tasks to 20# in R hand.  Pt will improve AROM for shoulder flexion needed for functional tasks to 155* in R UE.  Simple meal prep will improve to Min A   Pt will be Independent with HEP to improve ROM  and FM skills.      Long Term Goals: 6 weeks   ROM/Strength to perform the ADL's and functional activities listed below  Pt will improve functional  strength needed for tasks to 24# in R hand.  Pt will improve AROM for shoulder flexion needed for functional tasks to 165* in R UE.   Simple meal prep will improve to Mod I   G code self care CJ    Plan     Certification Period/Plan of care expiration: 2/24/2020 to 4/29/2020.    Outpatient Occupational Therapy 2 times weekly for 6 weeks to include the following interventions: Neuromuscular Re-ed, Patient Education and Therapeutic Activities.      TIAGO Carlisle

## 2020-03-10 NOTE — PROGRESS NOTES
"  Physical Therapy Daily Treatment Note     Name: Torito Harris  Clinic Number: [id number]    Therapy Diagnosis:   Encounter Diagnoses   Name Primary?    Impaired functional mobility, balance, gait, and endurance     Weakness of right leg      Physician: Ian Feliciano MD    Visit Date: 3/11/2020    Physician Orders: PT Eval and Treat   Medical Diagnosis from Referral: I69.30 (ICD-10-CM) - Late effect of stroke  Evaluation Date: 2/13/2020  Authorization Period Expiration: 1/9/20 to 6/1/20  Plan of Care Expiration: 2/13/20 to 3/26/20  Visit # / Visits authorized: 8/ 20    Time In: 1401   Time Out: 1445   Total Billable Time: 44  minutes    Precautions: Standard, Fall and speech deficits, cognitive deficits, hearing impaired    Subjective     Pt reports: no complaints.  " Good."  He reports compliance with HEP  Response to previous treatment:   Functional change: ongoing    Pain: 0/10  Location: N/A     Objective     Pt found seated on low mat after working with OTXavier Ugarte participated in neuromuscular re-education activities to improve: Balance, Coordination, Kinesthetic, Sense, Proprioception and Posture for 20 minutes. The following activities were included:    Sitting:  SPT performs minimal tone inhibition to R hand and wrist with use of firm surface  SPT performs Neuro-Shawna R wrist stretch of forearm over wrist  SPT applies plata gate to R hand;  pt wears throughout much of PT session~ removed to perform tests for gait        Evaluation 3/11/20   Single Limb Stance R LE NT  (<10 sec = HIGH FALL RISK) NT  (<10 sec = HIGH FALL RISK)   Single Limb Stance L LE NT  (<10 sec = HIGH FALL RISK) NT  (<10 sec = HIGH FALL RISK)   30 second Chair Rise 11.5 completed with L arm; pt places more weight through L LE 12 completed with L arm; pt places more weight through L LE   5 times sit-stand NT NT     Eval  Postural control:  MCTSIB:  1. Eyes Open/feet together/Firm: 29 seconds, F  2. Eyes Closed/feet " together/Firm: 22 seconds, F  3. Eyes Open/feet together/Foam: 22 seconds, F  4. Eyes Closed/feet together/Foam: pt unable to perform    3/11/20  Postural control:  MCTSIB:  1. Eyes Open/feet together/Firm: 30 seconds, P  2. Eyes Closed/feet together/Firm: 17 seconds, F  3. Eyes Open/feet together/Foam: 30 seconds, P  4. Eyes Closed/feet together/Foam: 2 seconds, F    At ballet bar:  1 x 10 L LE sideward step ups on 4 inch block, B UE support and min A from PT for weight shift and R knee control  1 x 10 L LE sideward step ups on 6 inch block, B UE support and min A from PT for weight shift and R knee control      Torito received therapeutic exercises to develop strength, endurance, ROM, flexibility and core stabilization for 10 minutes including:       Lower Extremity Strength  Right LE  eval 3/11/20 Left LE  eval 3/11/20   Hip Flexion: 4(-)/5 4-/5 Hip Flexion: 5/5 5/5   Hip Extension:  3+/5 3+/5 Hip Extension: 4/5 4-/5   Hip Abduction: 4-/5 4-/5 Hip Abduction: 4+/5 5/5   Hip Adduction: 4-/5 4-/5 Hip Adduction 5/5 5/5   Knee Extension: 4/5 4/5 Knee Extension: 5/5 5/5   Knee Flexion: 2-/5 2-/5 Knee Flexion: 5/5 5/5   Ankle Dorsiflexion: 3-/5 3/5 Ankle Dorsiflexion: 5/5 5/5   Ankle Plantarflexion: 4/5 3+/5 Ankle Plantarflexion: 5/5 5/5                 Patient participated in gait training activities to normalize gait pattern for 14 minutes. The following activities were included:     Assistive device: QC    Gait Assessment:   - AD used: QC  - Assistance: S/mod I  - Distance: mutliple trials of < 50 feet during performance of testing today     GAIT DEVIATIONS:  Torito displays the following deviations with ambulation: decreased marcelo, decreased step length, decreased stride length, B knee flexion in stance phase, decreased R heel strike at initial contact, L lateral trunk shift with increased weight through L LE, R UE tends to adduct with R wrist and hand flexion and forearm pronation.     Impairments contributing to  deviations: impaired motor control, impaired tone, decreased posture, decreased R LE strength, decreased balance    Stairs  Pt ascends and descends 4 six inch steps x 3 trials (12 steps); Min A provided by PT to manage R hand and inhibit flexor tone of hand and wrist. Pt holds L railing and exhibits step through pattern( R leads to ascend and L leads to descend).      -Pt also performs the below tests:         Evaluation 3/11/20   Timed Up and Go 26  sec with QC 28 sec with QC   Self Selected Walking Speed 0.4 m/sec (6m/15s) 0.4 m/sec (6m/15s)   Fast Walking Speed NT NT         CMS Impairment/Limitation/Restriction for FOTO Stroke Lower Extremity Survey     Therapist reviewed FOTO scores for Torito Harris on 3/11/2020.   FOTO documents entered into [employer] - see Media section.     Limitation Score: 57%  Category: Mobility            Home Exercises Provided and Patient Education Provided     Education provided:   HEP    Written Home Exercises Provided: yes.  Exercises were reviewed and Torito was able to demonstrate them prior to the end of the session.  Torito demonstrated good  understanding of the education provided.     See EMR under Patient Instructions for exercises provided 2/17/2020.    Assessment     Torito tolerated today's session well for his 30 day reassessment. PT continues to utilize [place] wrist extension orthosis to control R hand hypertonicity. However, pt seemed bothered by this orthosis during latter portion of treatment session and PT removed. With regard to tests and measures, pt did demonstrate improvement in several areas. First, he passed conditions # 1 and 3 on the MCTSIB( he failed all 3 on evaluation). Pt also increased his 30 second chair rise score from 11.5 repetitions at evaluation to 12 today. Pt also improved his R LE ankle dorsiflexion MMT grade from 3(-)/5 to 3/5( all other R LE scores stayed the same, with ankle plantarflexion declining by 1/2 grade). Pt's TUG score was 2  seconds slower and his SSWS remained the same as at evaluation. PT is happy with the above changes and knows that progress will come slowly. Pt remains limited by decreased standing balance, R UE and LE hemiparesis, decreased hearing, decreased cognition and language skills and decreased endurance. Pt is appropriate to continue addressing above limitations via OPPT. Cont with plan of care.     Torito is progressing well towards his goals.   Pt prognosis is Fair.     Pt will continue to benefit from skilled outpatient physical therapy to address the deficits listed in the problem list box on initial evaluation, provide pt/family education and to maximize pt's level of independence in the home and community environment.     Pt's spiritual, cultural and educational needs considered and pt agreeable to plan of care and goals.     Anticipated barriers to physical therapy: pt does not drive, is hearing impaired as well as language impaired    GOALS:   Short term goals: 4 weeks, pt agrees to goals set.  1. Pt to be issued updated LE HEP and to report at least partial compliance~ Goal MET, 3/11/20  2. Pt to improve his TUG score to 24 seconds with use of QC for improved household ambulation with decreased fall risk~Goal Not MET on 3/11/20, remains appropriate  3. Pt to increase his 30 second chair rise to 12.5 repetitions to demonstrate improved functional LE strength and endurance~ progressing~ remains appropriate, 3/11/20  4. Pt to improve his SSWS to 0.43 m/sec with use of QC for improved community ambulation with decreased fall risk~ remains appropriate, 3/11/20  5. Pt to pass condition # 1 of the MCTSIB to demonstrate improved static standing ability~Goal MET, 3/11/20  6. Pt to improve R LE MMT score for hip flexion to 4/5 for improved gait mechanics~remains appropriate, 3/11/20  7. Pt to improve R LE MMT score for hip extension to 4/5 for improved ability to perform symmetrical sit<> stand transfers~remains  appropriate, 3/11/20     Long term goals: 6 weeks, pt agrees to goals set  8. Pt to be compliant with finalized LE HEP~ongoing  9. Pt to improve his TUG score to 23 seconds with use of QC for improved household ambulation with decreased fall risk~ongoing  10. Pt to increase his 30 second chair rise to 13.5 repetitions to demonstrate improved functional LE strength and endurance~ongoing  11. Pt to improve his SSWS to 0.46 m/sec with use of QC for improved community ambulation with decreased fall risk~ongoing              12.       Pt to increase his scores on conditions # 2 and 3 of the MCTSIB to 25 seconds or > to demonstrate improved static standing balance with  low/eliminated vision and when standing on unstable surfaces~partially MET, 3/11/20              13.       Pt to improve R LE MMT score for knee flexion to 2+/5 for improved swing phase of gait~ongoing              14.       Pt to improve R LE MMT score for ankle dorsiflexion to 3+/5 for improved heel strike during gait~progressing, 3/11/20    Plan     Cont with endurance, strengthening, stretching, weight bearing and balance activities. Continue to increase weight shifting on R LE with hip involvement and core strengthening. Use of Neuro-Shawna techniques to encourage more functional use with R UE and LE and tolerance for SLS on R LE. Continue gait training with RW and use of Neuro-Shawna paddle/bracket attachment for R hand. Continue with promoting midline trunk position in parallel bars and stair training.       Boris Razo, PT   3/11/2020

## 2020-03-11 ENCOUNTER — CLINICAL SUPPORT (OUTPATIENT)
Dept: REHABILITATION | Facility: HOSPITAL | Age: 60
End: 2020-03-11
Payer: MEDICARE

## 2020-03-11 DIAGNOSIS — R47.01 BROCA'S APHASIA: ICD-10-CM

## 2020-03-11 DIAGNOSIS — Z74.09 IMPAIRED MOBILITY AND ACTIVITIES OF DAILY LIVING: Primary | ICD-10-CM

## 2020-03-11 DIAGNOSIS — R29.898 WEAKNESS OF RIGHT UPPER EXTREMITY: ICD-10-CM

## 2020-03-11 DIAGNOSIS — Z74.09 IMPAIRED FUNCTIONAL MOBILITY, BALANCE, GAIT, AND ENDURANCE: ICD-10-CM

## 2020-03-11 DIAGNOSIS — R29.898 WEAKNESS OF RIGHT LEG: ICD-10-CM

## 2020-03-11 DIAGNOSIS — Z78.9 IMPAIRED MOBILITY AND ACTIVITIES OF DAILY LIVING: Primary | ICD-10-CM

## 2020-03-11 PROCEDURE — 92507 TX SP LANG VOICE COMM INDIV: CPT | Mod: PO

## 2020-03-11 PROCEDURE — 97110 THERAPEUTIC EXERCISES: CPT | Mod: PO,59

## 2020-03-11 PROCEDURE — 97530 THERAPEUTIC ACTIVITIES: CPT | Mod: PO | Performed by: OPTOMETRIST

## 2020-03-11 PROCEDURE — 97116 GAIT TRAINING THERAPY: CPT | Mod: PO,59

## 2020-03-11 PROCEDURE — 97112 NEUROMUSCULAR REEDUCATION: CPT | Mod: PO

## 2020-03-11 NOTE — PROGRESS NOTES
Outpatient Neurological Rehabilitation   Speech and Language Therapy Daily Note  Date:  3/11/2020     Name: Torito Harris   MRN: [id number]   Therapy Diagnosis:   Encounter Diagnosis   Name Primary?    Broca's aphasia    Physician: Ian Feliciano MD  Physician Orders:[id number] - Ambulatory Referral to Speech Therapy (Order #[id number])  Medical Diagnosis:  I69.30 (ICD-10-CM) - Late effect of stroke     Visit # / Visits Authorized:  8/ 20  Date of Evaluation:  2/10/2020   Insurance Authorization Period: 02/09/2020- 12/31/2020  Plan of Care Certification:    2/10/2020 to 04/10/2020  Extended POC:  n/a  Progress Note: 3/10/20   Visits Cancelled:   Visits No Show:     Time In:  1230  Time Out:  1315  Total Billable Time: 45    Precautions: Standard, Fall and aphasia  Subjective:   Pt reports: He is not doing his iPad HEP every day.   He was inconsistently compliant to home exercise program.   Response to previous treatment: n/a   Pain Scale:  0/10 on VAS currently.   Pain Location: n/a  Objective:     UNTIMED  Procedure Min.   Speech-Language Therapy  45   Total Timed Units: 0  Total Untimed Units: 3  Charges Billed/# of units: 3    Short Term Goals: (4 weeks) Current Progress:   1. Pt will complete the Western Aphasia Battery test. Met/Discontinue    2. Pt will generate a response to basic sentence completions with 80% acc with min A.  Progressing/ Not Met 3/11/2020   65% ind'ly; 80% additional semantic cues; 100% with phonemic cues     Met x 3 at the Carmen level    3. Pt will repeat 3-5 word phrase/sentences with 80% acc with min A  Progressing/ Not Met 3/11/2020   40% ind'ly; 70% with one repetion; 100% given integral stimulation.     Met x1   4. Pt will follow simple 3 unit commands with 80% acc ind'ly.    Progressing/ Not Met 3/11/2020   40% ind'ly; 60% with x2 reptitions; 100% maxA with a model    5. Pt will participate in reading and writing assessment.     Progressing/ Not Met 3/11/2020   Not formally addressed  "this session.      6. Pt will complete word finding task (i.e. Creating subject, verb, object pairs in VNEST protocol) with 90% acc min A to improve word fluency.  Progressing/ Not Met 3/11/2020   Verb Network Strength Training (VNEST) was introduced today. VNEST focuses on verbs, encouraging people to think of people who perform actions (agents) and the objects or the actions that are performed on (patients). The idea is that by focusing on verbs, which require connection to nouns, you can strengthen all the words in the mental network around the verb. In this therapy technique, there are 6 steps each verb is taken through.   Step 1: Pt provided 0/3  subjects and objects in verb triads IND'ly, 3/3  given max verbal cues.   Step 2: Pt read triads of words aloud with 0% acc; 100% with integral stimulation .  Step 3: pt expanded where with 0% acc indly, 67% acc given max cues, when with 0% acc, 33% acc given max cues. Did not target "why" due to significant difficulty with where and when.     Step 4: pt identified whether sentences presented auditorily were correct or incorrect with 83% acc IND'ly.   Step 5: pt recalled the target verb with 0% acc IND'ly (0/1 ).  Step 6: not addressed             7. Pt will participate in MCST-A multimodal communication screening.     Progressing/ Not Met 3/11/2020  Initiated on 3/9/20 and to be completed by original  next session.          Patient Education/Response:   Education to both patient and wife that HEP will only be helpful if complete each day. They verbalized understanding; however, carryover of this education continues to be limited.     Written Home Exercises Provided: Patient instructed to cont prior HEP (Tactus Therapy on iPad).  Exercises were reviewed and Torito was able to demonstrate them prior to the end of the session.  Torito demonstrated fair  understanding of the education provided. Torito's wife demonstrated good understanding of education " and homework provided.  Assessment:   Torito is progressing well towards his goals. Significant difficulty with VNest task as in previous attempted sessions with max cues yielding 67% accuracy at best. Sentence completions are improving at the Carmen level. Pt was greater success on 3 unit commands targeting objects vs body parts. Goals to be updated as necessary.     Pt prognosis is Fair. Pt will continue to benefit from skilled outpatient speech and language therapy to address the deficits listed in the problem list on initial evaluation, provide pt/family education and to maximize pt's level of independence in the home and community environment.   Medical necessity is demonstrated by the following IMPAIRMENTS:  Patient with few true words in all situations severely limiting functional communication with both familiar and unfamiliar communication partners.  Barriers to Therapy: Transportation, timeframe from onset     Pt's spiritual, cultural and educational needs considered and pt agreeable to plan of care and goals.  Plan:   Continue POC with focus on receptive and expressive language through alternative and augmentative communication. Complete MCST-A. Consider d/c of VNest goal if no improvement.    Farrah Atkinson CCC-SLP   3/11/2020

## 2020-03-12 NOTE — PROGRESS NOTES
Occupational Therapy Daily Treatment Note     Date: 3/11/2020  Name: Torito Harris  [facility] Number: [id number]    Therapy Diagnosis:   Encounter Diagnoses   Name Primary?    Impaired mobility and activities of daily living Yes    Weakness of right upper extremity      Physician: Ian Feliciano MD    Physician Orders: Evaluate and treat  Medical Diagnosis:  Late effect of stroke  Evaluation Date: 2/24/2020  Plan of Care Expiration Period: 4/29/2020  Insurance Authorization period Expiration: 12/31/2020  Date of Return to MD: Unknown at this time  Visit # / Visits Authorized: 5 / 9    Time In: 1315  Time Out: 1400  Total Billable Time: 45 minutes    Precautions:  Standard     Subjective     Pt reports: Movement better in his R UE.    Response to previous treatment: Improved range at shoulder.  Functional change:  and release possible with simple objects.     Pain: No real pain, just tightness.  Location: R shoulder    Objective     Pt arrived to session, ambulated with SBQC and was seated at EOM.     Torito participated in dynamic functional therapeutic activities to improve functional performance for 45  minutes, including:  - Mobilization of palm of R hand for stretching and metacarpal flexion and extension    - General wrist stretches including 1. Scaphoid on radius 2. Increasing mobility of metacarpals 3. Carpal rolls 4. Increasing mobility towards radial deviation 5. Increasing mobility of wrist towards extension 6. Increasing mobility of wrist towards supination/pronation  - R hand placed on hard surface on mat next to Pt with digits and wrist in extension as Pt was given cues to lean forward to promote increased extension on their own   - Assisted elbow flexion and extension with long stretch at end ranges  - Scapular mobilization for elevation, depression, adduction and protraction where AAROM was performed to R side.  - Assisted shoulder flexion from sitting at EOM combined with reaching for  targets in various planes  From supine:   - Assisted shoulder flexion combined with scapular mobilization but shoulder flexion improve to WFL  - AAROM to flexion and abduction while assist from therapist to keep elbow in extension.   - With R UE extended toward ceiling, Pt was instructed to move his R UE in directions as directed by therapist.  Pt returned to sitting at EOM and then ambulated to counter for standing tasks:   - Pt performed simple grasp and release activities with his R hand for grasp and placement of small soft objects from one side of the mat to the other.       Home Exercises and Education Provided     Education provided:   - Simple grasp and release activities with his R hand  - Progress towards goals        Assessment     Torito seems to be happy with the progress that he is making and works hard in all sessions.       Torito is progressing well towards his goals and there are no updates to goals at this time. Pt prognosis is Good.     Pt will continue to benefit from skilled outpatient occupational therapy to address the deficits listed in the problem list on initial evaluation provide pt/family education and to maximize pt's level of independence in the home and community environment.     Anticipated barriers to occupational therapy: None noted at this time.     Pt's spiritual, cultural and educational needs considered and pt agreeable to plan of care and goals.    Goals:  Short Term Goals: 3 weeks   ROM/Strength to perform the ADL's and functional activities listed below,   Pt will improve functional  strength needed for tasks to 20# in R hand.  Pt will improve AROM for shoulder flexion needed for functional tasks to 155* in R UE.  Simple meal prep will improve to Min A   Pt will be Independent with HEP to improve ROM and FM skills.      Long Term Goals: 6 weeks   ROM/Strength to perform the ADL's and functional activities listed below  Pt will improve functional  strength needed for  tasks to 24# in R hand.  Pt will improve AROM for shoulder flexion needed for functional tasks to 165* in R UE.   Simple meal prep will improve to Mod I   G code self care CJ    Plan     Certification Period/Plan of care expiration: 2/24/2020 to 4/29/2020.    Outpatient Occupational Therapy 2 times weekly for 6 weeks to include the following interventions: Neuromuscular Re-ed, Patient Education and Therapeutic Activities.      TIAGO Carlisle

## 2020-03-16 ENCOUNTER — CLINICAL SUPPORT (OUTPATIENT)
Dept: REHABILITATION | Facility: HOSPITAL | Age: 60
End: 2020-03-16
Payer: MEDICARE

## 2020-03-16 DIAGNOSIS — R29.898 WEAKNESS OF RIGHT LEG: ICD-10-CM

## 2020-03-16 DIAGNOSIS — R47.01 BROCA'S APHASIA: ICD-10-CM

## 2020-03-16 DIAGNOSIS — Z78.9 IMPAIRED MOBILITY AND ACTIVITIES OF DAILY LIVING: Primary | ICD-10-CM

## 2020-03-16 DIAGNOSIS — Z74.09 IMPAIRED FUNCTIONAL MOBILITY, BALANCE, GAIT, AND ENDURANCE: ICD-10-CM

## 2020-03-16 DIAGNOSIS — R29.898 WEAKNESS OF RIGHT UPPER EXTREMITY: ICD-10-CM

## 2020-03-16 DIAGNOSIS — Z74.09 IMPAIRED MOBILITY AND ACTIVITIES OF DAILY LIVING: Primary | ICD-10-CM

## 2020-03-16 PROCEDURE — 92507 TX SP LANG VOICE COMM INDIV: CPT | Mod: PO

## 2020-03-16 PROCEDURE — 97530 THERAPEUTIC ACTIVITIES: CPT | Mod: PO | Performed by: OPTOMETRIST

## 2020-03-16 PROCEDURE — 97110 THERAPEUTIC EXERCISES: CPT | Mod: PO

## 2020-03-16 PROCEDURE — 97112 NEUROMUSCULAR REEDUCATION: CPT | Mod: PO

## 2020-03-16 NOTE — PROGRESS NOTES
"Outpatient Neurological Rehabilitation   Speech and Language Therapy Daily Note  Date:  3/16/2020     Name: Torito Harris   MRN: [id number]   Therapy Diagnosis:   Encounter Diagnosis   Name Primary?    Broca's aphasia    Physician: Ian Feliciano MD  Physician Orders:[id number] - Ambulatory Referral to Speech Therapy (Order #[id number])  Medical Diagnosis:  I69.30 (ICD-10-CM) - Late effect of stroke     Visit # / Visits Authorized:  9/ 20  Date of Evaluation:  2/10/2020   Insurance Authorization Period: 02/09/2020- 12/31/2020  Plan of Care Certification:    2/10/2020 to 04/10/2020  Extended POC:  n/a  Progress Note: 4/10/20   Visits Cancelled:   Visits No Show:     Time In:  1450  Time Out:  1530  Total Billable Time: 40    Precautions: Standard, Fall and aphasia  Subjective:   Pt reports: He is not doing his iPad HEP every day. "Just a little bit."  He was inconsistently compliant to home exercise program.   Response to previous treatment: n/a   Pain Scale:  0/10 on VAS currently.   Pain Location: n/a  Objective:     UNTIMED  Procedure Min.   Speech-Language Therapy  40   Total Timed Units: 0  Total Untimed Units: 1  Charges Billed/# of units: 1    Short Term Goals: (4 weeks) Current Progress:   1. Pt will complete the Western Aphasia Battery test. Met/Discontinue    2. Pt will generate a response to basic sentence completions with 80% acc with min A.   60% ind'ly; 90% additional semantic and  phonemic cues   Met x 3 at the Carmen level/discontinue    3. Pt will repeat 3-5 word phrase/sentences with 80% acc with min A  Progressing/ Not Met 3/16/2020   5 word sentences - 70% ind'ly; 80% with two repetitions.   Met x2   4. Pt will follow simple 3 unit commands with 80% acc ind'ly.    Progressing/ Not Met 3/16/2020   Not formally addressed      5. Pt will participate in reading and writing assessment.     Progressing/ Not Met 3/16/2020   Informally assessed with Tactus Therapy tasks.  Pt matched words to pictures (fo2) " - 100% acc ind'ly; fo3 - 100% acc ind'ly   Listened and id spoken words (fo3) w/ 90% acc ind'ly    Reading: Met x 1   6. Pt will complete word finding task (i.e. Creating subject, verb, object pairs in VNEST protocol) with 90% acc min A to improve word fluency.  Progressing/ Not Met 3/16/2020   Not formally addressed    7. Pt will participate in MCST-A multimodal communication screening.     Progressing/ Not Met 3/16/2020  IAttempted to complete MCST-A but pt was most responsive on the task Retelling a story using visual stimuli.   New Goal 3/16/20   Pt will generate a response to basic sentence completions with 80% acc ind'ly.  New goal     The Multi-Modal Communication Screening Task for Persons with Aphasia (MCST-A) was given to assess the ability to use alternative or augmentative modalities to communicate via alternative picture symbols. Descriptions of cues provided: rep-repetition, exp-expansion, fb-message feedback, open-clinician opens the book to get started, page-clinician locates specific page, narrow-narrow field of choices, dva-direct client's visual attention, differ-instructions to use a different modality, conf-confiming behavior, and mod-providing a model of cues.      Subtest/Skill                                Total Successful Attempts                   Response Type                           Cues Required  1-Symbol messages  to request basic needs                                   6/6                                                Pointing                                                   rep     Combining 2-3 symbols                                 3/5                                                 Pointing                                              rep, exp, fb, open, page, narrow, model      Categorizing                                                  4/4                                                 Pointing                                                rep      Using environmentally  "stored   phrases in specific context                            5/6                                                Pointing                                                narrow                   Story telling using a   descriptive scene sequence                         1/6                                                  Verbal only                                           Rep, exp, narrow       Story retelling using a   descriptive scene sequence                         0/6                                                  NR                                                 open, page, dva, differ, model     Telling about a location   from a map                                                    0/4                                                  NR                                                 open, page, dva, differ, model     Spelling                                                          0/4                                                   NR                                                 open, page, dva, differ, model     Description:   Attempted to complete the screening but Torito stared at the paper and clinician and did not say anything. If he did say something, it was "I can't." Based on performance on the rest of the screening, Torito showed emerging potential to use alternative means of communication to make his wants and needs know.      Patient Education/Response:   Education to both patient and wife that HEP will only be helpful if complete each day. They verbalized understanding; however, carryover of this education continues to be limited.     Written Home Exercises Provided: Patient instructed to cont prior HEP (Tactus Therapy on iPad).  Exercises were reviewed and Torito was able to demonstrate them prior to the end of the session.  Torito demonstrated fair  understanding of the education provided. Torito's wife demonstrated good understanding of education and homework " provided.  Assessment:   Torito is progressing well towards his goals.  MCST-A.completed but pt was mostly quiet and nonreponsive. Based on previous performance on the screening, he showed emerging potential to use AAC as a means of communication.  Reading was informally assessed and appeared to be within functional limits for matching words w/ pictures and identifying spoken words. STG #2 was met.  Goals to be updated as necessary.     Pt prognosis is Fair. Pt will continue to benefit from skilled outpatient speech and language therapy to address the deficits listed in the problem list on initial evaluation, provide pt/family education and to maximize pt's level of independence in the home and community environment.   Medical necessity is demonstrated by the following IMPAIRMENTS:  Patient with few true words in all situations severely limiting functional communication with both familiar and unfamiliar communication partners.  Barriers to Therapy: Transportation, timeframe from onset     Pt's spiritual, cultural and educational needs considered and pt agreeable to plan of care and goals.  Plan:   Continue POC with focus on receptive and expressive language through alternative and augmentative communication. Consider d/c of VNest goal if no improvement.    SERINA Farnsworth, CCC-SLP   3/16/2020

## 2020-03-16 NOTE — PROGRESS NOTES
"  Physical Therapy Daily Treatment Note     [id number]    Therapy Diagnosis:   No diagnosis found.  Physician: Ian Feliciano MD    Visit Date: 3/16/2020    Physician Orders: PT Eval and Treat   Medical Diagnosis from Referral: I69.30 (ICD-10-CM) - Late effect of stroke  Evaluation Date: 2/13/2020  Authorization Period Expiration: 1/9/20 to 6/1/20  Plan of Care Expiration: 2/13/20 to 3/26/20  Visit # / Visits authorized: 9/ 20    Time In: 1401   Time Out: 1446   Total Billable Time: 45  minutes    Precautions: Standard, Fall and speech deficits, cognitive deficits, hearing impaired    Subjective     Pt reports: no complaints.  " Good."  He reports compliance with HEP  Response to previous treatment:   Functional change: ongoing    Pain: 0/10  Location: N/A     Objective     Pt found seated on low mat after working with OT.    Torito received therapeutic exercises to develop strength, endurance, ROM, flexibility and core stabilization for 27 minutes including:      Sitting:  2 x 12 R LE Ankle Dorsi Flexor Concentric and Eccentric training against manual resistance from SPT.   2 x 12 R LE Hamstring Concentric and Eccentric training against manual resistance from SPT.      Supine:  2 x 12 Bridges with 3" hold at apex of movement  1 x 10 Supine Clamshells with B LE concurrently against manual resistance.  1 x 10 Supine Clamshells (1 set of R LE only & 1 set of L LE only) against manual resistance.   2 x 5 Supine Bridges with Hip ABD at top.     Patient participated in neuromuscular re-education activities to improve: Balance, Coordination, Kinesthetic, Sense, Proprioception and Posture for 18 minutes. The following activities were included:     Sitting  - R forearm over wrist stretch with distraction and pronation/supination  - R [place] wrist extension orthosis donned for first portion of session    // Bars  1 x 12 Anterior L LE step to place foot on top of foam fitter then " return to neutral; B UE support; CGA at R knee for stability  1 x 12 Amando-lateral L LE step to place foot on top of foam fitter then return to neutral; B UE support; CGA at R knee for stability  1 x 12 Anterior L LE step to place foot on top of blue stool then return to neutral; B UE support; CGA at R knee for stability  1 x 12 Amando-lateral L LE step to place foot on top of blue top stool then return to neutral; B UE support; CGA at R knee for stability  4 x 5 Anterior L LE step to place foot on top of foam fitter then return to neutral; 2 sets of B UE support & 2 sets of R UE support only; Min/Mod A provided at R knee and R hand for stability      Home Exercises Provided and Patient Education Provided     Education provided:   HEP    Written Home Exercises Provided: yes.  Exercises were reviewed and Torito was able to demonstrate them prior to the end of the session.  Torito demonstrated good  understanding of the education provided.     See EMR under Patient Instructions for exercises provided 2/17/2020.    Assessment     Torito tolerated today's session well with no complaints. Pt able to overcome one of his fears today by not utilizing his L UE for stability during L LE touches to top of foam fitter. Pt able to complete bridges with hip ABD with good form, the only limitation being decreased AROM of R hip ABD. SPT/PT continue to use [place] wrist extension orthosis to help manage R hand's tendency to close during exertion. Pt does not seem to like wearing this orthotic for the entire session, so compromises are made with respect to total wear time. Pt remains limited by decreased standing balance, R UE and LE hemiparesis, decreased hearing, decreased cognition and language skills and decreased endurance. Pt is appropriate to continue addressing above limitations via OPPT. Cont with plan of care.     Torito is progressing well towards his goals.   Pt prognosis is Fair.     Pt will continue to benefit from  skilled outpatient physical therapy to address the deficits listed in the problem list box on initial evaluation, provide pt/family education and to maximize pt's level of independence in the home and community environment.     Pt's spiritual, cultural and educational needs considered and pt agreeable to plan of care and goals.     Anticipated barriers to physical therapy: pt does not drive, is hearing impaired as well as language impaired    GOALS:   Short term goals: 4 weeks, pt agrees to goals set.  1. Pt to be issued updated LE HEP and to report at least partial compliance~ Goal MET, 3/11/20  2. Pt to improve his TUG score to 24 seconds with use of QC for improved household ambulation with decreased fall risk~Goal Not MET on 3/11/20, remains appropriate  3. Pt to increase his 30 second chair rise to 12.5 repetitions to demonstrate improved functional LE strength and endurance~ progressing~ remains appropriate, 3/11/20  4. Pt to improve his SSWS to 0.43 m/sec with use of QC for improved community ambulation with decreased fall risk~ remains appropriate, 3/11/20  5. Pt to pass condition # 1 of the MCTSIB to demonstrate improved static standing ability~Goal MET, 3/11/20  6. Pt to improve R LE MMT score for hip flexion to 4/5 for improved gait mechanics~remains appropriate, 3/11/20  7. Pt to improve R LE MMT score for hip extension to 4/5 for improved ability to perform symmetrical sit<> stand transfers~remains appropriate, 3/11/20     Long term goals: 6 weeks, pt agrees to goals set  8. Pt to be compliant with finalized LE HEP~ongoing  9. Pt to improve his TUG score to 23 seconds with use of QC for improved household ambulation with decreased fall risk~ongoing  10. Pt to increase his 30 second chair rise to 13.5 repetitions to demonstrate improved functional LE strength and endurance~ongoing  11. Pt to improve his SSWS to 0.46 m/sec with use of QC for improved community ambulation with decreased fall risk~ongoing               12.       Pt to increase his scores on conditions # 2 and 3 of the MCTSIB to 25 seconds or > to demonstrate improved static standing balance with  low/eliminated vision and when standing on unstable surfaces~partially MET, 3/11/20              13.       Pt to improve R LE MMT score for knee flexion to 2+/5 for improved swing phase of gait~ongoing              14.       Pt to improve R LE MMT score for ankle dorsiflexion to 3+/5 for improved heel strike during gait~progressing, 3/11/20    Plan     Cont with endurance, strengthening, stretching, weight bearing and balance activities. Continue to increase weight shifting on R LE with hip involvement and core strengthening. Use of Neuro-Shawna techniques to encourage more functional use with R UE and LE and tolerance for SLS on R LE. Continue gait training with RW and use of Neuro-Shawna paddle/bracket attachment for R hand. Continue with promoting midline trunk position in parallel bars and stair training.     Nate Haji, SPT    I certify that I was present in the room directing the student in service delivery and guiding them using my skilled judgment. As the co-signing therapist, I have reviewed the student's documentation and am responsible for the treatment, assessment and plan.    Boris Razo, PT   3/16/2020

## 2020-03-16 NOTE — PROGRESS NOTES
Occupational Therapy Daily Treatment Note     Date: 3/16/2020  Name: Torito Harris  [facility] Number: [id number]    Therapy Diagnosis:   Encounter Diagnoses   Name Primary?    Impaired mobility and activities of daily living Yes    Weakness of right upper extremity      Physician: Ian Feliciano MD    Physician Orders: Evaluate and treat  Medical Diagnosis:  Late effect of stroke  Evaluation Date: 2/24/2020  Plan of Care Expiration Period: 4/29/2020  Insurance Authorization period Expiration: 12/31/2020  Date of Return to MD: Unknown at this time  Visit # / Visits Authorized: 6 / 9    Time In: 1315  Time Out: 1400  Total Billable Time: 45 minutes    Precautions:  Standard     Subjective     Pt reports: He was glad to make this session.    Response to previous treatment: Improving range at shoulder and hand  Functional change:  and release possible with simple objects.     Pain: No real pain, just tightness.  Location: R shoulder    Objective     Pt arrived to session, ambulated with SBQC and was taken to sink for bilateral activity of washing hands prior to session. Pt then ambulated to gym area and was seated at EOM.     Torito participated in dynamic functional therapeutic activities to improve functional performance for 45  minutes, including:  - Mobilization of palm of R hand for stretching and metacarpal flexion and extension    - General wrist stretches including 1. Scaphoid on radius 2. Increasing mobility of metacarpals 3. Carpal rolls 4. Increasing mobility towards radial deviation 5. Increasing mobility of wrist towards extension 6. Increasing mobility of wrist towards supination/pronation  - R hand placed on hard surface on mat next to Pt with digits and wrist in extension as Pt was given cues to lean forward to promote increased extension on their own   - Assisted elbow flexion and extension with long stretch at end ranges  - Scapular mobilization for elevation, depression, adduction and  "protraction where AAROM was performed to R side.  - Assisted shoulder flexion from sitting at EOM combined with reaching for targets in various planes  Pt was assisted to supine and was placed with rolled towels at his waist and spine in an inverted "T" to promote thoracic extension.   From supine:   - Assisted shoulder flexion combined with scapular mobilization but shoulder flexion improve to WFL  - AAROM to flexion and abduction while assist from therapist to keep elbow in extension.   - With R UE extended toward ceiling, Pt was instructed to move his R UE in directions as directed by therapist.  - AAROM for IR and ER where he was limited to ~ 3/4 range for both.   Pt returned to sitting at EOM.  - Pt performed simple grasp and release activities with his R hand for grasp and placement of small objects from differing planes from therapist's hand and was assisted with placement on the edge of the mat to his Left.     Pt was left sitting at EOM for PT session to follow.       Home Exercises and Education Provided     Education provided:   - Simple grasp and release activities with his R hand  - Progress towards goals        Assessment     Torito was mildly stressed this day as he and his wife were initially confused this day to whether the clinic was open or closed. He was mildly stressed when he arrived but was able to participate well and enjoyed the improvement that he is making.       Torito is progressing well towards his goals and there are no updates to goals at this time. Pt prognosis is Good.     Pt will continue to benefit from skilled outpatient occupational therapy to address the deficits listed in the problem list on initial evaluation provide pt/family education and to maximize pt's level of independence in the home and community environment.     Anticipated barriers to occupational therapy: None noted at this time.     Pt's spiritual, cultural and educational needs considered and pt agreeable to plan " of care and goals.    Goals:  Short Term Goals: 3 weeks   ROM/Strength to perform the ADL's and functional activities listed below,   Pt will improve functional  strength needed for tasks to 20# in R hand.  Pt will improve AROM for shoulder flexion needed for functional tasks to 155* in R UE.  Simple meal prep will improve to Min A   Pt will be Independent with HEP to improve ROM and FM skills.      Long Term Goals: 6 weeks   ROM/Strength to perform the ADL's and functional activities listed below  Pt will improve functional  strength needed for tasks to 24# in R hand.  Pt will improve AROM for shoulder flexion needed for functional tasks to 165* in R UE.   Simple meal prep will improve to Mod I   G code self care CJ    Plan     Certification Period/Plan of care expiration: 2/24/2020 to 4/29/2020.    Outpatient Occupational Therapy 2 times weekly for 6 weeks to include the following interventions: Neuromuscular Re-ed, Patient Education and Therapeutic Activities.      TIAGO Carlisle

## 2020-03-17 NOTE — PROGRESS NOTES
"  Physical Therapy Daily Treatment Note     Name: Torito Harris  [facility] Number: [id number]    Therapy Diagnosis:   Encounter Diagnoses   Name Primary?    Impaired functional mobility, balance, gait, and endurance     Weakness of right leg      Physician: Ian Feliciano MD    Visit Date: 3/18/2020    Physician Orders: PT Eval and Treat   Medical Diagnosis from Referral: I69.30 (ICD-10-CM) - Late effect of stroke  Evaluation Date: 2/13/2020  Authorization Period Expiration: 1/9/20 to 6/1/20  Plan of Care Expiration: 2/13/20 to 3/26/20  Visit # / Visits authorized: 10/ 20    Time In: 1401    Time Out: 1445  Total Billable Time: 44  minutes    Precautions: Standard, Fall and speech deficits, cognitive deficits, hearing impaired    Subjective     Pt reports: no complaints.  " Good."  He reports compliance with HEP  Response to previous treatment:   Functional change: ongoing    Pain: 0/10  Location: N/A     Objective    Pt found standing in PT gym after working with OT.            Patient participated in neuromuscular re-education activities to improve: Balance, Coordination, Kinesthetic, Sense, Proprioception and Posture for 32 minutes. The following activities were included:       Sitting:  PT performs tone inhibition to R hand and wrist with use of firm surface  PT performs Neuro-Shawna R wrist stretches of forearm supination/pronation, carpal roll and forearm over wrist  -PT applies Neuro-Shawna paddle to R hand;  pt wears throughout remainder of PT session~ pt also wears docking station to R wrist during a portion of treatment to increase wrist extension stretch  -passive R wrist stretch to inhibit flexor tone  -1 x 10 R UE AA shoulder flex/ext with elbow in extension and PT guiding arm into abduction and slight ER~ pt with improved form and decreased R shoulder and trunk compensatory movements when compared to previous session  -2 x 10 R ankle rocker board activity to simulate Neuro-Shawna LE progression # 9~ PT " "places 4 # ankle weight to front of board and pt works on concentric and eccentric activation of dorsiflexors        Transitions:  -1 x 10 sit<> stand trials from high/low mat with feet resting on foam fitter, min A and attempts to weight bear symmetrically through LEs, no use of L UE during task, PT supports R UE  -1 x 10 L LE step ups/downs on 4" step with R UE support, mod A for R knee stability and for proper weight shift to R LE~ pt demonstrating less anxiety and improved willingness to attempt task          Patient participated in gait training activities to normalize gait pattern for 12 minutes. The following activities were included:     Assistive device: RW with R hand paddle/bracket attached to walker     -Pt ambulates 2 trials of approximately 311 feet and 160 feet, min A with PT providing downward pressure through R hand to keep walker stable, assist to help steer walker and frequent cues for improved weight shift and weight bearing to R LE. Pt takes one standing rest break and seated rest break between the 2 trials. Pt demonstrates mildly improved weight shift to R LE but still needs cues for controlling marcelo; mostly reciprocal pattern displayed throughout.    Home Exercises Provided and Patient Education Provided     Education provided:   HEP    Written Home Exercises Provided: yes.  Exercises were reviewed and Torito was able to demonstrate them prior to the end of the session.  Torito demonstrated good  understanding of the education provided.     See EMR under Patient Instructions for exercises provided 2/17/2020.    Assessment     Torito tolerated today's session well with no complaints. Pt able to overcome one of his fears again today by not utilizing his L UE for stability during L LE step ups on 4 inch block. This was completed outside the parallel bars, near the high low mat( which is also an improvement). PT continues to use Farrar wrist extension orthosis or hand paddle to help manage " R hand's tendency to close during exertion. Pt's gait pattern appears improved with use of RW today, with slightly less L lateral shifting noted.  Pt remains limited by decreased standing balance, R UE and LE hemiparesis, decreased hearing, decreased cognition and language skills and decreased endurance. Pt is appropriate to continue addressing above limitations via OPPT. Cont with plan of care.     Torito is progressing well towards his goals.   Pt prognosis is Fair.     Pt will continue to benefit from skilled outpatient physical therapy to address the deficits listed in the problem list box on initial evaluation, provide pt/family education and to maximize pt's level of independence in the home and community environment.     Pt's spiritual, cultural and educational needs considered and pt agreeable to plan of care and goals.     Anticipated barriers to physical therapy: pt does not drive, is hearing impaired as well as language impaired    GOALS:   Short term goals: 4 weeks, pt agrees to goals set.  1. Pt to be issued updated LE HEP and to report at least partial compliance~ Goal MET, 3/11/20  2. Pt to improve his TUG score to 24 seconds with use of QC for improved household ambulation with decreased fall risk~Goal Not MET on 3/11/20, remains appropriate  3. Pt to increase his 30 second chair rise to 12.5 repetitions to demonstrate improved functional LE strength and endurance~ progressing~ remains appropriate, 3/11/20  4. Pt to improve his SSWS to 0.43 m/sec with use of QC for improved community ambulation with decreased fall risk~ remains appropriate, 3/11/20  5. Pt to pass condition # 1 of the MCTSIB to demonstrate improved static standing ability~Goal MET, 3/11/20  6. Pt to improve R LE MMT score for hip flexion to 4/5 for improved gait mechanics~remains appropriate, 3/11/20  7. Pt to improve R LE MMT score for hip extension to 4/5 for improved ability to perform symmetrical sit<> stand transfers~remains  appropriate, 3/11/20     Long term goals: 6 weeks, pt agrees to goals set  8. Pt to be compliant with finalized LE HEP~ongoing  9. Pt to improve his TUG score to 23 seconds with use of QC for improved household ambulation with decreased fall risk~ongoing  10. Pt to increase his 30 second chair rise to 13.5 repetitions to demonstrate improved functional LE strength and endurance~ongoing  11. Pt to improve his SSWS to 0.46 m/sec with use of QC for improved community ambulation with decreased fall risk~ongoing              12.       Pt to increase his scores on conditions # 2 and 3 of the MCTSIB to 25 seconds or > to demonstrate improved static standing balance with  low/eliminated vision and when standing on unstable surfaces~partially MET, 3/11/20              13.       Pt to improve R LE MMT score for knee flexion to 2+/5 for improved swing phase of gait~ongoing              14.       Pt to improve R LE MMT score for ankle dorsiflexion to 3+/5 for improved heel strike during gait~progressing, 3/11/20    Plan     Cont with endurance, strengthening, stretching, weight bearing and balance activities. Continue to increase weight shifting on R LE with hip involvement and core strengthening. Use of Neuro-Shawna techniques to encourage more functional use with R UE and LE and tolerance for SLS on R LE. Continue gait training with RW and use of Neuro-Shawna paddle/bracket attachment for R hand. Continue with promoting midline trunk position in parallel bars and stair training.       Boris Razo, PT   3/18/2020

## 2020-03-18 ENCOUNTER — CLINICAL SUPPORT (OUTPATIENT)
Dept: REHABILITATION | Facility: HOSPITAL | Age: 60
End: 2020-03-18
Attending: PSYCHIATRY & NEUROLOGY
Payer: MEDICARE

## 2020-03-18 DIAGNOSIS — R47.01 BROCA'S APHASIA: ICD-10-CM

## 2020-03-18 DIAGNOSIS — Z78.9 IMPAIRED MOBILITY AND ACTIVITIES OF DAILY LIVING: ICD-10-CM

## 2020-03-18 DIAGNOSIS — R29.898 WEAKNESS OF RIGHT LEG: ICD-10-CM

## 2020-03-18 DIAGNOSIS — R29.898 WEAKNESS OF RIGHT UPPER EXTREMITY: Primary | ICD-10-CM

## 2020-03-18 DIAGNOSIS — Z74.09 IMPAIRED FUNCTIONAL MOBILITY, BALANCE, GAIT, AND ENDURANCE: ICD-10-CM

## 2020-03-18 DIAGNOSIS — Z74.09 IMPAIRED MOBILITY AND ACTIVITIES OF DAILY LIVING: ICD-10-CM

## 2020-03-18 PROCEDURE — 97116 GAIT TRAINING THERAPY: CPT | Mod: PO,59

## 2020-03-18 PROCEDURE — 97530 THERAPEUTIC ACTIVITIES: CPT | Mod: PO,59 | Performed by: OPTOMETRIST

## 2020-03-18 PROCEDURE — 92507 TX SP LANG VOICE COMM INDIV: CPT | Mod: PO

## 2020-03-18 PROCEDURE — 97112 NEUROMUSCULAR REEDUCATION: CPT | Mod: PO,59

## 2020-03-18 NOTE — PROGRESS NOTES
Occupational Therapy Daily Treatment Note     Date: 3/18/2020  Name: Torito Harris  [facility] Number: [id number]    Therapy Diagnosis:   Encounter Diagnoses   Name Primary?    Weakness of right upper extremity Yes    Impaired mobility and activities of daily living      Physician: Ian Feliciano MD    Physician Orders: Evaluate and treat  Medical Diagnosis:  Late effect of stroke  Evaluation Date: 2/24/2020  Plan of Care Expiration Period: 4/29/2020  Insurance Authorization period Expiration: 12/31/2020  Date of Return to MD: Unknown at this time  Visit # / Visits Authorized: 7 / 9    Time In: 1315  Time Out: 1400  Total Billable Time: 45 minutes    Precautions:  Standard     Subjective     Pt reports: Pt eager to come to session and ambulated back to therapy area early.     Response to previous treatment: Improved range at shoulder and hand.  Functional change:  and release improving with simple objects.     Pain: No real pain, just tightness.  Location: R shoulder for ER and IR.    Objective     Pt arrived to session, ambulated with SBQC and was taken to sink for bilateral activity of washing hands prior to session. Pt then ambulated to gym area and was seated at EOM.     Torito participated in dynamic functional therapeutic activities to improve functional performance for 45  minutes, including:  - Mobilization of palm of R hand for stretching and metacarpal flexion and extension    - General wrist stretches including 1. Scaphoid on radius 2. Increasing mobility of metacarpals 3. Carpal rolls 4. Increasing mobility towards radial deviation 5. Increasing mobility of wrist towards extension 6. Increasing mobility of wrist towards supination/pronation  - R hand placed on hard surface on mat next to Pt with digits and wrist in extension as Pt was given cues to lean forward to promote increased extension on their own   - Assisted elbow flexion and extension with long stretch at end ranges  - Scapular  "mobilization for elevation, depression, adduction and protraction where AAROM was performed to R side.  - Assisted shoulder flexion from sitting at EOM combined with reaching for targets in various planes  Pt was assisted to supine and was placed with rolled towels at his waist and spine in an inverted "T" to promote thoracic extension.   From supine:   - Assisted shoulder flexion combined with scapular mobilization but shoulder flexion improve to WFL  - AAROM to flexion and abduction while assist from therapist to keep elbow in extension.   - With R UE extended toward ceiling, Pt was instructed to move his R UE in directions as directed by therapist.  - AAROM for IR and ER where he was limited to ~ 3/4 range for both.   Pt returned to sitting at EOM.  - Pt performed simple grasp and release activities with his R hand for grasp and placement of small objects from differing planes from the Right side of the mat and then from the floor with placement into a small bag held with his Left hand. He needed assist for most grasp of the objects but was able to  a few on his own.     Pt was left sitting at EOM for PT session to follow.     After session, his wife was met in the parking lot and the issue of COVID-19 precautions was addressed as well as the lower priority of her 's need for therapy and she was understanding of his postponement of visits next week. She stated that she would explain this to her .       Home Exercises and Education Provided     Education provided:   - Simple grasp and release activities with his R hand  - Progress towards goals        Assessment     Torito had a good day today and is showing gradual improvement in AROM and control in his R UE. His wife understands his progress and stated that he does his HEP at home and she will support him during the postponed visits next week.     Torito is progressing well towards his goals and there are no updates to goals at this time. Pt " prognosis is Good.     Pt will continue to benefit from skilled outpatient occupational therapy to address the deficits listed in the problem list on initial evaluation provide pt/family education and to maximize pt's level of independence in the home and community environment.     Anticipated barriers to occupational therapy: None noted at this time.     Pt's spiritual, cultural and educational needs considered and pt agreeable to plan of care and goals.    Goals:  Short Term Goals: 3 weeks   ROM/Strength to perform the ADL's and functional activities listed below,   Pt will improve functional  strength needed for tasks to 20# in R hand.  Pt will improve AROM for shoulder flexion needed for functional tasks to 155* in R UE.  Simple meal prep will improve to Min A   Pt will be Independent with HEP to improve ROM and FM skills.      Long Term Goals: 6 weeks   ROM/Strength to perform the ADL's and functional activities listed below  Pt will improve functional  strength needed for tasks to 24# in R hand.  Pt will improve AROM for shoulder flexion needed for functional tasks to 165* in R UE.   Simple meal prep will improve to Mod I   G code self care CJ    Plan     Certification Period/Plan of care expiration: 2/24/2020 to 4/29/2020.    Outpatient Occupational Therapy 2 times weekly for 6 weeks to include the following interventions: Neuromuscular Re-ed, Patient Education and Therapeutic Activities.      TIAGO Carlisle

## 2020-03-18 NOTE — PROGRESS NOTES
Outpatient Neurological Rehabilitation   Speech and Language Therapy Daily Note  Date:  3/18/2020     Name: Torito Harris   MRN: [id number]   Therapy Diagnosis:   Encounter Diagnosis   Name Primary?    Broca's aphasia    Physician: Ian Feliciano MD  Physician Orders:[id number] - Ambulatory Referral to Speech Therapy (Order #[id number])  Medical Diagnosis:  I69.30 (ICD-10-CM) - Late effect of stroke     Visit # / Visits Authorized: 10/ 20  Date of Evaluation:  2/10/2020   Insurance Authorization Period: 02/09/2020- 12/31/2020  Plan of Care Certification:    2/10/2020 to 04/10/2020  Extended POC:  n/a  Progress Note: 4/10/20   Visits Cancelled:   Visits No Show:     Time In:  1445  Time Out:  1530  Total Billable Time: 45    Precautions: Standard, Fall and aphasia  Subjective:   Pt reports:   That he worked on the iPad a little bit.   He was inconsistently compliant to home exercise program.   Response to previous treatment: n/a   Pain Scale:  0/10 on VAS currently.   Pain Location: n/a  Objective:     UNTIMED  Procedure Min.   Speech-Language Therapy  45   Total Timed Units: 0  Total Untimed Units: 1  Charges Billed/# of units: 1    Short Term Goals: (4 weeks) Current Progress:   1. Pt will complete the Western Aphasia Battery test. Met/Discontinue    2. Pt will generate a response to basic sentence completions with 80% acc with min A.   60% ind'ly; 90% additional semantic and  phonemic cues   Met x 3 at the Carmen level/discontinue    3. Pt will repeat 3-5 word phrase/sentences with 80% acc with min A  Progressing/ Not Met 3/18/2020   5 word sentences - 70% ind'ly; 80% with two repetitions.   Met x2   4. Pt will follow simple 3 unit commands with 80% acc ind'ly.    Progressing/ Not Met 3/18/2020   Not formally addressed      5. Pt will participate in reading and writing assessment.     Progressing/ Not Met 3/18/2020   Informally assessed with [facility] tasks.  Pt filled in missing 4 letters when naming pictures  - 100% acc ind'ly.     Pt unscrambled letters of words after he wrote the words 3 times given a model with 100% acc ind'ly    Reading: Met x 1  Writing: Met x1    6. Pt will complete word finding task (i.e. Creating subject, verb, object pairs in VNEST protocol) with 90% acc min A to improve word fluency.  Progressing/ Not Met 3/18/2020   Not formally addressed    7. Pt will participate in MCST-A multimodal communication screening.       IAttempted to complete MCST-A but pt was most responsive on the task Retelling a story using visual stimuli.  Goal met/discontinue   New Goal 3/16/20   Pt will generate a response to basic sentence completions with 80% acc ind'ly. 70% acc ind'ly, 90% acc given phonemic cues.     Probed : ACRT    Pt wrote the label for an item pictured 3/3 immediately after completing anagram, copy, and recall treatment for writing.      Patient Education/Response:   Spoke with patient and his wife that due to therapy following updates regarding COVID-19 closely and taking every precaution to ensure the safety of our patients, staff and community.  In an abundance of caution and in an effort to help reduce risk and limit community spread, we have decided to temporarily postpone appointments for patients who may be at increased risk to attend in-person therapy or where therapy is not critically needed at this time. Plan of care and home exercise program were reviewed and patient has what they need to continue therapy at home. All questions were answered. Also stated to patient and his wife that we are exploring virtual methods of providing care and will be in touch over the next few weeks.   Education to both patient and wife that HEP will only be helpful if complete each day. They verbalized understanding; however, carryover of this education continues to be limited.     Written Home Exercises Provided: Patient instructed to cont prior HEP (Tactus Therapy on iPad). Various worksheets that target  goals.  Exercises were reviewed and Torito was able to demonstrate them prior to the end of the session.  Torito demonstrated fair  understanding of the education provided. Torito's wife demonstrated good understanding of education and homework provided.  Assessment:   Torito is progressing well towards his goals.  Reading and writing informally assessed with pt able to copy words but not initiate spelling. ACRT introduced briefly.  Goals to be updated as necessary.     Pt prognosis is Fair. Pt will continue to benefit from skilled outpatient speech and language therapy to address the deficits listed in the problem list on initial evaluation, provide pt/family education and to maximize pt's level of independence in the home and community environment.   Medical necessity is demonstrated by the following IMPAIRMENTS:  Patient with few true words in all situations severely limiting functional communication with both familiar and unfamiliar communication partners.  Barriers to Therapy: Transportation, timeframe from onset     Pt's spiritual, cultural and educational needs considered and pt agreeable to plan of care and goals.  Plan:   Continue POC with focus on receptive and expressive language through alternative and augmentative communication. Consider d/c of VNest goal if no improvement.    SERINA Farnsworth, CCC-SLP   3/18/2020

## 2020-03-24 ENCOUNTER — TELEPHONE (OUTPATIENT)
Dept: REHABILITATION | Facility: HOSPITAL | Age: 60
End: 2020-03-24

## 2020-03-24 NOTE — TELEPHONE ENCOUNTER
Postponed Appointments    Patient: Torito Harris  Date: 3/24/2020  Diagnosis: Late effect of stroke  MRN: [id number]    Spoke with patient concerning [facility] therapy and wellness team following updates regarding COVID-19 closely and taking every precaution to ensure the safety of our patients, staff and community.  In an abundance of caution and in an effort to help reduce risk and limit community spread, we have decided to temporarily postpone appointments for patients who may be at increased risk to attend in-person therapy or where therapy is not critically needed at this time. Plan of care and home exercise program were reviewed and patient has what they need to continue therapy at home. All patient questions were answered. Also stated to patient that we are exploring virtual methods of providing care and will be in touch over the next few weeks. Patient verbalized understanding to all.      PT indicated that all future visits have been postponed; pt's wife reports she may be interested in virtual visits in the future.      Boris Razo, PT  3/24/2020

## 2020-03-31 ENCOUNTER — TELEPHONE (OUTPATIENT)
Dept: REHABILITATION | Facility: HOSPITAL | Age: 60
End: 2020-03-31

## 2020-03-31 NOTE — TELEPHONE ENCOUNTER
PT spoke with pt's wife for weekly check- in during COVID-19 pandemic.  Pt's wife states that pt is doing well and doing some of his exercises. Pt's wife reports pt is staying inside but she may occasionally take pt out for a drive. PT indicated there are no new updates regarding virtual therapy visits at this time.  PT will continue to check on patient weekly.    Boris Razo, PT  3/31/20

## 2020-04-02 ENCOUNTER — TELEPHONE (OUTPATIENT)
Dept: REHABILITATION | Facility: HOSPITAL | Age: 60
End: 2020-04-02

## 2020-04-02 DIAGNOSIS — R47.01 BROCA'S APHASIA: ICD-10-CM

## 2020-04-02 NOTE — TELEPHONE ENCOUNTER
Postponed Appointments    Patient: Torito Harris  Date: 4/2/2020  Diagnosis:   1. Broca's aphasia       MRN: [id number]    Spoke with patient's wife, Nereyda for weekly check-in via telephone due to therapy following updates regarding COVID-19 closely and taking every precaution to ensure the safety of our patients, staff and community.  In an abundance of caution and in an effort to help reduce risk and limit community spread, we have decided to temporarily postpone appointments for patients who may be at increased risk to attend in-person therapy or where therapy is not critically needed at this time. Plan of care and home exercise program were reviewed and patient has what they need to continue therapy at home. All patient questions were answered. Also stated to patient that we are exploring virtual methods of providing care and will be in touch on a weekly basis. Patient's wife verbalized understanding to all.    Weekly check- in concerns:Patient's wife reported that he is working on the [employer] [facility] with some assistance from family. She was encouraged to encourage him to engage in conversation as well to practice his communication skills. She verbalized understanding.     CATIE Tran., CCC-SLP, CBIS  Speech-Language Pathologist    4/2/2020

## 2020-04-08 ENCOUNTER — TELEPHONE (OUTPATIENT)
Dept: REHABILITATION | Facility: HOSPITAL | Age: 60
End: 2020-04-08

## 2020-04-08 NOTE — TELEPHONE ENCOUNTER
Postponed Appointments    Patient: Tortio Harris  Date: 4/8/2020  Diagnosis: No diagnosis found.  MRN: [id number]    Spoke with patient/patient's caregiver for weekly check-in via telephone due to therapy following updates regarding COVID-19 closely and taking every precaution to ensure the safety of our patients, staff and community.  In an abundance of caution and in an effort to help reduce risk and limit community spread, we have decided to temporarily postpone appointments for patients who may be at increased risk to attend in-person therapy or where therapy is not critically needed at this time. Plan of care and home exercise program were reviewed and patient has what they need to continue therapy at home. All patient questions were answered. Educated pt and his wife why he is not appropriate for telehealth therapies at this time.  Patient/patient's caregiver verbalized understanding to all.      Aury Mead OT    4/8/2020

## 2020-04-20 ENCOUNTER — TELEPHONE (OUTPATIENT)
Dept: REHABILITATION | Facility: HOSPITAL | Age: 60
End: 2020-04-20

## 2020-04-20 DIAGNOSIS — R47.01 BROCA'S APHASIA: ICD-10-CM

## 2020-04-20 NOTE — TELEPHONE ENCOUNTER
Postponed Appointments    Patient: Torito Harris  Date: 4/20/2020  Diagnosis:   1. Broca's aphasia       MRN: [id number]    Spoke with patient's caregiver (wife Nereyda) for weekly check-in via telephone due to therapy following updates regarding COVID-19 closely and taking every precaution to ensure the safety of our patients, staff and community.  In an abundance of caution and in an effort to help reduce risk and limit community spread, we have decided to temporarily postpone appointments for patients who may be at increased risk to attend in-person therapy or where therapy is not critically needed at this time. Plan of care and home exercise program were reviewed and patient has what they need to continue therapy at home.      Weekly check- in concerns: Patient's wife stated that he is doing his [employer] Therapy on his iPad but still having trouble remembering his words during conversation. Patient's wife wants to try telehealth for speech therapy. They were instructed to download the [facility] myriam to participate in telehealth. The  staff will call to schedule a telehealth speech therapy appointment. All questions were answered. Patient's caregiver verbalized understanding to all.    CATIE Tran., CCC-SLP, CBIS  Speech-Language Pathologist  4/20/2020

## 2020-04-28 ENCOUNTER — TELEPHONE (OUTPATIENT)
Dept: REHABILITATION | Facility: HOSPITAL | Age: 60
End: 2020-04-28

## 2020-04-28 DIAGNOSIS — R47.01 BROCA'S APHASIA: ICD-10-CM

## 2020-04-28 NOTE — TELEPHONE ENCOUNTER
I spoke to the Torito Harris's wife to start the telehealth speech therapy session but there were technical difficulties. We could see each other but they could not hear me when using the iPad. His wife also reported that the My [facility] myriam on the iPad would not open. She was told to call IS for assistance. This appointment was rescheduled to Thursday 4/30/20 at 3:15 pm.    CATIE Tran., CCC-SLP, [facility]  Speech-Language Pathologist  4/28/2020

## 2020-04-30 ENCOUNTER — CLINICAL SUPPORT (OUTPATIENT)
Dept: REHABILITATION | Facility: HOSPITAL | Age: 60
End: 2020-04-30
Payer: MEDICARE

## 2020-04-30 DIAGNOSIS — R47.01 BROCA'S APHASIA: ICD-10-CM

## 2020-04-30 NOTE — PROGRESS NOTES
Outpatient Neurological Rehabilitation   Speech and Language Therapy Treatment Note  Date:  4/30/2020   Patient Location:  Visit type: Virtual visit with synchronous audio and video through Epic [facility]    Name: Torito Harris   MRN: [id number]   Therapy Diagnosis:   Encounter Diagnosis   Name Primary?    Broca's aphasia    Physician: Ian Feliciano MD  Physician Orders:[id number] - Ambulatory Referral to Speech Therapy (Order #[id number])  Medical Diagnosis:  I69.30 (ICD-10-CM) - Late effect of stroke    Patient unsafe for in-person office visit due to high risk co-morbidities, probability of infection, to minimize exposure, due to pt expressing symptoms, and/or due to government mandated quarantine.  This patient: (1) was informed that telehealth visits are now available congruent with guidelines set by state practice acts & CMS; (2) initiated scheduling of this type of visit; and (3) gave verbal consent to participate in such.  The patient & provider used Epic [facility] using both video & audio synchronous services to complete the visit.    Visit #/ Visits Authorized: 10/ 20 (virtual visit - 1)  Date of Evaluation:  2/10/20  Insurance Authorization Period: 02/09/2020- 12/31/2020  Plan of Care Certification:    2/10/2020 to 04/10/2020  Extended POC:  TBD   Progress Note: 5/10/20   Visits Cancelled: 0  Visits No Show: 0    Time Connection Established:  1515  Time Connection Terminated:  1530  Total Billable Time: 15     Precautions: Standard, Fall and aphasia  Subjective:   Pt reports: Torito refused to participate in the virtual visit today even though he was in the room. However his wife reported that he has been stubborn and not wanting to do anything. He is sleeping all day (he got up at 3:00 pm today) and is up all night.   He was not compliant to home exercise program.   Response to previous treatment: n/a   Pain Scale:  0/10 on VAS currently.   Pain Location: n/a  Objective:        UNTIMED  Procedure Min.   Speech-  Language- Voice Therapy  15        Total Timed Units: 0  Total Untimed Units: 1  Charges Billed/# of units: 1    Short Term Goals: (4 weeks) Current Progress:   1. Pt will complete the Western Aphasia Battery test. Met/Discontinue    2. Pt will generate a response to basic sentence completions with 80% acc with min A.    60% ind'ly; 90% additional semantic and  phonemic cues   Met x 3 at the Carmen level/discontinue    3. Pt will repeat 3-5 word phrase/sentences with 80% acc with min A  Progressing/ Not Met 3/18/2020   Not formally addressed      Met x2   4. Pt will follow simple 3 unit commands with 80% acc ind'ly.     Progressing/ Not Met 3/18/2020   Not formally addressed       5. Pt will participate in reading and writing assessment.      Met discontinue 4/30/20 Informally assessed with [employer] Therapy tasks.  Pt filled in missing 4 letters when naming pictures - 100% acc ind'ly.      Pt unscrambled letters of words after he wrote the words 3 times given a model with 100% acc ind'ly     Reading: Met x 1  Writing: Met x1 /Discontinue   6. Pt will complete word finding task (i.e. Creating subject, verb, object pairs in VNEST protocol) with 90% acc min A to improve word fluency.  Progressing/ Not Met 3/18/2020   Not formally addressed    7. Pt will participate in MCST-A multimodal communication screening.        IAttempted to complete MCST-A but pt was most responsive on the task Retelling a story using visual stimuli.  Goal met/discontinue   New Goal 3/16/20   Pt will generate a response to basic sentence completions with 80% acc ind'ly. Not formally addressed            Patient Education/Response:   Pt's wife was encouraged to notify the doctor about Torito's behavior and sleeping pattern. He shows signs of depression. Patient will not be seen via telehealth and will wait to be seen in clinic after the COVID-19 quarantine. Pt's wife verbalized agreement and understanding.     Written Home Exercises Provided: Patient  instructed to cont prior HEP.  Exercises were reviewed and Torito was able to demonstrate them prior to the end of the session.  Torito demonstrated fair  understanding of the education provided.     Assessment:   Torito is not progressing well towards his goals. Sleep pattern and behavioral issues were reported by his wife. Pt may be at maximum rehab potential at this time. A referral to case management may be appropriate.  Current goals remain appropriate. Goals to be updated as necessary.     Pt prognosis is Fair. Pt will continue to benefit from skilled outpatient speech and language therapy to address the deficits listed in the problem list on initial evaluation, provide pt/family education and to maximize pt's level of independence in the home and community environment.   Medical necessity is demonstrated by the following IMPAIRMENTS:  Patient with few true words in all situations severely limiting functional communication with both familiar and unfamiliar communication partners.  Barriers to Therapy: transportation, timeframe from onset  Pt's spiritual, cultural and educational needs considered and pt agreeable to plan of care and goals.  Plan:   Continue POC with focus on receptive and expressive language through augmentative-alternative communication. When pt returns to clinic, will re-assess and consider d/c.     SERINA Farnsworth, CCC-SLP   4/30/2020

## 2020-05-01 ENCOUNTER — OUTPATIENT CASE MANAGEMENT (OUTPATIENT)
Dept: ADMINISTRATIVE | Facility: OTHER | Age: 60
End: 2020-05-01

## 2020-05-01 NOTE — PROGRESS NOTES
Outpatient Care Management   - Low Risk Patient Assessment    Patient: Torito Harris  MRN:  [id number]  Date of Service:  5/1/2020  Completed by:  Lilly Aguilar LMSW  Referral Date: 04/30/2020  Program: OPCM Low Risk    Reason for Visit   Patient presents with    Social Work Assessment - Low/Mod Risk     05/01/2020    Case Closure     05/01/2020       Patient Summary     OPCM Social Work Assessment (Low/Moderate Risk)    General  Level of Caregiver support:  Caregiver currently provides assistance  Have you had to make a decision between paying for any of the following in the last 2 months?:  Medication  Employment status:  Disabled  Current symptoms:  Sleep disturbances, Memory problems  Assessments    This LMSW completed asssement with patient sister/caregiver. Caregiver reports patient resides with her but own his home. Caregiver reports patient sometimes requires assistance with ADL's. Caregiver reports she has hired a caregiver to assist with needs as needed. Caregiver reports patient ambulates with a standup walker. Caregiver denied patient needs assistance with food, shelter, and medical,however needs assistance with medication affordability. LMSW will send a referral to Pharmacy Assistance to assist with medication affordability. Caregiver also requesting medication to be mailed ordered. LMSW explained to caregiver . [facility] provides services but unsure if its cost effective as she is unable to afford medication.. Carevi eer reports patient sees and outside PCP and neurologist but has a speech therapist at [facility]. Caregiver reports patient drives himself to and from appointments with her in the car as he has the capacity to still drive. Caregiver interested in receiving resources on Long Term Planning. LMSW provided caregiver with an Advance Directive. Caregiver reports patient sleep pattern is a barrier as he is up all night and sleep throughout the day. LMSW encourage caregiver to  follow up with PCP regarding concerns. Caregiver reports patient has a follow up appointment in June and she will discuss concerns . SW provided all resources vai e-mail as requested.          Complex Care Plan     Care plan was discussed and completed today with input from patient and/or caregiver.    Goals    None         Patient Instructions     No follow-ups on file.    Todays OPCM Self-Management Care Plan was developed with the patients/caregivers input and was based on identified barriers from todays assessment.  Goals were written today with the patient/caregiver and the patient has agreed to work towards these goals to improve his/her overall well-being. Patient verbalized understanding of the care plan, goals, and all of today's instructions. Encouraged patient/caregiver to communicate with his/her physician and health care team about health conditions and the treatment plan.  Provided my contact information today and encouraged patient/caregiver to call me with any questions as needed.

## 2020-06-08 ENCOUNTER — DOCUMENTATION ONLY (OUTPATIENT)
Dept: REHABILITATION | Facility: HOSPITAL | Age: 60
End: 2020-06-08

## 2020-06-08 DIAGNOSIS — Z74.09 IMPAIRED FUNCTIONAL MOBILITY, BALANCE, GAIT, AND ENDURANCE: ICD-10-CM

## 2020-06-08 DIAGNOSIS — R29.898 WEAKNESS OF RIGHT LEG: ICD-10-CM

## 2020-06-08 NOTE — PROGRESS NOTES
OUTPATIENT PHYSICAL THERAPY DISCHARGE SUMMARY     Name: Torito Harris  [facility] Number: [id number]    Diagnosis:   Encounter Diagnoses   Name Primary?    Impaired functional mobility, balance, gait, and endurance     Weakness of right leg      Physician: Ian Feliciano MD  Treatment Orders: PT Eval and Treat  Past Medical History:   Diagnosis Date    Coronary artery disease     Diabetes mellitus     Hypertension        Initial visit: 02/13/20  Date of Last visit: 03/18/20  Date of Discharge Note:  06/08/20  Total Visits Received: 10  Missed Visits: 0  ASSESSMENT   Status Towards Goals Met:   Please refer to last follow up note on 03/18/20 for most updated functional status.     Goals Not achieved and why: Patient's therapy POC interrupted in March 2020 due to COVID-19 pandemic. Therapy team contact patient 2 weeks ago to re-schedule therapy appointments. Patient has not scheduled any further therapy appointments at this time. Patient will need new MD orders to return to therapy.     Discharge reason : Pt has not re-scheduled further follow-up sessions    PLAN   This patient is discharged from Outpatient Physical Therapy Services.     Hanh Brooke, PT  06/08/2020

## 2020-07-06 ENCOUNTER — CLINICAL SUPPORT (OUTPATIENT)
Dept: REHABILITATION | Facility: HOSPITAL | Age: 60
End: 2020-07-06
Attending: PHYSICIAN ASSISTANT
Payer: MEDICARE

## 2020-07-06 ENCOUNTER — CLINICAL SUPPORT (OUTPATIENT)
Dept: REHABILITATION | Facility: HOSPITAL | Age: 60
End: 2020-07-06
Payer: MEDICARE

## 2020-07-06 ENCOUNTER — DOCUMENTATION ONLY (OUTPATIENT)
Dept: REHABILITATION | Facility: HOSPITAL | Age: 60
End: 2020-07-06

## 2020-07-06 DIAGNOSIS — Z74.09 IMPAIRED MOBILITY AND ACTIVITIES OF DAILY LIVING: Primary | ICD-10-CM

## 2020-07-06 DIAGNOSIS — Z78.9 IMPAIRED MOBILITY AND ACTIVITIES OF DAILY LIVING: Primary | ICD-10-CM

## 2020-07-06 DIAGNOSIS — R29.898 WEAKNESS OF RIGHT UPPER EXTREMITY: ICD-10-CM

## 2020-07-06 DIAGNOSIS — I69.398 ABNORMALITY OF GAIT FOLLOWING CEREBROVASCULAR ACCIDENT (CVA): Primary | ICD-10-CM

## 2020-07-06 DIAGNOSIS — R26.9 ABNORMALITY OF GAIT FOLLOWING CEREBROVASCULAR ACCIDENT (CVA): Primary | ICD-10-CM

## 2020-07-06 DIAGNOSIS — R47.01 BROCA'S APHASIA: Primary | ICD-10-CM

## 2020-07-06 PROCEDURE — 92507 TX SP LANG VOICE COMM INDIV: CPT | Mod: PO

## 2020-07-06 PROCEDURE — 97166 OT EVAL MOD COMPLEX 45 MIN: CPT | Mod: PO | Performed by: OPTOMETRIST

## 2020-07-06 PROCEDURE — 97164 PT RE-EVAL EST PLAN CARE: CPT | Mod: PO

## 2020-07-06 PROCEDURE — 97530 THERAPEUTIC ACTIVITIES: CPT | Mod: PO,59 | Performed by: OPTOMETRIST

## 2020-07-06 NOTE — PROGRESS NOTES
Outpatient Neurological Rehabilitation   Speech and Language Therapy Treatment Note  Date:  7/6/2020     Name: Torito Harris   MRN: [id number]   Therapy Diagnosis:   Encounter Diagnosis   Name Primary?    Broca's aphasia Yes   Physician: Joe Oswald PA  Physician Orders:[id number] - Ambulatory Referral to Speech Therapy (Order #[id number])  Medical Diagnosis:  I69.30 (ICD-10-CM) - Late effect of stroke, I69.328 (ICD-10-CM) - Speech or language deficit following cerebrovascular accident      Visit #/ Visits Authorized: 1/ 20 (previous authorization - 10, virtual visit - 1)  Date of Evaluation:  2/10/20  Insurance Authorization Period: 7/6/2020- 12/31/2020  Plan of Care Certification:    2/10/2020 to 04/10/2020  Extended POC:  7/6/20 to 8/7/20  Progress Note:  8/6/20  Visits Cancelled: 0  Visits No Show: 0    Time In:  12:30  Time Out:  97931  Total Billable Time: 45     Precautions: Standard, Fall and aphasia  Subjective:   Pt reports: Torito returned to therapy following COVID-19 quarantine. His wife was present during the session and reported that the patient has not compliant with any of the HEP.   He was not compliant to home exercise program (HEP).   Response to previous treatment: n/a   Pain Scale:  0/10 on VAS currently.   Pain Location: n/a  Objective:        UNTIMED  Procedure Min.   Speech- Language- Voice Therapy  45        Total Timed Units: 0  Total Untimed Units: 1  Charges Billed/# of units: 1    Short Term Goals: (4 weeks) Current Progress:   1. Pt will complete the Western Aphasia Battery test. Met/Discontinue    2. Pt will generate a response to basic sentence completions with 80% acc with min A.    60% ind'ly; 90% additional semantic and  phonemic cues   Met x 3 at the [place] level/discontinue    3. Pt will repeat 3-5 word phrase/sentences with 80% acc with min A  Progressing/ Not Met 3/18/2020   Not formally addressed      Met x2 previously   4. Pt will follow simple 3 unit commands with 80%  acc ind'ly.     Progressing/ Not Met 3/18/2020   Not formally addressed       5. Pt will participate in reading and writing assessment.      Met discontinue 4/30/20 Informally assessed with [employer] Therapy tasks.  Pt filled in missing 4 letters when naming pictures - 100% acc ind'ly.      Pt unscrambled letters of words after he wrote the words 3 times given a model with 100% acc ind'ly     Reading: Met x 1 previously  Writing: Met x1 /Discontinue   6. Pt will complete word finding task (i.e. Creating subject, verb, object pairs in VNEST protocol) with 90% acc min A to improve word fluency.  Progressing/ Not Met 3/18/2020   Not formally addressed    7. Pt will participate in MCST-A multimodal communication screening.        IAttempted to complete MCST-A but pt was most responsive on the task Retelling a story using visual stimuli.  Goal met/discontinue   New Goal 3/16/20  8. Pt will generate a response to basic sentence completions with 80% acc ind'ly. Not formally addressed      New Goal 7/6/20  9. Patient will participate in a cognitive assessment.  The SCCAN was initiated today and will be completed the next session.            Patient Education/Response:   Long discussion occurred with the patient and his wife about his motivation and compliance of suggestions for functional communication and the home exercise program. The plan of care will include a few more therapy sessions to retrain and remind him of the compensatory strategies and AAC for communication. He will be discharged at the end of the plan of care if no significant progress has made in his motivation, participation, and communication. Patient and his wife verbalized agreement and understanding.     Written Home Exercises Provided: Patient instructed to cont prior HEP.  Exercises were reviewed and Torito was able to demonstrate them prior to the end of the session.  Torito demonstrated fair  understanding of the education provided.     Assessment:  "  Torito is not progressing well towards his goals. He participated in a re-assessment today. The SCCAN was initiated and will be completed next session. Most of his verbal responses were "I can't" or "I don't know". Based on previous sessions, today's observations, and his wife's report, Torito is probably at maximum rehab potential at this time. He does not show consistent motivation to participate in activities to increase his cognitive-communicative skills. After long discussion today, he will participate in a few more sessions of speech therapy to determine if he is appropriate for skilled speech therapy.  Current goals remain appropriate. Goals to be updated as necessary.     Pt prognosis is Fair. Pt will continue to benefit from skilled outpatient speech and language therapy to address the deficits listed in the problem list on initial evaluation, provide pt/family education and to maximize pt's level of independence in the home and community environment.   Medical necessity is demonstrated by the following IMPAIRMENTS:  Patient with few true words in all situations severely limiting functional communication with both familiar and unfamiliar communication partners.  Barriers to Therapy: transportation, timeframe from onset  Pt's spiritual, cultural and educational needs considered and pt agreeable to plan of care and goals.  Plan:   Continue POC with focus on receptive and expressive language through augmentative-alternative communication. Discharge probable at the end of POC.     SERINA Farnsworth, CCC-SLP, CBIS   7/6/2020       "

## 2020-07-06 NOTE — PROGRESS NOTES
[facility]ational [facility]  Initial Neurological Evaluation     Date: 7/6/2020  Patient: Torito Harris  Chart Number: [id number]    Therapy Diagnosis:   Encounter Diagnoses   Name Primary?    Impaired mobility and activities of daily living Yes    Weakness of right upper extremity      Physician: Joe Oswald PA    Physician Orders: Evaluate and treat   Medical Diagnosis:  Late effect of stroke  Evaluation Date: 7/6/2020  Plan of Care Expiration Period: 9/9/2020  Insurance Authorization period Expiration: 12/31/2020  Date of Return to MD: Unknown at this time.  Visit # / Visits Authorized: 1 / 20    Time In:1400  Time Out: 1445  Total Billable (one on one) Time: 45 minutes    Precautions: Standard    Subjective     History of Current Condition: Pt suffered a L CVA 12/6/2017 and was treated at [facility] for Acute care and InPt Rehab. Since discharge home in February of 2018 he has had Home Health and then OutPt therapies at [facility] in 2019. He had started again in 2/2020 but his treatments were interrupted by COVID-19 shutdown and he was discharged.  He is here today to continue to try to improve.     Involved Side: Right  Dominant Side: Right  Date of Onset: 12/6/2017  Surgical Procedure: n/a  Imaging: unknown  Previous Therapy: PT, OT, Speech for InPt. HH and OP    Patient's Goals for Therapy: Mr Harris would like to be able to use his R arm for more functional tasks    Pain:  Pain Related Behaviors Observed: no   Functional Pain Scale Rating 0-10:   0/10 on average  n/a/10 at best  4/10 at worst  Location: R shoulder during PROM  Description: Aching     Occupation:  Pt worked as a body and fender repairman   Working presently: on disability As Pt has not worked since 12/17.         Functional Limitations/Social History:     Prior Level of Function: Prior to CVA in 12/17 he Independent with all ADLs and IADLs and working full time  Current Level of Function: Able to  ambulate Mod I with LBQC and can dress self . Performs grooming and feeding tasks with non-dominant hand      Home/Living environment : lives with their spouse  Home Access: One story home with 5 ROBERT with railing of L   DME: transfer tub bench, wheelchair and hospital bed and abigail walker                Leisure: Fishing, boating as Pt owns 2 boats but has not used them since his stroke     Driving: Pt has returned to driving short distances using both feet to operate pedals. He was told that he should be formally assessed for driving skills and would like to address improved driving skills during his treatments to follow.     Past Medical History/Physical Systems Review:     Past Medical History:  Torito Harris  has a past medical history of Coronary artery disease, Diabetes mellitus, and Hypertension.    Past Surgical History:  Torito Harris  has a past surgical history that includes Coronary angioplasty with stent.    Current Medications:  Torito currently has no medications in their medication list.    Allergies:  Review of patient's allergies indicates:  No Known Allergies       Objective     Cognitive Exam:  Oriented: Person, Place, Time and Situation  Behaviors: normal, cooperative  Follows Commands/attention: Follows two-step commands  Communication: expressive aphasia, but improving. [place] and wears hearing aide in his L ear  Memory: No Deficits noted as determined by 3 word recall after 1 minute and 3 minutes  Safety awareness/insight to disability: aware of diagnosis, treatment, and prognosis  Coping skills/emotional control: Appropriate to situation    Visual/Perceptual:  Tracking: intact  Saccades: intact  Acuity: intact for distance but wears reading glasses  R/L discrimination: intact, but delayed   Visual field: intact  Motor Planning Praxis: intact  Comments: Delayed due to aphasia    Physical Exam:  Postural examination/scapula alignment: Rounded shoulder  Joint integrity: Firm end feeling  Skin  integrity: Warm, dry  Edema: None noted         Joint Evaluation  AROM  2/24/2020 AROM  2/24/2020 PROM   2/24/2020 AROM  7/6/2020   PROM  7/6/2020       Left Right Right Right Right    Shoulder flex 0-180 WNL  throughout 110 145      90       120   Shoulder Abd 0-180   90 170      80        135   Shoulder ER 0-90   50 90       50         90   Shoulder IR 0-90   40 80       40         90   Shoulder Extension 0-80   50 80        60            80   Elbow flex/ext 0-150    0-150           0-150      Fist: loose  Strength 2/24/2020 2/24/2020 7/6/2020 7/6/2020   **within available ROM** Left Right     Left  Right   Shoulder flex 4+/5 2+/5   5/5 throughout     2+/5  throughout   Shoulder abd 4+/5 2+/5     Shoulder ER 4/5 2+/5     Shoulder IR 4/5 2+/5     Shoulder Extension 4+/5 2+/5     Elbow flex 4+/5 3-/5     Elbow ext 4+/5 3-/5     Wrist flex 4+/5 2+/5     Wrist ext 4+/5 2+/5         Strength: (CLAUDE Dynamometer in lbs.) Average 3 trials, Position II:     2/24/2020 2/24/2020    Left Right   Rung II 80# 16#        7/6/2020 7/6/2020    Left Right   Rung II 80# 22#       Fine Motor Coordination: 9 Hole Peg Test  Unable to perform      Gross motor coordination:   - MORRIS (Rapid Alternating Movements): Able to perform but slower on R   - Finger to Nose (5 times): Slower and inaccurate on R   - Finger Flicks (coordination moving from digit flexion to digit extension): Unable on R     Tone:  Modified Elmer Scale:   0 - No increase in muscle tone    Sensation:    Light touch: right impaired  Sharp/Dull: right impaired  Kinesthesia: right impaired  Proprioception: right impaired  Temperature: right impaired    Balance:   Static Sit - GOOD: Takes MODERATE challenges from all directions  Dynamic sit- GOOD: Takes MODERATE challenges from all directions  Static Stand - FAIR+: Able to take MINIMAL challenges from all directions  Dynamic stand - FAIR+: Able to take MINIMAL challenges from all  directions    Endurance Deficit: none                    Functional Status      Functional Mobility:  Bed mobility: Mod I  Roll to left: Mod I  Roll to right: Mod I  Supine to sit: Mod I  Sit to supine: Mod I  Transfers to bed: Mod I  Transfers to toilet: Mod I  Car transfers: Mod I  Wheelchair mobility: Mod I    ADL's:  Feeding: Mod I as Pt feeds with his L hand   Grooming: Mod I using L non- dominant hand  Hygiene: Mod I  UB Dressing: Mod I  LB Dressing: Mod I  Toileting: Mod I  Bathing: Mod I    IADL's:  Homecare: Mod A  Cooking: D  Laundry: D  Yard work: D  Use of telephone: Mod I but limited by expressive aphasia   Money management: Mod A  Medication management: Mod I  Handwriting:Mod A using non - dominant hand      CMS Impairment/Limitation/Restriction for FOTO Stroke Upper Extremity Survey  Status Limitation G-Code CMS Severity Modifier  Intake 48% 52% Current Status CK - At least 40 percent but less than 60 percent  Predicted 54% 46% Goal Status+ CK - At least 40 percent but less than 60 percent  D/C Status CK **only report if this is a one time visit  +Based on FOTO predicted change score       Treatment     Treatment Time In: 1435  Treatment Time Out: 1445  Total Treatment time separate from Evaluation time:10 minutes    Torito participated in dynamic functional therapeutic activities to improve functional performance for 10  minutes, including:   AAROM to R UE and FM skills training with use of small objects for grasp and release    Home Exercises and Patient Education Provided    Education provided:   -role of OT, goals for OT, scheduling/cancellations, insurance limitations with patient.      Assessment     Torito Harris is a 59 y.o. male referred to outpatient occupational therapy and presents with a medical diagnosis of L CVA, resulting in decreased flexibility, decreased range of motion, decreased muscle strength, impaired function and decreased work ability and demonstrates limitations as  described in the chart below. Following medical record review it is determined that patient will benefit from occupational therapy services in order to maximize pain free and/or functional use of right UE and hand.Since his last assessment in February 2020, he has lost ROM in his R UE.     Patient prognosis is Good due to high motivation and good family support.  Patient will benefit from skilled outpatient Occupational Therapy to address the deficits stated above and in the chart below, provide patient/family education, and to maximize patient's level of independence.     Plan of care discussed with patient: Yes  Patient's spiritual, cultural and educational needs considered and patient is agreeable to the plan of care and goals as stated below:     Anticipated Barriers for therapy: None noted    Medical Necessity is demonstrated by the following  Profile and History Assessment of Occupational Performance Level of Clinical Decision Making Complexity Score   Occupational Profile:   Torito Harris is a 59 y.o. male who lives with their spouse and is currently disability. Torito Harris has difficulty with  Use of his dominant hand  driving/transportation management, phone/computer use and housework/household chores  affecting his/her daily functional abilities. His/her main goal for therapy is to improve the functional use of his dominant R UE and hand.     Comorbidities:   history of CVA    Medical and Therapy History Review:   Brief               Performance Deficits    Physical:  Joint Mobility  Joint Stability  Muscle Power/Strength  Muscle Endurance  Control of Voluntary Movement   Strength  Gross Motor Coordination  Fine Motor Coordination    Cognitive:  Communication    Psychosocial:    No Deficits     Clinical Decision Making:  moderate    Assessment Process:  Problem-Focused Assessments    Modification/Need for Assistance:  Minimal-Moderate Modifications/Assistance    Intervention Selection:  Limited  Treatment Options       moderate  Based on PMHX, co morbidities , data from assessments and functional level of assistance required with task and clinical presentation directly impacting function.       The following goals were discussed with the patient and patient is in agreement with them as to be addressed in the treatment plan.     Goals:  Short Term Goals: 3 weeks   ROM/Strength to perform the ADL's and functional activities listed below,   Pt will improve functional  strength needed for tasks to 25# in R hand.  Pt will improve AROM for shoulder flexion needed for functional tasks to 155* in R UE.  Simple meal prep will improve to Min A   Pt will be Independent with HEP to improve ROM and FM skills.      Long Term Goals: 8 weeks   ROM/Strength to perform the ADL's and functional activities listed below  Pt will improve functional  strength needed for tasks to 30# in R hand.  Pt will improve AROM for shoulder flexion needed for functional tasks to 165* in R UE.   Simple meal prep will improve to Mod I   Pt will complete clinical assessment for skills needed for the IADL of driving.  G code self care CJ    Plan   Certification Period/Plan of care expiration: 7/6/2020 to 9/9/2020.    Outpatient Occupational Therapy 1 times weekly for 8 weeks to include the following interventions: Patient Education, Self Care, Therapeutic Activites and Therapeutic Exercise.    TIAGO Carlisle      I certify the need for these services furnished under this plan of treatment and while under my care.  ____________________________________ Physician/Referring Practitioner   Date of Signature

## 2020-07-06 NOTE — PLAN OF CARE
[facility]  Physical Therapy Neurological Rehabilitation Re-Evaluation    Name: Torito Harris  Clinic Number: [id number]    Therapy Diagnosis:   Encounter Diagnosis   Name Primary?    Abnormality of gait following cerebrovascular accident (CVA) Yes     Physician: Joe Oswald PA    Physician Orders: PT Eval and Treat    Medical Diagnosis from Referral:  I63.9 (ICD-10-CM) - Cerebral infarction   Evaluation Date: 7/6/2020  Authorization Period Expiration: 12/31/2020   Plan of Care Expiration: Re-eval ONLY  Visit # / Visits authorized: 1 / 20    Time In: 1315  Time Out: 1400  Total Billable Time: 45 minutes    Precautions: Standard    Subjective   Date of onset: 2 years ago, CVA  History of current condition - Torito reports: Wife answering all questions due to communication deficits. Pt has aphasia and is [place]. Wife reports he walks a little in the house room to room distance only.      Medical History:   Past Medical History:   Diagnosis Date    Coronary artery disease     Diabetes mellitus     Hypertension        Surgical History:   Torito Harris  has a past surgical history that includes Coronary angioplasty with stent.    Medications:   Torito currently has no medications in their medication list.    Allergies:   Review of patient's allergies indicates:  No Known Allergies     Prior Therapy: multiple prior POCs at this clinic.   Social History:  lives with their spouse and an 17 y/o dtr  Falls: None  DME: Manual wheelchair , SBQC - has appt for motorized WC this month.    Home Environment: steps to enter home with rail   Exercise Routine / History: inconsistent at home, wife states they have weight machines at home but are not doing. He does in the bed exercises every morning for arms and some leg kicks.   Family Present at time of Eval: Wife  Occupation: n/a disabled   Prior Level of Function: [place] with all gait and mobility past year  Current Level of Function: [place]  with all gait and mobility currently.    Pain:   Current 0/10 Location n/a     Patient's goals: States he wants to work on his arm, denies LE difficulty or changes.     Objective     Patient's mobility presenting to therapy evaluation: walks with device    Mental status: alert, oriented to person, place, and time  Appearance: Casually dressed  Behavior:  calm and cooperative  Attention Span and Concentration:  Normal  Follows commands:  100% of time   Speech: aphasia deficits - see SLP eval today's date.     Dominant hand:  right     Posture Alignment in sitting:  WNL  Posture Alignment in standing: R rotation slight, slight increased hip flexion/trunk flexion     ROM:   UPPER EXTREMITY--AROM/PROM  (R) UE: impaired - see OT eval  (L) UE: WNLs         RANGE OF MOTION--LOWER EXTREMITIES  (R) LE Hip: WNL   Knee: WNL   Ankle: WNL    (L) LE: Hip: WNL   Knee: WNL   Ankle: WNL    Lower Extremity Strength  Right LE 3/11/20 Re-eval 7/6/2020 Left LE 3/11/20 Re-eval 7/6/2020   Hip Flexion: 4-/5 4+/5 Hip Flexion: 5/5 5/5   Hip Extension:  3+/5 4/5 Hip Extension: 4-/5 5/5   Hip Abduction: 4-/5 5/5 Hip Abduction: 5/5 5/5   Hip Adduction: 4-/5 4+/5 Hip Adduction 5/5 5/5   Knee Extension: 4/5 5/5 Knee Extension: 5/5 5/5   Knee Flexion: 2-/5 3+/5 Knee Flexion: 5/5 5/5   Ankle Dorsiflexion: 3/5 4/5 Ankle Dorsiflexion: 5/5 5/5   Ankle Plantarflexion: 3+/5 4/5 Ankle Plantarflexion: 5/5 5/5           3/11/20 7/6/2020   Timed Up and Go 28 sec with QC 27s with SBQC   Self Selected Walking Speed 0.4 m/sec (6m/15s) 0.46 m/sec (6m/13s) with SBQC           3/11/20 Re-eval 7/6/2020   30 second Chair Rise 12 completed with L arm; pt places more weight through L LE 13 completed with L arm     Sitting balance static: good  Sitting balance dynamic: good  Standing balance static: good  Standing balance dynamic:  Good/fair    GAIT ASSESSMENT  - AD used: Small base quad cane  - Assistance: Isabel  - Distance: home and limited community  distances    Observed Gait Deviations:  [unfilled] displays the following deviations with ambulation:  decreased marcelo and increased time in double stance; R LE steppage gait - per pt and wife this has been same gait over past year, no changes.   Impairments contributing to deviations/impairments: abnormal muscle tone     Endurance Deficit: mild    Functional Mobility (Bed mobility, transfers)  Bed mobility:  Mod I  Supine to sit:  Mod I  Sit to supine:  Mod I  Sit to stand:   Mod I  Stand pivot:    Mod I  Transfers to bed:  Mod I  Transfers to toilet: Mod I  Transfers to shower / tub:   Mod I  Car transfers:   Mod I  Wheelchair mobility:  Mod I         CMS Impairment/Limitation/Restriction for FOTO CVA Survey    Therapist reviewed FOTO scores for Torito Harris on 7/6/2020. Proxy of wife completed questionnaire.  FOTO documents entered into [employer] - see Media section.    Limitation Score: 46%         TREATMENT   No treatment provided today. All time spent on evaluation.     Home Exercises and Patient Education Provided    Education provided: POC, scheduling, goals of therapy    Written Home Exercises Provided: No. To be established at initial follow up session.       Assessment   Torito is a 59 y.o. male referred to outpatient Physical Therapy with a medical diagnosis of CVA. Patient presents with new therapy orders to resume PT which stopped due to COVID19 pandemic. He presents today with his wife. He is hard of hearing and has some aphasia deficits which limited some communication. Wife was able to supplement answers and he did answer some questions. He presents today with no specific complaints or goals related to therapy. His wife too had no goals or specific tasks that are difficult for him or that have declined. He has not been having any falls and his completely Isabel at home with functional mobility and gait. He is s/p CVA 2 years now with little spontaneous recovery expected. All of his objective measures  scored the same or better since therapy stopped in March. We discussed how this shows he will not decline with therapy discharging, his daily exercises are working well at home and since they have no specific concerns, no further skilled PT is warranted at this time. He was disappointed that he couldn't come just to use the leg press machine an his wife stated they have a similar machine at home available to him. POC is discharged.      Plan of care discussed with patient: Yes  Patient's spiritual, cultural and educational needs considered and patient is agreeable to the plan of care.    Anticipated Barriers for therapy: Communication deficits.       Plan   Plan of care Certification: 7/6/2020  Re-eval only.     Pt is discharged from PT POC. No skilled PT warranted.     Alanis Hughes, PT

## 2020-07-06 NOTE — PROGRESS NOTES
Outpatient Occupational Therapy Discharge Summary      Name: Torito Harris  Referring Physician: No ref. provider found   Initial visit: 2/24/20  Date of Last visit: 3/18/20  Total Visits Received: 7  Missed Visits: 0.      Goals Not achieved and why:  Pt care interrupted by COVID-19 shutdown.    Goals:  Short Term Goals: 3 weeks   ROM/Strength to perform the ADL's and functional activities listed below,   Pt will improve functional  strength needed for tasks to 20# in R hand.  Pt will improve AROM for shoulder flexion needed for functional tasks to 155* in R UE.  Simple meal prep will improve to Min A   Pt will be Independent with HEP to improve ROM and FM skills.      Long Term Goals: 6 weeks   ROM/Strength to perform the ADL's and functional activities listed below  Pt will improve functional  strength needed for tasks to 24# in R hand.  Pt will improve AROM for shoulder flexion needed for functional tasks to 165* in R UE.   Simple meal prep will improve to Mod I   G code self care CJ      Discharge reason:    COVID-19 interruption and Pt of high risk    Plan:    This patient is discharged from Occupational Therapy Services.     TIAGO Carlisle

## 2020-07-07 NOTE — PLAN OF CARE
[facility]  Initial Neurological Evaluation     Date: 7/6/2020  Patient: Torito Harris  Chart Number: [id number]    Therapy Diagnosis:   Encounter Diagnoses   Name Primary?    Impaired mobility and activities of daily living Yes    Weakness of right upper extremity      Physician: Joe Oswald PA    Physician Orders: Evaluate and treat   Medical Diagnosis:  Late effect of stroke  Evaluation Date: 7/6/2020  Plan of Care Expiration Period: 9/9/2020  Insurance Authorization period Expiration: 12/31/2020  Date of Return to MD: Unknown at this time.  Visit # / Visits Authorized: 1 / 20    Time In:1400  Time Out: 1445  Total Billable (one on one) Time: 45 minutes    Precautions: Standard    Subjective     History of Current Condition: Pt suffered a L CVA 12/6/2017 and was treated at [facility] for Acute care and InPt Rehab. Since discharge home in February of 2018 he has had Home Health and then OutPt therapies at [facility] in 2019. He had started again in 2/2020 but his treatments were interrupted by COVID-19 shutdown and he was discharged.  He is here today to continue to try to improve.     Involved Side: Right  Dominant Side: Right  Date of Onset: 12/6/2017  Surgical Procedure: n/a  Imaging: unknown  Previous Therapy: PT, OT, Speech for InPt. HH and OP    Patient's Goals for Therapy: Mr Harris would like to be able to use his R arm for more functional tasks    Pain:  Pain Related Behaviors Observed: no   Functional Pain Scale Rating 0-10:   0/10 on average  n/a/10 at best  4/10 at worst  Location: R shoulder during PROM  Description: Aching     Occupation:  Pt worked as a body and fender repairman   Working presently: on disability As Pt has not worked since 12/17.         Functional Limitations/Social History:     Prior Level of Function: Prior to CVA in 12/17 he Independent with all ADLs and IADLs and working full time  Current Level of Function: Able to  ambulate Mod I with LBQC and can dress self . Performs grooming and feeding tasks with non-dominant hand      Home/Living environment : lives with their spouse  Home Access: One story home with 5 ROBERT with railing of L   DME: transfer tub bench, wheelchair and hospital bed and abigail walker                Leisure: Fishing, boating as Pt owns 2 boats but has not used them since his stroke     Driving: Pt has returned to driving short distances using both feet to operate pedals. He was told that he should be formally assessed for driving skills and would like to address improved driving skills during his treatments to follow.     Past Medical History/Physical Systems Review:     Past Medical History:  Torito Harris  has a past medical history of Coronary artery disease, Diabetes mellitus, and Hypertension.    Past Surgical History:  Torito Harris  has a past surgical history that includes Coronary angioplasty with stent.    Current Medications:  Torito currently has no medications in their medication list.    Allergies:  Review of patient's allergies indicates:  No Known Allergies       Objective     Cognitive Exam:  Oriented: Person, Place, Time and Situation  Behaviors: normal, cooperative  Follows Commands/attention: Follows two-step commands  Communication: expressive aphasia, but improving. [place] and wears hearing aide in his L ear  Memory: No Deficits noted as determined by 3 word recall after 1 minute and 3 minutes  Safety awareness/insight to disability: aware of diagnosis, treatment, and prognosis  Coping skills/emotional control: Appropriate to situation    Visual/Perceptual:  Tracking: intact  Saccades: intact  Acuity: intact for distance but wears reading glasses  R/L discrimination: intact, but delayed   Visual field: intact  Motor Planning Praxis: intact  Comments: Delayed due to aphasia    Physical Exam:  Postural examination/scapula alignment: Rounded shoulder  Joint integrity: Firm end feeling  Skin  integrity: Warm, dry  Edema: None noted         Joint Evaluation  AROM  2/24/2020 AROM  2/24/2020 PROM   2/24/2020 AROM  7/6/2020   PROM  7/6/2020       Left Right Right Right Right    Shoulder flex 0-180 WNL  throughout 110 145      90       120   Shoulder Abd 0-180   90 170      80        135   Shoulder ER 0-90   50 90       50         90   Shoulder IR 0-90   40 80       40         90   Shoulder Extension 0-80   50 80        60            80   Elbow flex/ext 0-150    0-150           0-150      Fist: loose  Strength 2/24/2020 2/24/2020 7/6/2020 7/6/2020   **within available ROM** Left Right     Left  Right   Shoulder flex 4+/5 2+/5   5/5 throughout     2+/5  throughout   Shoulder abd 4+/5 2+/5     Shoulder ER 4/5 2+/5     Shoulder IR 4/5 2+/5     Shoulder Extension 4+/5 2+/5     Elbow flex 4+/5 3-/5     Elbow ext 4+/5 3-/5     Wrist flex 4+/5 2+/5     Wrist ext 4+/5 2+/5         Strength: (CLAUDE Dynamometer in lbs.) Average 3 trials, Position II:     2/24/2020 2/24/2020    Left Right   Rung II 80# 16#        7/6/2020 7/6/2020    Left Right   Rung II 80# 22#       Fine Motor Coordination: 9 Hole Peg Test  Unable to perform      Gross motor coordination:   - MORRIS (Rapid Alternating Movements): Able to perform but slower on R   - Finger to Nose (5 times): Slower and inaccurate on R   - Finger Flicks (coordination moving from digit flexion to digit extension): Unable on R     Tone:  Modified Elmer Scale:   0 - No increase in muscle tone    Sensation:    Light touch: right impaired  Sharp/Dull: right impaired  Kinesthesia: right impaired  Proprioception: right impaired  Temperature: right impaired    Balance:   Static Sit - GOOD: Takes MODERATE challenges from all directions  Dynamic sit- GOOD: Takes MODERATE challenges from all directions  Static Stand - FAIR+: Able to take MINIMAL challenges from all directions  Dynamic stand - FAIR+: Able to take MINIMAL challenges from all  directions    Endurance Deficit: none                    Functional Status      Functional Mobility:  Bed mobility: Mod I  Roll to left: Mod I  Roll to right: Mod I  Supine to sit: Mod I  Sit to supine: Mod I  Transfers to bed: Mod I  Transfers to toilet: Mod I  Car transfers: Mod I  Wheelchair mobility: Mod I    ADL's:  Feeding: Mod I as Pt feeds with his L hand   Grooming: Mod I using L non- dominant hand  Hygiene: Mod I  UB Dressing: Mod I  LB Dressing: Mod I  Toileting: Mod I  Bathing: Mod I    IADL's:  Homecare: Mod A  Cooking: D  Laundry: D  Yard work: D  Use of telephone: Mod I but limited by expressive aphasia   Money management: Mod A  Medication management: Mod I  Handwriting:Mod A using non - dominant hand      CMS Impairment/Limitation/Restriction for FOTO Stroke Upper Extremity Survey  Status Limitation G-Code CMS Severity Modifier  Intake 48% 52% Current Status CK - At least 40 percent but less than 60 percent  Predicted 54% 46% Goal Status+ CK - At least 40 percent but less than 60 percent  D/C Status CK **only report if this is a one time visit  +Based on FOTO predicted change score       Treatment     Treatment Time In: 1435  Treatment Time Out: 1445  Total Treatment time separate from Evaluation time:10 minutes    Torito participated in dynamic functional therapeutic activities to improve functional performance for 10  minutes, including:   AAROM to R UE and FM skills training with use of small objects for grasp and release    Home Exercises and Patient Education Provided    Education provided:   -role of OT, goals for OT, scheduling/cancellations, insurance limitations with patient.      Assessment     Torito Harris is a 59 y.o. male referred to outpatient occupational therapy and presents with a medical diagnosis of L CVA, resulting in decreased flexibility, decreased range of motion, decreased muscle strength, impaired function and decreased work ability and demonstrates limitations as  described in the chart below. Following medical record review it is determined that patient will benefit from occupational therapy services in order to maximize pain free and/or functional use of right UE and hand.Since his last assessment in February 2020, he has lost ROM in his R UE.     Patient prognosis is Good due to high motivation and good family support.  Patient will benefit from skilled outpatient Occupational Therapy to address the deficits stated above and in the chart below, provide patient/family education, and to maximize patient's level of independence.     Plan of care discussed with patient: Yes  Patient's spiritual, cultural and educational needs considered and patient is agreeable to the plan of care and goals as stated below:     Anticipated Barriers for therapy: None noted    Medical Necessity is demonstrated by the following  Profile and History Assessment of Occupational Performance Level of Clinical Decision Making Complexity Score   Occupational Profile:   Torito Harris is a 59 y.o. male who lives with their spouse and is currently disability. Torito Harris has difficulty with  Use of his dominant hand  driving/transportation management, phone/computer use and housework/household chores  affecting his/her daily functional abilities. His/her main goal for therapy is to improve the functional use of his dominant R UE and hand.     Comorbidities:   history of CVA    Medical and Therapy History Review:   Brief               Performance Deficits    Physical:  Joint Mobility  Joint Stability  Muscle Power/Strength  Muscle Endurance  Control of Voluntary Movement   Strength  Gross Motor Coordination  Fine Motor Coordination    Cognitive:  Communication    Psychosocial:    No Deficits     Clinical Decision Making:  moderate    Assessment Process:  Problem-Focused Assessments    Modification/Need for Assistance:  Minimal-Moderate Modifications/Assistance    Intervention Selection:  Limited  Treatment Options       moderate  Based on PMHX, co morbidities , data from assessments and functional level of assistance required with task and clinical presentation directly impacting function.       The following goals were discussed with the patient and patient is in agreement with them as to be addressed in the treatment plan.     Goals:  Short Term Goals: 3 weeks   ROM/Strength to perform the ADL's and functional activities listed below,   Pt will improve functional  strength needed for tasks to 25# in R hand.  Pt will improve AROM for shoulder flexion needed for functional tasks to 155* in R UE.  Simple meal prep will improve to Min A   Pt will be Independent with HEP to improve ROM and FM skills.      Long Term Goals: 8 weeks   ROM/Strength to perform the ADL's and functional activities listed below  Pt will improve functional  strength needed for tasks to 30# in R hand.  Pt will improve AROM for shoulder flexion needed for functional tasks to 165* in R UE.   Simple meal prep will improve to Mod I   Pt will complete clinical assessment for skills needed for the IADL of driving.  G code self care CJ    Plan   Certification Period/Plan of care expiration: 7/6/2020 to 9/9/2020.    Outpatient Occupational Therapy 1 times weekly for 8 weeks to include the following interventions: Patient Education, Self Care, Therapeutic Activites and Therapeutic Exercise.    TIAGO Carlisle      I certify the need for these services furnished under this plan of treatment and while under my care.  ____________________________________ Physician/Referring Practitioner   Date of Signature

## 2020-07-13 NOTE — PLAN OF CARE
"Outpatient Rehabilitation Therapy  Updated POC     Date: 7/6/2020   Name: Torito Harris  Clinic Number: [id number]    Therapy Diagnosis:   Encounter Diagnosis   Name Primary?    Broca's aphasia Yes     Physician: Joe Oswald PA    Physician Orders:[id number] - Ambulatory Referral to Speech Therapy (Order #[id number])  Medical Diagnosis:  I69.30 (ICD-10-CM) - Late effect of stroke, I69.328 (ICD-10-CM) - Speech or language deficit following cerebrovascular accident        Visit #/ Visits Authorized: 1/ 20 (previous authorization - 10, virtual visit - 1)  Date of Evaluation:  2/10/20  Insurance Authorization Period: 7/6/2020- 12/31/2020  Plan of Care Certification:    2/10/2020 to 04/10/2020  New POC Certification Period:  7/6/20 to 8/7/20    Total Visits Received: 11    Precautions:Standard and Fall     Subjective     Update: Torito is not progressing well towards his goals. He participated in a re-assessment today. The SCCAN was initiated and will be completed next session. Most of his verbal responses were "I can't" or "I don't know". Based on previous sessions, today's observations, and his wife's report, Torito is probably at maximum rehab potential at this time. He does not show consistent motivation to participate in activities to increase his cognitive-communicative skills. After long discussion today, he will participate in a few more sessions of speech therapy to determine if he is appropriate for skilled speech therapy.    Objective     Update: see follow up note dated 7/6/2020    Assessment     Update: Toirto Harris presents to [facility] s/p medical diagnosis of  stroke. Demonstrates impairments including limitations as described in the problem list. Positive prognostic factors include support system. Negative prognostic factors include lack of patient motivation, anxiety, and time from onset. He presents with severe Broca's aphasia characterized by decreased fluent " speech, significant word finding, auditory comprehension, and repetition.  Barriers to therapy include transportation and anxiety. Patient will benefit from skilled, outpatient neurological rehabilitation speech therapy.  Rehab Potential: fair     Education: Plan of Care, course of medical disease affect on therapy diagnosis , scheduling/ cancellation policy and insurance limitations / visit limit      Previous Short Term Goals Status: 4weeks  Short Term Goals: (4 weeks) Current Progress:   1. Pt will complete the Western Aphasia Battery test. Met/Discontinue    2. Pt will generate a response to basic sentence completions with 80% acc with min A.    60% ind'ly; 90% additional semantic and  phonemic cues   Met x 3 at the Carmen level/discontinue    3. Pt will repeat 3-5 word phrase/sentences with 80% acc with min A Not formally addressed      Met x2/continue   4. Pt will follow simple 3 unit commands with 80% acc ind'ly.   Not met/continue   5. Pt will participate in reading and writing assessment.      Met discontinue 4/30/20 Informally assessed with Tactus Therapy tasks.  Pt filled in missing 4 letters when naming pictures - 100% acc ind'ly.      Pt unscrambled letters of words after he wrote the words 3 times given a model with 100% acc ind'ly     Reading: Met x 1 previously  Writing: Met x1 /Discontinue   6. Pt will complete word finding task (i.e. Creating subject, verb, object pairs in VNEST protocol) with 90% acc min A to improve word fluency. Not met/continue   7. Pt will participate in MCST-A multimodal communication screening.  Attempted to complete MCST-A but pt was most responsive on the task Retelling a story using visual stimuli.  Goal met/discontinue   New Goal 3/16/20  8. Pt will generate a response to basic sentence completions with 80% acc ind'ly. Not met/continue      New Goal 7/6/20  9. Patient will participate in a cognitive assessment.  The SCCAN was initiated today and will be completed the next  session.   Not met/continue       New Short Term Goals: 4 weeks  Short Term Goals: (4 weeks) Current Progress:   1. Pt will repeat 3-5 word phrase/sentences with 80% acc with min A.    2. Pt will follow simple 3 unit commands with 80% acc ind'ly.       3. Pt will complete word finding task (i.e. Creating subject, verb, object pairs in VNEST protocol) with 90% acc min A to improve word fluency.    4. Pt will generate a response to basic sentence completions with 80% acc ind'ly.       5. Patient will participate in a cognitive assessment.     6. Patient will use multimodal communication to answer wh-questions with 90% acc min A to improve verbal expression.        Long Term Goal Status:  4 weeks  1. Pt will develop functional linguistic based skills and utilize compensatory strategies to communicate wants and needs effectively to different conversational partners, maintain safety, and participate socially in functional living environment.  progressing/not met  2. Pt will comprehend communication related to basic medical and social needs and utilize compensatory strategies to maintain safety and to participate socially in functional living environment. progressing/not met    Goals Previously Met:  1. Pt will complete the Western Aphasia Battery test.  2. Pt will generate a response to basic sentence completions with 80% acc with min A.  5. Pt will participate in reading and writing assessment.  7. Pt will participate in MCST-A multimodal communication screening.     Reasons for Recertification of Therapy:  decreased content words and significant word finding difficulty in all situations severely limiting functional communication with both familiar and unfamiliar communication partners to relay medically and safety relevant information in a timely manner in a state of emergency.     Plan     Updated Certification Period: 7/6/2020 to 8/7/20  Recommended Treatment Plan: Patient will participate in the [facility] neurological  rehabilitation program for speech therapy 1 time per week to address his  Communication and Cognition deficits, to educate patient and their family, and to participate in a home exercise program.     Other recommendations: none at this time     Therapist's Name:  SERINA Farnsworth, CCC-SLP, [facility]   7/6/2020      I CERTIFY THE NEED FOR THESE SERVICES FURNISHED UNDER THIS PLAN OF TREATMENT AND WHILE UNDER MY CARE      Physician Name: _______________________________    Physician Signature: ____________________________

## 2020-07-21 NOTE — PROGRESS NOTES
"Outpatient Neurological Rehabilitation   Speech and Language Therapy Treatment Note  Date:  7/23/2020     Name: Torito Harris   MRN: [id number]   Therapy Diagnosis:   Encounter Diagnosis   Name Primary?    Broca's aphasia Yes   Physician: Joe Oswald PA  Physician Orders:[id number] - Ambulatory Referral to Speech Therapy (Order #[id number])  Medical Diagnosis:  I69.30 (ICD-10-CM) - Late effect of stroke, I69.328 (ICD-10-CM) - Speech or language deficit following cerebrovascular accident      Visit #/ Visits Authorized: 2/ 20 (previous authorization - 10, virtual visit - 1)  Date of Evaluation:  2/10/20  Insurance Authorization Period: 7/6/2020- 12/31/2020  Plan of Care Certification:    2/10/2020 to 04/10/2020  Extended POC:  7/6/20 to 8/7/20  Progress Note:  8/6/20  Visits Cancelled: 0  Visits No Show: 0    Time In:  2:35   Time Out:  3:15   Total Billable Time: 49 min     Precautions: Standard, Fall and aphasia  Subjective:   Pt reports: "I can't say anything"   He was not compliant to home exercise program (HEP).   Response to previous treatment: n/a   Pain Scale:  0/10 on VAS currently.   Pain Location: n/a  Objective:        UNTIMED  Procedure Min.   Speech- Language- Voice Therapy  40        Total Timed Units: 0  Total Untimed Units: 1  Charges Billed/# of units: 1    Short Term Goals: (4 weeks) Current Progress:   1. Pt will complete the Western Aphasia Battery test. Met/Discontinue    2. Pt will generate a response to basic sentence completions with 80% acc with min A.      Met x 3 at the Carmen level/discontinue    3. Pt will repeat 3-5 word phrase/sentences with 80% acc with min A  Progressing/ Not Met 3/18/2020   Repeated 3-5 word phrases with 41% acc independently; when provided with MAX cues, pt with 100% accuracy     Met x2 previously   4. Pt will follow simple 3 unit commands with 80% acc ind'ly.     Progressing/ Not Met 3/18/2020   Not formally addressed       5. Pt will participate in reading " and writing assessment.      Met discontinue 4/30/20 Informally assessed with Tactus Therapy tasks.  Pt filled in missing 4 letters when naming pictures - 100% acc ind'ly.      Pt unscrambled letters of words after he wrote the words 3 times given a model with 100% acc ind'ly     Reading: Met x 1 previously  Writing: Met x1 /Discontinue   6. Pt will complete word finding task (i.e. Creating subject, verb, object pairs in VNEST protocol) with 90% acc min A to improve word fluency.  Progressing/ Not Met 3/18/2020   Verb Network Strength Training (VNEST) was introduced today. VNEST focuses on verbs, encouraging people to think of people who perform actions (agents) and the objects or the actions that are performed on (patients). The idea is that by focusing on verbs, which require connection to nouns, you can strengthen all the words in the mental network around the verb. In this therapy technique, there are 6 steps each verb is taken through.   Step 1: Pt provided 7/12  subjects and objects in verb triads IND'ly, 12/12  given MAX cues.   Step 2: Pt read triads of words aloud with 83% acc independently .  Step 3: pt expanded where with 0% acc indly, 100% acc given MAX cues, when with 0% acc, 100% acc given MAX cues, and why with 0% acc indly, 100% acc given MAX cues.   Step 4: pt identified whether sentences presented auditorily were correct or incorrect with 88% acc IND'ly.   Step 5: pt recalled the target verb with 50% acc IND'ly (1/2 ).  Step 6: not addressed     7. Pt will participate in MCST-A multimodal communication screening.        Goal met/discontinue   8. Pt will generate a response to basic sentence completions with 80% acc ind'ly.  Progressing/ Not Met 7/23/2020   Sentence competition tasks completed with 47% acc independently; 100% acc given MAX cues   9. Patient will participate in a cognitive assessment.   Progressing/ Not Met 7/23/2020   Not formally addressed       will be completed the next session.   "          Patient Education/Response:   Discussed word finding strategies and language tasks to partake in within his home environment. Patient verbalized understanding.     Written Home Exercises Provided: Patient instructed to cont prior HEP.  Exercises were reviewed and Torito was able to demonstrate them prior to the end of the session.  Torito demonstrated fair  understanding of the education provided.     Assessment:   Torito is not progressing well towards his goals. Pt with frequent statements of "I don't know" and " I can't" throughout session despite max encouragement. Pt required max cues to complete simple sentences. Required max cues to participate in VNEST protocol.  Current goals remain appropriate. Goals to be updated as necessary.     Pt prognosis is Fair. Pt will continue to benefit from skilled outpatient speech and language therapy to address the deficits listed in the problem list on initial evaluation, provide pt/family education and to maximize pt's level of independence in the home and community environment.   Medical necessity is demonstrated by the following IMPAIRMENTS:  Patient with few true words in all situations severely limiting functional communication with both familiar and unfamiliar communication partners.  Barriers to Therapy: transportation, timeframe from onset  Pt's spiritual, cultural and educational needs considered and pt agreeable to plan of care and goals.  Plan:   Continue POC with focus on receptive and expressive language through augmentative-alternative communication. Discharge probable at the end of POC.     SERINA Yang, CCC-SLP  Speech Language Pathologist   7/23/2020    "

## 2020-07-23 ENCOUNTER — CLINICAL SUPPORT (OUTPATIENT)
Dept: REHABILITATION | Facility: HOSPITAL | Age: 60
End: 2020-07-23
Payer: MEDICARE

## 2020-07-23 DIAGNOSIS — R47.01 BROCA'S APHASIA: Primary | ICD-10-CM

## 2020-07-23 DIAGNOSIS — R29.898 WEAKNESS OF RIGHT UPPER EXTREMITY: Primary | ICD-10-CM

## 2020-07-23 DIAGNOSIS — Z78.9 IMPAIRED MOBILITY AND ACTIVITIES OF DAILY LIVING: ICD-10-CM

## 2020-07-23 DIAGNOSIS — Z74.09 IMPAIRED MOBILITY AND ACTIVITIES OF DAILY LIVING: ICD-10-CM

## 2020-07-23 PROCEDURE — 92507 TX SP LANG VOICE COMM INDIV: CPT | Mod: PO | Performed by: SPEECH-LANGUAGE PATHOLOGIST

## 2020-07-23 PROCEDURE — 97530 THERAPEUTIC ACTIVITIES: CPT | Mod: PO

## 2020-07-23 PROCEDURE — 97110 THERAPEUTIC EXERCISES: CPT | Mod: PO

## 2020-07-23 NOTE — PROGRESS NOTES
"  Occupational Therapy Treatment Note     Date: 7/23/2020  Name: Torito Harris  [facility] Number: [id number]    Therapy Diagnosis:   Encounter Diagnoses   Name Primary?    Weakness of right upper extremity Yes    Impaired mobility and activities of daily living      Physician: Joe Oswald PA    Physician Orders: Evaluate and treat   Medical Diagnosis: Late effect of stroke  Date of Onset: 12/6/2017  Evaluation Date: 7/6/2020  Insurance Authorization Period Expiration: 12/31/2020  Plan of Care Certification Period: 7/6/2020 to 9/9/2020    Visit # / Visits authorized: 2 / 20  Time In: 1532  Time Out: 1615  Total Billable Time: 43 minutes    Precautions:  Standard      Subjective     Pt reports: "I use my right arm all day long"  Response to previous treatment: none noted  Functional change: none noted    Involved Side: Right  Dominant Side: Right    Pain: 0/10 on current   Functional Pain Scale Rating 0-10:   0/10 on average  n/a/10 at best  4/10 at worst  Location: R shoulder during PROM  Description: Aching    Objective     Torito received therapeutic exercises for 15 minutes including:  - AAROM for right scapular elevation and retraction x10 reps and scapular protraction and depression stretch x5 reps  - AROM against mod resistance for right elbow flexion/extension x5 reps; Spasticity noted during elbow extension (Modified Elmer: 1+)  - Brief wrist stretches to prepare for therapeutic activities including wrist flex/ext, UD/RD, and sup/pro  - R shoulder flexion AROM: 135*    Torito participated in dynamic functional therapeutic activities to improve functional performance for 28 minutes, including:  Pt performed the following activities standing at counter using right upper extremity with therapist facilitation for ER, proper thumb and forearm positioning for grasp/release, and shoulder flexion   - Grasp/release 10 clothespins   - ER with pt focused on keeping forearm in neutral and IR with pt focused " "on pronating forearm and placing onto counter x10 reps  - Picking up and placing 5 medium size PVC pipe pieces and placing them on "pipe tree" x2 trials     - Seated in chair pt grasped medium sized PVC pipe piece and used elbow flexion/extension to bring towards mouth and down to knee to promote increased independence with feeding and grooming/hygiene.     Pt provided with seated rest breaks throughout session as needed.     Home Exercises and Education Provided     Education provided:   - Use right hand as much as possible with functional daily tasks   - Progress towards goals     Assessment   Torito Harris is a 59 y.o. male referred to outpatient occupational therapy and presents with a medical diagnosis of L CVA, resulting in decreased flexibility, decreased range of motion, decreased muscle strength, impaired function and decreased work ability. Pt would continue to benefit from skilled occupational therapy services to maximize pain free and functional use of right UE and hand for increased performance with meaningful occupations.     Torito is progressing well towards his goals and there are no updates to goals at this time. Pt prognosis is Good.     Pt will continue to benefit from skilled outpatient occupational therapy to address the deficits listed in the problem list on initial evaluation provide pt/family education and to maximize pt's level of independence in the home and community environment.     Anticipated barriers to occupational therapy: None noted    Pt's spiritual, cultural and educational needs considered and pt agreeable to plan of care and goals.    Goals:  Short Term Goals: 3 weeks   ROM/Strength to perform the ADL's and functional activities listed below:   - Pt will improve functional  strength needed for tasks to 25# in R hand. ongoing  - Pt will improve AROM for shoulder flexion needed for functional tasks to 155* in R UE. ongoing  - Simple meal prep will improve to Min A " ongoing  - Pt will be Independent with HEP to improve ROM and FM skills. ongoing    Long Term Goals: 8 weeks   ROM/Strength to perform the ADL's and functional activities listed below:   - Pt will improve functional  strength needed for tasks to 30# in R hand. ongoing  - Pt will improve AROM for shoulder flexion needed for functional tasks to 165* in R UE. ongoing  - Simple meal prep will improve to Mod I ongoing  - Pt will complete clinical assessment for skills needed for the IADL of driving. ongoing  - G code self care CJ (discontinued)     Plan   Certification Period/Plan of care expiration: 7/6/2020 to 9/9/2020.     Outpatient Occupational Therapy 1 times weekly for 8 weeks to include the following interventions: Patient Education, Self Care, Therapeutic Activites and Therapeutic Exercise.      Dali Manzanares, OT

## 2020-07-30 ENCOUNTER — CLINICAL SUPPORT (OUTPATIENT)
Dept: REHABILITATION | Facility: HOSPITAL | Age: 60
End: 2020-07-30
Payer: MEDICARE

## 2020-07-30 DIAGNOSIS — R47.01 BROCA'S APHASIA: Primary | ICD-10-CM

## 2020-07-30 PROCEDURE — 92507 TX SP LANG VOICE COMM INDIV: CPT | Mod: PO | Performed by: SPEECH-LANGUAGE PATHOLOGIST

## 2020-07-30 NOTE — PLAN OF CARE
Outpatient Therapy Discharge Summary   Discharge Date: 7/30/2020   Name: Torito Harris  [facility] Number: [id number]  Therapy Diagnosis:   Encounter Diagnosis   Name Primary?    Broca's aphasia Yes     Physician: Joe Oswald PA  Physician Orders:[id number] - Ambulatory Referral to Speech Therapy (Order #[id number])  Medical Diagnosis:  I69.30 (ICD-10-CM) - Late effect of stroke, I69.328 (ICD-10-CM) - Speech or language deficit following cerebrovascular accident  Evaluation Date: 2/10/2020    Date of Last visit: 7/30/2020   Total Visits Received: 14  Cancelled Visits: 0  No Show Visits: 1    Assessment    Assessment of Current Status: Pt with minimal significant functional progress and minimal carryover in his home environement. He is not compliant with his home exercise program.  He does not appear to be motivated to participate in speech therapy.     Goals:   Short Term Goals: (4 weeks) Current Progress:   1. Pt will complete the Western Aphasia Battery test. Met/Discontinue    2. Pt will generate a response to basic sentence completions with 80% acc with min A.    Goal Met / Discontinue      3. Pt will repeat 3-5 word phrase/sentences with 80% acc with min A   Goal Not Met / Discontinue      4. Pt will follow simple 3 unit commands with 80% acc ind'ly.      Goal Not Met / Discontinue      5. Pt will participate in reading and writing assessment.       Goal Met / Discontinue     6. Pt will complete word finding task (i.e. Creating subject, verb, object pairs in VNEST protocol) with 90% acc min A to improve word fluency.   Goal Not Met / Discontinue      7. Pt will participate in MCST-A multimodal communication screening.        Goal met/discontinue   8. Pt will generate a response to basic sentence completions with 80% acc ind'ly.   Goal Not Met / Discontinue        Long Term Goals:  1. Pt will develop functional linguistic based skills and utilize compensatory strategies to communicate wants and needs  effectively to different conversational partners, maintain safety, and participate socially in functional living environment. Goal Not Met / Discontinue    2. Pt will comprehend communication related to basic medical and social needs and utilize compensatory strategies to maintain safety and to participate socially in functional living environment. Goal Not Met / Discontinue         CM NOMS (National Outcome Measure System)  Spoken Language Expression  LEVEL 3 The communication partner must assume responsibility for structuring thecommunication exchange, and with consistent and moderate cueing, the individual canproduce words and phrases that are appropriate and meaningful in context.       Spoken Language Comprehension  LEVEL 3: The individual usually responds accurately to simple yes/no questions. The individual is able to follow simple directions out of context, although moderate cueing is consistently needed. Accurate comprehension of more complex directions/messages is infrequent.       Discharge reason: Patient has reached the maximum rehab potential for the present time    Plan   This patient is discharged from Speech Therapy    SERINA Yang, CCC-SLP  Speech Language Pathologist   7/30/2020

## 2020-07-30 NOTE — PROGRESS NOTES
"Outpatient Neurological Rehabilitation   Speech and Language Therapy Treatment Note  Date:  7/30/2020     Name: Torito Harris   MRN: [id number]   Therapy Diagnosis:   Encounter Diagnosis   Name Primary?    Broca's aphasia Yes   Physician: Joe Oswald PA  Physician Orders:[id number] - Ambulatory Referral to Speech Therapy (Order #[id number])  Medical Diagnosis:  I69.30 (ICD-10-CM) - Late effect of stroke, I69.328 (ICD-10-CM) - Speech or language deficit following cerebrovascular accident      Visit #/ Visits Authorized: 3/20 (previous authorization - 10, virtual visit - 1)  Date of Evaluation:  2/10/20  Insurance Authorization Period: 7/6/2020- 12/31/2020  Plan of Care Certification:    2/10/2020 to 04/10/2020  Extended POC:  7/6/20 to 8/7/20  Progress Note:  8/6/20  Visits Cancelled: 0  Visits No Show: 0    Time In:  2:30   Time Out:  3:15   Total Billable Time: 45  min     Precautions: Standard, Fall and aphasia  Subjective:   Pt reports: "good". Pt with minimal response to ST's many prompts/cues to initiate conversation. Pt appeared to be upset that today was his discharge. SLP familiar to patient entered the room to discuss reason for d/c and home practice to engage in. SLP spoke with pt's wife regarding discharge plan. Pt then agreeable to engaging in therapy activities with SLP.  He was not compliant to home exercise program (HEP).   Response to previous treatment: n/a   Pain Scale:  0/10 on VAS currently.   Pain Location: n/a  Objective:        UNTIMED  Procedure Min.   Speech- Language- Voice Therapy 45         Total Timed Units: 0  Total Untimed Units: 1  Charges Billed/# of units: 1    Short Term Goals: (4 weeks) Current Progress:   1. Pt will complete the Western Aphasia Battery test. Met/Discontinue    2. Pt will generate a response to basic sentence completions with 80% acc with min A.      Met x 3 at the Carmen level/discontinue    3. Pt will repeat 3-5 word phrase/sentences with 80% acc with min " A  Progressing/ Not Met 3/18/2020   Repeated 3-5 word phrases with 75% acc independently; when provided with MAX cues, pt with 100% accuracy     Met x2 previously   4. Pt will follow simple 3 unit commands with 80% acc ind'ly.     Progressing/ Not Met 3/18/2020   50% acc independently; 80% acc given multiple repetitions; 100% acc given multiple repetitions and gesture cues   5. Pt will participate in reading and writing assessment.      Met discontinue 4/30/20 Informally assessed with [employer] tasks.  Pt filled in missing 4 letters when naming pictures - 100% acc ind'ly.      Pt unscrambled letters of words after he wrote the words 3 times given a model with 100% acc ind'ly     Reading: Met x 1 previously  Writing: Met x1 /Discontinue   6. Pt will complete word finding task (i.e. Creating subject, verb, object pairs in VNEST protocol) with 90% acc min A to improve word fluency.  Progressing/ Not Met 3/18/2020   Not formally addressed     7. Pt will participate in MCST-A multimodal communication screening.        Goal met/discontinue   8. Pt will generate a response to basic sentence completions with 80% acc ind'ly.  Progressing/ Not Met 7/30/2020   Sentence competition tasks completed with 60% acc independently; 100% acc given MAX cues   9. Patient will participate in a cognitive assessment.    Goal Not Met / Discontinue              Patient Education/Response:   Discussed reason for discharge being that pt is not consistent with home practice and minimal progress has been made. Discussed [employer] myriam that the patient has purchased- discussed various language activities that he can engage in at home to maintain his speech/language skills. Patient verbalized understanding by nodding his head.     Written Home Exercises Provided: Patient instructed to cont prior HEP.  Exercises were reviewed and Torito was able to demonstrate them prior to the end of the session.  Torito demonstrated fair  understanding of  the education provided.     Assessment:   Torito is not progressing well towards his goals. Minimal progress has been noted and the patient has not followed through on recommendations as made by his speech therapists. He has not been compliant to his home exercise program.  Pt to be discharged from speech therapy.    Medical necessity is demonstrated by the following IMPAIRMENTS:  Patient with few true words in all situations severely limiting functional communication with both familiar and unfamiliar communication partners.  Barriers to Therapy: transportation, timeframe from onset  Pt's spiritual, cultural and educational needs considered and pt agreeable to plan of care and goals.  Plan:   Pt is discharged from speech therapy    SERINA Yang, CCC-SLP  Speech Language Pathologist   7/30/2020

## 2020-08-03 ENCOUNTER — CLINICAL SUPPORT (OUTPATIENT)
Dept: REHABILITATION | Facility: HOSPITAL | Age: 60
End: 2020-08-03
Payer: MEDICARE

## 2020-08-03 DIAGNOSIS — R29.898 WEAKNESS OF RIGHT UPPER EXTREMITY: Primary | ICD-10-CM

## 2020-08-03 DIAGNOSIS — Z78.9 IMPAIRED MOBILITY AND ACTIVITIES OF DAILY LIVING: ICD-10-CM

## 2020-08-03 DIAGNOSIS — Z74.09 IMPAIRED MOBILITY AND ACTIVITIES OF DAILY LIVING: ICD-10-CM

## 2020-08-03 PROCEDURE — 97530 THERAPEUTIC ACTIVITIES: CPT | Mod: PO | Performed by: OPTOMETRIST

## 2020-08-31 ENCOUNTER — CLINICAL SUPPORT (OUTPATIENT)
Dept: REHABILITATION | Facility: HOSPITAL | Age: 60
End: 2020-08-31
Payer: MEDICARE

## 2020-08-31 DIAGNOSIS — R29.898 WEAKNESS OF RIGHT UPPER EXTREMITY: ICD-10-CM

## 2020-08-31 PROCEDURE — 97112 NEUROMUSCULAR REEDUCATION: CPT | Mod: PO

## 2020-08-31 NOTE — PROGRESS NOTES
Occupational Therapy Treatment Note     Date: 8/31/2020  Name: Torito Harris  [facility] Number: [id number]    Therapy Diagnosis: R arm weakness  Physician: Joe Oswald PA    Physician Orders: Evaluate and treat   Medical Diagnosis: Late effect of stroke  Date of Onset: 12/6/2017  Evaluation Date: 7/6/2020  Insurance Authorization Period Expiration: 12/31/2020  Plan of Care Certification Period: 7/6/2020 to 9/9/2020. Extend to 9/23/2020 due to missed visits.     Visit # / Visits authorized: 4 / 20  Time In: 1308  Time Out: 1346  Total Billable Time: 38 minutes    Precautions:  Standard      Subjective     Pt reports: He missed visits due to bad weather last weak. Trying to use his R hand as much as he can but not doing HEP.   Response to previous treatment: Pt wants to have more therapy than once a week but he needs to do HEP.   Functional change: none noted    Involved Side: Right  Dominant Side: Right    Pain: 0/10 on current   Functional Pain Scale Rating 0-10:   0/10 on average  0/10 at best  0/10 at worst  Location: R shoulder during PROM  Description: Aching    Objective      Pt then ambulated to gym area with quad cane. Torito participated in dynamic functional neuro joseluis for tone normalization and RUE control to improve functional performance for 38  minutes, including:  - Mobilization of palm of R hand for stretching and metacarpal flexion and extension    - General wrist stretches including 1. Scaphoid on radius 2. Increasing mobility of metacarpals 3. Carpal rolls 4. Increasing mobility towards radial deviation 5. Increasing mobility of wrist towards extension 6. Increasing mobility of wrist towards supination/pronation  - R wrist assisted to end ranges for flexion and extension with no pain noted.  -  RUE Scapular mobilization for elevation, depression, adduction and protraction with WB tasks.   - Assisted shoulder flexion from sitting at EOM combined with reaching for targets in various  planes  - Reach and grasp with RUE for balls with grasp and place lateral, across midline and forward with min a to keep R hand pronated.   - In stand, reaching with RUE to grasp and release.     Home Exercises and Education Provided     Education provided:   - Use right hand as much as possible with functional daily tasks   - Progress towards goals   Pt. Educated along with wife on need to work 4 hours a day on opening and closing R hand with grasp and release of various items- balls, rolled up socks, plastic cups, etc.   Assessment   Torito Harris is a 59 y.o. male referred to outpatient occupational therapy and presents with a medical diagnosis of L CVA, resulting in decreased flexibility, decreased range of motion, decreased muscle strength, impaired function and decreased work ability. He is motivated to improve and wants to attend more sessions but is limited to 1 x per week to encourage HEP completion.   Pt would continue to benefit from skilled occupational therapy services to maximize pain free and functional use of right UE and hand for increased performance with meaningful occupations.     Torito is progressing well towards his goals and there are no updates to goals at this time. Pt prognosis is Good.     Pt will continue to benefit from skilled outpatient occupational therapy to address the deficits listed in the problem list on initial evaluation provide pt/family education and to maximize pt's level of independence in the home and community environment.     Anticipated barriers to occupational therapy: None noted    Pt's spiritual, cultural and educational needs considered and pt agreeable to plan of care and goals.    Goals:  Short Term Goals: 3 weeks   ROM/Strength to perform the ADL's and functional activities listed below:   - Pt will improve functional  strength needed for tasks to 25# in R hand. ongoing  - Pt will improve AROM for shoulder flexion needed for functional tasks to 155* in R  UE. ongoing  - Simple meal prep will improve to Min A ongoing  - Pt will be Independent with HEP to improve ROM and FM skills. ongoing    Long Term Goals: 8 weeks   ROM/Strength to perform the ADL's and functional activities listed below:   - Pt will improve functional  strength needed for tasks to 30# in R hand. ongoing  - Pt will improve AROM for shoulder flexion needed for functional tasks to 165* in R UE. ongoing  - Simple meal prep will improve to Mod I ongoing  - Pt will complete clinical assessment for skills needed for the IADL of driving. ongoing  - G code self care CJ (discontinued)     Plan   Certification Period/Plan of care expiration: 7/6/2020 to 9/9/2020. Extend to 9/23/2020 due to missed visits.      Outpatient Occupational Therapy 1 times weekly for 8 weeks to include the following interventions: Patient Education, Self Care, Therapeutic Activities and Therapeutic Exercise.      Aury Mead, OT

## 2020-09-09 ENCOUNTER — CLINICAL SUPPORT (OUTPATIENT)
Dept: REHABILITATION | Facility: HOSPITAL | Age: 60
End: 2020-09-09
Payer: MEDICARE

## 2020-09-09 DIAGNOSIS — Z74.09 IMPAIRED MOBILITY AND ACTIVITIES OF DAILY LIVING: Primary | ICD-10-CM

## 2020-09-09 DIAGNOSIS — R29.898 WEAKNESS OF RIGHT UPPER EXTREMITY: ICD-10-CM

## 2020-09-09 DIAGNOSIS — Z78.9 IMPAIRED MOBILITY AND ACTIVITIES OF DAILY LIVING: Primary | ICD-10-CM

## 2020-09-09 PROCEDURE — 97112 NEUROMUSCULAR REEDUCATION: CPT | Mod: PO | Performed by: OPTOMETRIST

## 2020-09-10 NOTE — PROGRESS NOTES
Occupational Therapy Treatment Note     Date: 9/9/2020  Name: Torito Harris  [facility] Number: [id number]    Therapy Diagnosis: R arm weakness  Physician: Joe Oswald PA    Physician Orders: Evaluate and treat   Medical Diagnosis: Late effect of stroke  Date of Onset: 12/6/2017  Evaluation Date: 7/6/2020  Insurance Authorization Period Expiration: 12/31/2020  Plan of Care Certification Period: 7/6/2020 to 9/9/2020. Extend to 9/23/2020 due to missed visits.     Visit # / Visits authorized: 5 / 20  Time In: 1700  Time Out: 1755  Total Billable Time: 55 minutes    Precautions:  Standard      Subjective     Pt reports: He wants to be able to use his R hand for more tasks.  Response to previous treatment: Pt wants to have more therapy than once a week but he needs to do HEP.   Functional change: none noted    Involved Side: Right  Dominant Side: Right    Pain: 0/10 on current   Functional Pain Scale Rating 0-10:   0/10 on average  0/10 at best  0/10 at worst  Location: R shoulder during PROM  Description: Aching    Objective      Pt then ambulated to gym area with quad cane. Torito participated in dynamic functional neuro joseluis for tone normalization and RUE control to improve functional performance for 55  minutes, including:  - Mobilization of palm of R hand for stretching and metacarpal flexion and extension    - General wrist stretches including 1. Scaphoid on radius 2. Increasing mobility of metacarpals 3. Carpal rolls 4. Increasing mobility towards radial deviation 5. Increasing mobility of wrist towards extension 6. Increasing mobility of wrist towards supination/pronation  - R wrist assisted to end ranges for flexion and extension with no pain noted.  -  RUE Scapular mobilization for elevation, depression, adduction and protraction with WB tasks.   - Assisted shoulder flexion from sitting at EOM combined with reaching for targets in various planes  - Reach and grasp with RUE for soft objects with grasp  and place lateral, across midline and forward with min a to keep R hand pronated.   - Simple assisted grasp and release of large pipe sleeves for placement on a post but his needed assist for control and for wrist extension for task performed in both sitting at standing.  - From sitting bilateral task of pulling apart and then replacement of stacks of cups promoting use of his R UE in a simple functional task. Upon leaving he was accompanied to the waiting area where his wife was seated and she was shown this activity to perform at home. She expressed understanding and state that she has cups at home that he can use.     Home Exercises and Education Provided     Education provided:   - Use right hand as much as possible with functional daily tasks   - Progress towards goals   Pt. Educated along with wife on need to work 4 hours a day on opening and closing R hand with grasp and release of various items- balls, rolled up socks, plastic cups, etc.   Assessment   Torito Harris is a 59 y.o. male referred to outpatient occupational therapy and presents with a medical diagnosis of L CVA, resulting in decreased flexibility, decreased range of motion, decreased muscle strength, impaired function and decreased work ability. He is motivated to improve and wants to attend more sessions but is limited to 1 x per week to encourage him to do more of his HEP. A new task to do at home was given to his spouse that she stated she will follow up with him.   Pt would continue to benefit from skilled occupational therapy services to maximize pain free and functional use of right UE and hand for increased performance with meaningful occupations.     Torito is progressing well towards his goals and there are no updates to goals at this time. Pt prognosis is Good.     Pt will continue to benefit from skilled outpatient occupational therapy to address the deficits listed in the problem list on initial evaluation provide pt/family  education and to maximize pt's level of independence in the home and community environment.     Anticipated barriers to occupational therapy: None noted    Pt's spiritual, cultural and educational needs considered and pt agreeable to plan of care and goals.    Goals:  Short Term Goals: 3 weeks   ROM/Strength to perform the ADL's and functional activities listed below:   - Pt will improve functional  strength needed for tasks to 25# in R hand. ongoing  - Pt will improve AROM for shoulder flexion needed for functional tasks to 155* in R UE. ongoing  - Simple meal prep will improve to Min A ongoing  - Pt will be Independent with HEP to improve ROM and FM skills. ongoing    Long Term Goals: 8 weeks   ROM/Strength to perform the ADL's and functional activities listed below:   - Pt will improve functional  strength needed for tasks to 30# in R hand. ongoing  - Pt will improve AROM for shoulder flexion needed for functional tasks to 165* in R UE. ongoing  - Simple meal prep will improve to Mod I ongoing  - Pt will complete clinical assessment for skills needed for the IADL of driving. ongoing  - G code self care CJ (discontinued)     Plan   Certification Period/Plan of care expiration: 7/6/2020 to 9/9/2020. Extend to 9/23/2020 due to missed visits.      Outpatient Occupational Therapy 1 times weekly for 8 weeks to include the following interventions: Patient Education, Self Care, Therapeutic Activities and Therapeutic Exercise.      TIAGO Carlisle

## 2020-09-16 ENCOUNTER — DOCUMENTATION ONLY (OUTPATIENT)
Dept: REHABILITATION | Facility: HOSPITAL | Age: 60
End: 2020-09-16

## 2020-09-17 NOTE — PROGRESS NOTES
No Show Note/Documentation    Patient: Torito Harris  Date of Session: 9/16/2020  Diagnosis: No diagnosis found.  MRN: [id number]    Torito Harris did not attend his/her scheduled therapy appointment today. He did not call to cancel nor reschedule. This is the second appointment that he has not attended. Next appointment is scheduled for 9/23/2020 will follow up with patient at that time. No charges have been posted today.     TIAGO Carlisle

## 2020-09-23 ENCOUNTER — DOCUMENTATION ONLY (OUTPATIENT)
Dept: REHABILITATION | Facility: HOSPITAL | Age: 60
End: 2020-09-23

## 2020-09-24 NOTE — PROGRESS NOTES
.No Show Note/Documentation    Patient: Torito Harris  Date of Session: 9/23/2020  Diagnosis: No diagnosis found.  MRN: [id number]    Torito Harris did not attend his/her scheduled therapy appointment today. 9/23/2020 did not call to cancel nor reschedule. This is the third appointment that he has not attended. No future appointments are scheduled. No charges have been posted today.     TIAGO Carlisle

## 2020-11-09 ENCOUNTER — DOCUMENTATION ONLY (OUTPATIENT)
Dept: REHABILITATION | Facility: HOSPITAL | Age: 60
End: 2020-11-09

## 2020-11-10 NOTE — PROGRESS NOTES
Outpatient Occupational Therapy Discharge Summary      Name: Torito Harris  Referring Physician: No ref. provider found   Initial visit: 7/6/2020  Date of Last visit: 9/9/2020  Total Visits Received: 5  Missed Visits: 2        Goals Not achieved and why: Pt missed last 2 visits and did not call or reschedule.    Goals:  Short Term Goals: 3 weeks   ROM/Strength to perform the ADL's and functional activities listed below:   - Pt will improve functional  strength needed for tasks to 25# in R hand.   - Pt will improve AROM for shoulder flexion needed for functional tasks to 155* in R UE.   - Simple meal prep will improve to Min A   - Pt will be Independent with HEP to improve ROM and FM skills.      Long Term Goals: 8 weeks   ROM/Strength to perform the ADL's and functional activities listed below:   - Pt will improve functional  strength needed for tasks to 30# in R hand.  - Pt will improve AROM for shoulder flexion needed for functional tasks to 165* in R UE. ongoing  - Simple meal prep will improve to Mod I   - Pt will complete clinical assessment for skills needed for the IADL of driving.      Discharge reason:    Non compliance with attendance    Discharged by patient    Pt has not rescheduled further follow up sessions.      Plan:    This patient is discharged from Occupational Therapy Services.     TIAGO Carlisle

## 2025-02-06 NOTE — PROGRESS NOTES
PT/PTA met face to face to discuss pt's treatment plan and progress towards established goals. Continue with current PT POC, including: RLE strengthening and functional movement in standing. Pt will be seen by physical therapist at least every 6th treatment day or every 30 days, whichever occurs first.      Jeff Solis, PTA  8/06/19  
Refer to the Assessment tab to view/cancel completed assessment.